# Patient Record
Sex: MALE | Race: WHITE | Employment: OTHER | URBAN - METROPOLITAN AREA
[De-identification: names, ages, dates, MRNs, and addresses within clinical notes are randomized per-mention and may not be internally consistent; named-entity substitution may affect disease eponyms.]

---

## 2016-12-01 LAB
ALBUMIN SERPL-MCNC: 4.2 G/DL
ALP SERPL-CCNC: 73 U/L
ALT SERPL-CCNC: 12 U/L
ANION GAP SERPL CALCULATED.3IONS-SCNC: NORMAL MMOL/L
AST SERPL-CCNC: 16 U/L
BILIRUB SERPL-MCNC: 0.7 MG/DL
BUN SERPL-MCNC: 18 MG/DL
CALCIUM SERPL-MCNC: 9.2 MG/DL
CHLORIDE SERPLBLD-SCNC: 104 MMOL/L
CO2 SERPL-SCNC: 24 MMOL/L
CREAT SERPL-MCNC: 1.52 MG/DL
GFR SERPL CREATININE-BSD FRML MDRD: 42 ML/MIN/1.73M2
GLUCOSE SERPL-MCNC: 101 MG/DL (ref 70–99)
POTASSIUM SERPL-SCNC: 4.8 MMOL/L
PROT SERPL-MCNC: 6.4 G/DL
SODIUM SERPL-SCNC: 143 MMOL/L

## 2017-01-11 DIAGNOSIS — I48.0 PAROXYSMAL ATRIAL FIBRILLATION (H): Primary | ICD-10-CM

## 2017-01-11 RX ORDER — DABIGATRAN ETEXILATE 150 MG/1
150 CAPSULE ORAL 2 TIMES DAILY
Qty: 60 CAPSULE | Refills: 0 | Status: SHIPPED | OUTPATIENT
Start: 2017-01-11 | End: 2017-01-27

## 2017-01-11 RX ORDER — FLECAINIDE ACETATE 50 MG/1
TABLET ORAL
Qty: 270 TABLET | Refills: 0 | Status: SHIPPED | OUTPATIENT
Start: 2017-01-11 | End: 2017-01-27

## 2017-01-26 DIAGNOSIS — I10 HYPERTENSION, ESSENTIAL: ICD-10-CM

## 2017-01-26 DIAGNOSIS — E78.5 HYPERLIPIDEMIA: Primary | ICD-10-CM

## 2017-01-27 ENCOUNTER — OFFICE VISIT (OUTPATIENT)
Dept: CARDIOLOGY | Facility: CLINIC | Age: 82
End: 2017-01-27
Attending: INTERNAL MEDICINE
Payer: MEDICARE

## 2017-01-27 ENCOUNTER — TELEPHONE (OUTPATIENT)
Dept: CARDIOLOGY | Facility: CLINIC | Age: 82
End: 2017-01-27

## 2017-01-27 VITALS
SYSTOLIC BLOOD PRESSURE: 120 MMHG | HEART RATE: 60 BPM | BODY MASS INDEX: 26.51 KG/M2 | WEIGHT: 179 LBS | DIASTOLIC BLOOD PRESSURE: 54 MMHG | HEIGHT: 69 IN

## 2017-01-27 DIAGNOSIS — E78.2 MIXED HYPERLIPIDEMIA: ICD-10-CM

## 2017-01-27 DIAGNOSIS — I10 ESSENTIAL HYPERTENSION: ICD-10-CM

## 2017-01-27 DIAGNOSIS — I48.0 PAROXYSMAL ATRIAL FIBRILLATION (H): Primary | ICD-10-CM

## 2017-01-27 DIAGNOSIS — I25.810 CORONARY ARTERY DISEASE INVOLVING CORONARY BYPASS GRAFT OF NATIVE HEART WITHOUT ANGINA PECTORIS: ICD-10-CM

## 2017-01-27 DIAGNOSIS — E78.5 HYPERLIPIDEMIA: ICD-10-CM

## 2017-01-27 DIAGNOSIS — I10 HYPERTENSION, ESSENTIAL: ICD-10-CM

## 2017-01-27 LAB
ALT SERPL W P-5'-P-CCNC: <5 U/L (ref 5–30)
ANION GAP SERPL CALCULATED.3IONS-SCNC: 10.3 MMOL/L (ref 6–17)
BUN SERPL-MCNC: 22 MG/DL (ref 7–30)
CALCIUM SERPL-MCNC: 8.6 MG/DL (ref 8.5–10.5)
CHLORIDE SERPL-SCNC: 106 MMOL/L (ref 98–107)
CHOLEST SERPL-MCNC: 118 MG/DL
CO2 SERPL-SCNC: 23 MMOL/L (ref 23–29)
CREAT SERPL-MCNC: 1.77 MG/DL (ref 0.7–1.3)
ERYTHROCYTE [DISTWIDTH] IN BLOOD BY AUTOMATED COUNT: 13.4 % (ref 10–15)
GFR SERPL CREATININE-BSD FRML MDRD: 37 ML/MIN/1.7M2
GLUCOSE SERPL-MCNC: 116 MG/DL (ref 70–105)
HCT VFR BLD AUTO: 51 % (ref 40–53)
HDLC SERPL-MCNC: 43 MG/DL
HGB BLD-MCNC: 16.8 G/DL (ref 13.3–17.7)
LDLC SERPL CALC-MCNC: 53 MG/DL
MCH RBC QN AUTO: 29 PG (ref 26.5–33)
MCHC RBC AUTO-ENTMCNC: 32.9 G/DL (ref 31.5–36.5)
MCV RBC AUTO: 88 FL (ref 78–100)
NONHDLC SERPL-MCNC: 75 MG/DL
PLATELET # BLD AUTO: 171 10E9/L (ref 150–450)
POTASSIUM SERPL-SCNC: 4.3 MMOL/L (ref 3.5–5.1)
RBC # BLD AUTO: 5.8 10E12/L (ref 4.4–5.9)
SODIUM SERPL-SCNC: 135 MMOL/L (ref 136–145)
TRIGL SERPL-MCNC: 112 MG/DL
WBC # BLD AUTO: 7.8 10E9/L (ref 4–11)

## 2017-01-27 PROCEDURE — 80048 BASIC METABOLIC PNL TOTAL CA: CPT | Performed by: INTERNAL MEDICINE

## 2017-01-27 PROCEDURE — 84460 ALANINE AMINO (ALT) (SGPT): CPT | Performed by: INTERNAL MEDICINE

## 2017-01-27 PROCEDURE — 93005 ELECTROCARDIOGRAM TRACING: CPT | Performed by: INTERNAL MEDICINE

## 2017-01-27 PROCEDURE — 85027 COMPLETE CBC AUTOMATED: CPT | Performed by: INTERNAL MEDICINE

## 2017-01-27 PROCEDURE — 99214 OFFICE O/P EST MOD 30 MIN: CPT | Mod: 25 | Performed by: INTERNAL MEDICINE

## 2017-01-27 PROCEDURE — 36415 COLL VENOUS BLD VENIPUNCTURE: CPT | Performed by: INTERNAL MEDICINE

## 2017-01-27 PROCEDURE — 80061 LIPID PANEL: CPT | Performed by: INTERNAL MEDICINE

## 2017-01-27 RX ORDER — LISINOPRIL 10 MG/1
10 TABLET ORAL AT BEDTIME
COMMUNITY
End: 2017-01-27

## 2017-01-27 RX ORDER — EZETIMIBE 10 MG/1
10 TABLET ORAL DAILY
Qty: 90 TABLET | Refills: 3 | Status: SHIPPED | OUTPATIENT
Start: 2017-01-27 | End: 2017-02-20

## 2017-01-27 RX ORDER — PYRIDOSTIGMINE BROMIDE 60 MG/1
60 TABLET ORAL 2 TIMES DAILY PRN
COMMUNITY
End: 2017-11-09

## 2017-01-27 RX ORDER — ROSUVASTATIN CALCIUM 10 MG/1
10 TABLET, COATED ORAL DAILY
Qty: 90 TABLET | Refills: 4 | Status: SHIPPED | OUTPATIENT
Start: 2017-01-27 | End: 2017-11-09

## 2017-01-27 RX ORDER — LISINOPRIL 10 MG/1
10 TABLET ORAL AT BEDTIME
Qty: 90 TABLET | Refills: 3 | Status: SHIPPED | OUTPATIENT
Start: 2017-01-27 | End: 2017-02-10 | Stop reason: SINTOL

## 2017-01-27 RX ORDER — FLECAINIDE ACETATE 50 MG/1
TABLET ORAL
Qty: 270 TABLET | Refills: 3 | Status: SHIPPED | OUTPATIENT
Start: 2017-01-27 | End: 2017-03-06 | Stop reason: DRUGHIGH

## 2017-01-27 RX ORDER — DABIGATRAN ETEXILATE 150 MG/1
150 CAPSULE ORAL 2 TIMES DAILY
Qty: 90 CAPSULE | Refills: 11 | Status: SHIPPED | OUTPATIENT
Start: 2017-01-27 | End: 2017-11-09

## 2017-01-27 NOTE — Clinical Note
2017         Mike Cosby MD   [facility]    [address]    [address]   [place]  [address]      RE: Chava Valentine   MRN: [id number]   : 10/14/1933      Dear Mike:      I saw my good friend and colleague, Chava Valentine, today.  Chava is 83.  He has had chronic coronary artery disease and in  he had a four-vessel bypass.  His anatomy was he had a mammary artery to the LAD, saphenous vein grafts individually to the diagonal, to the obtuse marginal, and the distal RCA.  Since that time, he has not had angina and he has not had heart failure.  He has remained quietly active.  He has been slowed down by a vague pattern of muscular weakness.  He has had some colon issues and a tendency to loose stools, but all in all he is doing pretty well.  He has had paroxysmal atrial fibrillation and he has been under really delightful control with flecainide 100 mg in the morning and 50 mg at night.  With this, he has not had palpitations, dizziness or syncope.  He generally can appreciate when he is in atrial fibrillation and it usually is very short lived.  He says he can even convert himself if he does a vigorous Valsalva.      His current medications are:   1.  Pradaxa 150 mg b.i.d.   2.  Zetia 10 mg a day.   3.  Crestor 10 mg daily.   4.  Lisinopril 10 mg daily.   5.  Hydrochlorothiazide 25 mg p.r.n. which he only uses occasionally.   6.  Protonix 40 mg a day.      He also gets testosterone injections every 2 weeks.      This morning, I had him get a lipid profile.  His LDL was 53, triglycerides 112 and total cholesterol 118.  His creatinine was elevated at 1.77.  He was 1.66 2 years ago.  He has seen Aguilar Campos in the past and according to Chava, Aguilar was accepting of those levels.  I asked him to get another creatinine in a couple of weeks and I asked him to hydrate himself a little more consistently.      I also saqib a hemoglobin today.  He has had a tendency to  have high hemoglobins with the testosterone injections.  The last one I had in our chart was a hemoglobin of 17 grams.  We will see what the hemoglobin is today.      PHYSICAL EXAMINATION:     GENERAL:  The exam shows that his weight is down about 7 pounds from 2 years ago.     VITAL SIGNS:  Blood pressure 120/54, heart rate 60 beats a minute and regular.   HEAD:  Normal.   NECK:  Free of neck vein distention or bruits.   HEART:  Heart tones are regular without gallop or murmur.   LUNGS:  Clear.  Sternum is solid and well-healed.   ABDOMEN:  Flat without organomegaly, mass or pain.   EXTREMITIES:  Showed diminished dorsalis pedis pulses and +1 posterior tibial pulses.  He has diminished hair on both lower extremities, supporting some component of arterial disease.      His last stress nuclear study from 01/2015 showed an ejection fraction of 53%.  No obvious scars and no obvious ischemia.      This delightful man continues to be relatively asymptomatic from a cardiac standpoint.  I renewed the cardiac medicines noted above.  He was despondent a little bit that the Pradaxa is so expensive.  I really feel for him.  These drug companies are inordinately greedy and they have really priced themselves fixing the cost at around 300 dollars a month.  It is very disappointing to be a part of that.  I offered Coumadin.  He was not interested.      IMPRESSION:   1.  Asymptomatic coronary artery disease.   2.  Controlled hypertension.   3.  Moderate renal insufficiency.   4.  Controlled paroxysmal atrial fibrillation.   5.  GERD, but relatively quiet at this time.      PLAN:  Continue as we are.  He is one of my favorite people.  If he has any problems, please let me know.  Warm regards.      Sincerely,            Michael Bernard MD, [facility]

## 2017-01-27 NOTE — MR AVS SNAPSHOT
"              After Visit Summary   1/27/2017    Chava Valentine    MRN: [id number]           Patient Information     Date Of Birth          10/14/1933        Visit Information        Provider Department      1/27/2017 9:45 AM Michael Bernard MD [facility]        Today's Diagnoses     Paroxysmal atrial fibrillation (H)    -  1     Coronary artery disease involving coronary bypass graft of native heart without angina pectoris         Mixed hyperlipidemia         Essential hypertension            Follow-ups after your visit        Additional Services     Follow-Up with Cardiologist                 Future tests that were ordered for you today     Open Future Orders        Priority Expected Expires Ordered    Follow-Up with Cardiologist Routine 7/26/2017 1/27/2018 1/27/2017    Basic metabolic panel Routine 2/10/2017 1/27/2018 1/27/2017            Who to contact     If you have questions or need follow up information about today's clinic visit or your schedule please contact [facility] directly at [phone].  Normal or non-critical lab and imaging results will be communicated to you by SSEVhart, letter or phone within 4 business days after the clinic has received the results. If you do not hear from us within 7 days, please contact the clinic through SSEVhart or phone. If you have a critical or abnormal lab result, we will notify you by phone as soon as possible.  Submit refill requests through [email or web address] or call your pharmacy and they will forward the refill request to us. Please allow 3 business days for your refill to be completed.          Additional Information About Your Visit        SSEVhart Information     [email or web address] lets you send messages to your doctor, view your test results, renew your prescriptions, schedule appointments and more. To sign up, go to [email or web address] . Click on \"Log in\" on the left side of the screen, which " "will take you to the Welcome page. Then click on \"Sign up Now\" on the right side of the page.     You will be asked to enter the access code listed below, as well as some personal information. Please follow the directions to create your username and password.     Your access code is: [email or web address]  Expires: 2017 10:33 AM     Your access code will  in 90 days. If you need help or a new code, please call your [facility] clinic or [phone].        Care EveryWhere ID     This is your Care EveryWhere ID. This could be used by other organizations to access your [facility] medical records  [id number]        Your Vitals Were     Pulse Height BMI (Body Mass Index)             60 1.74 m (5' 8.5\") 26.82 kg/m2          Blood Pressure from Last 3 Encounters:   17 120/54   12/28/15 122/74   09/30/15 145/83    Weight from Last 3 Encounters:   17 81.194 kg (179 lb)   12/28/15 83.28 kg (183 lb 9.6 oz)   09/30/15 81.647 kg (180 lb)              We Performed the Following     EKG 12-lead complete w/read - Clinics (performed today)     Follow-Up with Cardiologist          Today's Medication Changes          These changes are accurate as of: 17 10:33 AM.  If you have any questions, ask your nurse or doctor.               These medicines have changed or have updated prescriptions.        Dose/Directions    hydrochlorothiazide 25 MG tablet   Commonly known as:  HYDRODIURIL   This may have changed:  additional instructions   Used for:  Essential hypertension, benign        Dose:  25 mg   Take 1 tablet (25 mg) by mouth daily   Quantity:  90 tablet   Refills:  2       lisinopril 10 MG tablet   Commonly known as:  PRINIVIL/ZESTRIL   This may have changed:  Another medication with the same name was removed. Continue taking this medication, and follow the directions you see here.   Used for:  Essential hypertension, Coronary artery disease involving coronary bypass graft of native heart without angina pectoris "   Changed by:  Michael Bernard MD        Dose:  10 mg   Take 1 tablet (10 mg) by mouth At Bedtime   Quantity:  90 tablet   Refills:  3            Where to get your medicines      These medications were sent to [id number] - [place], [place] - [address] [facility] [address]  [address] [place] [address]    Hours:  24-hours Phone:  [phone]    - dabigatran ANTICOAGULANT 150 MG Caps capsule  - ezetimibe 10 MG tablet  - flecainide 50 MG tablet  - lisinopril 10 MG tablet  - rosuvastatin 10 MG tablet             Primary Care Provider Office Phone # Fax #    Mike Cosby [phone]       [address] [facility] [address]  [place] [address]        Thank you!     Thank you for choosing [facility] [place]  for your care. Our goal is always to provide you with excellent care. Hearing back from our patients is one way we can continue to improve our services. Please take a few minutes to complete the written survey that you may receive in the mail after your visit with us. Thank you!             Your Updated Medication List - Protect others around you: Learn how to safely use, store and throw away your medicines at www.disposemymeds.org.          This list is accurate as of: 1/27/17 10:33 AM.  Always use your most recent med list.                   Brand Name Dispense Instructions for use    CLONAZEPAM PO      Take 0.5 mg by mouth 3 times daily as needed.       dabigatran ANTICOAGULANT 150 MG Caps capsule    PRADAXA ANTICOAGULANT    90 capsule    Take 1 capsule (150 mg) by mouth 2 times daily       ezetimibe 10 MG tablet    ZETIA    90 tablet    Take 1 tablet (10 mg) by mouth daily       flecainide 50 MG tablet    TAMBOCOR    270 tablet    2 tabs every am,100 mg,  1 tab every pm, 50 mg       hydrochlorothiazide 25 MG tablet    HYDRODIURIL    90 tablet    Take 1 tablet (25 mg) by mouth daily       lisinopril 10 MG tablet     PRINIVIL/ZESTRIL    90 tablet    Take 1 tablet (10 mg) by mouth At Bedtime       MESTINON 60 MG tablet   Generic drug:  pyridostigmine      Take 60 mg by mouth 2 times daily       METAMUCIL PO      Take by mouth as needed       pantoprazole 40 MG EC tablet    PROTONIX     Take by mouth 2 times daily       rosuvastatin 10 MG tablet    CRESTOR    90 tablet    Take 1 tablet (10 mg) by mouth daily       TESTOSTERONE AQUEOUS IM      Inject 1 mL into the muscle As directed  Every two weeks       ZOLPIDEM TARTRATE PO      Take 10 mg by mouth nightly as needed.

## 2017-01-27 NOTE — TELEPHONE ENCOUNTER
Patient phoned with results of CBC drawn after clinic today by Dr. Bernard because patient is receiving testosterone injections. Copy of report placed in mail. Lul

## 2017-01-27 NOTE — PROGRESS NOTES
2017         Mike Cosby MD   [facility]    [address]    [address]   [place]  [address]      RE: Chava Valentine   MRN: [id number]   : 10/14/1933      Dear Mike:      I saw my good friend and colleague, Chava Valentine, today.  Chava is 83.  He has had chronic coronary artery disease and in  he had a four-vessel bypass.  His anatomy was he had a mammary artery to the LAD, saphenous vein grafts individually to the diagonal, to the obtuse marginal, and the distal RCA.  Since that time, he has not had angina and he has not had heart failure.  He has remained quietly active.  He has been slowed down by a vague pattern of muscular weakness.  He has had some colon issues and a tendency to loose stools, but all in all he is doing pretty well.  He has had paroxysmal atrial fibrillation and he has been under really delightful control with flecainide 100 mg in the morning and 50 mg at night.  With this, he has not had palpitations, dizziness or syncope.  He generally can appreciate when he is in atrial fibrillation and it usually is very short lived.  He says he can even convert himself if he does a vigorous Valsalva.      His current medications are:   1.  Pradaxa 150 mg b.i.d.   2.  Zetia 10 mg a day.   3.  Crestor 10 mg daily.   4.  Lisinopril 10 mg daily.   5.  Hydrochlorothiazide 25 mg p.r.n. which he only uses occasionally.   6.  Protonix 40 mg a day.      He also gets testosterone injections every 2 weeks.      This morning, I had him get a lipid profile.  His LDL was 53, triglycerides 112 and total cholesterol 118.  His creatinine was elevated at 1.77.  He was 1.66 2 years ago.  He has seen Aguilar Campos in the past and according to Chava, Aguilar was accepting of those levels.  I asked him to get another creatinine in a couple of weeks and I asked him to hydrate himself a little more consistently.      I also saqib a hemoglobin today.  He has had a tendency to  have high hemoglobins with the testosterone injections.  The last one I had in our chart was a hemoglobin of 17 grams.  We will see what the hemoglobin is today.      PHYSICAL EXAMINATION:     GENERAL:  The exam shows that his weight is down about 7 pounds from 2 years ago.     VITAL SIGNS:  Blood pressure 120/54, heart rate 60 beats a minute and regular.   HEAD:  Normal.   NECK:  Free of neck vein distention or bruits.   HEART:  Heart tones are regular without gallop or murmur.   LUNGS:  Clear.  Sternum is solid and well-healed.   ABDOMEN:  Flat without organomegaly, mass or pain.   EXTREMITIES:  Showed diminished dorsalis pedis pulses and +1 posterior tibial pulses.  He has diminished hair on both lower extremities, supporting some component of arterial disease.      His last stress nuclear study from 2015 showed an ejection fraction of 53%.  No obvious scars and no obvious ischemia.      This delightful man continues to be relatively asymptomatic from a cardiac standpoint.  I renewed the cardiac medicines noted above.  He was despondent a little bit that the Pradaxa is so expensive.  I really feel for him.  These drug companies are inordinately greedy and they have really priced themselves fixing the cost at around 300 dollars a month.  It is very disappointing to be a part of that.  I offered Coumadin.  He was not interested.      IMPRESSION:   1.  Asymptomatic coronary artery disease.   2.  Controlled hypertension.   3.  Moderate renal insufficiency.   4.  Controlled paroxysmal atrial fibrillation.   5.  GERD, but relatively quiet at this time.      PLAN:  Continue as we are.  He is one of my favorite people.  If he has any problems, please let me know.  Warm regards.      Sincerely,            Talisha Bernard MD, FACC         TALISHA BERNARD MD, FACC             D: 2017 10:39   T: 2017 11:46   MT: HANG      Name:     MAGAN COWAN   MRN:      [id number]        Account:      [id number]   :       10/14/1933           Service Date: 01/27/2017      Document: [id number]

## 2017-01-27 NOTE — PROGRESS NOTES
HPI and Plan:   See dictation  I recommend continuing current treatment plans in the chart.    Orders Placed This Encounter   Procedures     CBC with platelets     Basic metabolic panel     EKG 12-lead complete w/read - Clinics (performed today)     Orders Placed This Encounter   Medications     DISCONTD: lisinopril (PRINIVIL/ZESTRIL) 10 MG tablet     Sig: Take 10 mg by mouth At Bedtime     pyridostigmine (MESTINON) 60 MG tablet     Sig: Take 60 mg by mouth 2 times daily     dabigatran ANTICOAGULANT (PRADAXA ANTICOAGULANT) 150 MG CAPS capsule     Sig: Take 1 capsule (150 mg) by mouth 2 times daily     Dispense:  90 capsule     Refill:  11     ezetimibe (ZETIA) 10 MG tablet     Sig: Take 1 tablet (10 mg) by mouth daily     Dispense:  90 tablet     Refill:  3     flecainide (TAMBOCOR) 50 MG tablet     Si tabs every am,100 mg,  1 tab every pm, 50 mg     Dispense:  270 tablet     Refill:  3     lisinopril (PRINIVIL/ZESTRIL) 10 MG tablet     Sig: Take 1 tablet (10 mg) by mouth At Bedtime     Dispense:  90 tablet     Refill:  3     rosuvastatin (CRESTOR) 10 MG tablet     Sig: Take 1 tablet (10 mg) by mouth daily     Dispense:  90 tablet     Refill:  4     Medications Discontinued During This Encounter   Medication Reason     lisinopril (PRINIVIL,ZESTRIL) 10 MG tablet Dose adjustment     dabigatran ANTICOAGULANT (PRADAXA ANTICOAGULANT) 150 MG CAPS capsule Reorder     ezetimibe (ZETIA) 10 MG tablet Reorder     flecainide (TAMBOCOR) 50 MG tablet Reorder     lisinopril (PRINIVIL/ZESTRIL) 10 MG tablet Reorder     rosuvastatin (CRESTOR) 10 MG tablet Reorder         Encounter Diagnoses   Name Primary?     Coronary artery disease involving coronary bypass graft of native heart without angina pectoris      Mixed hyperlipidemia      Essential hypertension      Paroxysmal atrial fibrillation (H) Yes       CURRENT MEDICATIONS:  Current Outpatient Prescriptions   Medication Sig Dispense Refill     pyridostigmine (MESTINON) 60 MG  tablet Take 60 mg by mouth 2 times daily       dabigatran ANTICOAGULANT (PRADAXA ANTICOAGULANT) 150 MG CAPS capsule Take 1 capsule (150 mg) by mouth 2 times daily 90 capsule 11     ezetimibe (ZETIA) 10 MG tablet Take 1 tablet (10 mg) by mouth daily 90 tablet 3     flecainide (TAMBOCOR) 50 MG tablet 2 tabs every am,100 mg,  1 tab every pm, 50 mg 270 tablet 3     lisinopril (PRINIVIL/ZESTRIL) 10 MG tablet Take 1 tablet (10 mg) by mouth At Bedtime 90 tablet 3     rosuvastatin (CRESTOR) 10 MG tablet Take 1 tablet (10 mg) by mouth daily 90 tablet 4     hydrochlorothiazide (HYDRODIURIL) 25 MG tablet Take 1 tablet (25 mg) by mouth daily (Patient taking differently: Take 25 mg by mouth daily Taking as B/P warrants) 90 tablet 2     pantoprazole (PROTONIX) 40 MG enteric coated tablet Take by mouth 2 times daily       Psyllium (METAMUCIL PO) Take by mouth as needed        ZOLPIDEM TARTRATE PO Take 10 mg by mouth nightly as needed.       CLONAZEPAM PO Take 0.5 mg by mouth 3 times daily as needed.       TESTOSTERONE AQUEOUS IM Inject 1 mL into the muscle As directed  Every two weeks       [DISCONTINUED] lisinopril (PRINIVIL/ZESTRIL) 10 MG tablet Take 10 mg by mouth At Bedtime       [DISCONTINUED] flecainide (TAMBOCOR) 50 MG tablet 2 tabs every am,100 mg,  1 tab every pm, 50 mg 270 tablet 0     [DISCONTINUED] ezetimibe (ZETIA) 10 MG tablet Take 1 tablet (10 mg) by mouth daily 90 tablet 3     [DISCONTINUED] lisinopril (PRINIVIL,ZESTRIL) 10 MG tablet Take 1 tablet (10 mg) by mouth 2 times daily Take one tab twice daily 180 tablet 4     [DISCONTINUED] rosuvastatin (CRESTOR) 10 MG tablet Take 1 tablet (10 mg) by mouth daily 90 tablet 4       ALLERGIES   No Known Allergies    PAST MEDICAL HISTORY:  Past Medical History   Diagnosis Date     Coronary artery disease      CABG with 4 grafts-LIMA to LAD, SVG to Diagonal,OM and RCA     Arrhythmia      occ a-fib, treated     Gastro-oesophageal reflux disease      Anxiety      Hypertension       Hypogonadism      with low testosterone     Hyperlipidemia      GI bleed 10/10     Erythrocytosis      Tremor        PAST SURGICAL HISTORY:  Past Surgical History   Procedure Laterality Date     Vascular surgery       cab     Orthopedic surgery       rotator cuff right     Vitrectomy parsplana, phacoemulsification clear cornea w standard intraocular lens implant, combined  2/20/2013     Procedure: COMBINED VITRECTOMY PARSPLANA, PHACOEMULSIFICATION CLEAR CORNEA WITH STANDARD INTRAOCULAR LENS IMPLANT;  LEFT PHACOEMULSIFICATION CLEAR CORNEA WITH SAURABH TORIC INTRAOCULAR LENS IMPLANT,  LEFT EYE 23 GAUGE PARSPLANA VITRECTOMY;  Surgeon: Jorge Regan MD;  Location:  [facility]     Phacoemulsification clear cornea with standard iol, vitrectomy parsplana 23 gague, combined  2/20/2013     Procedure: COMBINED PHACOEMULSIFICATION CLEAR CORNEA WITH STANDARD INTRAOCULAR LENS IMPLANT, VITRECTOMY PARSPLANA 23 GAUGE;;  Surgeon: Guero Lockett MD;  Location: [facility]     Vitrectomy parsplana, phacoemulsification clear cornea w standard intraocular lens implant, combined  7/31/2013     Procedure: COMBINED VITRECTOMY PARSPLANA, PHACOEMULSIFICATION CLEAR CORNEA WITH STANDARD INTRAOCULAR LENS IMPLANT;  RIGHT PHACOEMULSIFICATION CLEAR CORNEA WITH TORIC INTRAOCULAR LENS IMPLANT, RIGHT 23 GAUGE PARSPLANA VITRECTOMY ;  Surgeon: Jorge Regan MD;  Location: [facility]     Phacoemulsification clear cornea with standard iol, vitrectomy parsplana 23 gague, combined  7/31/2013     Procedure: COMBINED PHACOEMULSIFICATION CLEAR CORNEA WITH STANDARD INTRAOCULAR LENS IMPLANT, VITRECTOMY PARSPLANA 23 GAUGE;;  Surgeon: Guero Lockett MD;  Location: [facility]     Cardiac surgery       Bypass graft artery coronary         FAMILY HISTORY:  Family History   Problem Relation Age of Onset     Other - See Comments Mother      old age     Other - See Comments Father 97     CEREBROVASCULAR DISEASE Father        SOCIAL HISTORY:  Social History     Social  "History     Marital Status:      Spouse Name: N/A     Number of Children: N/A     Years of Education: N/A     Social History Main Topics     Smoking status: Former Smoker     Quit date: 01/01/1999     Smokeless tobacco: Never Used      Comment: recreational only     Alcohol Use: Yes      Comment: occas     Drug Use: No     Sexual Activity: Not on file     Other Topics Concern     Parent/Sibling W/ Cabg, Mi Or Angioplasty Before 65f 55m? No     Sleep Concern Yes     Special Diet Yes     less meat      Exercise Yes     3 x weekly     Seat Belt Yes     Social History Narrative         Review of Systems:  Skin:          Eyes:         ENT:         Respiratory:          Cardiovascular:         Gastroenterology:        Genitourinary:         Musculoskeletal:         Neurologic:         Psychiatric:         Heme/Lymph/Imm:         Endocrine:           Physical Exam:  Vitals: /54 mmHg  Pulse 60  Ht 1.74 m (5' 8.5\")  Wt 81.194 kg (179 lb)  BMI 26.82 kg/m2    Constitutional:  cooperative;well developed;alert and oriented        Skin:  warm and dry to the touch        Head:  normocephalic        Eyes:  pupils equal and round;sclera white        ENT:  no pallor or cyanosis        Neck:  carotid pulses are full and equal bilaterally;JVP normal        Chest:  clear to auscultation          Cardiac: regular rhythm;normal S1 and S2;no murmurs, gallops or rubs detected                  Abdomen:  abdomen soft;no masses        Vascular:                 1+             1+          Extremities and Back:  no edema;no deformities, clubbing, cyanosis, erythema observed              Neurological:  affect appropriate, oriented to time, person and place;no gross motor deficits          Recent Lab Results:  LIPID RESULTS:  Lab Results   Component Value Date    CHOL 118 01/27/2017    HDL 43 01/27/2017    LDL 53 01/27/2017    TRIG 112 01/27/2017    CHOLHDLRATIO 2.6 01/22/2015       LIVER ENZYME RESULTS:  Lab Results   Component " Value Date    AST 13 12/28/2015    ALT <5* 01/27/2017       CBC RESULTS:  Lab Results   Component Value Date    WBC 9.5 05/14/2015    RBC 5.97* 05/14/2015    HGB 17.0 05/14/2015    HCT 50.9 05/14/2015    MCV 85 05/14/2015    MCH 28.5 05/14/2015    MCHC 33.4 05/14/2015    RDW 13.7 05/14/2015     05/14/2015       BMP RESULTS:  Lab Results   Component Value Date    * 01/27/2017    POTASSIUM 4.3 01/27/2017    CHLORIDE 106 01/27/2017    CO2 23 01/27/2017    ANIONGAP 10.3 01/27/2017    * 01/27/2017    BUN 22 01/27/2017    BUN 12.3 01/13/2014    CR 1.77* 01/27/2017    GFRESTIMATED 37* 01/27/2017    GFRESTBLACK 45* 01/27/2017    FIDEL 8.6 01/27/2017        A1C RESULTS:  No results found for: A1C    INR RESULTS:  No results found for: INR        CC  Michael Bernard MD   [facility]  [address]  [place] [address]

## 2017-02-02 DIAGNOSIS — I10 ESSENTIAL HYPERTENSION: Primary | ICD-10-CM

## 2017-02-08 ENCOUNTER — TELEPHONE (OUTPATIENT)
Dept: CARDIOLOGY | Facility: CLINIC | Age: 82
End: 2017-02-08

## 2017-02-08 NOTE — TELEPHONE ENCOUNTER
Patient called in to say that shortly after seeing  on 1/27 he has been having more episodes of atrial fibrillation particularly in the evening and can usually abort the events with a Valsalva maneuver. He has a history of PAF and is on Flecainide 100 MG BID. He started taking an extra 50 MG in the evening after Dr. Bernard told him to do so when he saw him on 1/27. However, last evening he had a 'thunderous' episode ( heart was pounding hard) that took 3 Valsalva's to abort. It had him anxious and wondering what would happen if it continued.Interestingly, he had last evening a late dinner of pork. I told him that this type of meal could be a potential trigger. He asked about exercise and I told him that this would be beneficial. He asked about other anti arrhythmic drugs and I told him that there were some that are better than Flecainide but would require an office visit with EP to discuss. I did tell him that should he get an episode that he feels is fast > 100 bpm and lasts 2-3 hours he should go to ED. For now, we will keep observing and if he does experience increasing afib episodes he will call us and would like to get in to see Dr. Guzmán. Lul

## 2017-02-10 ENCOUNTER — TELEPHONE (OUTPATIENT)
Dept: CARDIOLOGY | Facility: CLINIC | Age: 82
End: 2017-02-10

## 2017-02-10 DIAGNOSIS — I25.810 CORONARY ARTERY DISEASE INVOLVING CORONARY BYPASS GRAFT OF NATIVE HEART WITHOUT ANGINA PECTORIS: ICD-10-CM

## 2017-02-10 DIAGNOSIS — I10 ESSENTIAL HYPERTENSION: ICD-10-CM

## 2017-02-10 DIAGNOSIS — E78.2 MIXED HYPERLIPIDEMIA: ICD-10-CM

## 2017-02-10 LAB
ANION GAP SERPL CALCULATED.3IONS-SCNC: 9.1 MMOL/L (ref 6–17)
BUN SERPL-MCNC: 18 MG/DL (ref 7–30)
CALCIUM SERPL-MCNC: 8.8 MG/DL (ref 8.5–10.5)
CHLORIDE SERPL-SCNC: 104 MMOL/L (ref 98–107)
CO2 SERPL-SCNC: 28 MMOL/L (ref 23–29)
CREAT SERPL-MCNC: 1.89 MG/DL (ref 0.7–1.3)
GFR SERPL CREATININE-BSD FRML MDRD: 34 ML/MIN/1.7M2
GLUCOSE SERPL-MCNC: 179 MG/DL (ref 70–105)
POTASSIUM SERPL-SCNC: 4.1 MMOL/L (ref 3.5–5.1)
SODIUM SERPL-SCNC: 137 MMOL/L (ref 136–145)

## 2017-02-10 PROCEDURE — 36415 COLL VENOUS BLD VENIPUNCTURE: CPT | Performed by: INTERNAL MEDICINE

## 2017-02-10 PROCEDURE — 80048 BASIC METABOLIC PNL TOTAL CA: CPT | Performed by: INTERNAL MEDICINE

## 2017-02-10 NOTE — TELEPHONE ENCOUNTER
"Patient called with BMP results form today showing an upward trend in creatinine. Reviewed with Dr. Bernard who recommends stopping Lisinopril, increase fluid intake and to contact Dr. Bolivar's office regarding CKD. Patient verbalized understanding and agrees with plan. I will mail copy of lab results to him today. Lul      Dx:  Mixed hyperlipidemia; Essential hyper...                       Ref Range 9:34 AM   2wk ago        Sodium 136 - 145 mmol/L 137 135 (L)      Potassium 3.5 - 5.1 mmol/L 4.1 4.3      Chloride 98 - 107 mmol/L 104 106      Carbon Dioxide 23 - 29 mmol/L 28 23      Anion Gap 6 - 17 mmol/L 9.1 10.3      Glucose 70 - 105 mg/dL 179 (H) 116 (H)CM      Urea Nitrogen 7 - 30 mg/dL 18 22      Creatinine 0.70 - 1.30 mg/dL 1.89 (H) 1.77 (H)      GFR Estimate >60 mL/min/1.7m2 34 (L) 60 mL/min/1.7m2\" class=\"[id number]\" style=\"cursor: pointer; background-color: rgb(222, 231, 239);\" onmouseover='jscript: william varStyle=\"underline\"; william bgColor=\"#[id number]\"; this.style.backgroundColor=bgColor; william children=this.getElementsByTagName(\"div\"); for(william child=0;child  37 (L)      GFR Estimate If Black >60 mL/min/1.7m2 41 (L) 60 mL/min/1.7m2\" class=\"[id number]\" style=\"cursor: pointer; background-color: rgb(222, 231, 239);\" onmouseover='jscript: william varStyle=\"underline\"; william bgColor=\"#[id number]\"; this.style.backgroundColor=bgColor; william children=this.getElementsByTagName(\"div\"); for(william child=0;child  45 (L)      Calcium 8.5 - 10.5 mg/dL 8.8 8.6              "

## 2017-02-20 DIAGNOSIS — E78.2 MIXED HYPERLIPIDEMIA: ICD-10-CM

## 2017-02-20 DIAGNOSIS — I25.810 CORONARY ARTERY DISEASE INVOLVING CORONARY BYPASS GRAFT OF NATIVE HEART WITHOUT ANGINA PECTORIS: ICD-10-CM

## 2017-02-20 RX ORDER — EZETIMIBE 10 MG/1
10 TABLET ORAL DAILY
Qty: 90 TABLET | Refills: 3 | Status: SHIPPED | OUTPATIENT
Start: 2017-02-20 | End: 2017-11-09

## 2017-02-21 ENCOUNTER — TRANSFERRED RECORDS (OUTPATIENT)
Dept: CARDIOLOGY | Facility: CLINIC | Age: 82
End: 2017-02-21

## 2017-02-24 ENCOUNTER — DOCUMENTATION ONLY (OUTPATIENT)
Dept: CARDIOLOGY | Facility: CLINIC | Age: 82
End: 2017-02-24

## 2017-03-01 ENCOUNTER — TELEPHONE (OUTPATIENT)
Dept: CARDIOLOGY | Facility: CLINIC | Age: 82
End: 2017-03-01

## 2017-03-01 NOTE — TELEPHONE ENCOUNTER
States after seeing Dr. Bernard at the end of January 2017, was somewhat concerned about creatine of 1.89 and GFR of 34. Since has seen GI, [facility] and Dr. Campos, nephrologist. GI felt he was having esophogeal spasms. Both GI and nephrologist indicated to him that he should change to a calcium Channel medication like Amlodipine, but they did not prescribe the medication. Would like cardiology input. Dr. Bernard out of the office this week and next.  Requesting to see Dr. Vegas, in the absents of Dr. Bernard. Agreed to appointment on Monday 3/6/17.  Sent for updates from [facility] and [facility].

## 2017-03-06 ENCOUNTER — OFFICE VISIT (OUTPATIENT)
Dept: CARDIOLOGY | Facility: CLINIC | Age: 82
End: 2017-03-06
Payer: MEDICARE

## 2017-03-06 VITALS
SYSTOLIC BLOOD PRESSURE: 124 MMHG | BODY MASS INDEX: 26.36 KG/M2 | HEART RATE: 68 BPM | WEIGHT: 178 LBS | HEIGHT: 69 IN | DIASTOLIC BLOOD PRESSURE: 64 MMHG

## 2017-03-06 DIAGNOSIS — I10 ESSENTIAL HYPERTENSION: ICD-10-CM

## 2017-03-06 DIAGNOSIS — I25.10 CORONARY ARTERY DISEASE INVOLVING NATIVE CORONARY ARTERY OF NATIVE HEART WITHOUT ANGINA PECTORIS: ICD-10-CM

## 2017-03-06 DIAGNOSIS — N18.30 CKD (CHRONIC KIDNEY DISEASE) STAGE 3, GFR 30-59 ML/MIN (H): ICD-10-CM

## 2017-03-06 DIAGNOSIS — K22.4 JACKHAMMER ESOPHAGUS: Primary | ICD-10-CM

## 2017-03-06 PROCEDURE — 99214 OFFICE O/P EST MOD 30 MIN: CPT | Performed by: INTERNAL MEDICINE

## 2017-03-06 RX ORDER — LISINOPRIL 10 MG/1
10 TABLET ORAL AT BEDTIME
COMMUNITY
End: 2017-03-06 | Stop reason: ALTCHOICE

## 2017-03-06 RX ORDER — NIFEDIPINE 30 MG
30 TABLET, EXTENDED RELEASE ORAL DAILY
Qty: 30 TABLET | Refills: 11 | Status: SHIPPED | OUTPATIENT
Start: 2017-03-06 | End: 2017-04-18

## 2017-03-06 RX ORDER — FLECAINIDE ACETATE 50 MG/1
50 TABLET ORAL 2 TIMES DAILY
Status: ON HOLD | COMMUNITY
End: 2017-07-24

## 2017-03-06 NOTE — PROGRESS NOTES
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  He has carefully followed by my partner Dr. Bernard with a history of coronary artery disease including CABG in 1995, diagnosis of paroxysmal atrial fibrillation for which he is on flecainide and anticoagulation, essential hypertension, and also sees Dr. Saini with stage III chronic renal insufficiency.    He's been having increasing problems with sudden severe epigastric and lower chest pain which almost doubles him over.  It goes away after a minute or 2.  There is no pattern to it.  Specifically he never gets this with exertion and it is not like his previous angina.  Little over a year ago he had a stress study with normal ejection fraction and no ischemia.  He has been through extensive GI workup and has been given the diagnosis of jackhammer esophagus.  They recommended medical therapy and to consider calcium channel blockers but did not actually initiate therapy.  He has discussed this with his nephrologist, Dr. Campos, and reports that Dr. Saini is okay with switching his lisinopril to a calcium channel blocker.  While he has a history of significant hypertension it is well controlled with relatively low dose ACE inhibitor.  If his blood pressure gets down to 110 systolic or lower he gets symptomatic hypotension symptoms.  He is not currently having orthostasis,  with his blood pressure in clinic today running 124/64.  He does note occasionally his heart rate suddenly change going from about 55-60 up to about 85-90, although he is asymptomatic.  He does a Valsalva maneuver and it resolvs.  This suggests an element of SVT , rather than PAF.  He is not having any bleeding episodes and has not fallen.  He otherwise is mainly limited by an obscure muscle weakness syndrome that is not progressive.  He is not having dyspnea on exertion, other chest  discomfort, orthopnea, edema, claudication, myalgias or muscle weakness, focal neurologic symptoms.     Full exam as noted below.  In summary blood pressures controlled, heart rate regular, and cardiopulmonary exams normal.    Impression/plan  1-jackhammer esophagus.  There is the potential that this spasm may be amenable to mitigation with calcium channel blockers.  He is otherwise an appropriate candidate for calcium channel blockers.  We do have to watch out for low blood pressure symptoms however.  Therefore I will initiate nifedipine extended release 30 mg daily, and he will stop his lisinopril.  He'll take his blood pressure daily to every other day and be in contact if there are issues with either too low or too high a blood pressure.  As needed, we may have to titrate the nifedipine to higher dose.    2-coronary artery disease.  Stable post CABG.  Normal ejection fraction, no ischemic symptoms.      3-history of paroxysmal atrial fibrillation and possibly SVT.  However no rapid ventricular response even off of rate control medication.  I will have him continue to follow with Dr. Bernard.  He is tolerating anticoagulation.   See dictation    No orders of the defined types were placed in this encounter.      Orders Placed This Encounter   Medications     flecainide (TAMBOCOR) 50 MG tablet     Sig: Take 50 mg by mouth 2 times daily Taking one tab in the am and one tab in the pm     DISCONTD: lisinopril (PRINIVIL/ZESTRIL) 10 MG tablet     Sig: Take 10 mg by mouth At Bedtime     NIFEdipine ER (ADALAT CC) 30 MG TB24     Sig: Take 1 tablet (30 mg) by mouth daily     Dispense:  30 tablet     Refill:  11       Medications Discontinued During This Encounter   Medication Reason     flecainide (TAMBOCOR) 50 MG tablet Dose adjustment     hydrochlorothiazide (HYDRODIURIL) 25 MG tablet Discontinued by another Health Care Provider     lisinopril (PRINIVIL/ZESTRIL) 10 MG tablet Alternate therapy         Encounter Diagnoses    Name Primary?     Jackhammer esophagus Yes     Coronary artery disease involving native coronary artery of native heart without angina pectoris      Essential hypertension      CKD (chronic kidney disease) stage 3, GFR 30-59 ml/min        CURRENT MEDICATIONS:  Current Outpatient Prescriptions   Medication Sig Dispense Refill     flecainide (TAMBOCOR) 50 MG tablet Take 50 mg by mouth 2 times daily Taking one tab in the am and one tab in the pm       NIFEdipine ER (ADALAT CC) 30 MG TB24 Take 1 tablet (30 mg) by mouth daily 30 tablet 11     ezetimibe (ZETIA) 10 MG tablet Take 1 tablet (10 mg) by mouth daily 90 tablet 3     pyridostigmine (MESTINON) 60 MG tablet Take 60 mg by mouth 2 times daily       dabigatran ANTICOAGULANT (PRADAXA ANTICOAGULANT) 150 MG CAPS capsule Take 1 capsule (150 mg) by mouth 2 times daily 90 capsule 11     rosuvastatin (CRESTOR) 10 MG tablet Take 1 tablet (10 mg) by mouth daily 90 tablet 4     pantoprazole (PROTONIX) 40 MG enteric coated tablet Take by mouth 2 times daily       Psyllium (METAMUCIL PO) Take by mouth as needed        ZOLPIDEM TARTRATE PO Take 10 mg by mouth nightly as needed.       CLONAZEPAM PO Take 0.5 mg by mouth 3 times daily as needed.       TESTOSTERONE AQUEOUS IM Inject 1 mL into the muscle As directed  Every two weeks         ALLERGIES   No Known Allergies    PAST MEDICAL HISTORY:  Past Medical History   Diagnosis Date     Anxiety      Arrhythmia      occ a-fib, treated     Coronary artery disease      CABG with 4 grafts-LIMA to LAD, SVG to Diagonal,OM and RCA     Erythrocytosis      Gastro-oesophageal reflux disease      GI bleed 10/10     Hyperlipidemia      Hypertension      Hypogonadism      with low testosterone     Tremor        PAST SURGICAL HISTORY:  Past Surgical History   Procedure Laterality Date     Vascular surgery       cab     Orthopedic surgery       rotator cuff right     Vitrectomy parsplana, phacoemulsification clear cornea w standard intraocular  lens implant, combined  2/20/2013     Procedure: COMBINED VITRECTOMY PARSPLANA, PHACOEMULSIFICATION CLEAR CORNEA WITH STANDARD INTRAOCULAR LENS IMPLANT;  LEFT PHACOEMULSIFICATION CLEAR CORNEA WITH SAURABH TORIC INTRAOCULAR LENS IMPLANT,  LEFT EYE 23 GAUGE PARSPLANA VITRECTOMY;  Surgeon: Jorge Regan MD;  Location: [facility]     Phacoemulsification clear cornea with standard iol, vitrectomy parsplana 23 gague, combined  2/20/2013     Procedure: COMBINED PHACOEMULSIFICATION CLEAR CORNEA WITH STANDARD INTRAOCULAR LENS IMPLANT, VITRECTOMY PARSPLANA 23 GAUGE;;  Surgeon: Guero Lockett MD;  Location: [facility]     Vitrectomy parsplana, phacoemulsification clear cornea w standard intraocular lens implant, combined  7/31/2013     Procedure: COMBINED VITRECTOMY PARSPLANA, PHACOEMULSIFICATION CLEAR CORNEA WITH STANDARD INTRAOCULAR LENS IMPLANT;  RIGHT PHACOEMULSIFICATION CLEAR CORNEA WITH TORIC INTRAOCULAR LENS IMPLANT, RIGHT 23 GAUGE PARSPLANA VITRECTOMY ;  Surgeon: Jorge Regan MD;  Location: [facility]     Phacoemulsification clear cornea with standard iol, vitrectomy parsplana 23 gague, combined  7/31/2013     Procedure: COMBINED PHACOEMULSIFICATION CLEAR CORNEA WITH STANDARD INTRAOCULAR LENS IMPLANT, VITRECTOMY PARSPLANA 23 GAUGE;;  Surgeon: Guero Lockett MD;  Location: [facility]     Cardiac surgery       Bypass graft artery coronary         FAMILY HISTORY:  Family History   Problem Relation Age of Onset     Other - See Comments Mother      old age     Other - See Comments Father 97     CEREBROVASCULAR DISEASE Father        SOCIAL HISTORY:  Social History     Social History     Marital status:      Spouse name: N/A     Number of children: N/A     Years of education: N/A     Social History Main Topics     Smoking status: Former Smoker     Quit date: 1/1/1999     Smokeless tobacco: Never Used      Comment: recreational only     Alcohol use Yes      Comment: occas     Drug use: No     Sexual activity: Not Asked  "    Other Topics Concern     Parent/Sibling W/ Cabg, Mi Or Angioplasty Before 65f 55m? No     Sleep Concern Yes     Special Diet Yes     less meat      Exercise Yes     3 x weekly     Seat Belt Yes     Social History Narrative       Review of Systems:  Skin:  Negative       Eyes:  Positive for glasses    ENT:  Positive for nasal congestion    Respiratory:  Negative       Cardiovascular:  Negative      Gastroenterology: Negative      Genitourinary:  not assessed      Musculoskeletal:  Negative      Neurologic:  Positive for numbness or tingling of hands    Psychiatric:  Positive for anxiety occ  Heme/Lymph/Imm:  Negative      Endocrine:  Negative        Physical Exam:  Vitals: /64  Pulse 68  Ht 1.74 m (5' 8.5\")  Wt 80.7 kg (178 lb)  BMI 26.67 kg/m2    Constitutional:  cooperative;well developed;alert and oriented        Skin:  warm and dry to the touch        Head:  normocephalic        Eyes:  pupils equal and round;sclera white;EOMS intact        ENT:  no pallor or cyanosis        Neck:  carotid pulses are full and equal bilaterally;JVP normal;no carotid bruit        Chest:  clear to auscultation;normal respiratory excursion;healed median sternotomy scar          Cardiac: regular rhythm;normal S1 and S2;no murmurs, gallops or rubs detected                  Abdomen:  abdomen soft        Vascular:                 1+             1+          Extremities and Back:  no edema;no deformities, clubbing, cyanosis, erythema observed              Neurological:  affect appropriate, oriented to time, person and place;no gross motor deficits              CC  No referring provider defined for this encounter.              "

## 2017-03-06 NOTE — LETTER
3/6/2017    [facility]   [address]  [place] [address]    RE: Chava Valentine       Dear Colleague,    I had the pleasure of seeing Chava Valentine in the [facility].    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  He has carefully followed by my partner Dr. Bernard with a history of coronary artery disease including CABG in 1995, diagnosis of paroxysmal atrial fibrillation for which he is on flecainide and anticoagulation, essential hypertension, and also sees Dr. Saini with stage III chronic renal insufficiency.    He's been having increasing problems with sudden severe epigastric and lower chest pain which almost doubles him over.  It goes away after a minute or 2.  There is no pattern to it.  Specifically he never gets this with exertion and it is not like his previous angina.  Little over a year ago he had a stress study with normal ejection fraction and no ischemia.  He has been through extensive GI workup and has been given the diagnosis of jackhammer esophagus.  They recommended medical therapy and to consider calcium channel blockers but did not actually initiate therapy.  He has discussed this with his nephrologist, Dr. Campos, and reports that Dr. Saini is okay with switching his lisinopril to a calcium channel blocker.  While he has a history of significant hypertension it is well controlled with relatively low dose ACE inhibitor.  If his blood pressure gets down to 110 systolic or lower he gets symptomatic hypotension symptoms.  He is not currently having orthostasis,  with his blood pressure in clinic today running 124/64.  He does note occasionally his heart rate suddenly change going from about 55-60 up to about 85-90, although he is asymptomatic.  He does a Valsalva maneuver and it resolvs.  This suggests an element of SVT  , rather than PAF.  He is not having any bleeding episodes and has not fallen.  He otherwise is mainly limited by an obscure muscle weakness syndrome that is not progressive.  He is not having dyspnea on exertion, other chest discomfort, orthopnea, edema, claudication, myalgias or muscle weakness, focal neurologic symptoms.     Full exam as noted below.  In summary blood pressures controlled, heart rate regular, and cardiopulmonary exams normal.    Impression/plan  1-jackhammer esophagus.  There is the potential that this spasm may be amenable to mitigation with calcium channel blockers.  He is otherwise an appropriate candidate for calcium channel blockers.  We do have to watch out for low blood pressure symptoms however.  Therefore I will initiate nifedipine extended release 30 mg daily, and he will stop his lisinopril.  He'll take his blood pressure daily to every other day and be in contact if there are issues with either too low or too high a blood pressure.  As needed, we may have to titrate the nifedipine to higher dose.    2-coronary artery disease.  Stable post CABG.  Normal ejection fraction, no ischemic symptoms.      3-history of paroxysmal atrial fibrillation and possibly SVT.  However no rapid ventricular response even off of rate control medication.  I will have him continue to follow with Dr. Bernard.  He is tolerating anticoagulation.   See dictation    No orders of the defined types were placed in this encounter.      Orders Placed This Encounter   Medications     flecainide (TAMBOCOR) 50 MG tablet     Sig: Take 50 mg by mouth 2 times daily Taking one tab in the am and one tab in the pm     DISCONTD: lisinopril (PRINIVIL/ZESTRIL) 10 MG tablet     Sig: Take 10 mg by mouth At Bedtime     NIFEdipine ER (ADALAT CC) 30 MG TB24     Sig: Take 1 tablet (30 mg) by mouth daily     Dispense:  30 tablet     Refill:  11       Medications Discontinued During This Encounter   Medication Reason     flecainide  (TAMBOCOR) 50 MG tablet Dose adjustment     hydrochlorothiazide (HYDRODIURIL) 25 MG tablet Discontinued by another Health Care Provider     lisinopril (PRINIVIL/ZESTRIL) 10 MG tablet Alternate therapy         Encounter Diagnoses   Name Primary?     Jackhammer esophagus Yes     Coronary artery disease involving native coronary artery of native heart without angina pectoris      Essential hypertension      CKD (chronic kidney disease) stage 3, GFR 30-59 ml/min        CURRENT MEDICATIONS:  Current Outpatient Prescriptions   Medication Sig Dispense Refill     flecainide (TAMBOCOR) 50 MG tablet Take 50 mg by mouth 2 times daily Taking one tab in the am and one tab in the pm       NIFEdipine ER (ADALAT CC) 30 MG TB24 Take 1 tablet (30 mg) by mouth daily 30 tablet 11     ezetimibe (ZETIA) 10 MG tablet Take 1 tablet (10 mg) by mouth daily 90 tablet 3     pyridostigmine (MESTINON) 60 MG tablet Take 60 mg by mouth 2 times daily       dabigatran ANTICOAGULANT (PRADAXA ANTICOAGULANT) 150 MG CAPS capsule Take 1 capsule (150 mg) by mouth 2 times daily 90 capsule 11     rosuvastatin (CRESTOR) 10 MG tablet Take 1 tablet (10 mg) by mouth daily 90 tablet 4     pantoprazole (PROTONIX) 40 MG enteric coated tablet Take by mouth 2 times daily       Psyllium (METAMUCIL PO) Take by mouth as needed        ZOLPIDEM TARTRATE PO Take 10 mg by mouth nightly as needed.       CLONAZEPAM PO Take 0.5 mg by mouth 3 times daily as needed.       TESTOSTERONE AQUEOUS IM Inject 1 mL into the muscle As directed  Every two weeks         ALLERGIES   No Known Allergies    PAST MEDICAL HISTORY:  Past Medical History   Diagnosis Date     Anxiety      Arrhythmia      occ a-fib, treated     Coronary artery disease      CABG with 4 grafts-LIMA to LAD, SVG to Diagonal,OM and RCA     Erythrocytosis      Gastro-oesophageal reflux disease      GI bleed 10/10     Hyperlipidemia      Hypertension      Hypogonadism      with low testosterone     Tremor        PAST  SURGICAL HISTORY:  Past Surgical History   Procedure Laterality Date     Vascular surgery       cab     Orthopedic surgery       rotator cuff right     Vitrectomy parsplana, phacoemulsification clear cornea w standard intraocular lens implant, combined  2/20/2013     Procedure: COMBINED VITRECTOMY PARSPLANA, PHACOEMULSIFICATION CLEAR CORNEA WITH STANDARD INTRAOCULAR LENS IMPLANT;  LEFT PHACOEMULSIFICATION CLEAR CORNEA WITH SAURABH TORIC INTRAOCULAR LENS IMPLANT,  LEFT EYE 23 GAUGE PARSPLANA VITRECTOMY;  Surgeon: Jorge Regan MD;  Location: [facility]     Phacoemulsification clear cornea with standard iol, vitrectomy parsplana 23 gague, combined  2/20/2013     Procedure: COMBINED PHACOEMULSIFICATION CLEAR CORNEA WITH STANDARD INTRAOCULAR LENS IMPLANT, VITRECTOMY PARSPLANA 23 GAUGE;;  Surgeon: Guero Lockett MD;  Location: [facility]     Vitrectomy parsplana, phacoemulsification clear cornea w standard intraocular lens implant, combined  7/31/2013     Procedure: COMBINED VITRECTOMY PARSPLANA, PHACOEMULSIFICATION CLEAR CORNEA WITH STANDARD INTRAOCULAR LENS IMPLANT;  RIGHT PHACOEMULSIFICATION CLEAR CORNEA WITH TORIC INTRAOCULAR LENS IMPLANT, RIGHT 23 GAUGE PARSPLANA VITRECTOMY ;  Surgeon: Jorge Regan MD;  Location: [facility]     Phacoemulsification clear cornea with standard iol, vitrectomy parsplana 23 gague, combined  7/31/2013     Procedure: COMBINED PHACOEMULSIFICATION CLEAR CORNEA WITH STANDARD INTRAOCULAR LENS IMPLANT, VITRECTOMY PARSPLANA 23 GAUGE;;  Surgeon: Guero Lockett MD;  Location: [facility]     Cardiac surgery       Bypass graft artery coronary         FAMILY HISTORY:  Family History   Problem Relation Age of Onset     Other - See Comments Mother      old age     Other - See Comments Father 97     CEREBROVASCULAR DISEASE Father        SOCIAL HISTORY:  Social History     Social History     Marital status:      Spouse name: N/A     Number of children: N/A     Years of education: N/A     Social History  "Main Topics     Smoking status: Former Smoker     Quit date: 1/1/1999     Smokeless tobacco: Never Used      Comment: recreational only     Alcohol use Yes      Comment: occas     Drug use: No     Sexual activity: Not Asked     Other Topics Concern     Parent/Sibling W/ Cabg, Mi Or Angioplasty Before 65f 55m? No     Sleep Concern Yes     Special Diet Yes     less meat      Exercise Yes     3 x weekly     Seat Belt Yes     Social History Narrative       Review of Systems:  Skin:  Negative       Eyes:  Positive for glasses    ENT:  Positive for nasal congestion    Respiratory:  Negative       Cardiovascular:  Negative      Gastroenterology: Negative      Genitourinary:  not assessed      Musculoskeletal:  Negative      Neurologic:  Positive for numbness or tingling of hands    Psychiatric:  Positive for anxiety occ  Heme/Lymph/Imm:  Negative      Endocrine:  Negative        Physical Exam:  Vitals: /64  Pulse 68  Ht 1.74 m (5' 8.5\")  Wt 80.7 kg (178 lb)  BMI 26.67 kg/m2    Constitutional:  cooperative;well developed;alert and oriented        Skin:  warm and dry to the touch        Head:  normocephalic        Eyes:  pupils equal and round;sclera white;EOMS intact        ENT:  no pallor or cyanosis        Neck:  carotid pulses are full and equal bilaterally;JVP normal;no carotid bruit        Chest:  clear to auscultation;normal respiratory excursion;healed median sternotomy scar          Cardiac: regular rhythm;normal S1 and S2;no murmurs, gallops or rubs detected                  Abdomen:  abdomen soft        Vascular:                 1+             1+          Extremities and Back:  no edema;no deformities, clubbing, cyanosis, erythema observed              Neurological:  affect appropriate, oriented to time, person and place;no gross motor deficits          Thank you for allowing me to participate in the care of your patient.    Sincerely,     Jacques Vegas MD     [facility]

## 2017-03-06 NOTE — MR AVS SNAPSHOT
"              After Visit Summary   3/6/2017    Chava Valentine    MRN: [id number]           Patient Information     Date Of Birth          10/14/1933        Visit Information        Provider Department      3/6/2017 8:30 AM Jacques Vegas MD [facility] HEART [facility]        Today's Diagnoses     Jackhammer esophagus    -  1    Coronary artery disease involving native coronary artery of native heart without angina pectoris        Essential hypertension        CKD (chronic kidney disease) stage 3, GFR 30-59 ml/min           Follow-ups after your visit        Who to contact     If you have questions or need follow up information about today's clinic visit or your schedule please contact [facility] directly at [phone].  Normal or non-critical lab and imaging results will be communicated to you by MyChart, letter or phone within 4 business days after the clinic has received the results. If you do not hear from us within 7 days, please contact the clinic through [employer] or phone. If you have a critical or abnormal lab result, we will notify you by phone as soon as possible.  Submit refill requests through [employer] or call your pharmacy and they will forward the refill request to us. Please allow 3 business days for your refill to be completed.          Additional Information About Your Visit        MyChart Information     [employer] lets you send messages to your doctor, view your test results, renew your prescriptions, schedule appointments and more. To sign up, go to [email or web address] . Click on \"Log in\" on the left side of the screen, which will take you to the Welcome page. Then click on \"Sign up Now\" on the right side of the page.     You will be asked to enter the access code listed below, as well as some personal information. Please follow the directions to create your username and password.     Your access code is: " "[email or web address]  Expires: 2017 10:33 AM     Your access code will  in 90 days. If you need help or a new code, please call your [place] clinic or [phone].        Care EveryWhere ID     This is your Care EveryWhere ID. This could be used by other organizations to access your [place] medical records  [id number]        Your Vitals Were     Pulse Height BMI (Body Mass Index)             68 1.74 m (5' 8.5\") 26.67 kg/m2          Blood Pressure from Last 3 Encounters:   17 124/64   17 120/54   12/28/15 122/74    Weight from Last 3 Encounters:   17 80.7 kg (178 lb)   17 81.2 kg (179 lb)   12/28/15 83.3 kg (183 lb 9.6 oz)              Today, you had the following     No orders found for display         Today's Medication Changes          These changes are accurate as of: 3/6/17  9:15 AM.  If you have any questions, ask your nurse or doctor.               Start taking these medicines.        Dose/Directions    NIFEdipine ER 30 MG Tb24   Commonly known as:  ADALAT CC   Used for:  Jackhammer esophagus, Essential hypertension   Started by:  Jacques Vegas MD        Dose:  30 mg   Take 1 tablet (30 mg) by mouth daily   Quantity:  30 tablet   Refills:  11         These medicines have changed or have updated prescriptions.        Dose/Directions    flecainide 50 MG tablet   Commonly known as:  TAMBOCOR   This may have changed:  Another medication with the same name was removed. Continue taking this medication, and follow the directions you see here.   Changed by:  Jacques Vegas MD        Dose:  50 mg   Take 50 mg by mouth 2 times daily Taking one tab in the am and one tab in the pm   Refills:  0         Stop taking these medicines if you haven't already. Please contact your care team if you have questions.     lisinopril 10 MG tablet   Commonly known as:  PRINIVIL/ZESTRIL   Stopped by:  Jacques Vegas MD                Where to get your medicines      These " medications were sent to [id number] - [place], [place] - [address] & [facility]  [address] [place] [address] [address]    Hours:  24-hours Phone:  [phone]     NIFEdipine ER 30 MG Tb24                Primary Care Provider Office Phone # Fax #    Mike Cosby [phone]       [facility] [address]  [place] [address]        Thank you!     Thank you for choosing [facility] [place]  for your care. Our goal is always to provide you with excellent care. Hearing back from our patients is one way we can continue to improve our services. Please take a few minutes to complete the written survey that you may receive in the mail after your visit with us. Thank you!             Your Updated Medication List - Protect others around you: Learn how to safely use, store and throw away your medicines at www.disposemymeds.org.          This list is accurate as of: 3/6/17  9:15 AM.  Always use your most recent med list.                   Brand Name Dispense Instructions for use    CLONAZEPAM PO      Take 0.5 mg by mouth 3 times daily as needed.       dabigatran ANTICOAGULANT 150 MG Caps capsule    PRADAXA ANTICOAGULANT    90 capsule    Take 1 capsule (150 mg) by mouth 2 times daily       ezetimibe 10 MG tablet    ZETIA    90 tablet    Take 1 tablet (10 mg) by mouth daily       flecainide 50 MG tablet    TAMBOCOR     Take 50 mg by mouth 2 times daily Taking one tab in the am and one tab in the pm       MESTINON 60 MG tablet   Generic drug:  pyridostigmine      Take 60 mg by mouth 2 times daily       METAMUCIL PO      Take by mouth as needed       NIFEdipine ER 30 MG Tb24    ADALAT CC    30 tablet    Take 1 tablet (30 mg) by mouth daily       pantoprazole 40 MG EC tablet    PROTONIX     Take by mouth 2 times daily       rosuvastatin 10 MG tablet    CRESTOR    90 tablet    Take 1 tablet (10 mg) by mouth daily        TESTOSTERONE AQUEOUS IM      Inject 1 mL into the muscle As directed  Every two weeks       ZOLPIDEM TARTRATE PO      Take 10 mg by mouth nightly as needed.

## 2017-03-17 NOTE — TELEPHONE ENCOUNTER
Called pt - lisinopril rx refill received.  Pt states he doesn't need it for two reasons:  1) Dr. Vegas discontinued it at [facility] 03/06/2017  2)  Pt has restarted lisinopril because he didn't tolerate the nifedipine with tachycardia and flushing but he has plenty at home    Pt taking lisinopril 10mg daily.  Wants to see Dr. Vegas to review going back to lisinopril  Offered appt but pt will call back to schedule.  Called [facility] denying refill at this time.

## 2017-03-22 ENCOUNTER — TELEPHONE (OUTPATIENT)
Dept: CARDIOLOGY | Facility: CLINIC | Age: 82
End: 2017-03-22

## 2017-03-22 NOTE — TELEPHONE ENCOUNTER
Next options would be to retry the nifedipine to see if it was definitely a side effect of that; or switch to nicardipine for his jackhammer esophagus, and then could even try diltiazem.  Both of these would help with his hypertension also.  At the level of blood pressures he is getting right now we can follow for a while as try these other modalities.

## 2017-03-22 NOTE — TELEPHONE ENCOUNTER
"Received VM from pt, stating he \"put myself back on lisinopril 10mg BID and it's not working sometimes BP as high as 180.\" PC to pt who states he could not tolerate nifedipine (he experienced facial rash, headache and tachycardia). States it also wasn't controlling his BP. After 7-10 days he stopped the nifedipine and then restarted the lisinopril at 10 mg. That worked for a bit but then he noticed his BP at the 150-170 systolic range. He takes his BP in the morning and the evening and notices it is in the same range both the morning and evening readings. He then increased lisinopril to 10 mg Qa.m. and 10 mg Qp.m. Pt states he decided on the BID dosing because previously when he had been on lisinopril 20 mg daily he experienced hypotension with that dose, so he thought splitting it up to BID would help prevent that. Now a week after being on the lisinopril 10 mg BID his BP has stayed the same. HR 60-70. He does not have any other symptoms like CP or SOB.    Told pt Dr Vegas is in the cath lab today and tomorrow, but will route to him to review and get back to him with a recommendation. Pt does not have any other symptoms like chest pain or SOB.       "

## 2017-03-23 NOTE — TELEPHONE ENCOUNTER
Attempt to reach pt again. Left VM on cell phone this time. When he returns the call, Dr Vegas's recommendation is this:    (Reviewed in person with Dr Vegas 3/22/17)  1) D/C lisinopril  2) May retrial nifedipine to see if that in fact reproduces his symptoms he experienced or if he tolerates it on second try OR try nicardipine ER 20 mg  3) If either of those fail, try amlodipine 5 mg  Will most likely need to titrate doses to get to ideal BP

## 2017-03-31 NOTE — TELEPHONE ENCOUNTER
Patient called in to team 3 today to say that he had not heard back from Dr. Vegas's team regarding his recommendations-Chart reviewed and team 1 did leave him a vm on 3/22. Regardless, patient wants f/u with Dr. Bernard. He put himself back on Lisinopril 10 MG BID and his BP has been in the 130/60. He is feeling well and does not experience nay esophageal spasms. He could not tolerate the Nifidipine-gave him a feeling of an 'on going sunburn' and his BP became more elevated. I will have patient come in on 4/18 at 11:15 to see Dr. Bernard. Lul

## 2017-04-08 LAB
CHOLEST SERPL-MCNC: 109 MG/DL
HDLC SERPL-MCNC: 40 MG/DL
LDLC SERPL CALC-MCNC: 54 MG/DL
NONHDLC SERPL-MCNC: NORMAL MG/DL
TRIGL SERPL-MCNC: 77 MG/DL
VLDL - CALC: 15 CALC

## 2017-04-18 ENCOUNTER — OFFICE VISIT (OUTPATIENT)
Dept: CARDIOLOGY | Facility: CLINIC | Age: 82
End: 2017-04-18
Attending: INTERNAL MEDICINE
Payer: MEDICARE

## 2017-04-18 VITALS
SYSTOLIC BLOOD PRESSURE: 142 MMHG | HEIGHT: 69 IN | HEART RATE: 64 BPM | BODY MASS INDEX: 25.77 KG/M2 | WEIGHT: 174 LBS | DIASTOLIC BLOOD PRESSURE: 70 MMHG

## 2017-04-18 DIAGNOSIS — I48.0 PAROXYSMAL ATRIAL FIBRILLATION (H): ICD-10-CM

## 2017-04-18 DIAGNOSIS — I25.810 CORONARY ARTERY DISEASE INVOLVING CORONARY BYPASS GRAFT OF NATIVE HEART WITHOUT ANGINA PECTORIS: ICD-10-CM

## 2017-04-18 DIAGNOSIS — I10 ESSENTIAL HYPERTENSION: ICD-10-CM

## 2017-04-18 DIAGNOSIS — E78.2 MIXED HYPERLIPIDEMIA: ICD-10-CM

## 2017-04-18 PROCEDURE — 99214 OFFICE O/P EST MOD 30 MIN: CPT | Performed by: INTERNAL MEDICINE

## 2017-04-18 RX ORDER — LISINOPRIL 10 MG/1
10 TABLET ORAL 2 TIMES DAILY
COMMUNITY
End: 2017-11-09

## 2017-04-18 NOTE — PROGRESS NOTES
HPI and Plan:   See dictation            No orders of the defined types were placed in this encounter.    Orders Placed This Encounter   Medications     lisinopril (PRINIVIL/ZESTRIL) 10 MG tablet     Sig: Take 10 mg by mouth 2 times daily     Medications Discontinued During This Encounter   Medication Reason     NIFEdipine ER (ADALAT CC) 30 MG TB24 Stopped by Patient         Encounter Diagnoses   Name Primary?     Coronary artery disease involving coronary bypass graft of native heart without angina pectoris      Mixed hyperlipidemia      Essential hypertension      Paroxysmal atrial fibrillation (H)        CURRENT MEDICATIONS:  Current Outpatient Prescriptions   Medication Sig Dispense Refill     lisinopril (PRINIVIL/ZESTRIL) 10 MG tablet Take 10 mg by mouth 2 times daily       flecainide (TAMBOCOR) 50 MG tablet Take 50 mg by mouth 2 times daily Taking one tab in the am and one tab in the pm       ezetimibe (ZETIA) 10 MG tablet Take 1 tablet (10 mg) by mouth daily 90 tablet 3     pyridostigmine (MESTINON) 60 MG tablet Take 60 mg by mouth 2 times daily       dabigatran ANTICOAGULANT (PRADAXA ANTICOAGULANT) 150 MG CAPS capsule Take 1 capsule (150 mg) by mouth 2 times daily 90 capsule 11     rosuvastatin (CRESTOR) 10 MG tablet Take 1 tablet (10 mg) by mouth daily 90 tablet 4     pantoprazole (PROTONIX) 40 MG enteric coated tablet Take by mouth 2 times daily       Psyllium (METAMUCIL PO) Take by mouth as needed        ZOLPIDEM TARTRATE PO Take 10 mg by mouth nightly as needed.       CLONAZEPAM PO Take 0.5 mg by mouth 3 times daily as needed.       TESTOSTERONE AQUEOUS IM Inject 1 mL into the muscle As directed  Every two weeks         ALLERGIES   No Known Allergies    PAST MEDICAL HISTORY:  Past Medical History:   Diagnosis Date     Anxiety      Arrhythmia     occ a-fib, treated     Coronary artery disease     CABG with 4 grafts-LIMA to LAD, SVG to Diagonal,OM and RCA     Erythrocytosis      Gastro-oesophageal reflux  disease      GI bleed 10/10     Hyperlipidemia      Hypertension      Hypogonadism     with low testosterone     Tremor        PAST SURGICAL HISTORY:  Past Surgical History:   Procedure Laterality Date     BYPASS GRAFT ARTERY CORONARY       CARDIAC SURGERY       ORTHOPEDIC SURGERY      rotator cuff right     PHACOEMULSIFICATION CLEAR CORNEA WITH STANDARD IOL, VITRECTOMY PARSPLANA 23 GAGUE, COMBINED  2/20/2013    Procedure: COMBINED PHACOEMULSIFICATION CLEAR CORNEA WITH STANDARD INTRAOCULAR LENS IMPLANT, VITRECTOMY PARSPLANA 23 GAUGE;;  Surgeon: Guero Lockett MD;  Location: [facility]     PHACOEMULSIFICATION CLEAR CORNEA WITH STANDARD IOL, VITRECTOMY PARSPLANA 23 GAGUE, COMBINED  7/31/2013    Procedure: COMBINED PHACOEMULSIFICATION CLEAR CORNEA WITH STANDARD INTRAOCULAR LENS IMPLANT, VITRECTOMY PARSPLANA 23 GAUGE;;  Surgeon: Guero Lockett MD;  Location: [facility]     VASCULAR SURGERY      cab     VITRECTOMY PARSPLANA, PHACOEMULSIFICATION CLEAR CORNEA W STANDARD INTRAOCULAR LENS IMPLANT, COMBINED  2/20/2013    Procedure: COMBINED VITRECTOMY PARSPLANA, PHACOEMULSIFICATION CLEAR CORNEA WITH STANDARD INTRAOCULAR LENS IMPLANT;  LEFT PHACOEMULSIFICATION CLEAR CORNEA WITH SAURABH TORIC INTRAOCULAR LENS IMPLANT,  LEFT EYE 23 GAUGE PARSPLANA VITRECTOMY;  Surgeon: Jorge Regan MD;  Location: [facility]     VITRECTOMY PARSPLANA, PHACOEMULSIFICATION CLEAR CORNEA W STANDARD INTRAOCULAR LENS IMPLANT, COMBINED  7/31/2013    Procedure: COMBINED VITRECTOMY PARSPLANA, PHACOEMULSIFICATION CLEAR CORNEA WITH STANDARD INTRAOCULAR LENS IMPLANT;  RIGHT PHACOEMULSIFICATION CLEAR CORNEA WITH TORIC INTRAOCULAR LENS IMPLANT, RIGHT 23 GAUGE PARSPLANA VITRECTOMY ;  Surgeon: Jorge Regan MD;  Location: [facility]       FAMILY HISTORY:  Family History   Problem Relation Age of Onset     Other - See Comments Mother      old age     Other - See Comments Father 97     CEREBROVASCULAR DISEASE Father        SOCIAL HISTORY:  Social History     Social  "History     Marital status:      Spouse name: N/A     Number of children: N/A     Years of education: N/A     Social History Main Topics     Smoking status: Former Smoker     Quit date: 1/1/1999     Smokeless tobacco: Never Used      Comment: recreational only     Alcohol use Yes      Comment: occas     Drug use: No     Sexual activity: Not on file     Other Topics Concern     Parent/Sibling W/ Cabg, Mi Or Angioplasty Before 65f 55m? No     Sleep Concern Yes     Special Diet Yes     less meat      Exercise Yes     3 x weekly     Seat Belt Yes     Social History Narrative         Review of Systems:  Skin:  Negative       Eyes:  Positive for glasses    ENT:  Positive for nasal congestion    Respiratory:  Negative       Cardiovascular:  Negative      Gastroenterology: Negative      Genitourinary:  Negative      Musculoskeletal:  Negative      Neurologic:  Positive for numbness or tingling of hands    Psychiatric:  Positive for anxiety occ  Heme/Lymph/Imm:  Negative      Endocrine:  Negative        Physical Exam:  Vitals: /70  Pulse 64  Ht 1.74 m (5' 8.5\")  Wt 78.9 kg (174 lb)  BMI 26.07 kg/m2    Constitutional:  cooperative;well developed;alert and oriented        Skin:  warm and dry to the touch        Head:  normocephalic        Eyes:  pupils equal and round;sclera white;EOMS intact        ENT:  no pallor or cyanosis        Neck:  carotid pulses are full and equal bilaterally;JVP normal;no carotid bruit        Chest:  clear to auscultation;normal respiratory excursion;healed median sternotomy scar          Cardiac: regular rhythm;normal S1 and S2;no murmurs, gallops or rubs detected                  Abdomen:  abdomen soft        Vascular:                 1+             1+          Extremities and Back:  no edema;no deformities, clubbing, cyanosis, erythema observed              Neurological:  affect appropriate, oriented to time, person and place;no gross motor deficits          Recent Lab " Results:  LIPID RESULTS:  Lab Results   Component Value Date    CHOL 118 01/27/2017    HDL 43 01/27/2017    LDL 53 01/27/2017    TRIG 112 01/27/2017    CHOLHDLRATIO 2.6 01/22/2015       LIVER ENZYME RESULTS:  Lab Results   Component Value Date    AST 16 12/01/2016    ALT <5 (L) 01/27/2017       CBC RESULTS:  Lab Results   Component Value Date    WBC 7.8 01/27/2017    RBC 5.80 01/27/2017    HGB 16.8 01/27/2017    HCT 51.0 01/27/2017    MCV 88 01/27/2017    MCH 29.0 01/27/2017    MCHC 32.9 01/27/2017    RDW 13.4 01/27/2017     01/27/2017       BMP RESULTS:  Lab Results   Component Value Date     02/10/2017    POTASSIUM 4.1 02/10/2017    CHLORIDE 104 02/10/2017    CO2 28 02/10/2017    ANIONGAP 9.1 02/10/2017     (H) 02/10/2017    BUN 18 02/10/2017    CR 1.89 (H) 02/10/2017    GFRESTIMATED 34 (L) 02/10/2017    GFRESTBLACK 41 (L) 02/10/2017    FIDEL 8.8 02/10/2017        A1C RESULTS:  No results found for: A1C    INR RESULTS:  No results found for: INR        CC  Michael Bernard MD   [facility]  [address]  [place] [address]

## 2017-04-18 NOTE — MR AVS SNAPSHOT
"              After Visit Summary   4/18/2017    Chava Valentine    MRN: [id number]           Patient Information     Date Of Birth          10/14/1933        Visit Information        Provider Department      4/18/2017 11:15 AM Michael Bernard MD [facility] HEART [facility]        Today's Diagnoses     Coronary artery disease involving coronary bypass graft of native heart without angina pectoris        Mixed hyperlipidemia        Essential hypertension        Paroxysmal atrial fibrillation (H)           Follow-ups after your visit        Additional Services     Follow-Up with Cardiologist                 Future tests that were ordered for you today     Open Future Orders        Priority Expected Expires Ordered    Follow-Up with Cardiologist Routine 10/15/2017 4/18/2018 4/18/2017            Who to contact     If you have questions or need follow up information about today's clinic visit or your schedule please contact [facility] directly at [phone].  Normal or non-critical lab and imaging results will be communicated to you by MyChart, letter or phone within 4 business days after the clinic has received the results. If you do not hear from us within 7 days, please contact the clinic through [employer] or phone. If you have a critical or abnormal lab result, we will notify you by phone as soon as possible.  Submit refill requests through [employer] or call your pharmacy and they will forward the refill request to us. Please allow 3 business days for your refill to be completed.          Additional Information About Your Visit        MyChart Information     [employer] lets you send messages to your doctor, view your test results, renew your prescriptions, schedule appointments and more. To sign up, go to [email or web address] . Click on \"Log in\" on the left side of the screen, which will take you to the Welcome page. Then click on \"Sign up Now\" on the " "right side of the page.     You will be asked to enter the access code listed below, as well as some personal information. Please follow the directions to create your username and password.     Your access code is: [email or web address]  Expires: 2017 11:33 AM     Your access code will  in 90 days. If you need help or a new code, please call your [place] [address] clinic or [phone].        Care EveryWhere ID     This is your Care EveryWhere ID. This could be used by other organizations to access your [address] [address] medical records  [id number]        Your Vitals Were     Pulse Height BMI (Body Mass Index)             64 1.74 m (5' 8.5\") 26.07 kg/m2          Blood Pressure from Last 3 Encounters:   17 142/70   17 124/64   17 120/54    Weight from Last 3 Encounters:   17 78.9 kg (174 lb)   17 80.7 kg (178 lb)   17 81.2 kg (179 lb)              We Performed the Following     Follow-Up with Cardiologist        Primary Care Provider Office Phone # Fax #    Mike Cosby [phone]       [address] [facility] [address] [address]        Thank you!     Thank you for choosing [facility] [place]  for your care. Our goal is always to provide you with excellent care. Hearing back from our patients is one way we can continue to improve our services. Please take a few minutes to complete the written survey that you may receive in the mail after your visit with us. Thank you!             Your Updated Medication List - Protect others around you: Learn how to safely use, store and throw away your medicines at www.disposemymeds.org.          This list is accurate as of: 17 12:04 PM.  Always use your most recent med list.                   Brand Name Dispense Instructions for use    CLONAZEPAM PO      Take 0.5 mg by mouth 3 times daily as needed.       dabigatran ANTICOAGULANT 150 MG Caps capsule    PRADAXA ANTICOAGULANT "    90 capsule    Take 1 capsule (150 mg) by mouth 2 times daily       ezetimibe 10 MG tablet    ZETIA    90 tablet    Take 1 tablet (10 mg) by mouth daily       flecainide 50 MG tablet    TAMBOCOR     Take 50 mg by mouth 2 times daily Taking one tab in the am and one tab in the pm       lisinopril 10 MG tablet    PRINIVIL/ZESTRIL     Take 10 mg by mouth 2 times daily       MESTINON 60 MG tablet   Generic drug:  pyridostigmine      Take 60 mg by mouth 2 times daily       METAMUCIL PO      Take by mouth as needed       pantoprazole 40 MG EC tablet    PROTONIX     Take by mouth 2 times daily       rosuvastatin 10 MG tablet    CRESTOR    90 tablet    Take 1 tablet (10 mg) by mouth daily       TESTOSTERONE AQUEOUS IM      Inject 1 mL into the muscle As directed  Every two weeks       ZOLPIDEM TARTRATE PO      Take 10 mg by mouth nightly as needed.

## 2017-04-18 NOTE — LETTER
4/18/2017    Mike Cosby MD  [facility]   [address]  [place] [address]    RE: Chava BRYANT Meme       Dear Colleague,    I had the pleasure of seeing Chava Valentine in the [facility].    I saw Chava today, who is 83.  He had some midsternal discomforts recently that the clinical impression of which was esophageal spasm.  It did not fit a pattern that was suggestive of angina.  He has rarely had some palpitations which he has been able to terminate using a Valsalva maneuver.  He has not had dizziness or syncope.  In fact, Chava has lost a few pounds down from 186 three years ago to 174 pounds.  He looks trim and fit and actually looks as well to me as I remember for a couple of years.  Appetite, bowel and urinary functions have been decent.  The mid sternal discomfort he has has recently been relatively quiet.      Jacques Vegas gave a trial of nifedipine in hopes of perhaps altering the pattern of esophageal spasm, but with this Chava did not feel that well.  His blood pressure got a little wobbly, and he went back and self-medicated himself up to lisinopril 10 mg b.i.d.  Previously he had been on 10 mg once a day.      He does have chronic renal insufficiency, but lisinopril has seemingly not been a problem for him.      MEDICATIONS then are:   1.  Lisinopril as above.   2.  Flecainide 50 mg b.i.d.   3.  Zetia 10 mg a day.   4.  Physostigmine 60 mg b.i.d.   5.  Pradaxa 150 mg b.i.d.   6.  Crestor 10 mg at bedtime.   7.  Protonix 40 mg a day.   8.  Testosterone injection.      PHYSICAL EXAMINATION:   VITAL SIGNS:  Showed a blood pressure 140/70.  He has been running 130-140/70 at home, heart rate 64 beats a minute and regular.   HEAD:  Normal.   NECK:  He had no neck vein distention or bruit.   HEART:  Regular without gallop or murmur.   LUNGS:  Clear.   ABDOMEN:  Soft without organomegaly.   EXTREMITIES:  Free of edema.      The issue of his esophageal  discomfort, presumably, will be left you.  His blood pressure is normal.  He looks well.  He feels well.  His weight is down.  His lipid profiles have always been excellent.      His rhythm has been relatively stable other than a rare sense of palpitation which he tolerates and terminates using a Valsalva maneuver.  He is one of my favorite people.  Hopefully he can continue on this very stable path.  If he has any problems, let me know.      IMPRESSION:   1.  Asymptomatic coronary artery disease.   2.  No signs of angina or heart failure.   3.  Atypical midsternal discomfort, likely esophageal, doubt it is cardiac in origin.   4.  Treated hypertension.   5.  Treated hyperlipidemia.   6.  Treated paroxysmal atrial fibrillation on flecainide chronically, and he has never had signs of drug side effects or toxicity with flecainide.      PLAN:  Continue as we are.     Again, thank you for allowing me to participate in the care of your patient.      Sincerely,    TALISHA RIDLEY MD     [facility]

## 2017-04-19 NOTE — PROGRESS NOTES
HISTORY OF PRESENT ILLNESS:  I saw Chava today, who is 83.  He had some midsternal discomforts recently that the clinical impression of which was esophageal spasm.  It did not fit a pattern that was suggestive of angina.  He has rarely had some palpitations which he has been able to terminate using a Valsalva maneuver.  He has not had dizziness or syncope.  In fact, Chava has lost a few pounds down from 186 three years ago to 174 pounds.  He looks trim and fit and actually looks as well to me as I remember for a couple of years.  Appetite, bowel and urinary functions have been decent.  The mid sternal discomfort he has has recently been relatively quiet.      Jacques Vegas gave a trial of nifedipine in hopes of perhaps altering the pattern of esophageal spasm, but with this Chava did not feel that well.  His blood pressure got a little wobbly, and he went back and self-medicated himself up to lisinopril 10 mg b.i.d.  Previously he had been on 10 mg once a day.      He does have chronic renal insufficiency, but lisinopril has seemingly not been a problem for him.      MEDICATIONS then are:   1.  Lisinopril as above.   2.  Flecainide 50 mg b.i.d.   3.  Zetia 10 mg a day.   4.  Physostigmine 60 mg b.i.d.   5.  Pradaxa 150 mg b.i.d.   6.  Crestor 10 mg at bedtime.   7.  Protonix 40 mg a day.   8.  Testosterone injection.      PHYSICAL EXAMINATION:   VITAL SIGNS:  Showed a blood pressure 140/70.  He has been running 130-140/70 at home, heart rate 64 beats a minute and regular.   HEAD:  Normal.   NECK:  He had no neck vein distention or bruit.   HEART:  Regular without gallop or murmur.   LUNGS:  Clear.   ABDOMEN:  Soft without organomegaly.   EXTREMITIES:  Free of edema.      The issue of his esophageal discomfort, presumably, will be left you.  His blood pressure is normal.  He looks well.  He feels well.  His weight is down.  His lipid profiles have always been excellent.      His rhythm has been relatively stable other  than a rare sense of palpitation which he tolerates and terminates using a Valsalva maneuver.  He is one of my favorite people.  Hopefully he can continue on this very stable path.  If he has any problems, let me know.      IMPRESSION:   1.  Asymptomatic coronary artery disease.   2.  No signs of angina or heart failure.   3.  Atypical midsternal discomfort, likely esophageal, doubt it is cardiac in origin.   4.  Treated hypertension.   5.  Treated hyperlipidemia.   6.  Treated paroxysmal atrial fibrillation on flecainide chronically, and he has never had signs of drug side effects or toxicity with flecainide.      PLAN:  Continue as we are.      cc:   Mike Cosby MD    [facility]    [address]    [place]  [address]         TALISHA RIDLEY MD, [facility]             D: 2017 12:04   T: 2017 23:26   MT: DAVID      Name:     MAGAN COWAN   MRN:      [id number]        Account:      [id number]   :      10/14/1933           Service Date: 2017      Document: [id number]

## 2017-06-08 LAB
ALBUMIN SERPL-MCNC: 4.1 G/DL
ALP SERPL-CCNC: 68 U/L
ALT SERPL-CCNC: 9 U/L
ANION GAP SERPL CALCULATED.3IONS-SCNC: NORMAL MMOL/L
AST SERPL-CCNC: 10 U/L
BILIRUB SERPL-MCNC: 0.6 MG/DL
BUN SERPL-MCNC: 20 MG/DL
CALCIUM SERPL-MCNC: 8.9 MG/DL
CHLORIDE SERPLBLD-SCNC: 103 MMOL/L
CO2 SERPL-SCNC: 23 MMOL/L
CREAT SERPL-MCNC: 1.59 MG/DL
GFR SERPL CREATININE-BSD FRML MDRD: 40 ML/MIN/1.73M2
GLUCOSE SERPL-MCNC: 93 MG/DL (ref 70–99)
POTASSIUM SERPL-SCNC: 4.5 MMOL/L
PROT SERPL-MCNC: 6.3 G/DL
SODIUM SERPL-SCNC: 141 MMOL/L

## 2017-06-12 LAB
T4 FREE SERPL-MCNC: 1.01 NG/DL
TSH SERPL-ACNC: 5.94 MCU/ML

## 2017-06-26 DIAGNOSIS — N40.0 BPH (BENIGN PROSTATIC HYPERPLASIA): Primary | ICD-10-CM

## 2017-06-27 ENCOUNTER — OFFICE VISIT (OUTPATIENT)
Dept: UROLOGY | Facility: CLINIC | Age: 82
End: 2017-06-27
Payer: MEDICARE

## 2017-06-27 VITALS
WEIGHT: 174 LBS | BODY MASS INDEX: 25.77 KG/M2 | SYSTOLIC BLOOD PRESSURE: 128 MMHG | DIASTOLIC BLOOD PRESSURE: 70 MMHG | HEIGHT: 69 IN | HEART RATE: 60 BPM

## 2017-06-27 DIAGNOSIS — N40.0 ENLARGED PROSTATE: Primary | ICD-10-CM

## 2017-06-27 LAB
ALBUMIN UR-MCNC: NEGATIVE MG/DL
APPEARANCE UR: CLEAR
BILIRUB UR QL STRIP: NEGATIVE
COLOR UR AUTO: YELLOW
GLUCOSE UR STRIP-MCNC: NEGATIVE MG/DL
HGB UR QL STRIP: NEGATIVE
KETONES UR STRIP-MCNC: NEGATIVE MG/DL
LEUKOCYTE ESTERASE UR QL STRIP: NEGATIVE
NITRATE UR QL: NEGATIVE
PH UR STRIP: 5.5 PH (ref 5–7)
PR INTERVAL - MUSE: 21
PSA SERPL-MCNC: 3.2 NG/ML (ref 0–4)
SP GR UR STRIP: 1.01 (ref 1–1.03)
URN SPEC COLLECT METH UR: NORMAL
UROBILINOGEN UR STRIP-ACNC: 0.2 EU/DL (ref 0.2–1)

## 2017-06-27 PROCEDURE — 81003 URINALYSIS AUTO W/O SCOPE: CPT | Performed by: UROLOGY

## 2017-06-27 PROCEDURE — 84153 ASSAY OF PSA TOTAL: CPT | Performed by: UROLOGY

## 2017-06-27 PROCEDURE — 36415 COLL VENOUS BLD VENIPUNCTURE: CPT | Performed by: UROLOGY

## 2017-06-27 PROCEDURE — 99213 OFFICE O/P EST LOW 20 MIN: CPT | Mod: 25 | Performed by: UROLOGY

## 2017-06-27 PROCEDURE — 51798 US URINE CAPACITY MEASURE: CPT | Performed by: UROLOGY

## 2017-06-27 ASSESSMENT — PAIN SCALES - GENERAL: PAINLEVEL: NO PAIN (0)

## 2017-06-27 NOTE — LETTER
6/27/2017       RE: Chava Valentine  [address]   [facility] [address]     Dear Colleague,    Thank you for referring your patient, Chava Valentine, to the [facility] [place] at [facility]. Please see a copy of my visit note below.    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 He also uses intramuscular testosterone injections managed by his primary care physician. He uses trimix injections with good success.  I saw him for the first time a year and a half ago and at that time he was found have a somewhat asymmetric prostate, slightly larger on the right side. His PSA was low. He is back today for repeat PSA and digital rectal examination. His PSA is 3.2. He has no urinary complaints currently.      PAST MEDICAL HISTORY:   Past Medical History:   Diagnosis Date     Anxiety      Arrhythmia     occ a-fib, treated     Coronary artery disease     CABG with 4 grafts-LIMA to LAD, SVG to Diagonal,OM and RCA     Erythrocytosis      Gastro-oesophageal reflux disease      GI bleed 10/10     Hyperlipidemia      Hypertension      Hypogonadism     with low testosterone     Tremor        PAST SURGICAL HISTORY:   Past Surgical History:   Procedure Laterality Date     BYPASS GRAFT ARTERY CORONARY       CARDIAC SURGERY       ORTHOPEDIC SURGERY      rotator cuff right     PHACOEMULSIFICATION CLEAR CORNEA WITH STANDARD IOL, VITRECTOMY PARSPLANA 23 GAGUE, COMBINED  2/20/2013    Procedure: COMBINED PHACOEMULSIFICATION CLEAR CORNEA WITH STANDARD INTRAOCULAR LENS IMPLANT, VITRECTOMY PARSPLANA 23 GAUGE;;  Surgeon: Guero Lockett MD;  Location: [facility]     PHACOEMULSIFICATION CLEAR CORNEA WITH STANDARD IOL, VITRECTOMY  PARSPLANA 23 GAGUE, COMBINED  7/31/2013    Procedure: COMBINED PHACOEMULSIFICATION CLEAR CORNEA WITH STANDARD INTRAOCULAR LENS IMPLANT, VITRECTOMY PARSPLANA 23 GAUGE;;  Surgeon: Guero Lockett MD;  Location: [facility]     VASCULAR SURGERY      cab     VITRECTOMY PARSPLANA, PHACOEMULSIFICATION CLEAR CORNEA W STANDARD INTRAOCULAR LENS IMPLANT, COMBINED  2/20/2013    Procedure: COMBINED VITRECTOMY PARSPLANA, PHACOEMULSIFICATION CLEAR CORNEA WITH STANDARD INTRAOCULAR LENS IMPLANT;  LEFT PHACOEMULSIFICATION CLEAR CORNEA WITH SAURABH TORIC INTRAOCULAR LENS IMPLANT,  LEFT EYE 23 GAUGE PARSPLANA VITRECTOMY;  Surgeon: Jorge Regan MD;  Location: [facility]     VITRECTOMY PARSPLANA, PHACOEMULSIFICATION CLEAR CORNEA W STANDARD INTRAOCULAR LENS IMPLANT, COMBINED  7/31/2013    Procedure: COMBINED VITRECTOMY PARSPLANA, PHACOEMULSIFICATION CLEAR CORNEA WITH STANDARD INTRAOCULAR LENS IMPLANT;  RIGHT PHACOEMULSIFICATION CLEAR CORNEA WITH TORIC INTRAOCULAR LENS IMPLANT, RIGHT 23 GAUGE PARSPLANA VITRECTOMY ;  Surgeon: Jorge Regan MD;  Location: [facility]       FAMILY HISTORY:   Family History   Problem Relation Age of Onset     Other - See Comments Mother      old age     Other - See Comments Father 97     CEREBROVASCULAR DISEASE Father        SOCIAL HISTORY:   Social History   Substance Use Topics     Smoking status: Former Smoker     Quit date: 1/1/1999     Smokeless tobacco: Never Used      Comment: recreational only     Alcohol use Yes      Comment: occas       Current Outpatient Prescriptions   Medication     lisinopril (PRINIVIL/ZESTRIL) 10 MG tablet     flecainide (TAMBOCOR) 50 MG tablet     ezetimibe (ZETIA) 10 MG tablet     pyridostigmine (MESTINON) 60 MG tablet     dabigatran ANTICOAGULANT (PRADAXA ANTICOAGULANT) 150 MG CAPS capsule     rosuvastatin (CRESTOR) 10 MG tablet     pantoprazole (PROTONIX) 40 MG enteric coated tablet     Psyllium (METAMUCIL PO)     ZOLPIDEM TARTRATE PO     CLONAZEPAM PO     TESTOSTERONE AQUEOUS IM      No current facility-administered medications for this visit.      PHYSICAL EXAM:  There were no vitals taken for this visit.    HEENT: Normocephalic and atraumatic   Cardiac: Not done  Back/Flank: Not done  CNS/PNS: Not done  Respiratory: Normal non-labored breathing  Abdomen: Soft nontender and nondistended  Peripheral Vascular: Not done  Mental Status: Not done    Penis: Not done  Scrotal Skin: Not done  Testicles: Not done  Epididymis: Not done  Digital Rectal Exam: His prostate is slightly asymmetric, slightly larger on the right. Unchanged from previous exam.    Cystoscopy: Not done    Imaging: None    Urinalysis: UA RESULTS:  Recent Labs   Lab Test  05/14/15   1920   COLOR  Yellow   APPEARANCE  Slightly Cloudy   URINEGLC  Negative   URINEBILI  Negative   URINEKETONE  5*   SG  1.014   UBLD  Negative   URINEPH  8.0*   PROTEIN  30*   NITRITE  Negative   LEUKEST  Negative   RBCU  0   WBCU  0     PSA: 3.2    Post Void Residual: 21mL    Other labs: None today    IMPRESSION:  Doing well    PLAN:  He is doing well with a normal PSA and no urinary complaints. The Trimix injections are working well for him. We discussed general screening recommendations for PSA screening. He is well beyond the normally recommended age and he was advised of this. Nonetheless, he would like to continue PSA screening annually. We will see him back in 1 year for this.    Total Time: 15 minutes                                      Total in Consultation: 15 minutes  Ricardo Randall M.D.

## 2017-06-27 NOTE — NURSING NOTE
Chief Complaint   Patient presents with     Clinic Care Coordination - Follow-up     BPH      Echo Pacheco LPN

## 2017-06-27 NOTE — MR AVS SNAPSHOT
"              After Visit Summary   6/27/2017    Chava Valentine    MRN: [id number]           Patient Information     Date Of Birth          10/14/1933        Visit Information        Provider Department      6/27/2017 10:00 AM Ricardo Randall MD [facility]        Today's Diagnoses     Enlarged prostate    -  1       Follow-ups after your visit        Follow-up notes from your care team     Return in about 1 year (around 6/27/2018) for PSA same day, 10 min appt OK.      Your next 10 appointments already scheduled     Jul 24, 2017   Procedure with Rodolfo Calhoun MD   [facility] (--)    [address]  [facility] [address] [phone]              Who to contact     If you have questions or need follow up information about today's clinic visit or your schedule please contact [facility] directly at [phone].  Normal or non-critical lab and imaging results will be communicated to you by [employer], letter or phone within 4 business days after the clinic has received the results. If you do not hear from us within 7 days, please contact the clinic through [employer] or phone. If you have a critical or abnormal lab result, we will notify you by phone as soon as possible.  Submit refill requests through [employer] or call your pharmacy and they will forward the refill request to us. Please allow 3 business days for your refill to be completed.          Additional Information About Your Visit        MyChart Information     [employer] lets you send messages to your doctor, view your test results, renew your prescriptions, schedule appointments and more. To sign up, go to [email or web address] . Click on \"Log in\" on the left side of the screen, which will take you to the Welcome page. Then click on \"Sign up Now\" on the right side of the page.     You will be asked to enter the access code listed " "below, as well as some personal information. Please follow the directions to create your username and password.     Your access code is: [email or web address]  Expires: 2017 10:22 AM     Your access code will  in 90 days. If you need help or a new code, please call your [facility] or [phone].        Care EveryWhere ID     This is your Care EveryWhere ID. This could be used by other organizations to access your [facility] medical records  [id number]        Your Vitals Were     Pulse Height BMI (Body Mass Index)             60 1.74 m (5' 8.5\") 26.07 kg/m2          Blood Pressure from Last 3 Encounters:   17 128/70   17 142/70   17 124/64    Weight from Last 3 Encounters:   17 78.9 kg (174 lb)   17 78.9 kg (174 lb)   17 80.7 kg (178 lb)              We Performed the Following     MEASURE POST-VOID RESIDUAL URINE/BLADDER CAPACITY, US NON-IMAGING (28864)     PSA Diag Urologic Phys     UA without Microscopic        Primary Care Provider Office Phone # Fax #    Mike Cosby [phone]       [facility] [address]  [facility] [address]        Equal Access to Services     PAOLA ROJO : Hadii aad ku hadasho Soomaali, waaxda luqadaha, qaybta kaalmada adeegyachrissy, tim rosado . So [facility] [phone].    ATENCIÓN: Si habla español, tiene a diaz disposición servicios gratuitos de asistencia lingüística. Llame al [phone].    We comply with applicable federal civil rights laws and [place] laws. We do not discriminate on the basis of race, color, national origin, age, disability sex, sexual orientation or gender identity.            Thank you!     Thank you for choosing [facility]  for your care. Our goal is always to provide you with excellent care. Hearing back from our patients is one way we can continue to improve our services. Please take a few minutes to complete " the written survey that you may receive in the mail after your visit with us. Thank you!             Your Updated Medication List - Protect others around you: Learn how to safely use, store and throw away your medicines at www.disposemymeds.org.          This list is accurate as of: 6/27/17 10:22 AM.  Always use your most recent med list.                   Brand Name Dispense Instructions for use Diagnosis    CLONAZEPAM PO      Take 0.5 mg by mouth 3 times daily as needed.        dabigatran ANTICOAGULANT 150 MG capsule    PRADAXA ANTICOAGULANT    90 capsule    Take 1 capsule (150 mg) by mouth 2 times daily    Paroxysmal atrial fibrillation (H)       ezetimibe 10 MG tablet    ZETIA    90 tablet    Take 1 tablet (10 mg) by mouth daily    Coronary artery disease involving coronary bypass graft of native heart without angina pectoris, Mixed hyperlipidemia       flecainide 50 MG tablet    TAMBOCOR     Take 50 mg by mouth 2 times daily Taking one tab in the am and one tab in the pm        lisinopril 10 MG tablet    PRINIVIL/ZESTRIL     Take 10 mg by mouth 2 times daily        MESTINON 60 MG tablet   Generic drug:  pyridostigmine      Take 60 mg by mouth 2 times daily        METAMUCIL PO      Take by mouth as needed        pantoprazole 40 MG EC tablet    PROTONIX     Take by mouth 2 times daily        rosuvastatin 10 MG tablet    CRESTOR    90 tablet    Take 1 tablet (10 mg) by mouth daily    Coronary artery disease involving coronary bypass graft of native heart without angina pectoris, Mixed hyperlipidemia       TESTOSTERONE AQUEOUS IM      Inject 1 mL into the muscle As directed  Every two weeks        ZOLPIDEM TARTRATE PO      Take 10 mg by mouth nightly as needed.

## 2017-06-27 NOTE — PROGRESS NOTES
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 He also uses intramuscular testosterone injections managed by his primary care physician. He uses trimix injections with good success.  I saw him for the first time a year and a half ago and at that time he was found have a somewhat asymmetric prostate, slightly larger on the right side. His PSA was low. He is back today for repeat PSA and digital rectal examination. His PSA is 3.2. He has no urinary complaints currently.      PAST MEDICAL HISTORY:   Past Medical History:   Diagnosis Date     Anxiety      Arrhythmia     occ a-fib, treated     Coronary artery disease     CABG with 4 grafts-LIMA to LAD, SVG to Diagonal,OM and RCA     Erythrocytosis      Gastro-oesophageal reflux disease      GI bleed 10/10     Hyperlipidemia      Hypertension      Hypogonadism     with low testosterone     Tremor        PAST SURGICAL HISTORY:   Past Surgical History:   Procedure Laterality Date     BYPASS GRAFT ARTERY CORONARY       CARDIAC SURGERY       ORTHOPEDIC SURGERY      rotator cuff right     PHACOEMULSIFICATION CLEAR CORNEA WITH STANDARD IOL, VITRECTOMY PARSPLANA 23 GAGUE, COMBINED  2/20/2013    Procedure: COMBINED PHACOEMULSIFICATION CLEAR CORNEA WITH STANDARD INTRAOCULAR LENS IMPLANT, VITRECTOMY PARSPLANA 23 GAUGE;;  Surgeon: Guero Lockett MD;  Location: [facility]     PHACOEMULSIFICATION CLEAR CORNEA WITH STANDARD IOL, VITRECTOMY PARSPLANA 23 GAGUE, COMBINED  7/31/2013    Procedure: COMBINED PHACOEMULSIFICATION CLEAR CORNEA WITH STANDARD INTRAOCULAR LENS IMPLANT, VITRECTOMY PARSPLANA 23 GAUGE;;  Surgeon: Guero Lockett MD;  Location: [facility]     VASCULAR SURGERY      cab     VITRECTOMY PARSPLANA, PHACOEMULSIFICATION CLEAR CORNEA W  STANDARD INTRAOCULAR LENS IMPLANT, COMBINED  2/20/2013    Procedure: COMBINED VITRECTOMY PARSPLANA, PHACOEMULSIFICATION CLEAR CORNEA WITH STANDARD INTRAOCULAR LENS IMPLANT;  LEFT PHACOEMULSIFICATION CLEAR CORNEA WITH SAURABH TORIC INTRAOCULAR LENS IMPLANT,  LEFT EYE 23 GAUGE PARSPLANA VITRECTOMY;  Surgeon: Jorge Regan MD;  Location: [facility]     VITRECTOMY PARSPLANA, PHACOEMULSIFICATION CLEAR CORNEA W STANDARD INTRAOCULAR LENS IMPLANT, COMBINED  7/31/2013    Procedure: COMBINED VITRECTOMY PARSPLANA, PHACOEMULSIFICATION CLEAR CORNEA WITH STANDARD INTRAOCULAR LENS IMPLANT;  RIGHT PHACOEMULSIFICATION CLEAR CORNEA WITH TORIC INTRAOCULAR LENS IMPLANT, RIGHT 23 GAUGE PARSPLANA VITRECTOMY ;  Surgeon: Jorge Regan MD;  Location: [facility]       FAMILY HISTORY:   Family History   Problem Relation Age of Onset     Other - See Comments Mother      old age     Other - See Comments Father 97     CEREBROVASCULAR DISEASE Father        SOCIAL HISTORY:   Social History   Substance Use Topics     Smoking status: Former Smoker     Quit date: 1/1/1999     Smokeless tobacco: Never Used      Comment: recreational only     Alcohol use Yes      Comment: occas       Current Outpatient Prescriptions   Medication     lisinopril (PRINIVIL/ZESTRIL) 10 MG tablet     flecainide (TAMBOCOR) 50 MG tablet     ezetimibe (ZETIA) 10 MG tablet     pyridostigmine (MESTINON) 60 MG tablet     dabigatran ANTICOAGULANT (PRADAXA ANTICOAGULANT) 150 MG CAPS capsule     rosuvastatin (CRESTOR) 10 MG tablet     pantoprazole (PROTONIX) 40 MG enteric coated tablet     Psyllium (METAMUCIL PO)     ZOLPIDEM TARTRATE PO     CLONAZEPAM PO     TESTOSTERONE AQUEOUS IM     No current facility-administered medications for this visit.          PHYSICAL EXAM:    There were no vitals taken for this visit.    HEENT: Normocephalic and atraumatic   Cardiac: Not done  Back/Flank: Not done  CNS/PNS: Not done  Respiratory: Normal non-labored breathing  Abdomen: Soft nontender and  nondistended  Peripheral Vascular: Not done  Mental Status: Not done    Penis: Not done  Scrotal Skin: Not done  Testicles: Not done  Epididymis: Not done  Digital Rectal Exam: His prostate is slightly asymmetric, slightly larger on the right. Unchanged from previous exam.    Cystoscopy: Not done    Imaging: None    Urinalysis: UA RESULTS:  Recent Labs   Lab Test  05/14/15   1920   COLOR  Yellow   APPEARANCE  Slightly Cloudy   URINEGLC  Negative   URINEBILI  Negative   URINEKETONE  5*   SG  1.014   UBLD  Negative   URINEPH  8.0*   PROTEIN  30*   NITRITE  Negative   LEUKEST  Negative   RBCU  0   WBCU  0       PSA: 3.2    Post Void Residual: 21mL    Other labs: None today      IMPRESSION:  Doing well    PLAN:  He is doing well with a normal PSA and no urinary complaints. The Trimix injections are working well for him. We discussed general screening recommendations for PSA screening. He is well beyond the normally recommended age and he was advised of this. Nonetheless, he would like to continue PSA screening annually. We will see him back in 1 year for this.    Total Time: 15 minutes                                      Total in Consultation: 15 minutes      Ricardo Randall M.D.

## 2017-07-18 LAB — POTASSIUM SERPL-SCNC: 5.3 MMOL/L

## 2017-07-21 RX ORDER — NIFEDIPINE 30 MG
30 TABLET, EXTENDED RELEASE ORAL DAILY
Status: ON HOLD | COMMUNITY
End: 2017-07-24

## 2017-07-24 ENCOUNTER — ANESTHESIA EVENT (OUTPATIENT)
Dept: SURGERY | Facility: CLINIC | Age: 82
DRG: 483 | End: 2017-07-24
Payer: MEDICARE

## 2017-07-24 ENCOUNTER — HOSPITAL ENCOUNTER (INPATIENT)
Facility: CLINIC | Age: 82
LOS: 2 days | Discharge: HOME | DRG: 483 | End: 2017-07-26
Attending: ORTHOPAEDIC SURGERY | Admitting: ORTHOPAEDIC SURGERY
Payer: MEDICARE

## 2017-07-24 ENCOUNTER — ANESTHESIA (OUTPATIENT)
Dept: SURGERY | Facility: CLINIC | Age: 82
DRG: 483 | End: 2017-07-24
Payer: MEDICARE

## 2017-07-24 ENCOUNTER — APPOINTMENT (OUTPATIENT)
Dept: GENERAL RADIOLOGY | Facility: CLINIC | Age: 82
DRG: 483 | End: 2017-07-24
Attending: ORTHOPAEDIC SURGERY
Payer: MEDICARE

## 2017-07-24 DIAGNOSIS — Z96.611 S/P REVERSE TOTAL SHOULDER ARTHROPLASTY, RIGHT: Primary | ICD-10-CM

## 2017-07-24 PROBLEM — Z96.619 S/P REVERSE TOTAL SHOULDER ARTHROPLASTY: Status: ACTIVE | Noted: 2017-07-24

## 2017-07-24 LAB
ANION GAP SERPL CALCULATED.3IONS-SCNC: 8 MMOL/L (ref 3–14)
BUN SERPL-MCNC: 16 MG/DL (ref 7–30)
CALCIUM SERPL-MCNC: 8.5 MG/DL (ref 8.5–10.1)
CHLORIDE SERPL-SCNC: 106 MMOL/L (ref 94–109)
CO2 SERPL-SCNC: 24 MMOL/L (ref 20–32)
CREAT SERPL-MCNC: 1.42 MG/DL (ref 0.66–1.25)
GFR SERPL CREATININE-BSD FRML MDRD: 48 ML/MIN/1.7M2
GLUCOSE SERPL-MCNC: 106 MG/DL (ref 70–99)
HGB BLD-MCNC: 16.7 G/DL (ref 13.3–17.7)
INR PPP: 0.89 (ref 0.86–1.14)
PLATELET # BLD AUTO: 145 10E9/L (ref 150–450)
POTASSIUM SERPL-SCNC: 4.7 MMOL/L (ref 3.4–5.3)
SODIUM SERPL-SCNC: 138 MMOL/L (ref 133–144)

## 2017-07-24 PROCEDURE — 25000125 ZZHC RX 250: Performed by: NURSE ANESTHETIST, CERTIFIED REGISTERED

## 2017-07-24 PROCEDURE — C1713 ANCHOR/SCREW BN/BN,TIS/BN: HCPCS | Performed by: ORTHOPAEDIC SURGERY

## 2017-07-24 PROCEDURE — 85018 HEMOGLOBIN: CPT | Performed by: ORTHOPAEDIC SURGERY

## 2017-07-24 PROCEDURE — 25000128 H RX IP 250 OP 636: Performed by: NURSE ANESTHETIST, CERTIFIED REGISTERED

## 2017-07-24 PROCEDURE — 36415 COLL VENOUS BLD VENIPUNCTURE: CPT | Performed by: ORTHOPAEDIC SURGERY

## 2017-07-24 PROCEDURE — 36000063 ZZH SURGERY LEVEL 4 EA 15 ADDTL MIN: Performed by: ORTHOPAEDIC SURGERY

## 2017-07-24 PROCEDURE — 12000007 ZZH R&B INTERMEDIATE

## 2017-07-24 PROCEDURE — C1776 JOINT DEVICE (IMPLANTABLE): HCPCS | Performed by: ORTHOPAEDIC SURGERY

## 2017-07-24 PROCEDURE — 40000986 XR SHOULDER RT PORT G/E 2 VW: Mod: RT

## 2017-07-24 PROCEDURE — 27210794 ZZH OR GENERAL SUPPLY STERILE: Performed by: ORTHOPAEDIC SURGERY

## 2017-07-24 PROCEDURE — 27810169 ZZH OR IMPLANT GENERAL: Performed by: ORTHOPAEDIC SURGERY

## 2017-07-24 PROCEDURE — 93010 ELECTROCARDIOGRAM REPORT: CPT | Performed by: INTERNAL MEDICINE

## 2017-07-24 PROCEDURE — 25000132 ZZH RX MED GY IP 250 OP 250 PS 637: Mod: GY | Performed by: ORTHOPAEDIC SURGERY

## 2017-07-24 PROCEDURE — 25000566 ZZH SEVOFLURANE, EA 15 MIN: Performed by: ORTHOPAEDIC SURGERY

## 2017-07-24 PROCEDURE — 99207 ZZC CONSULT E&M CHANGED TO INITIAL LEVEL: CPT | Performed by: PHYSICIAN ASSISTANT

## 2017-07-24 PROCEDURE — 25000128 H RX IP 250 OP 636: Performed by: ORTHOPAEDIC SURGERY

## 2017-07-24 PROCEDURE — 36000093 ZZH SURGERY LEVEL 4 1ST 30 MIN: Performed by: ORTHOPAEDIC SURGERY

## 2017-07-24 PROCEDURE — 25000128 H RX IP 250 OP 636: Performed by: ANESTHESIOLOGY

## 2017-07-24 PROCEDURE — 25000125 ZZHC RX 250: Performed by: ORTHOPAEDIC SURGERY

## 2017-07-24 PROCEDURE — 25000132 ZZH RX MED GY IP 250 OP 250 PS 637: Mod: GY

## 2017-07-24 PROCEDURE — A9270 NON-COVERED ITEM OR SERVICE: HCPCS | Mod: GY | Performed by: ORTHOPAEDIC SURGERY

## 2017-07-24 PROCEDURE — 85610 PROTHROMBIN TIME: CPT | Performed by: ORTHOPAEDIC SURGERY

## 2017-07-24 PROCEDURE — 40000171 ZZH STATISTIC PRE-PROCEDURE ASSESSMENT III: Performed by: ORTHOPAEDIC SURGERY

## 2017-07-24 PROCEDURE — 37000008 ZZH ANESTHESIA TECHNICAL FEE, 1ST 30 MIN: Performed by: ORTHOPAEDIC SURGERY

## 2017-07-24 PROCEDURE — 99223 1ST HOSP IP/OBS HIGH 75: CPT | Performed by: PHYSICIAN ASSISTANT

## 2017-07-24 PROCEDURE — 93005 ELECTROCARDIOGRAM TRACING: CPT

## 2017-07-24 PROCEDURE — 0RRJ00Z REPLACEMENT OF RIGHT SHOULDER JOINT WITH REVERSE BALL AND SOCKET SYNTHETIC SUBSTITUTE, OPEN APPROACH: ICD-10-PCS | Performed by: ORTHOPAEDIC SURGERY

## 2017-07-24 PROCEDURE — 80048 BASIC METABOLIC PNL TOTAL CA: CPT | Performed by: ORTHOPAEDIC SURGERY

## 2017-07-24 PROCEDURE — 85049 AUTOMATED PLATELET COUNT: CPT | Performed by: ORTHOPAEDIC SURGERY

## 2017-07-24 PROCEDURE — 37000009 ZZH ANESTHESIA TECHNICAL FEE, EACH ADDTL 15 MIN: Performed by: ORTHOPAEDIC SURGERY

## 2017-07-24 PROCEDURE — 25800025 ZZH RX 258: Performed by: ORTHOPAEDIC SURGERY

## 2017-07-24 PROCEDURE — 71000012 ZZH RECOVERY PHASE 1 LEVEL 1 FIRST HR: Performed by: ORTHOPAEDIC SURGERY

## 2017-07-24 DEVICE — IMP BASEPLATE SHLDR 15MM AQLS DWD003: Type: IMPLANTABLE DEVICE | Site: SHOULDER | Status: FUNCTIONAL

## 2017-07-24 DEVICE — IMP SHOULDER HUMERAL HEAD +12MM 36MM DWB995: Type: IMPLANTABLE DEVICE | Site: SHOULDER | Status: FUNCTIONAL

## 2017-07-24 DEVICE — IMP SCR FIXATION 4.5X18MM AQLS VDV218: Type: IMPLANTABLE DEVICE | Site: SHOULDER | Status: FUNCTIONAL

## 2017-07-24 DEVICE — BONE CEMENT RADIOPAQUE SIMPLEX HV FULL DOSE 6194-1-001: Type: IMPLANTABLE DEVICE | Site: SHOULDER | Status: FUNCTIONAL

## 2017-07-24 DEVICE — IMP STEM SHOULDER 12.0X100MM AQLS DWB950: Type: IMPLANTABLE DEVICE | Site: SHOULDER | Status: FUNCTIONAL

## 2017-07-24 DEVICE — IMP SHOULDER HUMERAL HEAD METAPHYSIS 36MM DWB960: Type: IMPLANTABLE DEVICE | Site: SHOULDER | Status: FUNCTIONAL

## 2017-07-24 DEVICE — IMP GLENOID SHOULDER +9MM 36MM AQLS DWB931: Type: IMPLANTABLE DEVICE | Site: SHOULDER | Status: FUNCTIONAL

## 2017-07-24 DEVICE — IMP SCR LOCKING 4.5X41MM AQLS DWD041: Type: IMPLANTABLE DEVICE | Site: SHOULDER | Status: FUNCTIONAL

## 2017-07-24 DEVICE — CEMENT RESTRICTOR 24MM EBO101: Type: IMPLANTABLE DEVICE | Site: SHOULDER | Status: FUNCTIONAL

## 2017-07-24 DEVICE — IMPLANTABLE DEVICE: Type: IMPLANTABLE DEVICE | Site: SHOULDER | Status: FUNCTIONAL

## 2017-07-24 DEVICE — IMP SCR FIXATION 4.5X29MM AQLS VDV229: Type: IMPLANTABLE DEVICE | Site: SHOULDER | Status: FUNCTIONAL

## 2017-07-24 DEVICE — IMP SCR LOCKING 4.5X26MM AQLS DWD026: Type: IMPLANTABLE DEVICE | Site: SHOULDER | Status: FUNCTIONAL

## 2017-07-24 RX ORDER — HYDROMORPHONE HYDROCHLORIDE 1 MG/ML
0.2 INJECTION, SOLUTION INTRAMUSCULAR; INTRAVENOUS; SUBCUTANEOUS
Status: DISCONTINUED | OUTPATIENT
Start: 2017-07-24 | End: 2017-07-26 | Stop reason: HOSPADM

## 2017-07-24 RX ORDER — ONDANSETRON 2 MG/ML
4 INJECTION INTRAMUSCULAR; INTRAVENOUS EVERY 30 MIN PRN
Status: DISCONTINUED | OUTPATIENT
Start: 2017-07-24 | End: 2017-07-24 | Stop reason: HOSPADM

## 2017-07-24 RX ORDER — FLUTICASONE PROPIONATE 50 MCG
1 SPRAY, SUSPENSION (ML) NASAL DAILY PRN
COMMUNITY
End: 2022-06-29

## 2017-07-24 RX ORDER — FENTANYL CITRATE 50 UG/ML
INJECTION, SOLUTION INTRAMUSCULAR; INTRAVENOUS PRN
Status: DISCONTINUED | OUTPATIENT
Start: 2017-07-24 | End: 2017-07-24

## 2017-07-24 RX ORDER — CYCLOBENZAPRINE HCL 5 MG
5 TABLET ORAL 3 TIMES DAILY PRN
Status: DISCONTINUED | OUTPATIENT
Start: 2017-07-24 | End: 2017-07-26 | Stop reason: HOSPADM

## 2017-07-24 RX ORDER — CEFAZOLIN SODIUM 1 G/3ML
1 INJECTION, POWDER, FOR SOLUTION INTRAMUSCULAR; INTRAVENOUS EVERY 8 HOURS
Status: COMPLETED | OUTPATIENT
Start: 2017-07-24 | End: 2017-07-25

## 2017-07-24 RX ORDER — ONDANSETRON 4 MG/1
4 TABLET, ORALLY DISINTEGRATING ORAL EVERY 30 MIN PRN
Status: DISCONTINUED | OUTPATIENT
Start: 2017-07-24 | End: 2017-07-24 | Stop reason: HOSPADM

## 2017-07-24 RX ORDER — ONDANSETRON 4 MG/1
4 TABLET, ORALLY DISINTEGRATING ORAL EVERY 6 HOURS PRN
Status: DISCONTINUED | OUTPATIENT
Start: 2017-07-24 | End: 2017-07-26 | Stop reason: HOSPADM

## 2017-07-24 RX ORDER — ACETAMINOPHEN 325 MG/1
650 TABLET ORAL EVERY 4 HOURS PRN
Status: DISCONTINUED | OUTPATIENT
Start: 2017-07-27 | End: 2017-07-26 | Stop reason: HOSPADM

## 2017-07-24 RX ORDER — FENTANYL CITRATE 50 UG/ML
25-50 INJECTION, SOLUTION INTRAMUSCULAR; INTRAVENOUS
Status: DISCONTINUED | OUTPATIENT
Start: 2017-07-24 | End: 2017-07-24

## 2017-07-24 RX ORDER — OXYCODONE HYDROCHLORIDE 5 MG/1
5 TABLET ORAL
Status: DISCONTINUED | OUTPATIENT
Start: 2017-07-24 | End: 2017-07-25

## 2017-07-24 RX ORDER — CEFAZOLIN SODIUM 1 G/3ML
1 INJECTION, POWDER, FOR SOLUTION INTRAMUSCULAR; INTRAVENOUS SEE ADMIN INSTRUCTIONS
Status: DISCONTINUED | OUTPATIENT
Start: 2017-07-24 | End: 2017-07-24

## 2017-07-24 RX ORDER — GLYCOPYRROLATE 0.2 MG/ML
INJECTION, SOLUTION INTRAMUSCULAR; INTRAVENOUS PRN
Status: DISCONTINUED | OUTPATIENT
Start: 2017-07-24 | End: 2017-07-24

## 2017-07-24 RX ORDER — ONDANSETRON 2 MG/ML
4 INJECTION INTRAMUSCULAR; INTRAVENOUS EVERY 6 HOURS PRN
Status: DISCONTINUED | OUTPATIENT
Start: 2017-07-24 | End: 2017-07-26 | Stop reason: HOSPADM

## 2017-07-24 RX ORDER — VECURONIUM BROMIDE 1 MG/ML
INJECTION, POWDER, LYOPHILIZED, FOR SOLUTION INTRAVENOUS PRN
Status: DISCONTINUED | OUTPATIENT
Start: 2017-07-24 | End: 2017-07-24

## 2017-07-24 RX ORDER — NEOSTIGMINE METHYLSULFATE 1 MG/ML
VIAL (ML) INJECTION PRN
Status: DISCONTINUED | OUTPATIENT
Start: 2017-07-24 | End: 2017-07-24

## 2017-07-24 RX ORDER — EPHEDRINE SULFATE 50 MG/ML
INJECTION, SOLUTION INTRAMUSCULAR; INTRAVENOUS; SUBCUTANEOUS PRN
Status: DISCONTINUED | OUTPATIENT
Start: 2017-07-24 | End: 2017-07-24

## 2017-07-24 RX ORDER — ROSUVASTATIN CALCIUM 10 MG/1
10 TABLET, COATED ORAL DAILY
Status: DISCONTINUED | OUTPATIENT
Start: 2017-07-24 | End: 2017-07-26 | Stop reason: HOSPADM

## 2017-07-24 RX ORDER — PROPOFOL 10 MG/ML
INJECTION, EMULSION INTRAVENOUS PRN
Status: DISCONTINUED | OUTPATIENT
Start: 2017-07-24 | End: 2017-07-24

## 2017-07-24 RX ORDER — NALOXONE HYDROCHLORIDE 0.4 MG/ML
.1-.4 INJECTION, SOLUTION INTRAMUSCULAR; INTRAVENOUS; SUBCUTANEOUS
Status: DISCONTINUED | OUTPATIENT
Start: 2017-07-24 | End: 2017-07-26 | Stop reason: HOSPADM

## 2017-07-24 RX ORDER — FLUTICASONE PROPIONATE 50 MCG
1 SPRAY, SUSPENSION (ML) NASAL DAILY PRN
Status: DISCONTINUED | OUTPATIENT
Start: 2017-07-24 | End: 2017-07-26 | Stop reason: HOSPADM

## 2017-07-24 RX ORDER — PANTOPRAZOLE SODIUM 40 MG/1
40 TABLET, DELAYED RELEASE ORAL 2 TIMES DAILY
Status: DISCONTINUED | OUTPATIENT
Start: 2017-07-24 | End: 2017-07-26 | Stop reason: HOSPADM

## 2017-07-24 RX ORDER — SODIUM CHLORIDE, SODIUM LACTATE, POTASSIUM CHLORIDE, CALCIUM CHLORIDE 600; 310; 30; 20 MG/100ML; MG/100ML; MG/100ML; MG/100ML
INJECTION, SOLUTION INTRAVENOUS CONTINUOUS
Status: DISCONTINUED | OUTPATIENT
Start: 2017-07-24 | End: 2017-07-24

## 2017-07-24 RX ORDER — DIPHENHYDRAMINE HYDROCHLORIDE 50 MG/ML
12.5 INJECTION INTRAMUSCULAR; INTRAVENOUS EVERY 6 HOURS PRN
Status: DISCONTINUED | OUTPATIENT
Start: 2017-07-24 | End: 2017-07-26 | Stop reason: HOSPADM

## 2017-07-24 RX ORDER — PROCHLORPERAZINE MALEATE 5 MG
5 TABLET ORAL EVERY 6 HOURS PRN
Status: DISCONTINUED | OUTPATIENT
Start: 2017-07-24 | End: 2017-07-26 | Stop reason: HOSPADM

## 2017-07-24 RX ORDER — AMOXICILLIN 250 MG
1-2 CAPSULE ORAL 2 TIMES DAILY
Status: DISCONTINUED | OUTPATIENT
Start: 2017-07-24 | End: 2017-07-26 | Stop reason: HOSPADM

## 2017-07-24 RX ORDER — LIDOCAINE 40 MG/G
CREAM TOPICAL
Status: DISCONTINUED | OUTPATIENT
Start: 2017-07-24 | End: 2017-07-26 | Stop reason: HOSPADM

## 2017-07-24 RX ORDER — LIDOCAINE HYDROCHLORIDE 20 MG/ML
INJECTION, SOLUTION INFILTRATION; PERINEURAL PRN
Status: DISCONTINUED | OUTPATIENT
Start: 2017-07-24 | End: 2017-07-24

## 2017-07-24 RX ORDER — ONDANSETRON 2 MG/ML
INJECTION INTRAMUSCULAR; INTRAVENOUS PRN
Status: DISCONTINUED | OUTPATIENT
Start: 2017-07-24 | End: 2017-07-24

## 2017-07-24 RX ORDER — FENTANYL CITRATE 50 UG/ML
25-50 INJECTION, SOLUTION INTRAMUSCULAR; INTRAVENOUS
Status: DISCONTINUED | OUTPATIENT
Start: 2017-07-24 | End: 2017-07-24 | Stop reason: HOSPADM

## 2017-07-24 RX ORDER — SODIUM CHLORIDE, SODIUM LACTATE, POTASSIUM CHLORIDE, CALCIUM CHLORIDE 600; 310; 30; 20 MG/100ML; MG/100ML; MG/100ML; MG/100ML
INJECTION, SOLUTION INTRAVENOUS CONTINUOUS
Status: DISCONTINUED | OUTPATIENT
Start: 2017-07-24 | End: 2017-07-24 | Stop reason: HOSPADM

## 2017-07-24 RX ORDER — DEXTROSE MONOHYDRATE, SODIUM CHLORIDE, AND POTASSIUM CHLORIDE 50; 1.49; 4.5 G/1000ML; G/1000ML; G/1000ML
INJECTION, SOLUTION INTRAVENOUS CONTINUOUS
Status: DISCONTINUED | OUTPATIENT
Start: 2017-07-24 | End: 2017-07-26 | Stop reason: HOSPADM

## 2017-07-24 RX ORDER — EZETIMIBE 10 MG/1
10 TABLET ORAL DAILY
Status: DISCONTINUED | OUTPATIENT
Start: 2017-07-25 | End: 2017-07-26 | Stop reason: HOSPADM

## 2017-07-24 RX ORDER — DIPHENHYDRAMINE HCL 12.5MG/5ML
12.5 LIQUID (ML) ORAL EVERY 6 HOURS PRN
Status: DISCONTINUED | OUTPATIENT
Start: 2017-07-24 | End: 2017-07-26 | Stop reason: HOSPADM

## 2017-07-24 RX ORDER — ACETAMINOPHEN 325 MG/1
975 TABLET ORAL EVERY 8 HOURS
Status: DISCONTINUED | OUTPATIENT
Start: 2017-07-24 | End: 2017-07-26 | Stop reason: HOSPADM

## 2017-07-24 RX ORDER — CEFAZOLIN SODIUM 2 G/100ML
2 INJECTION, SOLUTION INTRAVENOUS
Status: COMPLETED | OUTPATIENT
Start: 2017-07-24 | End: 2017-07-24

## 2017-07-24 RX ORDER — LISINOPRIL 10 MG/1
10 TABLET ORAL 2 TIMES DAILY
Status: DISCONTINUED | OUTPATIENT
Start: 2017-07-24 | End: 2017-07-24

## 2017-07-24 RX ORDER — HYDROMORPHONE HYDROCHLORIDE 1 MG/ML
.3-.5 INJECTION, SOLUTION INTRAMUSCULAR; INTRAVENOUS; SUBCUTANEOUS EVERY 5 MIN PRN
Status: DISCONTINUED | OUTPATIENT
Start: 2017-07-24 | End: 2017-07-24 | Stop reason: HOSPADM

## 2017-07-24 RX ORDER — FLECAINIDE ACETATE 100 MG/1
100 TABLET ORAL 2 TIMES DAILY
Status: DISCONTINUED | OUTPATIENT
Start: 2017-07-24 | End: 2017-07-26 | Stop reason: HOSPADM

## 2017-07-24 RX ADMIN — ROSUVASTATIN CALCIUM 10 MG: 10 TABLET ORAL at 21:30

## 2017-07-24 RX ADMIN — SODIUM CHLORIDE, POTASSIUM CHLORIDE, SODIUM LACTATE AND CALCIUM CHLORIDE: 600; 310; 30; 20 INJECTION, SOLUTION INTRAVENOUS at 15:08

## 2017-07-24 RX ADMIN — OXYCODONE HYDROCHLORIDE 5 MG: 5 TABLET ORAL at 21:30

## 2017-07-24 RX ADMIN — VECURONIUM BROMIDE 2 MG: 1 INJECTION, POWDER, LYOPHILIZED, FOR SOLUTION INTRAVENOUS at 14:27

## 2017-07-24 RX ADMIN — GLYCOPYRROLATE 0.4 MG: 0.2 INJECTION, SOLUTION INTRAMUSCULAR; INTRAVENOUS at 15:04

## 2017-07-24 RX ADMIN — POTASSIUM CHLORIDE, DEXTROSE MONOHYDRATE AND SODIUM CHLORIDE: 150; 5; 450 INJECTION, SOLUTION INTRAVENOUS at 19:10

## 2017-07-24 RX ADMIN — CEFAZOLIN SODIUM 1 G: 1 INJECTION, POWDER, FOR SOLUTION INTRAMUSCULAR; INTRAVENOUS at 21:30

## 2017-07-24 RX ADMIN — HYDROMORPHONE HYDROCHLORIDE 0.2 MG: 1 INJECTION, SOLUTION INTRAMUSCULAR; INTRAVENOUS; SUBCUTANEOUS at 17:33

## 2017-07-24 RX ADMIN — PROPOFOL 200 MG: 10 INJECTION, EMULSION INTRAVENOUS at 13:12

## 2017-07-24 RX ADMIN — ROCURONIUM BROMIDE 50 MG: 10 INJECTION INTRAVENOUS at 13:12

## 2017-07-24 RX ADMIN — SODIUM CHLORIDE, POTASSIUM CHLORIDE, SODIUM LACTATE AND CALCIUM CHLORIDE: 600; 310; 30; 20 INJECTION, SOLUTION INTRAVENOUS at 12:42

## 2017-07-24 RX ADMIN — Medication 10 MG: at 13:25

## 2017-07-24 RX ADMIN — Medication: at 17:32

## 2017-07-24 RX ADMIN — SENNOSIDES AND DOCUSATE SODIUM 1 TABLET: 8.6; 5 TABLET ORAL at 21:30

## 2017-07-24 RX ADMIN — CEFAZOLIN SODIUM 2 G: 2 INJECTION, SOLUTION INTRAVENOUS at 13:30

## 2017-07-24 RX ADMIN — ACETAMINOPHEN 975 MG: 325 TABLET, FILM COATED ORAL at 21:30

## 2017-07-24 RX ADMIN — LIDOCAINE HYDROCHLORIDE 100 MG: 20 INJECTION, SOLUTION INFILTRATION; PERINEURAL at 13:12

## 2017-07-24 RX ADMIN — ONDANSETRON 4 MG: 2 INJECTION INTRAMUSCULAR; INTRAVENOUS at 15:02

## 2017-07-24 RX ADMIN — PANTOPRAZOLE SODIUM 40 MG: 40 TABLET, DELAYED RELEASE ORAL at 21:30

## 2017-07-24 RX ADMIN — FENTANYL CITRATE 100 MCG: 50 INJECTION, SOLUTION INTRAMUSCULAR; INTRAVENOUS at 13:12

## 2017-07-24 RX ADMIN — NEOSTIGMINE METHYLSULFATE 3 MG: 1 INJECTION INTRAMUSCULAR; INTRAVENOUS; SUBCUTANEOUS at 15:04

## 2017-07-24 RX ADMIN — FLECAINIDE ACETATE 100 MG: 100 TABLET ORAL at 21:30

## 2017-07-24 RX ADMIN — PHENYLEPHRINE HYDROCHLORIDE 0.6 MCG/KG/MIN: 10 INJECTION, SOLUTION INTRAMUSCULAR; INTRAVENOUS; SUBCUTANEOUS at 13:12

## 2017-07-24 ASSESSMENT — LIFESTYLE VARIABLES: TOBACCO_USE: 1

## 2017-07-24 NOTE — IP AVS SNAPSHOT
MRN:[id number]                      After Visit Summary   7/24/2017    Chava Valentine    MRN: [id number]           Thank you!     Thank you for choosing [facility] for your care. Our goal is always to provide you with excellent care. Hearing back from our patients is one way we can continue to improve our services. Please take a few minutes to complete the written survey that you may receive in the mail after you visit with us. Thank you!        Patient Information     Date Of Birth          10/14/1933        Designated Caregiver       Most Recent Value    Caregiver    Will someone help with your care after discharge? yes    Name of designated caregiver carlyn     Phone number of caregiver     Caregiver address [address] [place] [address] [address] [facility]       About your hospital stay     You were admitted on:  July 24, 2017 You last received care in the:  [place] [address] [facility]    You were discharged on:  July 26, 2017        Reason for your hospital stay       Reverse TSA                  Who to Call     For medical emergencies, please call 911.  For non-urgent questions about your medical care, please call your primary care provider or clinic, [phone]  For questions related to your surgery, please call your surgery clinic        Attending Provider     Provider Bernadine Sutherland MD Orthopedics       Primary Care Provider Office Phone # Fax #    Mike Cosby [phone]      Follow-up Appointments     Follow-up and recommended labs and tests        Office visit prearranged                  Further instructions from your care team       DISCHARGE INSTRUCTIONS FOR YOUR TOTAL SHOULDER REPLACEMENT      BERNADINE FERRO MD    Instructions to care for your wound at home:   Change the dressing daily.  Inspect your incision at the time of dressing change for redness, swelling, and drainage.  Some discoloration and bruising is common, but please  notify my office if you have any concerns about the wound appearance.  You may shower directly over the wound beginning 4 days following your surgery.  Do not, however, submerge the wound under water until the sutures are removed and the wound completely sealed and without drainage.  You should let your arm hang at your side during the shower.  Do not actively lift or actively move the arm from your side when out of the sling.  To wash under your arm, simply lean over towards your operative shoulder to allow the arm to hang freely and gently separate from your body by gravity alone.         Assistive devices:  Sling for support.  Remove the sling at least 3 times daily to fully straighten your elbow.  Also move your wrist, hand, and fingers often to mobilize swelling and prevent stiffness.    Wearing a button-up blouse or shirt is recommended.  The shoulder sling or immobilizer may be worn over the outside of your clothes.  Always keep your surgical arm hanging at your side when donning or doffing the sling.  Slide your surgical arm into the shirt sleeve first, then the other arm.  When taking off the shirt or blouse, do the opposite - slide your unaffected arm out of the sleeve first, followed by the surgical arm.     Outpatient physical therapy and home exercises:  Your outpatient physical therapy following your surgery is prearranged by our office.  You should have already scheduled your therapy sessions in advance.  If you have not done so, contact the therapy location of your choice for your appointments for the physical therapy regimen that has been prescribed for you.  You should perform your home exercise program daily in addition to the sessions scheduled with your therapist.    Clinic follow-up appointment:  Your clinic follow-up visit has been prearranged at the time of your surgery scheduling.     Medications:  New discharge medications will include a pain medication and a muscle relaxer as needed.  "Detailed instructions will come with those medications.  You will also receive instructions on when to resume your home medications.  Make sure to use your stool softener while on narcotics.    Please use your Pradaxa as before surgery as the blood thinner.    Antibiotic coverage will be needed before any type of dental procedure.  This is a life long recommendation.  You should notify your dentist of your total shoulder surgery and call your dentist or our office one week before a dental appointment for the antibiotics.       Call [phone] with any questions.     Rodolfo Calhoun MD    Pending Results     Date and Time Order Name Status Description    7/24/2017 1039 EKG 12-lead, tracing only Preliminary             Statement of Approval     Ordered          07/25/17 0831  I have reviewed and agree with all the recommendations and orders detailed in this document.  EFFECTIVE NOW     Approved and electronically signed by:  Rodolfo Calhoun MD             Admission Information     Date & Time Department Dept. Phone    7/24/2017 [place] [address] [facility] [phone]      Your Vitals Were     Blood Pressure Pulse Temperature Respirations Height Weight    140/68 (BP Location: Left arm) 58 97.9  F (36.6  C) (Oral) 16 1.727 m (5' 8\") 79.8 kg (176 lb)    Pulse Oximetry BMI (Body Mass Index)                94% 26.76 kg/m2          [employer] Information     [employer] lets you send messages to your doctor, view your test results, renew your prescriptions, schedule appointments and more. To sign up, go to [email or web address] . Click on \"Log in\" on the left side of the screen, which will take you to the Welcome page. Then click on \"Sign up Now\" on the right side of the page.     You will be asked to enter the access code listed below, as well as some personal information. Please follow the directions to create your username and password.     Your access code is: [email or web address]  Expires: 9/25/2017 10:22 " AM     Your access code will  in 90 days. If you need help or a new code, please call your [facility] clinic or [phone].        Care EveryWhere ID     This is your Care EveryWhere ID. This could be used by other organizations to access your [facility] medical records  [id number]        Equal Access to Services     SAMLUIS MANUEL DARREL : Hadii aad ku hadasho Soomaali, waaxda luqadaha, qaybta kaalmada adegeorgeda, tim greenemorrodarin grey. So [facility] [phone].    ATENCIÓN: Si habla español, tiene a diaz disposición servicios gratuitos de asistencia lingüística. Llame al [phone].    We comply with applicable federal civil rights laws and [place] laws. We do not discriminate on the basis of race, color, national origin, age, disability sex, sexual orientation or gender identity.               Review of your medicines      START taking        Dose / Directions    cyclobenzaprine 5 MG tablet   Commonly known as:  FLEXERIL   Used for:  S/p reverse total shoulder arthroplasty, right   Notes to Patient:  Use sparingly         Dose:  5 mg   Take 1 tablet (5 mg) by mouth 3 times daily as needed for muscle spasms   Quantity:  20 tablet   Refills:  0       HYDROcodone-acetaminophen 5-325 MG per tablet   Commonly known as:  NORCO   Used for:  S/p reverse total shoulder arthroplasty, right   Notes to Patient:  Start with 1/2 tablet        Dose:  1-2 tablet   Take 1-2 tablets by mouth every 4 hours as needed for moderate to severe pain   Quantity:  50 tablet   Refills:  0         CONTINUE these medicines which have NOT CHANGED        Dose / Directions    CLONAZEPAM PO   Notes to Patient:  Don't use while taking Norco        Dose:  0.25-0.5 mg   Take 0.25-0.5 mg by mouth 3 times daily as needed   Refills:  0       dabigatran ANTICOAGULANT 150 MG capsule   Commonly known as:  PRADAXA ANTICOAGULANT   Used for:  Paroxysmal atrial fibrillation (H)        Dose:  150 mg   Take 1 capsule (150 mg) by mouth 2 times  daily   Quantity:  90 capsule   Refills:  11       ezetimibe 10 MG tablet   Commonly known as:  ZETIA   Used for:  Coronary artery disease involving coronary bypass graft of native heart without angina pectoris, Mixed hyperlipidemia        Dose:  10 mg   Take 1 tablet (10 mg) by mouth daily   Quantity:  90 tablet   Refills:  3       FLECAINIDE ACETATE PO        Dose:  100 mg   Take 100 mg by mouth 2 times daily (Takes 2 x 50mg tablet = 100mg dose)   Refills:  0       fluticasone 50 MCG/ACT spray   Commonly known as:  FLONASE        Dose:  1 spray   Spray 1 spray into both nostrils daily as needed for rhinitis or allergies   Refills:  0       lisinopril 10 MG tablet   Commonly known as:  PRINIVIL/ZESTRIL   Notes to Patient:  Resume on discharge         Dose:  10 mg   Take 10 mg by mouth 2 times daily   Refills:  0       MESTINON 60 MG tablet   Generic drug:  pyridostigmine   Notes to Patient:  Resume on discharge         Dose:  60 mg   Take 60 mg by mouth 2 times daily as needed   Refills:  0       METAMUCIL PO        Dose:  1 Dose   Take 1 Dose by mouth daily as needed   Refills:  0       OCUVITE PO   Notes to Patient:  Resume on discharge         Dose:  1 tablet   Take 1 tablet by mouth 2 times daily   Refills:  0       PAPAV-PHENTOLAMINE-ALPROSTADIL IC   Notes to Patient:  Resume on discharge         Dose:  1 Dose   Inject 1 Dose as directed daily as needed (erectile dysfunction) Trimix Injection   Refills:  0       PROTONIX PO        Dose:  40 mg   Take 40 mg by mouth 2 times daily   Refills:  0       rosuvastatin 10 MG tablet   Commonly known as:  CRESTOR   Used for:  Coronary artery disease involving coronary bypass graft of native heart without angina pectoris, Mixed hyperlipidemia        Dose:  10 mg   Take 1 tablet (10 mg) by mouth daily   Quantity:  90 tablet   Refills:  4       TESTOSTERONE AQUEOUS IM   Notes to Patient:  Resume at discharge        Dose:  1 mL   Inject 1 mL into the muscle every 14 days    Refills:  0       ZANTAC PO   Notes to Patient:  Resume at discharge        Dose:  150 mg   Take 150 mg by mouth daily as needed for heartburn   Refills:  0       ZOLPIDEM TARTRATE PO   Notes to Patient:  Do not take while taking Norco        Dose:  5-10 mg   Take 5-10 mg by mouth nightly as needed   Refills:  0            Where to get your medicines      These medications were sent to [facility] [place], [place] - [address]  [address], [place] [address]     Phone:  [phone]     cyclobenzaprine 5 MG tablet         Some of these will need a paper prescription and others can be bought over the counter. Ask your nurse if you have questions.     Bring a paper prescription for each of these medications     HYDROcodone-acetaminophen 5-325 MG per tablet                Protect others around you: Learn how to safely use, store and throw away your medicines at www.disposemymeds.org.             Medication List: This is a list of all your medications and when to take them. Check marks below indicate your daily home schedule. Keep this list as a reference.      Medications           Morning Afternoon Evening Bedtime As Needed    CLONAZEPAM PO   Take 0.25-0.5 mg by mouth 3 times daily as needed   Notes to Patient:  Don't use while taking Norco                                cyclobenzaprine 5 MG tablet   Commonly known as:  FLEXERIL   Take 1 tablet (5 mg) by mouth 3 times daily as needed for muscle spasms   Last time this was given:  5 mg on 7/25/2017  7:04 PM   Notes to Patient:  Use sparingly                                 dabigatran ANTICOAGULANT 150 MG capsule   Commonly known as:  PRADAXA ANTICOAGULANT   Take 1 capsule (150 mg) by mouth 2 times daily   Next Dose Due:  Start on 7/27                                      ezetimibe 10 MG tablet   Commonly known as:  ZETIA   Take 1 tablet (10 mg) by mouth daily   Last time this was given:  10 mg on 7/26/2017  8:13 AM   Next Dose Due:  7/27                                    FLECAINIDE ACETATE PO   Take 100 mg by mouth 2 times daily (Takes 2 x 50mg tablet = 100mg dose)   Last time this was given:  100 mg on 7/26/2017  8:13 AM   Next Dose Due:  7/27                                fluticasone 50 MCG/ACT spray   Commonly known as:  FLONASE   Spray 1 spray into both nostrils daily as needed for rhinitis or allergies                                   HYDROcodone-acetaminophen 5-325 MG per tablet   Commonly known as:  NORCO   Take 1-2 tablets by mouth every 4 hours as needed for moderate to severe pain   Last time this was given:  2 tablets on 7/26/2017  8:12 AM   Notes to Patient:  Start with 1/2 tablet                                lisinopril 10 MG tablet   Commonly known as:  PRINIVIL/ZESTRIL   Take 10 mg by mouth 2 times daily   Notes to Patient:  Resume on discharge                                       MESTINON 60 MG tablet   Take 60 mg by mouth 2 times daily as needed   Generic drug:  pyridostigmine   Notes to Patient:  Resume on discharge                                    METAMUCIL PO   Take 1 Dose by mouth daily as needed                                   OCUVITE PO   Take 1 tablet by mouth 2 times daily   Notes to Patient:  Resume on discharge                                 PAPAV-PHENTOLAMINE-ALPROSTADIL IC   Inject 1 Dose as directed daily as needed (erectile dysfunction) Trimix Injection   Notes to Patient:  Resume on discharge                                 PROTONIX PO   Take 40 mg by mouth 2 times daily   Last time this was given:  40 mg on 7/26/2017  8:13 AM   Next Dose Due:  7/26                                   rosuvastatin 10 MG tablet   Commonly known as:  CRESTOR   Take 1 tablet (10 mg) by mouth daily   Last time this was given:  10 mg on 7/25/2017  8:28 PM   Next Dose Due:  7/27                                   TESTOSTERONE AQUEOUS IM   Inject 1 mL into the muscle every 14 days   Notes to Patient:  Resume at discharge                                    ZANTAC PO   Take 150 mg by mouth daily as needed for heartburn   Notes to Patient:  Resume at discharge                                   ZOLPIDEM TARTRATE PO   Take 5-10 mg by mouth nightly as needed   Notes to Patient:  Do not take while taking Norco

## 2017-07-24 NOTE — ANESTHESIA PREPROCEDURE EVALUATION
Procedure: Procedure(s):  REVERSE ARTHROPLASTY SHOULDER  Preop diagnosis: RIGHT SHOULDER ROTATOR CUFF ARTHROPATHY     No Known Allergies  Past Medical History:   Diagnosis Date     Allergic rhinitis      Anxiety      Arrhythmia     occ a-fib, treated     ASCVD (arteriosclerotic cardiovascular disease)      Contact dermatitis      Coronary artery disease     CABG with 4 grafts-LIMA to LAD, SVG to Diagonal,OM and RCA     Cough      Elevated prolactin level (H)      Erythrocytosis      Gastro-oesophageal reflux disease      GI bleed 10/10     Hyperlipidemia      Hypertension      Hypogonadism     with low testosterone     Insomnia      Post-nasal drip      Prediabetes      Renal disease     CKD     Shoulder arthritis      SVT (supraventricular tachycardia) (H)      Tremor      Past Surgical History:   Procedure Laterality Date     BYPASS GRAFT ARTERY CORONARY       CARDIAC SURGERY       COLONOSCOPY  12/2010    tubular adenoma     ORTHOPEDIC SURGERY      rotator cuff right     PHACOEMULSIFICATION CLEAR CORNEA WITH STANDARD IOL, VITRECTOMY PARSPLANA 23 GAGUE, COMBINED  2/20/2013    Procedure: COMBINED PHACOEMULSIFICATION CLEAR CORNEA WITH STANDARD INTRAOCULAR LENS IMPLANT, VITRECTOMY PARSPLANA 23 GAUGE;;  Surgeon: Guero Lockett MD;  Location: [facility]     PHACOEMULSIFICATION CLEAR CORNEA WITH STANDARD IOL, VITRECTOMY PARSPLANA 23 GAGUE, COMBINED  7/31/2013    Procedure: COMBINED PHACOEMULSIFICATION CLEAR CORNEA WITH STANDARD INTRAOCULAR LENS IMPLANT, VITRECTOMY PARSPLANA 23 GAUGE;;  Surgeon: Guero Lockett MD;  Location: [facility]     VASCULAR SURGERY      cab     VITRECTOMY PARSPLANA, PHACOEMULSIFICATION CLEAR CORNEA W STANDARD INTRAOCULAR LENS IMPLANT, COMBINED  2/20/2013    Procedure: COMBINED VITRECTOMY PARSPLANA, PHACOEMULSIFICATION CLEAR CORNEA WITH STANDARD INTRAOCULAR LENS IMPLANT;  LEFT PHACOEMULSIFICATION CLEAR CORNEA WITH SAURABH TORIC INTRAOCULAR LENS IMPLANT,  LEFT EYE 23 GAUGE PARSPLANA VITRECTOMY;   Surgeon: Jorge Regan MD;  Location: [facility]     VITRECTOMY PARSPLANA, PHACOEMULSIFICATION CLEAR CORNEA W STANDARD INTRAOCULAR LENS IMPLANT, COMBINED  7/31/2013    Procedure: COMBINED VITRECTOMY PARSPLANA, PHACOEMULSIFICATION CLEAR CORNEA WITH STANDARD INTRAOCULAR LENS IMPLANT;  RIGHT PHACOEMULSIFICATION CLEAR CORNEA WITH TORIC INTRAOCULAR LENS IMPLANT, RIGHT 23 GAUGE PARSPLANA VITRECTOMY ;  Surgeon: Jorge Regan MD;  Location: [facility]     Prior to Admission medications    Medication Sig Start Date End Date Taking? Authorizing Provider   FLECAINIDE ACETATE PO Take 100 mg by mouth 2 times daily (Takes 2 x 50mg tablet = 100mg dose)   Yes Reported, Patient   Pantoprazole Sodium (PROTONIX PO) Take 40 mg by mouth 2 times daily   Yes Reported, Patient   Multiple Vitamins-Minerals (OCUVITE PO) Take 1 tablet by mouth 2 times daily   Yes Reported, Patient   RaNITidine HCl (ZANTAC PO) Take 150 mg by mouth daily as needed for heartburn   Yes Reported, Patient   fluticasone (FLONASE) 50 MCG/ACT spray Spray 1 spray into both nostrils daily as needed for rhinitis or allergies   Yes Reported, Patient   PAPAV-PHENTOLAMINE-ALPROSTADIL IC Inject 1 Dose as directed daily as needed (erectile dysfunction) Trimix Injection   Yes Reported, Patient   lisinopril (PRINIVIL/ZESTRIL) 10 MG tablet Take 10 mg by mouth 2 times daily   Yes Reported, Patient   ezetimibe (ZETIA) 10 MG tablet Take 1 tablet (10 mg) by mouth daily 2/20/17  Yes Michael Bernard MD   pyridostigmine (MESTINON) 60 MG tablet Take 60 mg by mouth 2 times daily as needed    Yes Reported, Patient   dabigatran ANTICOAGULANT (PRADAXA ANTICOAGULANT) 150 MG CAPS capsule Take 1 capsule (150 mg) by mouth 2 times daily 1/27/17  Yes Michael Bernard MD   rosuvastatin (CRESTOR) 10 MG tablet Take 1 tablet (10 mg) by mouth daily 1/27/17  Yes Michael Bernard MD   Psyllium (METAMUCIL PO) Take 1 Dose by mouth daily as needed    Yes Reported, Patient   ZOLPIDEM TARTRATE PO Take 5-10 mg by  mouth nightly as needed    Yes Reported, Patient   CLONAZEPAM PO Take 0.25-0.5 mg by mouth 3 times daily as needed    Yes Reported, Patient   TESTOSTERONE AQUEOUS IM Inject 1 mL into the muscle every 14 days    Yes Reported, Patient     Current Facility-Administered Medications Ordered in Epic   Medication Dose Route Frequency Last Rate Last Dose     ceFAZolin sodium-dextrose (ANCEF) infusion 2 g  2 g Intravenous Pre-Op/Pre-procedure x 1 dose         ceFAZolin (ANCEF) 1 g vial to attach to  ml bag for ADULT or 50 ml bag for PEDS  1 g Intravenous See Admin Instructions         lidocaine 1 % 1 mL  1 mL Other Q1H PRN         lactated ringers infusion   Intravenous Continuous         fentaNYL (PF) (SUBLIMAZE) injection 25-50 mcg  25-50 mcg Intravenous Pre-Op/Pre-procedure x 1 dose         midazolam (VERSED) injection 1.5 mg  1.5 mg Intravenous Pre-Op/Pre-procedure x 1 dose         No current [facility]-ordered outpatient prescriptions on file.     Wt Readings from Last 1 Encounters:   06/27/17 78.9 kg (174 lb)     Temp Readings from Last 1 Encounters:   05/14/15 36.9  C (98.5  F) (Oral)     BP Readings from Last 6 Encounters:   06/27/17 128/70   04/18/17 142/70   03/06/17 124/64   01/27/17 120/54   12/28/15 122/74   09/30/15 145/83     Pulse Readings from Last 4 Encounters:   06/27/17 60   04/18/17 64   03/06/17 68   01/27/17 60     Resp Readings from Last 1 Encounters:   09/30/15 10     SpO2 Readings from Last 1 Encounters:   09/30/15 96%     Recent Labs   Lab Test  02/10/17   0934  01/27/17   0846   NA  137  135*   POTASSIUM  4.1  4.3   CHLORIDE  104  106   CO2  28  23   ANIONGAP  9.1  10.3   GLC  179*  116*   BUN  18  22   CR  1.89*  1.77*   FIDEL  8.8  8.6     Recent Labs   Lab Test  01/27/17   0846  05/14/15   1839   WBC  7.8  9.5   HGB  16.8  17.0   PLT  171  154     No results for input(s): INR in the last 60278 hours.    Invalid input(s): APTT   RECENT LABS:   ECG:   ECHO:   CXR:    Anesthesia Evaluation     . Pt  has had prior anesthetic.     No history of anesthetic complications          ROS/MED HX    ENT/Pulmonary:     (+)tobacco use, Past use , . .    Neurologic:       Cardiovascular: Comment: Ef 55 %    (+) hypertension--CAD, -CABG-. : . . . :. .       METS/Exercise Tolerance:     Hematologic:         Musculoskeletal:         GI/Hepatic:     (+) GERD Asymptomatic on medication,       Renal/Genitourinary:     (+) chronic renal disease, type: CRI, Pt does not require dialysis, Pt has no history of transplant,       Endo:         Psychiatric:         Infectious Disease:         Malignancy:         Other:                     Physical Exam  Normal systems: cardiovascular and pulmonary    Airway   Mallampati: I  Neck ROM: full    Dental   (+) caps    Cardiovascular       Pulmonary                     Anesthesia Plan      History & Physical Review  History and physical reviewed and following examination; no interval change.    ASA Status:  3 .    NPO Status:  > 8 hours    Plan for General, ETT and Periph. Nerve Block for postop pain with Intravenous induction. Maintenance will be Balanced.      Additional equipment: Videolaryngoscope      Postoperative Care  Postoperative pain management:  IV analgesics.      Consents  Anesthetic plan, risks, benefits and alternatives discussed with:  Patient..                          .

## 2017-07-24 NOTE — ANESTHESIA CARE TRANSFER NOTE
Patient: Chava Valentine    Procedure(s):  RIGHT REVERSE TOTAL SHOULDER ARTHROPLASTY  - Wound Class: I-Clean    Diagnosis: RIGHT SHOULDER ROTATOR CUFF ARTHROPATHY   Diagnosis Additional Information: No value filed.    Anesthesia Type:   General, ETT, Periph. Nerve Block for postop pain     Note:  Airway :Face Mask  Patient transferred to:PACU  Comments: Spontaneous respirations, tidal volume > 300 ml, oxygen saturation > 97%, TOF 4/4 with > 5 seconds sustained tetany, follows commands.  Suctioned and extubated with cuff down to facemask.  Exchanging well.Transferred to PACU, spontaneous respirations, 6 L oxygen via facemask.  All monitors and alarms on and functioning, VSS.  Patient comfortable.  Report to PACU RN.      Vitals: (Last set prior to Anesthesia Care Transfer)    CRNA VITALS  7/24/2017 1503 - 7/24/2017 1538      7/24/2017             Pulse: 62    SpO2: 98 %    Resp Rate (set): 10                Electronically Signed By: GAMAL Sherman CRNA  July 24, 2017  3:38 PM

## 2017-07-24 NOTE — OP NOTE
PATIENT NAME:  Chava Valentine  MEDICAL RECORD #: [id number]  PATIENT BIRTHDAY:           10/14/1933  DATE OF SURGERY: 7/24/2017    SURGEON:     Rodolfo Cahloun MD     1st ASSISTANTt:  WAN Burr OPA-C      PREOPERATIVE DIAGNOSIS:  Rotator cuff arthropathy right shoulder.      POSTOPERATIVE DIAGNOSIS:  Rotator cuff arthropathy right shoulder.      PROCEDURE: right reverse total shoulder arthroplasty.    COMPONENTS USED:  Tornier 12 mm humeral stem, 9 mm metaphyseal extension insert, 12 mm humeral poly insert, 29 mm glenoid baseplate, 36 mm glenosphere, plus appropriate length compression and locking screws.     INDICATIONS FOR PROCEDURE:  The patient is brought to the operating room for elective right Reverse TSA for rotator cuff arthropathy.  Preoperative IV antibiotics were given and will be continued for 24 hours post-op.  The patient understands the indications, alternatives, risks, benefits, and time involved for recovery and is consented for the procedure.        DESCRIPTION OF PROCEDURE:  The patient was brought to the operating room on 7/24/2017 and following suitable general anesthesia, the patient was placed in the semi-sitting position and the right shoulder prepped and draped in the usual manner.      A full time out was carried out and the patient, proper extremity, operative site and procedure identified and confirmed by all members of the operative team.      We used the superolateral approach with an incision made over the distal clavicle, the anterior edge of the acromion, out to the anterolateral edge of the acromion and down in line with the deltoid fibers for 5 cm.  The deltoid was split in line with its fibers for 4 cm and released from the anterior edge of the acromion and clavicle.  The underlying rotator cuff was completely torn and retracted, especially involving the supraspinatus and infraspinatus.  The subscapularis was partially released along the superior attachment.  The long  head biceps tendon was torn and retracted distally.      The rotator cuff was debrided superiorly to allow for superior and anterior dislocation of the humerus.  We then resected the proximal end of the humerus in the usual fashion in 15 degrees of retroversion.  We prepared the canal with the diaphyseal reamer first to the 12 mm size followed by the metaphyseal reamer.  We then trialed the 12 mm humeral component and noted it was well seated.      We next retracted the humerus inferiorly.  I did partially release the subscap from the lesser tuberosity and released the glenoid labrum and capsular structures to allow for inferior displacement of the proximally displaced humeral head.  Once we had good exposure of the glenoid, we then prepared the glenoid face by placing a centering hole followed by planing off the face of the glenoid with the circular reamer back to subchondral bone.  The 29 mm base plate was secured to the glenoid with two locking screws and 2 compression screws.  Secure fixation was obtained.  The 36 mm 10 degree tilted glenosphere was next secured to baseplate.      Attention was directed back to the humerus.  We trialed and determined that the 12 mm polyethylene articular insert would be used.   A 9 mm metaphyseal ring extension was used.  The trial components were removed.  We prepared the bone surfaces with jet lavage and careful drying technique.  We then cemented the 12 mm stem into position using Simplex cement.  Once the cement was set, we trialed one more time and again selected the 12 mm articular insert which was secured and the shoulder reduced one final time.  Motion, stability and alignment was good.  Tensioning of the deltoid was good.      The wound was irrigated throughout with antibiotic solution using jet lavage.  It was closed over a medium Hemovac drain by initially placing 0 Vicryl in a running fashion to reinforce the edge of the anterior deltoid.  The running suture was  placed 1 cm from the edge of the deltoid.  I then used #2 FiberWire looping around the 0 Vicryl reinforcing suture in the deltoid and then up through drill holes in the distal clavicle and acromion.  After these 4 sutures were placed through the bone and through the anterior deltoid, we then used #1 Ethibond in interrupted and running fashion over the top of the superior portion of the deltoid repair and then also running in the distal portion.  Subcutaneous tissue was closed with 2-0 undyed Vicryl and skin with skin staples.  Sterile soft dressing was applied.  The patient was placed in an UltraSling, awakened and taken to PAR in satisfactory condition.     A surgical assistant was medically necessary and required during this procedure for positioning and retraction.  He was present for the entire procedure.          BERNADINE FERRO MD      CC: Bernadine Ferro MD          Fax: [phone]

## 2017-07-24 NOTE — ANESTHESIA PROCEDURE NOTES
Peripheral nerve/Neuraxial procedure note : interscalene  Pre-Procedure  Performed by GARY CALLAHAN  Location: pre-op      Pre-Anesthestic Checklist: patient identified, IV checked, site marked, risks and benefits discussed, informed consent, monitors and equipment checked, at physician/surgeon's request and post-op pain management    Timeout  Correct Patient: Yes   Correct Procedure: Yes   Correct Site: Yes   Correct Laterality: Yes   Correct Position: Yes   Site Marked: Yes   .   Procedure Documentation    .    Procedure:    Interscalene.  Local skin infiltrated with 3 mL of 1% lidocaine.     Ultrasound used to identify targeted nerve, plexus, or vascular marker and placed a needle adjacent to it., Ultrasound was used to visualize the spread of the anesthetic in close proximity to the above stated nerve. A permanent image is entered into the patient's record.  Patient Prep;mask, sterile gloves, chlorhexidine gluconate and isopropyl alcohol, patient draped.  Nerve Stim: Initial Level 1 mA. Lowest motor response mA..  Needle: insulated, short bevel Needle Gauge: 20.    Needle Length (Inches) 2  Insertion Method: Single Shot.       Assessment/Narrative  Paresthesias: No.  .  The placement was negative for: blood aspirated, painful injection and site bleeding.  Bolus given via needle..   Secured via.   Complications: none. Comments:  Interscalene brachial plexus nerve block  30 ml 0.5% Ropivacaine with 1:400,000 Epinephrine

## 2017-07-24 NOTE — IP AVS SNAPSHOT
` ` Patient Information     Patient Name Sex     Chava Valentine ([id number]) Male 10/14/1933       Room Bed    [facility] 5511-02      Patient Demographics     Address Phone E-mail Address    [address]   [place] [address] [phone] (Home) *Preferred*  [phone] (Mobile) [email or web address]      Patient Ethnicity & Race     Ethnic Group Patient Race    American White      Emergency Contact(s)     Name Relation Home Work Mobile    Ricardo Thibodeaux Spouse [phone]    Jamarcus Valentine Brother [phone]        Documents on File        Status Date Received Description       Documents for the Patient    Privacy Notice - [place] Received 09     Insurance Card Received 11/19/10     External Medication Information Consent Accepted 12/10/14     Patient ID Received 13     Consent for Services - Hospital/Clinic Received () 11     Insurance Card Received 11     Insurance Card  12 INSURANCE CARD    Business/Insurance/Care Coordination/Health Form - Patient.1       Consent for EHR Access  13 Copied from existing Consent for services - C/HOD collected on 2011    [facility] Specified Other       Insurance Card Received 13     Consent for Services - Hospital/Clinic Received () 13     Insurance Card Received 12/10/14     Consent for Services - Hospital/Clinic Received () 12/10/14     Patient ID Received 05/14/15     Consent for Services - Hospital/Clinic Received () 12/28/15     Insurance Card Received 12/28/15     Consent for Services/Privacy Notice - Hospital/Clinic Received () 16     Insurance Card Received 17 BCBS 2017    Privacy Notice - [place] Received 17     Consent for Services/Privacy Notice - Hospital/Clinic Received 17        Documents for the Encounter    H/P - History and Physical  17 [facility], HISTORY AND PHY 2017    CMS IM for Patient Signature Received  07/26/17 1MM    Lab Result - HIM Scan  07/20/17 LAB[facility], [facility]    Lab Result - HIM Scan  07/21/17 LAB- [facility]      Admission Information     Attending Provider Admitting Provider Admission Type Admission Date/Time     Rodolfo Calhoun MD Elective 07/24/17  1011    Discharge Date Hospital Service Auth/Cert Status Service Area    07/26/17 Surgery Incomplete [facility]    Unit Room/Bed Admission Status        55 ORTHO SPEC UNIT 5511/5511-02 Discharged (Confirmed)       Admission     Complaint    RIGHT SHOULDER ROTATOR CUFF ARTHROPATHY , S/p reverse total shoulder arthroplasty      Hospital Account     Name Acct ID Class Status Primary Coverage    Chava Valentine [id number] Inpatient Open MEDICARE - MEDICARE            Guarantor Account (for Hospital Account #[id number])     Name Relation to Pt Service Area Active? Acct Type    Chava Valentine Self FCS Yes Personal/Family    Address Phone          [address] [facility] [place] [address] [phone](H)              Coverage Information (for Hospital Account #[id number])     1. MEDICARE/MEDICARE     F/O Payor/Plan Precert #    MEDICARE/MEDICARE     Subscriber Subscriber #    Chava Valentine [id number]    Address Phone    [employer] CLAIMS  [address]  [place] [address] [phone]          2. BC[place]     F/O Payor/Plan Precert #    [place]     Subscriber Subscriber #    Chava Valentine [id number]    Address Phone    [address] [address]  [place], [place] [id number] [phone]

## 2017-07-24 NOTE — PROGRESS NOTES
Admission medication history interview status for the 7/24/2017  admission is complete. See EPIC admission navigator for prior to admission medications     Medication history source reliability:Good    Medication history interview source(s):Patient    Medication history resources (including written lists, pill bottles, clinic record):None    Primary pharmacy.[facility]    Additional medication history information not noted on PTA med list :      Time spent in this activity: 45 minutes    Prior to Admission medications    Medication Sig Last Dose Taking? Auth Provider   FLECAINIDE ACETATE PO Take 100 mg by mouth 2 times daily (Takes 2 x 50mg tablet = 100mg dose) 7/24/2017 at 0700 Yes Reported, Patient   Pantoprazole Sodium (PROTONIX PO) Take 40 mg by mouth 2 times daily 7/23/2017 at pm Yes Reported, Patient   Multiple Vitamins-Minerals (OCUVITE PO) Take 1 tablet by mouth 2 times daily 7/23/2017 Yes Reported, Patient   RaNITidine HCl (ZANTAC PO) Take 150 mg by mouth daily as needed for heartburn 7/23/2017 at prn Yes Reported, Patient   fluticasone (FLONASE) 50 MCG/ACT spray Spray 1 spray into both nostrils daily as needed for rhinitis or allergies more than a week at prn Yes Reported, Patient   PAPAV-PHENTOLAMINE-ALPROSTADIL IC Inject 1 Dose as directed daily as needed (erectile dysfunction) Trimix Injection 7/23/2017 at pm Yes Reported, Patient   lisinopril (PRINIVIL/ZESTRIL) 10 MG tablet Take 10 mg by mouth 2 times daily 7/24/2017 at 0700 Yes Reported, Patient   ezetimibe (ZETIA) 10 MG tablet Take 1 tablet (10 mg) by mouth daily 7/24/2017 at 0700 Yes Michael Bernard MD   pyridostigmine (MESTINON) 60 MG tablet Take 60 mg by mouth 2 times daily as needed  7/21/2017 Yes Reported, Patient   dabigatran ANTICOAGULANT (PRADAXA ANTICOAGULANT) 150 MG CAPS capsule Take 1 capsule (150 mg) by mouth 2 times daily 7/17/2017 Yes Michael Bernard MD   rosuvastatin (CRESTOR) 10 MG tablet Take 1 tablet (10 mg) by mouth daily  7/23/2017 at pm Yes Michael Bernard MD   Psyllium (METAMUCIL PO) Take 1 Dose by mouth daily as needed  7/23/2017 at prn Yes Reported, Patient   ZOLPIDEM TARTRATE PO Take 5-10 mg by mouth nightly as needed  7/23/2017 at pm Yes Reported, Patient   CLONAZEPAM PO Take 0.25-0.5 mg by mouth 3 times daily as needed  Past Week at prn Yes Reported, Patient   TESTOSTERONE AQUEOUS IM Inject 1 mL into the muscle every 14 days  more than 2 weeks Yes Reported, Patient

## 2017-07-24 NOTE — ANESTHESIA POSTPROCEDURE EVALUATION
Patient: Chava Valentine    Procedure(s):  RIGHT REVERSE TOTAL SHOULDER ARTHROPLASTY  - Wound Class: I-Clean    Diagnosis:RIGHT SHOULDER ROTATOR CUFF ARTHROPATHY   Diagnosis Additional Information: No value filed.    Anesthesia Type:  General, ETT, Periph. Nerve Block for postop pain    Note:  Anesthesia Post Evaluation    Patient location during evaluation: PACU  Patient participation: Able to fully participate in evaluation  Level of consciousness: awake  Pain management: adequate  Airway patency: patent  Cardiovascular status: acceptable  Respiratory status: acceptable  Hydration status: acceptable  PONV: none     Anesthetic complications: None          Last vitals:  Vitals:    07/24/17 1610 07/24/17 1620 07/24/17 1630   BP: 116/67 112/66 116/66   Resp: 18 19 17   Temp:      SpO2: 99% 99% 99%         Electronically Signed By: Kanadce Mcgill MD  July 24, 2017  4:52 PM

## 2017-07-25 ENCOUNTER — APPOINTMENT (OUTPATIENT)
Dept: OCCUPATIONAL THERAPY | Facility: CLINIC | Age: 82
DRG: 483 | End: 2017-07-25
Attending: ORTHOPAEDIC SURGERY
Payer: MEDICARE

## 2017-07-25 LAB
ANION GAP SERPL CALCULATED.3IONS-SCNC: 8 MMOL/L (ref 3–14)
BUN SERPL-MCNC: 16 MG/DL (ref 7–30)
CALCIUM SERPL-MCNC: 8 MG/DL (ref 8.5–10.1)
CHLORIDE SERPL-SCNC: 101 MMOL/L (ref 94–109)
CO2 SERPL-SCNC: 24 MMOL/L (ref 20–32)
CREAT SERPL-MCNC: 1.29 MG/DL (ref 0.66–1.25)
GFR SERPL CREATININE-BSD FRML MDRD: 53 ML/MIN/1.7M2
GLUCOSE SERPL-MCNC: 166 MG/DL (ref 70–99)
HGB BLD-MCNC: 13.8 G/DL (ref 13.3–17.7)
POTASSIUM SERPL-SCNC: 4.6 MMOL/L (ref 3.4–5.3)
SODIUM SERPL-SCNC: 133 MMOL/L (ref 133–144)

## 2017-07-25 PROCEDURE — 97535 SELF CARE MNGMENT TRAINING: CPT | Mod: GO

## 2017-07-25 PROCEDURE — 80048 BASIC METABOLIC PNL TOTAL CA: CPT | Performed by: ORTHOPAEDIC SURGERY

## 2017-07-25 PROCEDURE — 97166 OT EVAL MOD COMPLEX 45 MIN: CPT | Mod: GO

## 2017-07-25 PROCEDURE — 99207 ZZC MOONLIGHTING INDICATOR: CPT | Performed by: INTERNAL MEDICINE

## 2017-07-25 PROCEDURE — 40000133 ZZH STATISTIC OT WARD VISIT: Performed by: OCCUPATIONAL THERAPY ASSISTANT

## 2017-07-25 PROCEDURE — A9270 NON-COVERED ITEM OR SERVICE: HCPCS | Mod: GY | Performed by: ORTHOPAEDIC SURGERY

## 2017-07-25 PROCEDURE — 40000133 ZZH STATISTIC OT WARD VISIT

## 2017-07-25 PROCEDURE — A9270 NON-COVERED ITEM OR SERVICE: HCPCS | Mod: GY | Performed by: INTERNAL MEDICINE

## 2017-07-25 PROCEDURE — 97110 THERAPEUTIC EXERCISES: CPT | Mod: GO

## 2017-07-25 PROCEDURE — 12000007 ZZH R&B INTERMEDIATE

## 2017-07-25 PROCEDURE — 36415 COLL VENOUS BLD VENIPUNCTURE: CPT | Performed by: ORTHOPAEDIC SURGERY

## 2017-07-25 PROCEDURE — 97530 THERAPEUTIC ACTIVITIES: CPT | Mod: GO | Performed by: OCCUPATIONAL THERAPY ASSISTANT

## 2017-07-25 PROCEDURE — 99232 SBSQ HOSP IP/OBS MODERATE 35: CPT | Performed by: INTERNAL MEDICINE

## 2017-07-25 PROCEDURE — 25000132 ZZH RX MED GY IP 250 OP 250 PS 637: Mod: GY | Performed by: ORTHOPAEDIC SURGERY

## 2017-07-25 PROCEDURE — 97110 THERAPEUTIC EXERCISES: CPT | Mod: GO | Performed by: OCCUPATIONAL THERAPY ASSISTANT

## 2017-07-25 PROCEDURE — 97530 THERAPEUTIC ACTIVITIES: CPT | Mod: GO

## 2017-07-25 PROCEDURE — 25000132 ZZH RX MED GY IP 250 OP 250 PS 637: Mod: GY | Performed by: INTERNAL MEDICINE

## 2017-07-25 PROCEDURE — 85018 HEMOGLOBIN: CPT | Performed by: ORTHOPAEDIC SURGERY

## 2017-07-25 PROCEDURE — 25000128 H RX IP 250 OP 636: Performed by: ORTHOPAEDIC SURGERY

## 2017-07-25 RX ORDER — CYCLOBENZAPRINE HCL 5 MG
5 TABLET ORAL 3 TIMES DAILY PRN
Qty: 20 TABLET | Refills: 0 | Status: SHIPPED | OUTPATIENT
Start: 2017-07-25 | End: 2017-11-09

## 2017-07-25 RX ORDER — CALCIUM CARBONATE 500 MG/1
500-1000 TABLET, CHEWABLE ORAL
Status: DISCONTINUED | OUTPATIENT
Start: 2017-07-25 | End: 2017-07-26 | Stop reason: HOSPADM

## 2017-07-25 RX ORDER — OXYCODONE HYDROCHLORIDE 5 MG/1
5-10 TABLET ORAL
Status: DISCONTINUED | OUTPATIENT
Start: 2017-07-25 | End: 2017-07-25 | Stop reason: ALTCHOICE

## 2017-07-25 RX ORDER — HYDROCODONE BITARTRATE AND ACETAMINOPHEN 5; 325 MG/1; MG/1
1-2 TABLET ORAL EVERY 4 HOURS PRN
Status: DISCONTINUED | OUTPATIENT
Start: 2017-07-25 | End: 2017-07-26 | Stop reason: HOSPADM

## 2017-07-25 RX ORDER — OXYCODONE HYDROCHLORIDE 5 MG/1
5-10 TABLET ORAL
Qty: 50 TABLET | Refills: 0 | Status: SHIPPED | OUTPATIENT
Start: 2017-07-25 | End: 2017-07-26

## 2017-07-25 RX ADMIN — PANTOPRAZOLE SODIUM 40 MG: 40 TABLET, DELAYED RELEASE ORAL at 20:28

## 2017-07-25 RX ADMIN — FLECAINIDE ACETATE 100 MG: 100 TABLET ORAL at 08:40

## 2017-07-25 RX ADMIN — HYDROMORPHONE HYDROCHLORIDE 0.2 MG: 1 INJECTION, SOLUTION INTRAMUSCULAR; INTRAVENOUS; SUBCUTANEOUS at 05:10

## 2017-07-25 RX ADMIN — HYDROCODONE BITARTRATE AND ACETAMINOPHEN 2 TABLET: 5; 325 TABLET ORAL at 18:16

## 2017-07-25 RX ADMIN — FLECAINIDE ACETATE 100 MG: 100 TABLET ORAL at 20:28

## 2017-07-25 RX ADMIN — HYDROCODONE BITARTRATE AND ACETAMINOPHEN 2 TABLET: 5; 325 TABLET ORAL at 22:24

## 2017-07-25 RX ADMIN — HYDROCODONE BITARTRATE AND ACETAMINOPHEN 1 TABLET: 5; 325 TABLET ORAL at 14:48

## 2017-07-25 RX ADMIN — CALCIUM CARBONATE (ANTACID) CHEW TAB 500 MG 1000 MG: 500 CHEW TAB at 11:09

## 2017-07-25 RX ADMIN — CALCIUM CARBONATE (ANTACID) CHEW TAB 500 MG 1000 MG: 500 CHEW TAB at 13:23

## 2017-07-25 RX ADMIN — ENOXAPARIN SODIUM 40 MG: 40 INJECTION SUBCUTANEOUS at 12:03

## 2017-07-25 RX ADMIN — OXYCODONE HYDROCHLORIDE 5 MG: 5 TABLET ORAL at 06:27

## 2017-07-25 RX ADMIN — HYDROCODONE BITARTRATE AND ACETAMINOPHEN 1 TABLET: 5; 325 TABLET ORAL at 14:10

## 2017-07-25 RX ADMIN — EZETIMIBE 10 MG: 10 TABLET ORAL at 08:40

## 2017-07-25 RX ADMIN — SENNOSIDES AND DOCUSATE SODIUM 2 TABLET: 8.6; 5 TABLET ORAL at 20:27

## 2017-07-25 RX ADMIN — ACETAMINOPHEN 975 MG: 325 TABLET, FILM COATED ORAL at 05:30

## 2017-07-25 RX ADMIN — OXYCODONE HYDROCHLORIDE 5 MG: 5 TABLET ORAL at 00:49

## 2017-07-25 RX ADMIN — CYCLOBENZAPRINE HYDROCHLORIDE 5 MG: 5 TABLET, FILM COATED ORAL at 19:04

## 2017-07-25 RX ADMIN — HYDROMORPHONE HYDROCHLORIDE 0.2 MG: 1 INJECTION, SOLUTION INTRAMUSCULAR; INTRAVENOUS; SUBCUTANEOUS at 20:27

## 2017-07-25 RX ADMIN — PANTOPRAZOLE SODIUM 40 MG: 40 TABLET, DELAYED RELEASE ORAL at 08:41

## 2017-07-25 RX ADMIN — HYDROMORPHONE HYDROCHLORIDE 0.2 MG: 1 INJECTION, SOLUTION INTRAMUSCULAR; INTRAVENOUS; SUBCUTANEOUS at 07:25

## 2017-07-25 RX ADMIN — SENNOSIDES AND DOCUSATE SODIUM 2 TABLET: 8.6; 5 TABLET ORAL at 08:40

## 2017-07-25 RX ADMIN — CEFAZOLIN SODIUM 1 G: 1 INJECTION, POWDER, FOR SOLUTION INTRAMUSCULAR; INTRAVENOUS at 05:30

## 2017-07-25 RX ADMIN — CYCLOBENZAPRINE HYDROCHLORIDE 5 MG: 5 TABLET, FILM COATED ORAL at 06:22

## 2017-07-25 RX ADMIN — ROSUVASTATIN CALCIUM 10 MG: 10 TABLET ORAL at 20:28

## 2017-07-25 RX ADMIN — OXYCODONE HYDROCHLORIDE 5 MG: 5 TABLET ORAL at 10:43

## 2017-07-25 ASSESSMENT — ACTIVITIES OF DAILY LIVING (ADL): PREVIOUS_RESPONSIBILITIES: MEDICATION MANAGEMENT;FINANCES;DRIVING

## 2017-07-25 NOTE — PLAN OF CARE
Problem: Goal Outcome Summary  Goal: Goal Outcome Summary  OT: Eval complete and Tx initiated. Seen POD #1 of R reverse TSA. Prior to surgery, pt lives with wife in condo and reports I with ADL/IADLs. O2 sats dropped to 90% on room air during activity; thus, reapplied 1L with RN notified.     Discharge Planner OT   Patient plan for discharge: Home tomorrow with OP OT/PT  Current status: Pt required mod A for R sling mgmt and min A for functional transfers. Difficulty tolerating R UE HEP during session.   Barriers to return to prior living situation: Current level of A for ADLs; Fall risk; Safety concern  Recommendations for discharge: Home with A from wife for ADLs and OP OT/PT per MD's protocol.   Rationale for recommendations: Pt would benefit from twice daily OT intervention during hospitalization to ensure safety with ADLs as well as caregiver education for pt's wife. Pt will need A for ADLs upon return home due to impaired safety, decreased balance, pain, and R UE post-surgical precautions.        Entered by: Jc Pacheco 07/25/2017 12:53 PM

## 2017-07-25 NOTE — CONSULTS
"[facility]  Hospitalist Service Consultation  JoAnna K. Barthell, PA-C pager [phone]    Chava Valentine MRN# [id number]   YOB: 1933 Age: 83 year old      Date of Admission:  7/24/2017  Date of Consult: 7/24/2017    Primary Care Physician: Mike Cosby [phone]    Requesting Physician: Rodolfo Calhoun  Reason for Consult: Medical comngt    History provided by patient.    HPI  Chava Valentine is an 82-year-old male with a myasthenia gravis-like disorder, CKD III, HLD, HTN, ASCVD, and paroxysmal atrial fibrillation who is admitted under the care of the orthopedic service for routine postoperative management following an elective right reverse total shoulder arthroplasty.  The procedure was performed under general anesthesia and a peripheral nerve block.  Procedure was performed by Dr. Rodolfo Calhoun.  He was treated perioperatively with Ancef antibiotics.  EBL is not yet documented.    Presently the patient is evaluated in his hospital room with his wife at the bedside.  He looks drowsy and complains of increasing pain and nausea.  His heart rate sounds regular and is in the 60s to 70s range.  He is on 4 L supplemental oxygen.    He follows with Dr. Bernard for his paroxysmal atrial fibrillation and monitoring of ASCVD.  He helped Pradaxa for the week prior to today's surgery.  He takes Mestinon for an unspecified neurologic disorder.  He explains he follows with Dr. Loo at the [facility] and was informed that he does not have myasthenia gravis but continues to take the medication after having some improvement of muscle weakness after starting approximately six years ago in 2011.  He is a borderline diabetic and does not take any medications.  He reports he can tell when he has low blood sugar because he gets \"shaky\" and believes he might have had one episode within the last hour or two, but has since eaten crackers and drank juice and is feeling improved.    PAST MEDICAL " HISTORY  Paroxysmal atrial fibrillation.  ASCVD s/p 4-vessel CABG (1995)  Hypertension.  Hyperlipidemia.  CKD stage III.  Myasthenia gravis variant (2011).  Follows with Dr. Loo through the [facility].  This is not myasthenia gravis but he takes Mestinon.  Erectile dysfunction.    PAST SURGICAL HISTORY  Past Surgical History:   Procedure Laterality Date     BYPASS GRAFT ARTERY CORONARY       CARDIAC SURGERY       COLONOSCOPY  12/2010    tubular adenoma     ORTHOPEDIC SURGERY      rotator cuff right     PHACOEMULSIFICATION CLEAR CORNEA WITH STANDARD IOL, VITRECTOMY PARSPLANA 23 GAGUE, COMBINED  2/20/2013    Procedure: COMBINED PHACOEMULSIFICATION CLEAR CORNEA WITH STANDARD INTRAOCULAR LENS IMPLANT, VITRECTOMY PARSPLANA 23 GAUGE;;  Surgeon: Guero Lockett MD;  Location: [facility]     PHACOEMULSIFICATION CLEAR CORNEA WITH STANDARD IOL, VITRECTOMY PARSPLANA 23 GAGUE, COMBINED  7/31/2013    Procedure: COMBINED PHACOEMULSIFICATION CLEAR CORNEA WITH STANDARD INTRAOCULAR LENS IMPLANT, VITRECTOMY PARSPLANA 23 GAUGE;;  Surgeon: Guero Lockett MD;  Location: [facility]     VASCULAR SURGERY      cab     VITRECTOMY PARSPLANA, PHACOEMULSIFICATION CLEAR CORNEA W STANDARD INTRAOCULAR LENS IMPLANT, COMBINED  2/20/2013    Procedure: COMBINED VITRECTOMY PARSPLANA, PHACOEMULSIFICATION CLEAR CORNEA WITH STANDARD INTRAOCULAR LENS IMPLANT;  LEFT PHACOEMULSIFICATION CLEAR CORNEA WITH SAURABH TORIC INTRAOCULAR LENS IMPLANT,  LEFT EYE 23 GAUGE PARSPLANA VITRECTOMY;  Surgeon: Jorge Regan MD;  Location: [facility]     VITRECTOMY PARSPLANA, PHACOEMULSIFICATION CLEAR CORNEA W STANDARD INTRAOCULAR LENS IMPLANT, COMBINED  7/31/2013    Procedure: COMBINED VITRECTOMY PARSPLANA, PHACOEMULSIFICATION CLEAR CORNEA WITH STANDARD INTRAOCULAR LENS IMPLANT;  RIGHT PHACOEMULSIFICATION CLEAR CORNEA WITH TORIC INTRAOCULAR LENS IMPLANT, RIGHT 23 GAUGE PARSPLANA VITRECTOMY ;  Surgeon: Jorge Regan MD;  Location: [facility]       HOME MEDICATIONS  Prior to  Admission medications    Medication Sig Last Dose Taking? Auth Provider   FLECAINIDE ACETATE PO Take 100 mg by mouth 2 times daily (Takes 2 x 50mg tablet = 100mg dose) 7/24/2017 at 0700 Yes Reported, Patient   Pantoprazole Sodium (PROTONIX PO) Take 40 mg by mouth 2 times daily 7/23/2017 at pm Yes Reported, Patient   Multiple Vitamins-Minerals (OCUVITE PO) Take 1 tablet by mouth 2 times daily 7/23/2017 Yes Reported, Patient   RaNITidine HCl (ZANTAC PO) Take 150 mg by mouth daily as needed for heartburn 7/23/2017 at prn Yes Reported, Patient   fluticasone (FLONASE) 50 MCG/ACT spray Spray 1 spray into both nostrils daily as needed for rhinitis or allergies more than a week at prn Yes Reported, Patient   PAPAV-PHENTOLAMINE-ALPROSTADIL IC Inject 1 Dose as directed daily as needed (erectile dysfunction) Trimix Injection 7/23/2017 at pm Yes Reported, Patient   lisinopril (PRINIVIL/ZESTRIL) 10 MG tablet Take 10 mg by mouth 2 times daily 7/24/2017 at 0700 Yes Reported, Patient   ezetimibe (ZETIA) 10 MG tablet Take 1 tablet (10 mg) by mouth daily 7/24/2017 at 0700 Yes Michael Bernard MD   pyridostigmine (MESTINON) 60 MG tablet Take 60 mg by mouth 2 times daily as needed  7/21/2017 Yes Reported, Patient   dabigatran ANTICOAGULANT (PRADAXA ANTICOAGULANT) 150 MG CAPS capsule Take 1 capsule (150 mg) by mouth 2 times daily 7/17/2017 Yes Michael Bernard MD   rosuvastatin (CRESTOR) 10 MG tablet Take 1 tablet (10 mg) by mouth daily 7/23/2017 at pm Yes Michael Bernard MD   Psyllium (METAMUCIL PO) Take 1 Dose by mouth daily as needed  7/23/2017 at prn Yes Reported, Patient   ZOLPIDEM TARTRATE PO Take 5-10 mg by mouth nightly as needed  7/23/2017 at pm Yes Reported, Patient   CLONAZEPAM PO Take 0.25-0.5 mg by mouth 3 times daily as needed  Past Week at prn Yes Reported, Patient   TESTOSTERONE AQUEOUS IM Inject 1 mL into the muscle every 14 days  more than 2 weeks Yes Reported, Patient       ALLERGIES  Allergies   Allergen Reactions  "    No Clinical Screening - See Comments Rash     Dove soap       SOCIAL HISTORY  Former social smoker quitting 10-15 years ago.  He drinks approximately one sixpack of beer per month.  He lives in a condominium with his wife.  Is generally quite active at baseline.      FAMILY HISTORY  Mother:  at age 97.  She had bipolar disorder.  Father:  at age 97.  History of cerebrovascular disease.      REVIEW OF SYSTEMS  A 10 point ROS was negative other than the symptoms noted above in the HPI.    PHYSICAL EXAM  Nursing Notes Reviewed.  /68  Temp 97.7  F (36.5  C) (Oral)  Resp 16  Ht 1.727 m (5' 8\")  Wt 79.8 kg (176 lb)  SpO2 98%  BMI 26.76 kg/m2   General:  Awake but drowsy.  Elderly male who appears stated age and looks comfortable lying flat in bed.    Skin:  Warm, dry. No rashes or lesions on exposed skin.  HEENT:  Normocephalic, atraumatic. EOMs grossly intact and PERRL. MMM.  Neck:  Supple, no cervical lymphadenopathy.  Chest:  Breath sounds CTA. No increased work of breathing on 4 L supplemental O2 by nasal cannula..  Cardiovascular:  RRR, no rub or murmur. No peripheral edema.  Abdomen:  Soft, non-tender, non-distended. Bowel sounds present.  Musculoskeletal:  Right upper extremity is immobilized in a sling.  Hemovac is present.  Distal CMS intact.  Neurological:  CN 2-12 grossly intact    Laboratory Results:  Personally reviewed today as per Epic.   Creatinine 1.42, GFR 48.  Hemoglobin 16.7, platelets 145.    Imaging Results:   Personally reviewed today as per Epic.   EKG shows sinus rhythm with left axis deviation and left bundle branch block.    ASSESSMENT AND PLAN  Chava Valentine is an 82-year-old male with a myasthenia gravis-like disorder, CKD III, HLD, HTN, ASCVD, and paroxysmal atrial fibrillation who is admitted under the care of the orthopedic service for routine postoperative management following an elective right reverse total shoulder arthroplasty.  The hospital service is consulted " "for medical comanagement.    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  -Routine postoperative management including DVT prophylaxis IV fluids, and pain control deferred to orthopedic service.  -Hemoglobin 16.7 preoperatively.  Check hemoglobin in the a.m.  -Resume Pradaxa when okay with orthopedic service.    Paroxysmal atrial fibrillation.  Currently sounds to be in regular rhythm with normal rate.  Follows with Dr. Bernard.  -Continue prior to admission flecainide 100 mg b.i.d.  -As above resume Pradaxa for stroke prophylaxis when okay with orthopedic service.    ASCVD.  HTN.  HLD.  Status post four vessel CABG in 1995.  Blood pressure is adequately controlled postoperatively.  -Hold prior to admission lisinopril given known CKD stage III and today's surgery.  Can likely resume if kidney functioning stable tomorrow.  -Continue prior to admission Zetia and Crestor.    CKD, stage III.  Baseline creatinine appears in the 1.4-1.5 range.  -Hold prior to admission ACE inhibitor as above.  -Avoid nephrotoxic agent.  -Check creatinine in a.m.    Myasthenia gravis-like disorder.  Diagnosis in 2011 through the [facility] after presenting with sudden muscle weakness.  He does not have myasthenia gravis, but continues on Mestinon as needed.  -Continue prior to admission Mestinon b.i.d. prn.    GERD.  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  -Continue prior to admission Protonix b.i.d and Zantac daily.    Prophylaxis:  DVT: Enoxaparin per primary service.    Code Status: Full code, but clarifies that he would only want heroic measures like intubation for up to three days unless he was noted to be improving.    Dispo: as per ortho service.    This patient was discussed with Dr. Ariane Puga of the Hospitalist Service who agrees with current plans as outlined above.    JoAnna K. Barthell  "

## 2017-07-25 NOTE — PLAN OF CARE
Problem: Perioperative Period (Adult)  Goal: Signs and Symptoms of Listed Potential Problems Will be Absent or Manageable (Perioperative Period)  Signs and symptoms of listed potential problems will be absent or manageable by discharge/transition of care (reference Perioperative Period (Adult) CPG).  Outcome: No Change  A&O x 4, VSS on RA, pain controlled with IV and oral analgesic, drsg CDI, CMS intact, up with assist of 1 SB, unable to void strait cathed x 1, IV infusing, continue to monitor.

## 2017-07-25 NOTE — PLAN OF CARE
Problem: Goal Outcome Summary  Goal: Goal Outcome Summary  Outcome: Improving  Patient A&Ox4, VSS On 6LPM O2. Iv infusing. Dressing c/d/i. Hvc patent. Up with SBA. CMS intact. Sling on Rt arm. Unable to void. BladderScan >520 tried to void several times no succes . Straight cath x1.Now DTV. On Regular diet. Wife by bedside. Will continue monitoring.

## 2017-07-25 NOTE — DISCHARGE INSTRUCTIONS
DISCHARGE INSTRUCTIONS FOR YOUR TOTAL SHOULDER REPLACEMENT      BERNADINE FERRO MD    Instructions to care for your wound at home:   Change the dressing daily.  Inspect your incision at the time of dressing change for redness, swelling, and drainage.  Some discoloration and bruising is common, but please notify my office if you have any concerns about the wound appearance.  You may shower directly over the wound beginning 4 days following your surgery.  Do not, however, submerge the wound under water until the sutures are removed and the wound completely sealed and without drainage.  You should let your arm hang at your side during the shower.  Do not actively lift or actively move the arm from your side when out of the sling.  To wash under your arm, simply lean over towards your operative shoulder to allow the arm to hang freely and gently separate from your body by gravity alone.         Assistive devices:  Sling for support.  Remove the sling at least 3 times daily to fully straighten your elbow.  Also move your wrist, hand, and fingers often to mobilize swelling and prevent stiffness.    Wearing a button-up blouse or shirt is recommended.  The shoulder sling or immobilizer may be worn over the outside of your clothes.  Always keep your surgical arm hanging at your side when donning or doffing the sling.  Slide your surgical arm into the shirt sleeve first, then the other arm.  When taking off the shirt or blouse, do the opposite - slide your unaffected arm out of the sleeve first, followed by the surgical arm.     Outpatient physical therapy and home exercises:  Your outpatient physical therapy following your surgery is prearranged by our office.  You should have already scheduled your therapy sessions in advance.  If you have not done so, contact the therapy location of your choice for your appointments for the physical therapy regimen that has been prescribed for you.  You should perform your home exercise  program daily in addition to the sessions scheduled with your therapist.    Clinic follow-up appointment:  Your clinic follow-up visit has been prearranged at the time of your surgery scheduling.     Medications:  New discharge medications will include a pain medication and a muscle relaxer as needed. Detailed instructions will come with those medications.  You will also receive instructions on when to resume your home medications.  Make sure to use your stool softener while on narcotics.    Please use your Pradaxa as before surgery as the blood thinner.    Antibiotic coverage will be needed before any type of dental procedure.  This is a life long recommendation.  You should notify your dentist of your total shoulder surgery and call your dentist or our office one week before a dental appointment for the antibiotics.       Call [phone] with any questions.     Rodolfo Calhoun MD

## 2017-07-25 NOTE — PLAN OF CARE
Problem: Goal Outcome Summary  Goal: Goal Outcome Summary  PT: PT orders received, chart reviewed, discussed status with OT. Per chart review and OT discussion, no skilled acute PT needs. Plan is to discharge home with OP therapy. Will complete PT orders.

## 2017-07-25 NOTE — PROVIDER NOTIFICATION
Spoke with Dr. Calhoun regarding pt's unrelieved pain this morning after all available pain meds were given, increased oxy to 5-10 q3 prn

## 2017-07-25 NOTE — PLAN OF CARE
Problem: Goal Outcome Summary  Goal: Goal Outcome Summary  Outcome: No Change  VSS, A&Ox4, complaints of increasing pain. Pt states does not tolerate Oxycodone well. Feeling sedated with no relief of pain. Per MD medication change to Norco, started with 1 tab norco. Pt still unable to void, BS for 224. Will continue to monitor

## 2017-07-25 NOTE — PROGRESS NOTES
[facility]  Hospitalist Progress Note  Date of service (when I saw the patient) 07/25/2017:          Assessment and Plan:    Mr Chava Valentine is an 82-year-old male with a myasthenia gravis-like disorder, CKD III, HLD, HTN, ASCVD, and paroxysmal atrial fibrillation who is admitted under the care of the orthopedic service for routine postoperative management following an elective right reverse total shoulder arthroplasty.  The hospital service is consulted for medical comanagement.      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  -Routine postoperative management including DVT prophylaxis IV fluids, and pain control deferred to orthopedic service.  -Hemoglobin drop 16.7 preoperatively->13.8 this a.m due to surgery  -Resume Pradaxa when okay with orthopedic service.     Urinary retention:  - likely due to pain medication  - has required intermittent catherization  - encourage po fluids, ambulate continue intermittent catherization  - add Flomax if unable to void & leave indwelling harris in       Paroxysmal atrial fibrillation.  - Currently sounds to be in regular rhythm with normal rate. Follows with Dr. Bernard.  - Continue prior to admission flecainide 100 mg b.i.d.  - resume Pradaxa for stroke prophylaxis when okay with orthopedic service.      ASCVD:  - stable. s/p Status post four vessel CABG in 1995.     Hx HTN:  - BP soft postop,continue to hold PTA lisinopril 10 mg     HLD.  Status post four vessel CABG in 1995.  Blood pressure is adequately controlled postoperatively.  -Hold prior to admission lisinopril given known CKD stage III and today's surgery.  Can likely resume if kidney functioning stable tomorrow.  -Continue prior to admission Zetia and Crestor.      CKD, stage III.  Baseline creatinine appears in the 1.4-1.5 range.  - Creatinine better today 1.29  - Hold prior to admission ACE inhibitor as above.  - Avoid nephrotoxic agent.      Myasthenia  "gravis-like disorder.  Diagnosis in  through the [facility] after presenting with sudden muscle weakness.  He does not have myasthenia gravis, but continues on Mestinon as needed.  - Continue prior to admission Mestinon b.i.d. prn.      GERD.  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  -Continue prior to admission Protonix b.i.d and Zantac daily.     Prophylaxis:  DVT: Enoxaparin per primary service.     Code Status: Full code,     Dispo: as per ortho service.               Interval History:   Unable to void- urinary retention required straight catherizing x 2 last last evening. This am has not voided as yet.  - pain issues , adverse affects to Oxy changed per ortho to norco              Medications:       ezetimibe  10 mg Oral Daily     flecainide (TAMBOCOR) tablet 100 mg  100 mg Oral BID     pantoprazole (PROTONIX) EC tablet 40 mg  40 mg Oral BID     rosuvastatin  10 mg Oral Daily     sodium chloride (PF)  3 mL Intracatheter Q8H     enoxaparin  40 mg Subcutaneous Q24H     acetaminophen  975 mg Oral Q8H     senna-docusate  1-2 tablet Oral BID     calcium carbonate, HYDROcodone-acetaminophen, fluticasone, ranitidine, lidocaine (buffered or not buffered), lidocaine 4%, sodium chloride (PF), naloxone, HYDROmorphone, [START ON 2017] acetaminophen, cyclobenzaprine, diphenhydrAMINE **OR** diphenhydrAMINE, ondansetron **OR** ondansetron, prochlorperazine **OR** prochlorperazine, sore throat lozenge               Physical Exam:   Blood pressure 102/54, pulse 61, temperature 98.8  F (37.1  C), temperature source Oral, resp. rate 16, height 1.727 m (5' 8\"), weight 79.8 kg (176 lb), SpO2 95 %.  Wt Readings from Last 4 Encounters:   17 79.8 kg (176 lb)   17 78.9 kg (174 lb)   17 78.9 kg (174 lb)   17 80.7 kg (178 lb)         Vital Sign Ranges  Temperature Temp  Av.7  F (36.5  C)  Min: 96.4  F (35.8  C)  Max: 98.8  F (37.1  C)   Blood " pressure Systolic (24hrs), Av , Min:97 , Max:122        Diastolic (24hrs), Av, Min:53, Max:72      Pulse Pulse  Av  Min: 61  Max: 61   Respirations Resp  Av.1  Min: 12  Max: 19   Pulse oximetry SpO2  Av.4 %  Min: 94 %  Max: 99 %         Intake/Output Summary (Last 24 hours) at 17 1414  Last data filed at 17 1300   Gross per 24 hour   Intake           2952.5 ml   Output             1645 ml   Net           1307.5 ml       Constitutional: Awake, alert, cooperative, no apparent distress   Lungs: Clear to auscultation bilaterally, no crackles or wheezing   Cardiovascular: Regular rate and rhythm, normal S1 and S2, and no murmur noted   Abdomen: Normal bowel sounds, soft, non-distended, non-tender   Skin: No rashes, no cyanosis, no edema   Neuro:                Data:          Lab Results   Component Value Date     2017     2017     02/10/2017    Lab Results   Component Value Date    CHLORIDE 101 2017    CHLORIDE 106 2017    CHLORIDE 104 02/10/2017    Lab Results   Component Value Date    BUN 16 2017    BUN 16 2017    BUN 18 02/10/2017      Lab Results   Component Value Date    POTASSIUM 4.6 2017    POTASSIUM 4.7 2017    POTASSIUM 4.1 02/10/2017    Lab Results   Component Value Date    CO2 24 2017    CO2 24 2017    CO2 28 02/10/2017    Lab Results   Component Value Date    CR 1.29 2017    CR 1.42 2017    CR 1.89 02/10/2017        Lab Results   Component Value Date    WBC 7.8 2017    WBC 9.5 2015    WBC 8.6 2008    HGB 13.8 2017    HGB 16.7 2017    HGB 16.8 2017    HCT 51.0 2017    HCT 50.9 2015    HCT 46.6 2008    MCV 88 2017    MCV 85 2015    MCV 96 2008     (L) 2017     2017     2015

## 2017-07-25 NOTE — DISCHARGE SUMMARY
DISCHARGE SUMMARY    NAME:  Chava Valentine  AGE:  83 year old  YOB: 1933  MRN#:  [id number]    Chava Valentine was admitted for elective reverse right total shoulder arthroplasty.  The surgery was performed on 7/24/2017.  The postoperative course is documented in the medical record.  There were no complications. The patient was felt ready for discharge home with post-discharge physical therapy and outpatient visit prearranged.     Lab Results   Component Value Date    HGB 13.8 07/25/2017    HGB 16.7 07/24/2017    HGB 16.8 01/27/2017       The patient received 0 units transfusion.      FINAL DISCHARGE DIAGNOSIS:  Rotator cuff arthropathy.               Acute blood loss anemia     SURGICAL PROCEDURE THIS ADMISSION:  Right reverse total shoulder arthroplasty.         BERNADINE FERRO MD      CC: Fax [phone]         Bernadine Ferro MD

## 2017-07-25 NOTE — PROGRESS NOTES
[facility] Orthopedic Post-Op / Progress Note  Chava Valentine is a 83 year old male    Today's Date:2017  Admission Date: 2017  POD # 1 Reverse TSA         Interval History:   Struggling with pain management as block wore off a few hours ago.    CMS good in hand  Some problems with urination - should improve with mobilization            Physical Exam:   All vitals have been reviewed  Temperatures:  Current - Temp: 98  F (36.7  C); Max - Temp  Av.6  F (36.4  C)  Min: 96.4  F (35.8  C)  Max: 98.2  F (36.8  C)  Pulse range: No Data Recorded  Blood pressure range: Systolic (24hrs), Av , Min:97 , Max:137   ; Diastolic (24hrs), Av, Min:53, Max:76      Intake/Output Summary (Last 24 hours) at 17 0823  Last data filed at 17 0632   Gross per 24 hour   Intake           2652.5 ml   Output             1645 ml   Net           1007.5 ml       Dressing dry and intact.          Data:   All laboratory data related to this surgery reviewed      Lab Results   Component Value Date     2017    POTASSIUM 4.6 2017    CHLORIDE 101 2017    CO2 24 2017     (H) 2017     Lab Results   Component Value Date    HGB 13.8 2017    HGB 16.7 2017    HGB 16.8 2017     Platelet Count (10e9/L)   Date Value   2017 145 (L)   2017 171   2015 154       All imaging studies related to this surgery reviewed         Assessment and Plan:    Assessment:  Doing well.  No immediate surgical complications identified.  No excessive bleeding    Plan:  Pain control measures  Discharge plan: Home when mobilized and pain controlled.  Maybe this afternoon or tomorrow AM    Rodolfo Calhoun MD

## 2017-07-25 NOTE — PROGRESS NOTES
07/25/17 0846   Quick Adds   Type of Visit Initial Occupational Therapy Evaluation   Living Environment   Lives With spouse   Living Arrangements condominium   Home Accessibility no concerns   Transportation Available car;family or friend will provide  (Pt still drives; however, wife can provide for now.)   Self-Care   Dominant Hand right   Usual Activity Tolerance good   Current Activity Tolerance moderate   Equipment Currently Used at Home none   Functional Level Prior   Ambulation 0-->independent   Transferring 0-->independent   Toileting 0-->independent   Bathing 0-->independent   Dressing 0-->independent   Eating 0-->independent   Communication 0-->understands/communicates without difficulty   Swallowing 0-->swallows foods/liquids without difficulty   Cognition 0 - no cognition issues reported   Fall history within last six months no   General Information   Onset of Illness/Injury or Date of Surgery - Date 07/24/17   Referring Physician Rodolfo Calhoun MD   Patient/Family Goals Statement Pt plans to discharge home tomorrow with A from wife   Additional Occupational Profile Info/Pertinent History of Current Problem Seen POD #1 of R reverse TSA.    Precautions/Limitations fall precautions;oxygen therapy device and L/min  (No supplemental O2 at baseline. On 4L at eval.)   Weight-Bearing Status - RUE nonweight-bearing   General Observations Wife present at eval.    Cognitive Status Examination   Orientation orientation to person, place and time   Level of Consciousness alert   Able to Follow Commands WNL/WFL   Personal Safety (Cognitive) at risk behaviors demonstrated   Memory intact   Pain Assessment   Patient Currently in Pain Yes, see Vital Sign flowsheet   Range of Motion (ROM)   ROM Quick Adds Other (describe)   ROM Comment L UE WNL. No R shoulder ROM per MD. Only R elbow, wrist, and hand ROM.    Strength   Manual Muscle Testing Quick Adds Other   Strength Comments L UE WFL. R UE not tested due to shoulder  precautions.    Mobility   Bed Mobility Comments SBA for supine<>sit.    Transfer Skills   Transfer Transfer Safety Analysis Bed/Chair;Transfer Skill: Stand to Sit;Transfer Safety Analysis Sit/Stand   Transfer Skill: Bed to Chair/Chair to Bed   Level of [place]: Bed to Chair minimum assist (75% patients effort)   Weight-Bearing Restrictions nonweight-bearing   Transfer Safety Analysis Bed/Chair   Impairments Contributing to Impaired Transfers pain;impaired balance   Transfer Skill: Sit to Stand   Level of [place]: Sit/Stand minimum assist (75% patients effort)   Transfer Skill: Sit to Stand nonweight-bearing   Transfer Safety Analysis Sit/Stand   Impaired Transfers: Sit/Stand pain;impaired balance   Transfer Skill: Toilet Transfer   Level of [place]: Toilet minimum assist (75% patients effort)   Weight-Bearing Restrictions: Toilet nonweight-bearing   Upper Body Dressing   Level of [place]: Dress Upper Body moderate assist (50% patients effort)   Lower Body Dressing   Level of [place]: Dress Lower Body moderate assist (50% patients effort)   Eating/Self Feeding   Level of [place]: Eating independent   Instrumental Activities of Daily Living (IADL)   Previous Responsibilities medication management;finances;driving   Activities of Daily Living Analysis   Impairments Contributing to Impaired Activities of Daily Living pain;post surgical precautions;balance impaired;fear and anxiety   General Therapy Interventions   Planned Therapy Interventions ADL retraining;cognition;transfer training;home program guidelines;progressive activity/exercise   Clinical Impression   Criteria for Skilled Therapeutic Interventions Met yes, treatment indicated   OT Diagnosis Decreased I and safety with ADLs   Influenced by the following impairments Pain; R UE post-surgical precautions; Impaired safety and activity tolerance; Decreased balance   Assessment of Occupational Performance 3-5 Performance Deficits  "  Identified Performance Deficits Dressing; Toileting; Showering   Clinical Decision Making (Complexity) Moderate complexity   Therapy Frequency 2 times/day   Predicted Duration of Therapy Intervention (days/wks) 2 days   Anticipated Discharge Disposition (Per surgeon)   Risks and Benefits of Treatment have been explained. Yes   Patient, Family & other staff in agreement with plan of care Yes   [facility] TM \"6 Clicks\"   2016, Trustees of [facility], under license to [employer].  All rights reserved.   6 Clicks Short Forms Daily Activity Inpatient Short Form   [facility]  \"6 Clicks\" Daily Activity Inpatient Short Form   1. Putting on and taking off regular lower body clothing? 2 - A Lot   2. Bathing (including washing, rinsing, drying)? 2 - A Lot   3. Toileting, which includes using toilet, bedpan or urinal? 3 - A Little   4. Putting on and taking off regular upper body clothing? 2 - A Lot   5. Taking care of personal grooming such as brushing teeth? 4 - None   6. Eating meals? 4 - None   Daily Activity Raw Score (Score out of 24.Lower scores equate to lower levels of function) 17   Total Evaluation Time   Total Evaluation Time (Minutes) 12     "

## 2017-07-26 ENCOUNTER — APPOINTMENT (OUTPATIENT)
Dept: OCCUPATIONAL THERAPY | Facility: CLINIC | Age: 82
DRG: 483 | End: 2017-07-26
Attending: ORTHOPAEDIC SURGERY
Payer: MEDICARE

## 2017-07-26 VITALS
TEMPERATURE: 97.9 F | RESPIRATION RATE: 16 BRPM | SYSTOLIC BLOOD PRESSURE: 140 MMHG | DIASTOLIC BLOOD PRESSURE: 68 MMHG | BODY MASS INDEX: 26.67 KG/M2 | HEART RATE: 58 BPM | WEIGHT: 176 LBS | HEIGHT: 68 IN | OXYGEN SATURATION: 94 %

## 2017-07-26 LAB — HGB BLD-MCNC: 13.3 G/DL (ref 13.3–17.7)

## 2017-07-26 PROCEDURE — 25000132 ZZH RX MED GY IP 250 OP 250 PS 637: Mod: GY | Performed by: INTERNAL MEDICINE

## 2017-07-26 PROCEDURE — A9270 NON-COVERED ITEM OR SERVICE: HCPCS | Mod: GY | Performed by: ORTHOPAEDIC SURGERY

## 2017-07-26 PROCEDURE — 40000133 ZZH STATISTIC OT WARD VISIT: Performed by: OCCUPATIONAL THERAPY ASSISTANT

## 2017-07-26 PROCEDURE — 25000128 H RX IP 250 OP 636: Performed by: ORTHOPAEDIC SURGERY

## 2017-07-26 PROCEDURE — 97110 THERAPEUTIC EXERCISES: CPT | Mod: GO | Performed by: OCCUPATIONAL THERAPY ASSISTANT

## 2017-07-26 PROCEDURE — 97535 SELF CARE MNGMENT TRAINING: CPT | Mod: GO | Performed by: OCCUPATIONAL THERAPY ASSISTANT

## 2017-07-26 PROCEDURE — 36415 COLL VENOUS BLD VENIPUNCTURE: CPT | Performed by: ORTHOPAEDIC SURGERY

## 2017-07-26 PROCEDURE — 97530 THERAPEUTIC ACTIVITIES: CPT | Mod: GO | Performed by: OCCUPATIONAL THERAPY ASSISTANT

## 2017-07-26 PROCEDURE — A9270 NON-COVERED ITEM OR SERVICE: HCPCS | Mod: GY | Performed by: INTERNAL MEDICINE

## 2017-07-26 PROCEDURE — 85018 HEMOGLOBIN: CPT | Performed by: ORTHOPAEDIC SURGERY

## 2017-07-26 PROCEDURE — 25000132 ZZH RX MED GY IP 250 OP 250 PS 637: Mod: GY | Performed by: ORTHOPAEDIC SURGERY

## 2017-07-26 PROCEDURE — 99232 SBSQ HOSP IP/OBS MODERATE 35: CPT | Performed by: INTERNAL MEDICINE

## 2017-07-26 RX ORDER — HYDROCODONE BITARTRATE AND ACETAMINOPHEN 5; 325 MG/1; MG/1
1-2 TABLET ORAL EVERY 4 HOURS PRN
Qty: 50 TABLET | Refills: 0 | Status: SHIPPED | OUTPATIENT
Start: 2017-07-26 | End: 2017-11-09

## 2017-07-26 RX ADMIN — PANTOPRAZOLE SODIUM 40 MG: 40 TABLET, DELAYED RELEASE ORAL at 08:13

## 2017-07-26 RX ADMIN — SENNOSIDES AND DOCUSATE SODIUM 2 TABLET: 8.6; 5 TABLET ORAL at 08:13

## 2017-07-26 RX ADMIN — CALCIUM CARBONATE (ANTACID) CHEW TAB 500 MG 1000 MG: 500 CHEW TAB at 08:28

## 2017-07-26 RX ADMIN — HYDROCODONE BITARTRATE AND ACETAMINOPHEN 2 TABLET: 5; 325 TABLET ORAL at 02:35

## 2017-07-26 RX ADMIN — HYDROCODONE BITARTRATE AND ACETAMINOPHEN 2 TABLET: 5; 325 TABLET ORAL at 08:12

## 2017-07-26 RX ADMIN — CALCIUM CARBONATE (ANTACID) CHEW TAB 500 MG 1000 MG: 500 CHEW TAB at 15:15

## 2017-07-26 RX ADMIN — FLECAINIDE ACETATE 100 MG: 100 TABLET ORAL at 08:13

## 2017-07-26 RX ADMIN — ENOXAPARIN SODIUM 40 MG: 40 INJECTION SUBCUTANEOUS at 12:08

## 2017-07-26 RX ADMIN — EZETIMIBE 10 MG: 10 TABLET ORAL at 08:13

## 2017-07-26 NOTE — PROGRESS NOTES
[facility]  Hospitalist Progress Note  Date of service (when I saw the patient) 07/25/2017:          Assessment and Plan:    Mr Chava Valentine is an 82-year-old male with a myasthenia gravis-like disorder, CKD III, HLD, HTN, ASCVD, and paroxysmal atrial fibrillation who is admitted under the care of the orthopedic service for routine postoperative management following an elective right reverse total shoulder arthroplasty.  The hospital service is consulted for medical comanagement.      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  -Routine postoperative management including DVT prophylaxis IV fluids, and pain control deferred to orthopedic service.  - minimize norco  To 1/2 - 1 pill instead of 2 pills   -Hemoglobin drop 16.7 preoperatively->13.8 -> 3.3 this a.m due to surgery  -Resume Pradaxa in am per ortho.     Urinary retention:  - likely due to pain medication  - has required intermittent catherization  - resolved      Paroxysmal atrial fibrillation.  - Currently sounds to be in regular rhythm with normal rate. Follows with Dr. Bernard.  - Continue prior to admission flecainide 100 mg b.i.d.  - resume Pradaxa in am for stroke prophylaxis, per ortho.      ASCVD:  - stable. s/p Status post four vessel CABG in 1995.     Hx HTN:  - BP improved , ok to resume  PTA lisinopril 10 mg in am     HLD.  Status post four vessel CABG in 1995.  Blood pressure is adequately controlled postoperatively.  -Hold prior to admission lisinopril given known CKD stage III and today's surgery.  Can likely resume if kidney functioning stable tomorrow.  -Continue prior to admission Zetia and Crestor.      CKD, stage III.  Baseline creatinine appears in the 1.4-1.5 range.  - Creatinine better today 1.29  - Hold prior to admission ACE inhibitor in hospital   - ok to resume  PTA lisinopril 10 mg in am      Myasthenia gravis-like disorder.  Diagnosis in 2011 through the [facility] after  "presenting with sudden muscle weakness.  He does not have myasthenia gravis, but continues on Mestinon as needed.  - Continue prior to admission Mestinon b.i.d. prn.      GERD.  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  -Continue prior to admission Protonix b.i.d and Zantac daily.     Prophylaxis:  DVT: Enoxaparin per primary service.     Code Status: Full code,     Dispo: as per ortho service.               Interval History:   Was felt to be slow / sluggish this am when ortho was him, Rn report pt had received 2 norcos this am. Now pt sitting up in bed feeding self. Medications for d/c discussed with pt/significant othet              Medications:       ezetimibe  10 mg Oral Daily     flecainide (TAMBOCOR) tablet 100 mg  100 mg Oral BID     pantoprazole (PROTONIX) EC tablet 40 mg  40 mg Oral BID     rosuvastatin  10 mg Oral Daily     sodium chloride (PF)  3 mL Intracatheter Q8H     enoxaparin  40 mg Subcutaneous Q24H     acetaminophen  975 mg Oral Q8H     senna-docusate  1-2 tablet Oral BID     calcium carbonate, HYDROcodone-acetaminophen, fluticasone, ranitidine, lidocaine (buffered or not buffered), lidocaine 4%, sodium chloride (PF), naloxone, HYDROmorphone, [START ON 2017] acetaminophen, cyclobenzaprine, diphenhydrAMINE **OR** diphenhydrAMINE, ondansetron **OR** ondansetron, prochlorperazine **OR** prochlorperazine, sore throat lozenge               Physical Exam:   Blood pressure 136/77, pulse 67, temperature 98.4  F (36.9  C), temperature source Oral, resp. rate 16, height 1.727 m (5' 8\"), weight 79.8 kg (176 lb), SpO2 92 %.  Wt Readings from Last 4 Encounters:   17 79.8 kg (176 lb)   17 78.9 kg (174 lb)   17 78.9 kg (174 lb)   17 80.7 kg (178 lb)         Vital Sign Ranges  Temperature Temp  Av.7  F (36.5  C)  Min: 96.4  F (35.8  C)  Max: 98.8  F (37.1  C)   Blood pressure Systolic (24hrs), Av , Min:97 , Max:122        " Diastolic (24hrs), Av, Min:53, Max:72      Pulse Pulse  Av  Min: 61  Max: 61   Respirations Resp  Av.1  Min: 12  Max: 19   Pulse oximetry SpO2  Av.4 %  Min: 94 %  Max: 99 %         Intake/Output Summary (Last 24 hours) at 17 1414  Last data filed at 17 1300   Gross per 24 hour   Intake           2952.5 ml   Output             1645 ml   Net           1307.5 ml       Constitutional: Awake, alert, cooperative, no apparent distress   Lungs: Clear to auscultation bilaterally, no crackles or wheezing   Cardiovascular: Regular rate and rhythm, normal S1 and S2, and no murmur noted   Abdomen: Normal bowel sounds, soft, non-distended, non-tender   Skin: No rashes, no cyanosis, no edema   Neuro:                Data:        Hgb 16.7 preop-> 13.8-> 13.3 today

## 2017-07-26 NOTE — PLAN OF CARE
Problem: Goal Outcome Summary  Goal: Goal Outcome Summary  Outcome: Improving  Discharge instructions reviewed with patient and spouse, all questions answered.

## 2017-07-26 NOTE — PLAN OF CARE
Problem: Goal Outcome Summary  Goal: Goal Outcome Summary  Outcome: No Change  A&O. CMS intact. Bowel sounds active and present. Regular diet; appetite good.  VSS. O2 stable on RA. Dressing CDI.  Up with SBA. Voided spontaneously on shift: 775 mL.  C/o R shoulder  Pain, minimally addressed with PRN Norco, Dilaudid, Flexeril. Discharge plan pending.

## 2017-07-26 NOTE — PLAN OF CARE
Problem: Goal Outcome Summary  Goal: Goal Outcome Summary  Outcome: Improving  VSS, AxOx4, pain well controlled this shift, voiding in urinal, tolerating regular diet. Will d/c to home with spouse this shift. Medications to be sent with patient.

## 2017-07-26 NOTE — PLAN OF CARE
Problem: Goal Outcome Summary  Goal: Goal Outcome Summary  Outcome: Improving  A/o. VSS on 2L. CMS intact. Ambulating to bathroom, voiding in urinal. Frequent voids  each.  Dressing has dried drainage, no change noticed.  Pain mgt w/IV Dilaudid, PO Norco and Flexeril. Wife at bedside. Continue to follow care plan.

## 2017-07-26 NOTE — PLAN OF CARE
Problem: Goal Outcome Summary  Goal: Goal Outcome Summary  Discharge Planner OT   Patient plan for discharge: Home with OP OT/PT  Current status: Pt feeling groggy from the pain meds, extra time to process information when completing HEP and dressing.  Pt required assist from spouse to doff/don sling, don LE/UE dressing.  Assist with cues needed for safety with sit to stands and transfer bed<>chair.    Barriers to return to prior living situation:  Current level of A for ADLs; Fall risk; Safety concern  Recommendations for discharge: Home with spouse assist and OP OT/PT per plan established by the Occupational Therapist  Rationale for recommendations:  Pt will need A for ADLs upon return home due to impaired safety, decreased balance, pain, and R UE post-surgical precautions       Entered by: Adele Clark 07/26/2017 9:31 AM      Pt to d/c home today with spouse and family/friends assist for mobility, ADLS/IADLS.  GOALS NOT MET see discharge summary

## 2017-07-27 ENCOUNTER — APPOINTMENT (OUTPATIENT)
Dept: GENERAL RADIOLOGY | Facility: CLINIC | Age: 82
End: 2017-07-27
Attending: EMERGENCY MEDICINE
Payer: MEDICARE

## 2017-07-27 ENCOUNTER — HOSPITAL ENCOUNTER (EMERGENCY)
Facility: CLINIC | Age: 82
Discharge: HOME | End: 2017-07-27
Attending: EMERGENCY MEDICINE | Admitting: EMERGENCY MEDICINE
Payer: MEDICARE

## 2017-07-27 VITALS
OXYGEN SATURATION: 95 % | TEMPERATURE: 99.7 F | SYSTOLIC BLOOD PRESSURE: 133 MMHG | HEART RATE: 91 BPM | BODY MASS INDEX: 26.67 KG/M2 | WEIGHT: 176 LBS | HEIGHT: 68 IN | RESPIRATION RATE: 18 BRPM | DIASTOLIC BLOOD PRESSURE: 75 MMHG

## 2017-07-27 DIAGNOSIS — K59.00 CONSTIPATION, UNSPECIFIED CONSTIPATION TYPE: ICD-10-CM

## 2017-07-27 PROCEDURE — 71020 XR CHEST 2 VW: CPT

## 2017-07-27 PROCEDURE — 25000128 H RX IP 250 OP 636: Performed by: EMERGENCY MEDICINE

## 2017-07-27 PROCEDURE — 99284 EMERGENCY DEPT VISIT MOD MDM: CPT | Mod: 25

## 2017-07-27 PROCEDURE — 74020 XR ABDOMEN 2 VW: CPT

## 2017-07-27 PROCEDURE — 96372 THER/PROPH/DIAG INJ SC/IM: CPT

## 2017-07-27 RX ADMIN — METHYLNALTREXONE BROMIDE 6 MG: 12 INJECTION, SOLUTION SUBCUTANEOUS at 13:37

## 2017-07-27 ASSESSMENT — ENCOUNTER SYMPTOMS
COUGH: 1
CONSTIPATION: 1
FEVER: 1

## 2017-07-27 NOTE — DISCHARGE INSTRUCTIONS

## 2017-07-27 NOTE — ED PROVIDER NOTES
History     Chief Complaint:  Constipation     HPI   Chava Valentine is a 83 year old male s/p total right shoulder reconstruction 4 days ago who presents for multiple complaints. The patient is currently taking Norco and has had no bowel movements since 1 day prior to surgery despite taking stool softeners. The patient has since developed rectal pain which has been progressively worsening. Wife notes the patient has also had a productive cough since his surgery and measured a fever of 101 last night. Given the patient's worsening pain and other symptoms 911 services were called which prompts their visit. Patient also reports he fell yesterday after losing balance and lightly bumped his right shoulder but denies any worsening pain in his extremities.     Allergies:  The patient has no known allergies to medications.       Medications:    Hydrocodone-acetaminophen (norco) 5-325 mg per tablet  Cyclobenzaprine (flexeril) 5 mg tablet  Flecainide acetate po  Pantoprazole sodium (protonix po)  Multiple vitamins-minerals (ocuvite po)  Ranitidine hcl (zantac po)  Fluticasone (flonase) 50 mcg/act spray  Papav-phentolamine-alprostadil ic  Lisinopril (prinivil/zestril) 10 mg tablet  Ezetimibe (zetia) 10 mg tablet  Pyridostigmine (mestinon) 60 mg tablet  Dabigatran anticoagulant (pradaxa anticoagulant) 150 mg caps capsule  Rosuvastatin (crestor) 10 mg tablet  Psyllium (metamucil po)  Zolpidem tartrate po  Clonazepam po  Testosterone aqueous im    Past Medical History:    Allergic rhinitis   Anxiety   Arrhythmia   ASCVD (arteriosclerotic cardiovascular disease)   Contact dermatitis   Coronary artery disease   Cough   Elevated prolactin level (H)   Erythrocytosis   Gastro-oesophageal reflux disease   GI bleed   Hyperlipidemia   Hypertension   Hypogonadism   Insomnia   Post-nasal drip   Prediabetes   Renal disease   Shoulder arthritis   SVT (supraventricular tachycardia) (H)   Tremor     Past Surgical History:    Bypass graft  "artery coronary    Cardiac surgery    Colonoscopy  12/2010  Tubular adenoma  Orthopedic surgery    Rotator cuff right  Reverse arthroplasty shoulder right 7/24/2017  Procedure: reverse arthroplasty shoulder;  right reverse total shoulder arthroplasty ;  surgeon: flora plunkett md;  location:  or  Vascular surgery    Cab  Vitrectomy parsplana, phacoemulsification clear cornea w standard intraocular lens implant, combined    Family History:    CV disease    Social History:  .  The patient is a former smoker, quit 1999.  The patient consumes alcohol occasionally.     Review of Systems   Constitutional: Positive for fever.   Respiratory: Positive for cough.    Gastrointestinal: Positive for constipation.   All other systems reviewed and are negative.    Physical Exam   First Vitals:  BP: 147/73  Pulse: 83  Temp: 99.5  F (37.5  C)  Resp: 18  Height: 172.7 cm (5' 8\")  Weight: 79.8 kg (176 lb)  SpO2: 94 %    Physical Exam  Physical Exam   General:  Sitting on bed with wife, son and daughter in law at bedside.   HENT:  No obvious trauma to head  Right Ear:  External ear normal.   Left Ear:  External ear normal.   Nose:  Nose normal.   Eyes:  Conjunctivae and EOM are normal. Pupils are equal, round, and reactive.   Neck: Normal range of motion. Neck supple. No tracheal deviation present.   CV:  Normal heart sounds. No murmur heard.  Pulm/Chest: Effort normal and breath sounds normal.   Abd: Soft. No distension. There is no tenderness. There is no rigidity, no rebound and no guarding.   M/S: Normal range of motion. The right arm distribution of the AIN, PIN, radial, ulnar, and musculocutaneous nerve are intact to muscle and sensation bilaterally. Right shoulder in postoperative shoulder immobilizer. Radial pulses +2.   Neuro: Alert. GCS 15.  Skin: Skin is warm and dry. No rash noted. Not diaphoretic.   Psych: Normal mood and affect. Behavior is normal.     Emergency Department Course "   Imaging:  Radiographic findings were communicated with the patient who voiced understanding of the findings.    XR Abdomen:   Moderate amount of stool throughout the colon. Nonobstructive bowel gas pattern without evidence of free air. Lung bases are clear.  Results per radiology.     Chest X-ray (PA & LAT):   Cardiac silhouette within normal limits. Prominence of the pulmonary arteries, suggests pulmonary arterial hypertension. Lungs are clear. No pleural effusion or pneumothorax. Median sternotomy  wires appear intact.  Results per radiology.      Interventions:  Relistor 6 mg IV subcut    Emergency Department Course:  Nursing notes and vitals reviewed.  I performed an exam of the patient as documented above.     The work up above was undertaken.     The patient received the intervention(s) above.     1555: Patient had very large ball movement and felt 100% better. He dressed himself and desires to go home. Son and daughter-in-law are at bedside along with wife and updated them. He feels comfortable going home.    Findings and plan explained to the Patient. Patient discharged home with instructions regarding supportive care, medications, and reasons to return. The importance of close follow-up was reviewed.      Impression & Plan    Medical Decision Making:  Chava Valentine is a very pleasant 83 year old year old patient who presents to the emergency department with concern of constipation. The patient declined a rectal exam. Signs and symptoms are consistent with constipation. I reviewed options for fecal disimpaction versus enema versus Relistor. The patient consented for the latter. There are no signs at this point for a need for rectal disimpaction.  There are no signs of obstruction nor other intraabdominal catastrophe.  Abdominal x-ray showed no evidence of small bowel obstruction. The patient has had a dry cough since surgery and has a temperature of 99 so chest x-rays obtained at the same time that shows  no infiltrate to suggest pneumonia. The patient had no abdominal pain to suggest diverticulitis appendicitis or cholecystitis. After moving his bowels his symptoms of constipation feeling resolved. He did have a fall earlier but there is no injury to his head or shoulder. There are no indications for advanced imaging or admission.  I am going to send them home with instructions on medication management of this. They will then start daily stool softener in addition to MiraLAX as well once they are more completely cleaned out. Patient is agreeable with this and questions are answered.     The treatment plan was discussed with the patient and they expressed understanding of this plan and consented to the plan.  In addition, the patient will return to the emergency department if their symptoms persist, worsen, if new symptoms arise or if there is any concern as other pathology may be present that is not evident at this time. They also understand the importance of close follow up in the clinic and if unable to do so will return to the emergency department for a reevaluation. All questions were answered.    Diagnosis:    ICD-10-CM    1. Constipation, unspecified constipation type K59.00        Disposition:  Discharged.    Grayson KAY, am serving as a scribe at 2:29 PM on 7/27/2017 to document services personally performed by Dr. Calhoun, based on my observations and the provider's statements to me.     EMERGENCY DEPARTMENT       Cody Calhoun,   07/27/17 143

## 2017-07-27 NOTE — ED AVS SNAPSHOT
Emergency Department    [address] [facility] [address]    Phone:  [phone]    Fax:  [phone]                                       Chava Valentine   MRN: [id number]    Department:   Emergency Department   Date of Visit:  7/27/2017           Patient Information     Date Of Birth          10/14/1933        Your diagnoses for this visit were:     Constipation, unspecified constipation type        You were seen by Cody Calhoun DO.      Follow-up Information     Follow up with Mike Cosby MD In 5 days.    Specialty:  Family Practice    Contact information:    [facility]  [address] [facility] [address]  [facility] [address] [phone]          Follow up with  Emergency Department.    Specialty:  EMERGENCY MEDICINE    Why:  If symptoms worsen    Contact information:    [address] [facility] [address] [phone]        Discharge Instructions         Constipation (Adult)  Constipation means that you have bowel movements that are less frequent than usual. Stools often become very hard and difficult to pass.  Constipation is very common. At some point in life it affects almost everyone. Since everyone's bowel habits are different, what is constipation to one person may not be to another. Your healthcare provider may do tests to diagnose constipation. It depends on what he or she finds when evaluating you.    Symptoms of constipation include:    Abdominal pain    Bloating    Vomiting    Painful bowel movements    Itching, swelling, bleeding, or pain around the anus  Causes  Constipation can have many causes. These include:    Diet low in fiber    Too much dairy    Not drinking enough liquids    Lack of exercise or physical activity. This is especially true for older adults.    Changes in lifestyle or daily routine, including pregnancy, aging, work, and travel    Frequent use or misuse of laxatives    Ignoring the urge to have a bowel movement or  delaying it until later    Medicines, such as certain prescription pain medicines, iron supplements, antacids, certain antidepressants, and calcium supplements    Diseases like irritable bowel syndrome, bowel obstructions, stroke, diabetes, thyroid disease, Parkinson disease, hemorrhoids, and colon cancer  Complications  Potential complications of constipation can include:    Hemorrhoids    Rectal bleeding from hemorrhoids or anal fissures (skin tears)    Hernias    Dependency on laxatives    Chronic constipation    Fecal impaction    Bowel obstruction or perforation  Home care  All treatment should be done after talking with your healthcare provider. This is especially true if you have another medical problems, are taking prescription medicines, or are an older adult. Treatment most often involves lifestyle changes. You may also need medicines. Your healthcare provider will tell you which will work best for you. Follow the advice below to help avoid this problem in the future.  Lifestyle changes  These lifestyle changes can help prevent constipation:    Diet. Eat a high-fiber diet, with fresh fruit and vegetables, and reduce dairy intake, meats, and processed foods    Fluids. It's important to get enough fluids each day. Drink plenty of water when you eat more fiber. If you are on diet that limits the amount of fluid you can have, talk about this with your healthcare provider.    Regular exercise. Check with your healthcare provider first.  Medications  Take any medicines as directed. Some laxatives are safe to use only every now and then. Others can be taken on a regular basis. Talk with your doctor or pharmacist if you have questions.  Prescription pain medicines can cause constipation. If you are taking this kind of medicine, ask your healthcare provider if you should also take a stool softener.  Medicines you may take to treat constipation include:    Fiber supplements    Stool  softeners    Laxatives    Enemas    Rectal suppositories  Follow-up care  Follow up with your healthcare provider if symptoms don't get better in the next few days. You may need to have more tests or see a specialist.  Call 911  Call 911 if any of these occur:    Trouble breathing    Stiff, rigid abdomen that is severely painful to touch    Confusion    Fainting or loss of consciousness    Rapid heart rate    Chest pain  When to seek medical advice  Call your healthcare provider right away if any of these occur:    Fever over 100.4 F (38 C)    Failure to resume normal bowel movements    Pain in your abdomen or back gets worse    Nausea or vomiting    Swelling in your abdomen    Blood in the stool    Black, tarry stool    Involuntary weight loss    Weakness  Date Last Reviewed: 12/30/2015 2000-2017 [email or web address]. [address] [place] [address], [place] [address]. All rights reserved. This information is not intended as a substitute for professional medical care. Always follow your healthcare professional's instructions.          24 Hour Appointment Hotline       To make an appointment at any [facility], call [phone] ([phone]). If you don't have a family doctor or clinic, we will help you find one. [facility] clinics are conveniently located to serve the needs of you and your family.             Review of your medicines      Our records show that you are taking the medicines listed below. If these are incorrect, please call your family doctor or clinic.        Dose / Directions Last dose taken    CLONAZEPAM PO   Dose:  0.25-0.5 mg        Take 0.25-0.5 mg by mouth 3 times daily as needed   Refills:  0        cyclobenzaprine 5 MG tablet   Commonly known as:  FLEXERIL   Dose:  5 mg   Quantity:  20 tablet        Take 1 tablet (5 mg) by mouth 3 times daily as needed for muscle spasms   Refills:  0        dabigatran ANTICOAGULANT 150 MG capsule   Commonly known as:  PRADAXA ANTICOAGULANT   Dose:  150  mg   Quantity:  90 capsule        Take 1 capsule (150 mg) by mouth 2 times daily   Refills:  11        ezetimibe 10 MG tablet   Commonly known as:  ZETIA   Dose:  10 mg   Quantity:  90 tablet        Take 1 tablet (10 mg) by mouth daily   Refills:  3        FLECAINIDE ACETATE PO   Dose:  100 mg        Take 100 mg by mouth 2 times daily (Takes 2 x 50mg tablet = 100mg dose)   Refills:  0        fluticasone 50 MCG/ACT spray   Commonly known as:  FLONASE   Dose:  1 spray        Spray 1 spray into both nostrils daily as needed for rhinitis or allergies   Refills:  0        HYDROcodone-acetaminophen 5-325 MG per tablet   Commonly known as:  NORCO   Dose:  1-2 tablet   Quantity:  50 tablet        Take 1-2 tablets by mouth every 4 hours as needed for moderate to severe pain   Refills:  0        lisinopril 10 MG tablet   Commonly known as:  PRINIVIL/ZESTRIL   Dose:  10 mg        Take 10 mg by mouth 2 times daily   Refills:  0        MESTINON 60 MG tablet   Dose:  60 mg   Generic drug:  pyridostigmine        Take 60 mg by mouth 2 times daily as needed   Refills:  0        METAMUCIL PO   Dose:  1 Dose        Take 1 Dose by mouth daily as needed   Refills:  0        OCUVITE PO   Dose:  1 tablet        Take 1 tablet by mouth 2 times daily   Refills:  0        PAPAV-PHENTOLAMINE-ALPROSTADIL IC   Dose:  1 Dose        Inject 1 Dose as directed daily as needed (erectile dysfunction) Trimix Injection   Refills:  0        PROTONIX PO   Dose:  40 mg        Take 40 mg by mouth 2 times daily   Refills:  0        rosuvastatin 10 MG tablet   Commonly known as:  CRESTOR   Dose:  10 mg   Quantity:  90 tablet        Take 1 tablet (10 mg) by mouth daily   Refills:  4        TESTOSTERONE AQUEOUS IM   Dose:  1 mL        Inject 1 mL into the muscle every 14 days   Refills:  0        ZANTAC PO   Dose:  150 mg        Take 150 mg by mouth daily as needed for heartburn   Refills:  0        ZOLPIDEM TARTRATE PO   Dose:  5-10 mg        Take 5-10 mg by  mouth nightly as needed   Refills:  0                Procedures and tests performed during your visit     Abdomen XR, 2 vw, flat and upright    XR Chest 2 Views      Orders Needing Specimen Collection     None      Pending Results     No orders found from 7/25/2017 to 7/28/2017.            Pending Culture Results     No orders found from 7/25/2017 to 7/28/2017.            Pending Results Instructions     If you had any lab results that were not finalized at the time of your Discharge, you can call the ED Lab Result RN at [phone]. You will be contacted by this team for any positive Lab results or changes in treatment. The nurses are available 7 days a week from 10A to 6:30P.  You can leave a message 24 hours per day and they will return your call.        Test Results From Your Hospital Stay        7/27/2017 12:54 PM      Narrative     XR CHEST 2 VW 7/27/2017 12:50 PM    COMPARISON: 5/14/2015    HISTORY: Cough        Impression     IMPRESSION: Cardiac silhouette within normal limits. Prominence of the  pulmonary arteries, suggests pulmonary arterial hypertension. Lungs  are clear. No pleural effusion or pneumothorax. Median sternotomy  wires appear intact.    LONDON HENRY         7/27/2017 12:55 PM      Narrative     XR ABDOMEN 2 VW 7/27/2017 12:50 PM    COMPARISON: CT dated 5/14/2015    HISTORY: Pain, constipation.        Impression     IMPRESSION: Moderate amount of stool throughout the colon.  Nonobstructive bowel gas pattern without evidence of free air. Lung  bases are clear.    LONDON HENRY                Clinical Quality Measure: Blood Pressure Screening     Your blood pressure was checked while you were in the emergency department today. The last reading we obtained was  BP: 147/73 . Please read the guidelines below about what these numbers mean and what you should do about them.  If your systolic blood pressure (the top number) is less than 120 and your diastolic blood pressure (the bottom number) is  "less than 80, then your blood pressure is normal. There is nothing more that you need to do about it.  If your systolic blood pressure (the top number) is 120-139 or your diastolic blood pressure (the bottom number) is 80-89, your blood pressure may be higher than it should be. You should have your blood pressure rechecked within a year by a primary care provider.  If your systolic blood pressure (the top number) is 140 or greater or your diastolic blood pressure (the bottom number) is 90 or greater, you may have high blood pressure. High blood pressure is treatable, but if left untreated over time it can put you at risk for heart attack, stroke, or kidney failure. You should have your blood pressure rechecked by a primary care provider within the next 4 weeks.  If your provider in the emergency department today gave you specific instructions to follow-up with your doctor or provider even sooner than that, you should follow that instruction and not wait for up to 4 weeks for your follow-up visit.        Thank you for choosing [facility]       Thank you for choosing [facility] for your care. Our goal is always to provide you with excellent care. Hearing back from our patients is one way we can continue to improve our services. Please take a few minutes to complete the written survey that you may receive in the mail after you visit with us. Thank you!        [employer] Information     [employer] lets you send messages to your doctor, view your test results, renew your prescriptions, schedule appointments and more. To sign up, go to [email or web address] . Click on \"Log in\" on the left side of the screen, which will take you to the Welcome page. Then click on \"Sign up Now\" on the right side of the page.     You will be asked to enter the access code listed below, as well as some personal information. Please follow the directions to create your username and password.     Your access code is: [email or web address]  Expires: 9/25/2017 " 10:22 AM     Your access code will  in 90 days. If you need help or a new code, please call your [facility] clinic or [phone].        Care EveryWhere ID     This is your Care EveryWhere ID. This could be used by other organizations to access your [facility] medical records  [id number]        Equal Access to Services     PAOLA ROJO : Benja Ho, joyce hughes, bobby vazquezaldakota sihdu, tim rosado . So [facility] [phone].    ATENCIÓN: Si habla español, tiene a diaz disposición servicios gratuitos de asistencia lingüística. Llame al [phone].    We comply with applicable federal civil rights laws and [place] laws. We do not discriminate on the basis of race, color, national origin, age, disability sex, sexual orientation or gender identity.            After Visit Summary       This is your record. Keep this with you and show to your community pharmacist(s) and doctor(s) at your next visit.

## 2017-07-27 NOTE — ED NOTES
Bed: ED13  Expected date: 7/27/17  Expected time: 12:03 PM  Means of arrival: Ambulance  Comments:  ED1 83M possible bowel obstruction

## 2017-07-27 NOTE — ED AVS SNAPSHOT
Emergency Department    [address] [place] [address]    Phone:  [phone]    Fax:  [phone]                                       Chava Valentine   MRN: [id number]    Department:   Emergency Department   Date of Visit:  7/27/2017           After Visit Summary Signature Page     I have received my discharge instructions, and my questions have been answered. I have discussed any challenges I see with this plan with the nurse or doctor.    ..........................................................................................................................................  Patient/Patient Representative Signature      ..........................................................................................................................................  Patient Representative Print Name and Relationship to Patient    ..................................................               ................................................  Date                                            Time    ..........................................................................................................................................  Reviewed by Signature/Title    ...................................................              ..............................................  Date                                                            Time

## 2017-07-28 LAB — INTERPRETATION ECG - MUSE: NORMAL

## 2017-11-02 ENCOUNTER — PRE VISIT (OUTPATIENT)
Dept: CARDIOLOGY | Facility: CLINIC | Age: 82
End: 2017-11-02

## 2017-11-09 ENCOUNTER — OFFICE VISIT (OUTPATIENT)
Dept: CARDIOLOGY | Facility: CLINIC | Age: 82
End: 2017-11-09
Attending: INTERNAL MEDICINE
Payer: MEDICARE

## 2017-11-09 VITALS
WEIGHT: 177.1 LBS | BODY MASS INDEX: 26.23 KG/M2 | HEIGHT: 69 IN | HEART RATE: 77 BPM | SYSTOLIC BLOOD PRESSURE: 126 MMHG | DIASTOLIC BLOOD PRESSURE: 64 MMHG

## 2017-11-09 DIAGNOSIS — I10 ESSENTIAL HYPERTENSION: ICD-10-CM

## 2017-11-09 DIAGNOSIS — I25.810 CORONARY ARTERY DISEASE INVOLVING CORONARY BYPASS GRAFT OF NATIVE HEART WITHOUT ANGINA PECTORIS: ICD-10-CM

## 2017-11-09 DIAGNOSIS — E78.2 MIXED HYPERLIPIDEMIA: ICD-10-CM

## 2017-11-09 DIAGNOSIS — I48.0 PAROXYSMAL ATRIAL FIBRILLATION (H): ICD-10-CM

## 2017-11-09 PROCEDURE — 99214 OFFICE O/P EST MOD 30 MIN: CPT | Performed by: INTERNAL MEDICINE

## 2017-11-09 RX ORDER — LISINOPRIL 10 MG/1
10 TABLET ORAL 2 TIMES DAILY
Qty: 90 TABLET | Refills: 3 | Status: SHIPPED | OUTPATIENT
Start: 2017-11-09 | End: 2018-05-09

## 2017-11-09 RX ORDER — EZETIMIBE 10 MG/1
10 TABLET ORAL DAILY
Qty: 90 TABLET | Refills: 3 | Status: SHIPPED | OUTPATIENT
Start: 2017-11-09 | End: 2018-10-26

## 2017-11-09 RX ORDER — DABIGATRAN ETEXILATE 150 MG/1
150 CAPSULE ORAL 2 TIMES DAILY
Qty: 90 CAPSULE | Refills: 11 | Status: SHIPPED | OUTPATIENT
Start: 2017-11-09 | End: 2018-01-30

## 2017-11-09 RX ORDER — FLECAINIDE ACETATE 100 MG/1
100 TABLET ORAL 2 TIMES DAILY
Qty: 180 TABLET | Refills: 3 | Status: SHIPPED | OUTPATIENT
Start: 2017-11-09 | End: 2018-10-26

## 2017-11-09 RX ORDER — ROSUVASTATIN CALCIUM 10 MG/1
10 TABLET, COATED ORAL DAILY
Qty: 90 TABLET | Refills: 4 | Status: SHIPPED | OUTPATIENT
Start: 2017-11-09 | End: 2018-10-26

## 2017-11-09 RX ORDER — FLECAINIDE ACETATE 100 MG/1
100 TABLET ORAL 2 TIMES DAILY
COMMUNITY
End: 2017-11-09

## 2017-11-09 NOTE — MR AVS SNAPSHOT
"              After Visit Summary   11/9/2017    Chava Valentine    MRN: [id number]           Patient Information     Date Of Birth          10/14/1933        Visit Information        Provider Department      11/9/2017 2:45 PM Michael Bernard MD [facility]        Today's Diagnoses     Coronary artery disease involving coronary bypass graft of native heart without angina pectoris        Mixed hyperlipidemia        Essential hypertension        Paroxysmal atrial fibrillation (H)           Follow-ups after your visit        Additional Services     Follow-Up with Cardiologist                 Future tests that were ordered for you today     Open Future Orders        Priority Expected Expires Ordered    Follow-Up with Cardiologist Routine 5/8/2018 11/9/2018 11/9/2017            Who to contact     If you have questions or need follow up information about today's clinic visit or your schedule please contact [facility] directly at [phone].  Normal or non-critical lab and imaging results will be communicated to you by MyChart, letter or phone within 4 business days after the clinic has received the results. If you do not hear from us within 7 days, please contact the clinic through [employer] or phone. If you have a critical or abnormal lab result, we will notify you by phone as soon as possible.  Submit refill requests through [employer] or call your pharmacy and they will forward the refill request to us. Please allow 3 business days for your refill to be completed.          Additional Information About Your Visit        MyChart Information     [employer] lets you send messages to your doctor, view your test results, renew your prescriptions, schedule appointments and more. To sign up, go to [email or web address] . Click on \"Log in\" on the left side of the screen, which will take you to the Welcome page. Then click on \"Sign up Now\" on the right " "side of the page.     You will be asked to enter the access code listed below, as well as some personal information. Please follow the directions to create your username and password.     Your access code is: [email or web address]  Expires: 2018  3:28 PM     Your access code will  in 90 days. If you need help or a new code, please call your [facility] clinic or [phone].        Care EveryWhere ID     This is your Care EveryWhere ID. This could be used by other organizations to access your [facility] medical records  [id number]        Your Vitals Were     Pulse Height BMI (Body Mass Index)             77 1.74 m (5' 8.5\") 26.54 kg/m2          Blood Pressure from Last 3 Encounters:   17 126/64   17 133/75   17 140/68    Weight from Last 3 Encounters:   17 80.3 kg (177 lb 1.6 oz)   17 79.8 kg (176 lb)   17 79.8 kg (176 lb)              We Performed the Following     Follow-Up with Cardiologist          Today's Medication Changes          These changes are accurate as of: 17  3:28 PM.  If you have any questions, ask your nurse or doctor.               These medicines have changed or have updated prescriptions.        Dose/Directions    flecainide 100 MG tablet   Commonly known as:  TAMBOCOR   This may have changed:  Another medication with the same name was removed. Continue taking this medication, and follow the directions you see here.   Used for:  Paroxysmal atrial fibrillation (H)   Changed by:  Michael Bernard MD        Dose:  100 mg   Take 1 tablet (100 mg) by mouth 2 times daily   Quantity:  180 tablet   Refills:  3            Where to get your medicines      These medications were sent to [facility]  [place] - [address] & [facility]  [address] [address]    Hours:  24-hours Phone:  [phone]     dabigatran ANTICOAGULANT 150 MG capsule    ezetimibe 10 MG tablet    flecainide 100 MG tablet    lisinopril 10 MG " tablet    rosuvastatin 10 MG tablet                Primary Care Provider Office Phone # Fax #    Mike Cosby [phone] [address] [address]        Equal Access to Services     PAOLA ROJO : Hadii jessenia ku hadsjo Soomaali, waaxda luqadaha, qaybta kaalmada adeegyada, tim edwigein hayaan christi josr ashlee grey. So [facility] [phone].    ATENCIÓN: Si habla español, tiene a diaz disposición servicios gratuitos de asistencia lingüística. Llame al [phone].    We comply with applicable federal civil rights laws and [place] laws. We do not discriminate on the basis of race, color, national origin, age, disability, sex, sexual orientation, or gender identity.            Thank you!     Thank you for choosing [facility]  for your care. Our goal is always to provide you with excellent care. Hearing back from our patients is one way we can continue to improve our services. Please take a few minutes to complete the written survey that you may receive in the mail after your visit with us. Thank you!             Your Updated Medication List - Protect others around you: Learn how to safely use, store and throw away your medicines at www.disposemymeds.org.          This list is accurate as of: 11/9/17  3:28 PM.  Always use your most recent med list.                   Brand Name Dispense Instructions for use Diagnosis    CLONAZEPAM PO      Take 0.25-0.5 mg by mouth 3 times daily as needed        dabigatran ANTICOAGULANT 150 MG capsule    PRADAXA ANTICOAGULANT    90 capsule    Take 1 capsule (150 mg) by mouth 2 times daily    Paroxysmal atrial fibrillation (H)       ezetimibe 10 MG tablet    ZETIA    90 tablet    Take 1 tablet (10 mg) by mouth daily    Coronary artery disease involving coronary bypass graft of native heart without angina pectoris, Mixed hyperlipidemia       flecainide 100 MG tablet    TAMBOCOR    180 tablet    Take 1 tablet  (100 mg) by mouth 2 times daily    Paroxysmal atrial fibrillation (H)       fluticasone 50 MCG/ACT spray    FLONASE     Spray 1 spray into both nostrils daily as needed for rhinitis or allergies        lisinopril 10 MG tablet    PRINIVIL/ZESTRIL    90 tablet    Take 1 tablet (10 mg) by mouth 2 times daily    Essential hypertension       METAMUCIL PO      Take 1 Dose by mouth daily as needed        OCUVITE PO      Take 1 tablet by mouth 2 times daily        PAPAV-PHENTOLAMINE-ALPROSTADIL IC      Inject 1 Dose as directed daily as needed (erectile dysfunction) Trimix Injection        PROTONIX PO      Take 40 mg by mouth 2 times daily        rosuvastatin 10 MG tablet    CRESTOR    90 tablet    Take 1 tablet (10 mg) by mouth daily    Coronary artery disease involving coronary bypass graft of native heart without angina pectoris, Mixed hyperlipidemia       TESTOSTERONE AQUEOUS IM      Inject 1 mL into the muscle every 14 days        ZANTAC PO      Take 150 mg by mouth daily as needed for heartburn        ZOLPIDEM TARTRATE PO      Take 5-10 mg by mouth nightly as needed

## 2017-11-09 NOTE — PROGRESS NOTES
I saw Chava today, who is 83.  He had some midsternal discomforts recently that the clinical impression of which was esophageal spasm.  It did not fit a pattern that was suggestive of angina.  He has rarely had some palpitations which he has been able to terminate using a Valsalva maneuver.  He has not had dizziness or syncope.  In fact, Chava has lost a few pounds down from 186 three years ago to 174 pounds.  He looks trim and fit and actually looks as well to me as I remember for a couple of years.  Appetite, bowel and urinary functions have been decent.  The mid sternal discomfort he has has recently been relatively quiet.     MEDICATIONS then are:   1.  Lisinopril as above.   2.  Flecainide 50 mg b.i.d.   3.  Zetia 10 mg a day.   4.  Physostigmine 60 mg b.i.d.   5.  Pradaxa 150 mg b.i.d.   6.  Crestor 10 mg at bedtime.   7.  Protonix 40 mg a day.   8.  Testosterone injection.       IMPRESSION:   1.  Asymptomatic coronary artery disease.   2.  No signs of angina or heart failure.   3.  Atypical midsternal discomfort  4.  Treated hypertension.   5.  Treated hyperlipidemia.   6.  Treated paroxysmal atrial fibrillation on flecainide chronically, and he has never had signs of drug side effects or toxicity with flecainide.   HPI and Plan:     See dictation  I recommend continuing current treatment plans in the chart.        No orders of the defined types were placed in this encounter.    Orders Placed This Encounter   Medications     flecainide (TAMBOCOR) 100 MG tablet     Sig: Take 100 mg by mouth 2 times daily     Medications Discontinued During This Encounter   Medication Reason     cyclobenzaprine (FLEXERIL) 5 MG tablet Therapy completed     FLECAINIDE ACETATE PO Dose adjustment     HYDROcodone-acetaminophen (NORCO) 5-325 MG per tablet Therapy completed     pyridostigmine (MESTINON) 60 MG tablet Therapy completed         Encounter Diagnoses   Name Primary?     Coronary artery disease involving coronary bypass graft  of native heart without angina pectoris      Mixed hyperlipidemia      Essential hypertension      Paroxysmal atrial fibrillation (H)        CURRENT MEDICATIONS:  Current Outpatient Prescriptions   Medication Sig Dispense Refill     flecainide (TAMBOCOR) 100 MG tablet Take 100 mg by mouth 2 times daily       Pantoprazole Sodium (PROTONIX PO) Take 40 mg by mouth 2 times daily       Multiple Vitamins-Minerals (OCUVITE PO) Take 1 tablet by mouth 2 times daily       RaNITidine HCl (ZANTAC PO) Take 150 mg by mouth daily as needed for heartburn       fluticasone (FLONASE) 50 MCG/ACT spray Spray 1 spray into both nostrils daily as needed for rhinitis or allergies       PAPAV-PHENTOLAMINE-ALPROSTADIL IC Inject 1 Dose as directed daily as needed (erectile dysfunction) Trimix Injection       lisinopril (PRINIVIL/ZESTRIL) 10 MG tablet Take 10 mg by mouth 2 times daily       ezetimibe (ZETIA) 10 MG tablet Take 1 tablet (10 mg) by mouth daily 90 tablet 3     dabigatran ANTICOAGULANT (PRADAXA ANTICOAGULANT) 150 MG CAPS capsule Take 1 capsule (150 mg) by mouth 2 times daily 90 capsule 11     rosuvastatin (CRESTOR) 10 MG tablet Take 1 tablet (10 mg) by mouth daily 90 tablet 4     Psyllium (METAMUCIL PO) Take 1 Dose by mouth daily as needed        ZOLPIDEM TARTRATE PO Take 5-10 mg by mouth nightly as needed        CLONAZEPAM PO Take 0.25-0.5 mg by mouth 3 times daily as needed        TESTOSTERONE AQUEOUS IM Inject 1 mL into the muscle every 14 days          ALLERGIES     Allergies   Allergen Reactions     No Clinical Screening - See Comments Rash     Dove soap       PAST MEDICAL HISTORY:  Past Medical History:   Diagnosis Date     Allergic rhinitis      Anxiety      Contact dermatitis      Coronary artery disease     CABG with 4 grafts-LIMA to LAD, SVG to Diagonal,OM and RCA     Cough      Elevated prolactin level (H)      Erythrocytosis      Gastro-oesophageal reflux disease      GI bleed 10/10     Hyperlipidemia      Hypertension       Hypogonadism     with low testosterone     Insomnia      Paroxysmal atrial fibrillation (H)     flecainide therapy     Post-nasal drip      Prediabetes      Renal disease     CKD     Shoulder arthritis      SVT (supraventricular tachycardia) (H)      Tremor        PAST SURGICAL HISTORY:  Past Surgical History:   Procedure Laterality Date     BYPASS GRAFT ARTERY CORONARY       CARDIAC SURGERY       COLONOSCOPY  12/2010    tubular adenoma     ORTHOPEDIC SURGERY      rotator cuff right     PHACOEMULSIFICATION CLEAR CORNEA WITH STANDARD IOL, VITRECTOMY PARSPLANA 23 GAGUE, COMBINED  2/20/2013    Procedure: COMBINED PHACOEMULSIFICATION CLEAR CORNEA WITH STANDARD INTRAOCULAR LENS IMPLANT, VITRECTOMY PARSPLANA 23 GAUGE;;  Surgeon: Guero Lockett MD;  [facility]:  [facility]     PHACOEMULSIFICATION CLEAR CORNEA WITH STANDARD IOL, VITRECTOMY PARSPLANA 23 GAGUE, COMBINED  7/31/2013    Procedure: COMBINED PHACOEMULSIFICATION CLEAR CORNEA WITH STANDARD INTRAOCULAR LENS IMPLANT, VITRECTOMY PARSPLANA 23 GAUGE;;  Surgeon: Guero Lockett MD;  Location:  [facility]     REVERSE ARTHROPLASTY SHOULDER Right 7/24/2017    Procedure: REVERSE ARTHROPLASTY SHOULDER;  RIGHT REVERSE TOTAL SHOULDER ARTHROPLASTY ;  Surgeon: Rodolfo Calhoun MD;  Location:  [facility]     VASCULAR SURGERY      cab     VITRECTOMY PARSPLANA, PHACOEMULSIFICATION CLEAR CORNEA W STANDARD INTRAOCULAR LENS IMPLANT, COMBINED  2/20/2013    Procedure: COMBINED VITRECTOMY PARSPLANA, PHACOEMULSIFICATION CLEAR CORNEA WITH STANDARD INTRAOCULAR LENS IMPLANT;  LEFT PHACOEMULSIFICATION CLEAR CORNEA WITH SAURABH TORIC INTRAOCULAR LENS IMPLANT,  LEFT EYE 23 GAUGE PARSPLANA VITRECTOMY;  Surgeon: Jorge Regan MD;  Location:  [facility]     VITRECTOMY PARSPLANA, PHACOEMULSIFICATION CLEAR CORNEA W STANDARD INTRAOCULAR LENS IMPLANT, COMBINED  7/31/2013    Procedure: COMBINED VITRECTOMY PARSPLANA, PHACOEMULSIFICATION CLEAR CORNEA WITH STANDARD INTRAOCULAR LENS IMPLANT;  RIGHT  "PHACOEMULSIFICATION CLEAR CORNEA WITH TORIC INTRAOCULAR LENS IMPLANT, RIGHT 23 GAUGE PARSPLANA VITRECTOMY ;  Surgeon: Jorge Regan MD;  Location: [facility]       FAMILY HISTORY:  Family History   Problem Relation Age of Onset     Other - See Comments Mother      old age     Other - See Comments Father 97     CEREBROVASCULAR DISEASE Father        SOCIAL HISTORY:  Social History     Social History     Marital status:      Spouse name: N/A     Number of children: N/A     Years of education: N/A     Social History Main Topics     Smoking status: Former Smoker     Packs/day: 0.00     Years: 20.00     Types: Cigarettes     Quit date: 1/1/1999     Smokeless tobacco: Never Used      Comment: recreational only     Alcohol use Yes      Comment: occas     Drug use: No     Sexual activity: Not Asked     Other Topics Concern     Parent/Sibling W/ Cabg, Mi Or Angioplasty Before 65f 55m? No     Sleep Concern Yes     Special Diet Yes     less meat      Exercise Yes     3 x weekly     Seat Belt Yes     Social History Narrative         Review of Systems:  Skin:  Negative       Eyes:  Positive for glasses    ENT:  Negative      Respiratory:  Negative       Cardiovascular:  Negative      Gastroenterology: Positive for heartburn    Genitourinary:  Negative      Musculoskeletal:  Negative      Neurologic:  Negative      Psychiatric:  Positive for anxiety occ  Heme/Lymph/Imm:  Negative      Endocrine:  Negative        Physical Exam:  Vitals: /64  Pulse 77  Ht 1.74 m (5' 8.5\")  Wt 80.3 kg (177 lb 1.6 oz)  BMI 26.54 kg/m2    Constitutional:           Skin:             Head:           Eyes:           Lymph:      ENT:           Neck:           Respiratory:            Cardiac:                                                           GI:           Extremities and Muscular Skeletal:                 Neurological:           Psych:           Recent Lab Results:  LIPID RESULTS:  Lab Results   Component Value Date    CHOL 118 " 01/27/2017    HDL 43 01/27/2017    LDL 53 01/27/2017    TRIG 112 01/27/2017    CHOLHDLRATIO 2.6 01/22/2015       LIVER ENZYME RESULTS:  Lab Results   Component Value Date    AST 16 12/01/2016    ALT <5 (L) 01/27/2017       CBC RESULTS:  Lab Results   Component Value Date    WBC 7.8 01/27/2017    RBC 5.80 01/27/2017    HGB 13.3 07/26/2017    HCT 51.0 01/27/2017    MCV 88 01/27/2017    MCH 29.0 01/27/2017    MCHC 32.9 01/27/2017    RDW 13.4 01/27/2017     (L) 07/24/2017       BMP RESULTS:  Lab Results   Component Value Date     07/25/2017    POTASSIUM 4.6 07/25/2017    CHLORIDE 101 07/25/2017    CO2 24 07/25/2017    ANIONGAP 8 07/25/2017     (H) 07/25/2017    BUN 16 07/25/2017    CR 1.29 (H) 07/25/2017    GFRESTIMATED 53 (L) 07/25/2017    GFRESTBLACK 64 07/25/2017    FIDEL 8.0 (L) 07/25/2017        A1C RESULTS:  No results found for: A1C    INR RESULTS:  Lab Results   Component Value Date    INR 0.89 07/24/2017           CC  Michael Bernard MD  [address]  [place] [id number]

## 2017-11-09 NOTE — LETTER
11/9/2017    Mike Cosby MD  [address]  [place] [address]      RE: Chava Valentine       Dear Colleague,    I had the pleasure of seeing Chava Valentine in the [facility].    HISTORY OF PRESENT ILLNESS:  I saw my good friend, Chava Valentine, today, who is 84.  He has a history of coronary artery disease and previous coronary artery bypass surgery but has essentially been angina-free since that time.  He has had paroxysmal atrial fibrillation that has been held in check with flecainide 100 mg twice a day.  He takes Pradaxa for stroke prophylaxis because of his history of PAFib.      Chava continues to be quietly active.  He denies chest pain, palpitations, dizziness, syncope, shortness of breath, etc.        He has been slowed down more by surgical right shoulder which has been stubborn and has rehabbed with some residual diminished range of motion and stiffness.  He is disappointed by this but continues to work on it.      Appetite, bowel and urinary functions have been stable.  He had some constipation post shoulder surgery that was dealt with acutely, but subsequently his bowel functions have returned to normal.      REVIEW OF SYSTEMS:  Review of systems is negative from a cardiopulmonary standpoint.  He has occasional heartburn, occasional mild anxiety, otherwise he is relatively negative at this time.  I endorsed the findings in Epic.      PHYSICAL EXAMINATION:   GENERAL:  Well-developed, well-nourished male.  He looks younger than his years, bright, alert, oriented and cooperative.  He finds his memory is not as precise as it used to be, but not particularly bad.   VITAL SIGNS:  Blood pressure at rest was 120/60, heart rate 76 beats per minute.  He weighs 177 pounds, which is stable.   HEAD:  Normal.   NECK:  Free of neck vein distention or bruits.   HEART:  Regular without gallop or murmur.  Sternum was solid and well-healed.   LUNGS:  Clear to auscultation.    ABDOMEN:  Soft without organomegaly.   EXTREMITIES:  Free of edema.      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  He seems to be delightfully stable.  He has been on flecainide with good control for years.  Despite his history of coronary artery disease, he has never had inducible ischemia or angina.  His QRS did widen a little bit with his last EKG.  I will review this with EP.  His QRS went from 132 milliseconds with a left bundle branch block pattern to a computer approximated QRS of 146 milliseconds.  No dizziness or syncope, no palpitations or signs of arrhythmia otherwise.      IMPRESSION:   1.  Asymptomatic coronary artery disease.   2.  Post multiple-vessel bypass.   3.  LV function is normal.   4.  No current signs of angina or heart failure.   5.  Paroxysmal atrial fibrillation successfully treated with flecainide and no signs of arrhythmias, a slight increase in QRS duration noted.   6.  Chronic anticoagulation for stroke prophylaxis.      PLAN:  Should he wish to go skiing again this year as he has done regularly, we will discuss doing a repeat stress nuclear study and proceed as needed.  I would do a Lexiscan thallium.  Otherwise, I am delighted with his stability.  He is one of my favorite people.  I hope he continues to do well.  If he has problems, let me know.  I will see him in 6-9 months.        Outpatient Encounter Prescriptions as of 11/9/2017   Medication Sig Dispense Refill     dabigatran ANTICOAGULANT (PRADAXA ANTICOAGULANT) 150 MG capsule Take 1 capsule (150 mg) by mouth 2 times daily 90 capsule 11     ezetimibe (ZETIA) 10 MG tablet Take 1 tablet (10 mg) by mouth daily 90 tablet 3     flecainide (TAMBOCOR) 100 MG tablet Take 1 tablet (100 mg) by mouth 2 times daily 180 tablet 3     lisinopril (PRINIVIL/ZESTRIL) 10 MG tablet Take 1 tablet (10 mg) by mouth 2 times daily 90 tablet 3     rosuvastatin (CRESTOR) 10 MG tablet Take 1 tablet (10 mg) by mouth daily 90 tablet 4     Pantoprazole  Sodium (PROTONIX PO) Take 40 mg by mouth 2 times daily       Multiple Vitamins-Minerals (OCUVITE PO) Take 1 tablet by mouth 2 times daily       RaNITidine HCl (ZANTAC PO) Take 150 mg by mouth daily as needed for heartburn       fluticasone (FLONASE) 50 MCG/ACT spray Spray 1 spray into both nostrils daily as needed for rhinitis or allergies       PAPAV-PHENTOLAMINE-ALPROSTADIL IC Inject 1 Dose as directed daily as needed (erectile dysfunction) Trimix Injection       Psyllium (METAMUCIL PO) Take 1 Dose by mouth daily as needed        ZOLPIDEM TARTRATE PO Take 5-10 mg by mouth nightly as needed        CLONAZEPAM PO Take 0.25-0.5 mg by mouth 3 times daily as needed        TESTOSTERONE AQUEOUS IM Inject 1 mL into the muscle every 14 days        [DISCONTINUED] flecainide (TAMBOCOR) 100 MG tablet Take 100 mg by mouth 2 times daily       [DISCONTINUED] HYDROcodone-acetaminophen (NORCO) 5-325 MG per tablet Take 1-2 tablets by mouth every 4 hours as needed for moderate to severe pain 50 tablet 0     [DISCONTINUED] cyclobenzaprine (FLEXERIL) 5 MG tablet Take 1 tablet (5 mg) by mouth 3 times daily as needed for muscle spasms 20 tablet 0     [DISCONTINUED] FLECAINIDE ACETATE PO Take 100 mg by mouth 2 times daily (Takes 2 x 50mg tablet = 100mg dose)       [DISCONTINUED] lisinopril (PRINIVIL/ZESTRIL) 10 MG tablet Take 10 mg by mouth 2 times daily       [DISCONTINUED] ezetimibe (ZETIA) 10 MG tablet Take 1 tablet (10 mg) by mouth daily 90 tablet 3     [DISCONTINUED] pyridostigmine (MESTINON) 60 MG tablet Take 60 mg by mouth 2 times daily as needed        [DISCONTINUED] dabigatran ANTICOAGULANT (PRADAXA ANTICOAGULANT) 150 MG CAPS capsule Take 1 capsule (150 mg) by mouth 2 times daily 90 capsule 11     [DISCONTINUED] rosuvastatin (CRESTOR) 10 MG tablet Take 1 tablet (10 mg) by mouth daily 90 tablet 4     No facility-administered encounter medications on file as of 11/9/2017.      Again, thank you for allowing me to participate in the  care of your patient.      Sincerely,    TALISHA RIDLEY MD     [facility]

## 2017-11-10 NOTE — PROGRESS NOTES
HISTORY OF PRESENT ILLNESS:  I saw my good friend, Chava Morrellshilohar, today, who is 84.  He has a history of coronary artery disease and previous coronary artery bypass surgery but has essentially been angina-free since that time.  He has had paroxysmal atrial fibrillation that has been held in check with flecainide 100 mg twice a day.  He takes Pradaxa for stroke prophylaxis because of his history of PAFib.      Chava continues to be quietly active.  He denies chest pain, palpitations, dizziness, syncope, shortness of breath, etc.        He has been slowed down more by surgical right shoulder which has been stubborn and has rehabbed with some residual diminished range of motion and stiffness.  He is disappointed by this but continues to work on it.      Appetite, bowel and urinary functions have been stable.  He had some constipation post shoulder surgery that was dealt with acutely, but subsequently his bowel functions have returned to normal.      REVIEW OF SYSTEMS:  Review of systems is negative from a cardiopulmonary standpoint.  He has occasional heartburn, occasional mild anxiety, otherwise he is relatively negative at this time.  I endorsed the findings in Epic.      PHYSICAL EXAMINATION:   GENERAL:  Well-developed, well-nourished male.  He looks younger than his years, bright, alert, oriented and cooperative.  He finds his memory is not as precise as it used to be, but not particularly bad.   VITAL SIGNS:  Blood pressure at rest was 120/60, heart rate 76 beats per minute.  He weighs 177 pounds, which is stable.   HEAD:  Normal.   NECK:  Free of neck vein distention or bruits.   HEART:  Regular without gallop or murmur.  Sternum was solid and well-healed.   LUNGS:  Clear to auscultation.   ABDOMEN:  Soft without organomegaly.   EXTREMITIES:  Free of edema.      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  He seems to be delightfully stable.  He has been on flecainide with good control for years.  Despite his  history of coronary artery disease, he has never had inducible ischemia or angina.  His QRS did widen a little bit with his last EKG.  I will review this with EP.  His QRS went from 132 milliseconds with a left bundle branch block pattern to a computer approximated QRS of 146 milliseconds.  No dizziness or syncope, no palpitations or signs of arrhythmia otherwise.      IMPRESSION:   1.  Asymptomatic coronary artery disease.   2.  Post multiple-vessel bypass.   3.  LV function is normal.   4.  No current signs of angina or heart failure.   5.  Paroxysmal atrial fibrillation successfully treated with flecainide and no signs of arrhythmias, a slight increase in QRS duration noted.   6.  Chronic anticoagulation for stroke prophylaxis.      PLAN:  Should he wish to go skiing again this year as he has done regularly, we will discuss doing a repeat stress nuclear study and proceed as needed.  I would do a Lexiscan thallium.  Otherwise, I am delighted with his stability.  He is one of my favorite people.  I hope he continues to do well.  If he has problems, let me know.  I will see him in 6-9 months.      cc:   Mike Cosby MD    [facility]    [address], [address] [place] [address]  [id number]         TALISHA RIDLEY MD, [facility]             D: 2017 16:06   T: 11/10/2017 01:08   MT: DAVID      Name:     MAGAN COWAN   MRN:      [id number]        Account:      [id number]   :      10/14/1933           Service Date: 2017      Document: [id number]

## 2017-11-28 ENCOUNTER — TELEPHONE (OUTPATIENT)
Dept: CARDIOLOGY | Facility: CLINIC | Age: 82
End: 2017-11-28

## 2017-11-28 NOTE — TELEPHONE ENCOUNTER
Call from patient stating that his script for Ambien was recently cut from 30 tablets per month to 15 tablets per month. He stated that he has tried to cut down to a half a tablet some nights due to this, but since he has tried the smaller dose he has been having occasional episodes of tachycardia some nights. He denied symptoms with these episodes or issues with episodes during the day. He has a hx of AFib and is on Flecainide and Pradaxa for this. He requested to see if Dr. Bernard would refill the ambien to help with the tachycardia episodes. Advised that he review this with the physician who has been prescribing this medication for him. He stated understanding, but requested to send a message to Dr. Bernard regarding this issue. Will route to Dr. Bernard for review. LEEROY Uribe RN

## 2018-01-11 ENCOUNTER — TELEPHONE (OUTPATIENT)
Dept: CARDIOLOGY | Facility: CLINIC | Age: 83
End: 2018-01-11

## 2018-01-11 DIAGNOSIS — I25.10 ATHEROSCLEROSIS OF NATIVE CORONARY ARTERY OF NATIVE HEART WITHOUT ANGINA PECTORIS: Primary | ICD-10-CM

## 2018-01-11 DIAGNOSIS — I10 BENIGN ESSENTIAL HYPERTENSION: ICD-10-CM

## 2018-01-11 RX ORDER — NITROGLYCERIN 0.4 MG/1
TABLET SUBLINGUAL
Qty: 25 TABLET | Refills: 3 | Status: SHIPPED | OUTPATIENT
Start: 2018-01-11 | End: 2021-08-31

## 2018-01-11 RX ORDER — ISOSORBIDE MONONITRATE 30 MG/1
15 TABLET, EXTENDED RELEASE ORAL DAILY
Qty: 30 TABLET | Refills: 3 | Status: SHIPPED | OUTPATIENT
Start: 2018-01-11 | End: 2018-10-26

## 2018-01-11 NOTE — PROGRESS NOTES
Pt called about CP with intercourse  No NTG to use .  Sounds a little like angina but not clearly so.    Ordered NTG 4 days mg SL and Imdur 15 mg qd    JE

## 2018-01-11 NOTE — TELEPHONE ENCOUNTER
Patient states when he last saw Dr. Bernard on 11-9-2017 he has notice some anginal symptoms with intercourse.  He denies any chest pain with walking up stairs or walking around. Or any other strenuous activity.  Is recovering from shoulder surgery so is not as active as he usually is.      He would prefer to talk to Dr. Mariscal directly if possible due to the sensitivity of the issue.  He is wondering if he needs to have any cardiac testing? Will forward msg to Dr. Bernard

## 2018-01-12 NOTE — TELEPHONE ENCOUNTER
Michael Bernard MD Tinglov, Karin, RN                   I did call him => ordered NTG and imdur 15 mg qg => we'll see how he does => likely get lexiscan thallium    je

## 2018-01-30 DIAGNOSIS — I48.0 PAROXYSMAL ATRIAL FIBRILLATION (H): ICD-10-CM

## 2018-01-30 RX ORDER — DABIGATRAN ETEXILATE 150 MG/1
150 CAPSULE ORAL 2 TIMES DAILY
Qty: 180 CAPSULE | Refills: 2 | Status: SHIPPED | OUTPATIENT
Start: 2018-01-30 | End: 2018-10-26

## 2018-03-07 ENCOUNTER — TELEPHONE (OUTPATIENT)
Dept: CARDIOLOGY | Facility: CLINIC | Age: 83
End: 2018-03-07

## 2018-03-07 NOTE — TELEPHONE ENCOUNTER
Call from patient to report hypotension in the morning recently. He stated that yesterday morning he felt a general sense of malaise/fatigue and some lightheadedness. He checked his blood pressure and it was 102 systolic. He typically runs right in the 120s and prefers to be there. He takes lisinopril 10 mg BID. He wanted to see if he could possibly decrease or eliminate his evening dose of lisinopril to avoid lower blood pressures in the morning. He didn't think he'd be able to accurately cut the 10 mg pill in half because it's too small. Advised that he could try holding the evening dose of the lisinopril for the next few days, continue to monitor his blood pressure, and see how he feels. Informed Dr. Valentine that I'd send a message to Dr. Bernard for his review upon his return to the clinic on Monday, and we can give him a call with any further recommendations for the lisinopril. He felt comfortable with this plan and appreciated the call. LEEROY Uribe RN - 03/07/18, 1:39 PM

## 2018-03-15 ENCOUNTER — TRANSFERRED RECORDS (OUTPATIENT)
Dept: HEALTH INFORMATION MANAGEMENT | Facility: CLINIC | Age: 83
End: 2018-03-15

## 2018-04-10 ENCOUNTER — TRANSFERRED RECORDS (OUTPATIENT)
Dept: HEALTH INFORMATION MANAGEMENT | Facility: CLINIC | Age: 83
End: 2018-04-10

## 2018-04-13 ENCOUNTER — APPOINTMENT (OUTPATIENT)
Dept: GENERAL RADIOLOGY | Facility: CLINIC | Age: 83
End: 2018-04-13
Attending: PHYSICIAN ASSISTANT
Payer: MEDICARE

## 2018-04-13 ENCOUNTER — HOSPITAL ENCOUNTER (EMERGENCY)
Facility: CLINIC | Age: 83
Discharge: HOME | End: 2018-04-13
Attending: EMERGENCY MEDICINE | Admitting: EMERGENCY MEDICINE
Payer: MEDICARE

## 2018-04-13 VITALS
WEIGHT: 174 LBS | TEMPERATURE: 98.8 F | DIASTOLIC BLOOD PRESSURE: 73 MMHG | BODY MASS INDEX: 26.37 KG/M2 | OXYGEN SATURATION: 96 % | HEIGHT: 68 IN | SYSTOLIC BLOOD PRESSURE: 129 MMHG | RESPIRATION RATE: 18 BRPM

## 2018-04-13 DIAGNOSIS — J18.9 PNEUMONIA OF LEFT LOWER LOBE DUE TO INFECTIOUS ORGANISM: ICD-10-CM

## 2018-04-13 PROCEDURE — 25000125 ZZHC RX 250: Performed by: PHYSICIAN ASSISTANT

## 2018-04-13 PROCEDURE — 94640 AIRWAY INHALATION TREATMENT: CPT

## 2018-04-13 PROCEDURE — 99283 EMERGENCY DEPT VISIT LOW MDM: CPT | Mod: 25

## 2018-04-13 PROCEDURE — 71046 X-RAY EXAM CHEST 2 VIEWS: CPT

## 2018-04-13 RX ORDER — ALBUTEROL SULFATE 90 UG/1
2 AEROSOL, METERED RESPIRATORY (INHALATION) EVERY 6 HOURS PRN
Qty: 1 INHALER | Refills: 0 | Status: SHIPPED | OUTPATIENT
Start: 2018-04-13 | End: 2022-06-29

## 2018-04-13 RX ORDER — LEVOFLOXACIN 500 MG/1
500 TABLET, FILM COATED ORAL DAILY
Qty: 5 TABLET | Refills: 0 | Status: SHIPPED | OUTPATIENT
Start: 2018-04-13 | End: 2018-04-20

## 2018-04-13 RX ORDER — IPRATROPIUM BROMIDE AND ALBUTEROL SULFATE 2.5; .5 MG/3ML; MG/3ML
3 SOLUTION RESPIRATORY (INHALATION)
Status: DISCONTINUED | OUTPATIENT
Start: 2018-04-13 | End: 2018-04-13 | Stop reason: HOSPADM

## 2018-04-13 RX ADMIN — IPRATROPIUM BROMIDE AND ALBUTEROL SULFATE 3 ML: .5; 3 SOLUTION RESPIRATORY (INHALATION) at 12:44

## 2018-04-13 ASSESSMENT — ENCOUNTER SYMPTOMS
ABDOMINAL PAIN: 0
COUGH: 1
FEVER: 1
SHORTNESS OF BREATH: 0
CONFUSION: 1

## 2018-04-13 NOTE — ED AVS SNAPSHOT
Emergency Department    [address] [facility] [address]    Phone:  [phone]    Fax:  [phone]                                       Chava Valentine   MRN: [id number]    Department:   Emergency Department   Date of Visit:  4/13/2018           Patient Information     Date Of Birth          10/14/1933        Your diagnoses for this visit were:     Pneumonia of left lower lobe due to infectious organism (H)        You were seen by Trierweiler, Chad A, MD.      Follow-up Information     Follow up with Mike Cosby MD In 2 days.    Specialty:  Family Practice    Why:  As needed    Contact information:    [facility]  [address] [address] [phone]          Follow up with  Emergency Department.    Specialty:  EMERGENCY MEDICINE    Why:  If symptoms worsen    Contact information:    [address] [facility] [address] [phone]        Discharge Instructions       Discharge Instructions  Bronchitis, Pneumonia, Bronchospasm    You were seen today for a chest infection or inflammation. If your provider decided this was due to a bacterial infection, you may need an antibiotic. Sometimes these are caused by a virus, and then an antibiotic will not help.     Generally, every Emergency Department visit should have a follow-up clinic visit with either a primary or a specialty clinic/provider. Please follow-up as instructed by your emergency provider today.    Return to the Emergency Department if:    Your breathing gets much worse.    You are very weak, or feel much more ill.    You develop new symptoms, such as chest pain.    You cough up blood.    You are vomiting (throwing up) enough that you cannot keep fluids or your medicine down.    What can I do to help myself?    Fill any prescriptions the provider gave you and take them right away--especially antibiotics. Be sure to finish the whole antibiotic prescription.    You may be  given a prescription for an inhaler, which can help loosen tight air passages.  Use this as needed, but not more often than directed. Inhalers work much better when used with a spacer.     You may be given a prescription for a steroid to reduce inflammation. Used long-term, these can have side effects, but for short-term use they are safe. You may notice restlessness or increased appetite.        You may use non-prescription cough or cold medicines. Cough medicines may help, but don t make the cough go away completely.     Avoid smoke, because this can make your symptoms worse. If you smoke, this may be a good time to quit! Consider using nicotine lozenges, gum, or patches to reduce cravings.     If you have a fever, Tylenol  (acetaminophen), Motrin  (ibuprofen), or Advil  (ibuprofen) may help bring fever down and may help you feel more comfortable. Be sure to read and follow the package directions, and ask your provider if you have questions.    Be sure to get your flu shot each year.  For certain ages, the pneumonia shot can help prevent pneumonia.  If you were given a prescription for medicine here today, be sure to read all of the information (including the package insert) that comes with your prescription.  This will include important information about the medicine, its side effects, and any warnings that you need to know about.  The pharmacist who fills the prescription can provide more information and answer questions you may have about the medicine.  If you have questions or concerns that the pharmacist cannot address, please call or return to the Emergency Department.     Remember that you can always come back to the Emergency Department if you are not able to see your regular provider in the amount of time listed above, if you get any new symptoms, or if there is anything that worries you.      Your next 10 appointments already scheduled     May 09, 2018  3:15 PM CDT   Return Visit with Michael Bernard MD    [facility] ([facility])    [address] [facility] [address]  [facility] [address] [phone] OPT 2              24 Hour Appointment Hotline       To make an appointment at any [facility], call [phone] ([phone]). If you don't have a family doctor or clinic, we will help you find one. [facility] are conveniently located to serve the needs of you and your family.             Review of your medicines      START taking        Dose / Directions Last dose taken    albuterol 108 (90 Base) MCG/ACT Inhaler   Commonly known as:  PROAIR HFA/PROVENTIL HFA/VENTOLIN HFA   Dose:  2 puff   Quantity:  1 Inhaler        Inhale 2 puffs into the lungs every 6 hours as needed for shortness of breath / dyspnea or wheezing   Refills:  0        levofloxacin 500 MG tablet   Commonly known as:  LEVAQUIN   Dose:  500 mg   Quantity:  5 tablet        Take 1 tablet (500 mg) by mouth daily for 7 days   Refills:  0          Our records show that you are taking the medicines listed below. If these are incorrect, please call your family doctor or clinic.        Dose / Directions Last dose taken    CLONAZEPAM PO   Dose:  0.25-0.5 mg        Take 0.25-0.5 mg by mouth 3 times daily as needed   Refills:  0        dabigatran ANTICOAGULANT 150 MG capsule   Commonly known as:  PRADAXA ANTICOAGULANT   Dose:  150 mg   Quantity:  180 capsule        Take 1 capsule (150 mg) by mouth 2 times daily   Refills:  2        ezetimibe 10 MG tablet   Commonly known as:  ZETIA   Dose:  10 mg   Quantity:  90 tablet        Take 1 tablet (10 mg) by mouth daily   Refills:  3        flecainide 100 MG tablet   Commonly known as:  TAMBOCOR   Dose:  100 mg   Quantity:  180 tablet        Take 1 tablet (100 mg) by mouth 2 times daily   Refills:  3        fluticasone 50 MCG/ACT spray   Commonly known as:  FLONASE   Dose:  1 spray        Spray 1 spray into both nostrils daily as needed for rhinitis or  allergies   Refills:  0        isosorbide mononitrate 30 MG 24 hr tablet   Commonly known as:  IMDUR   Dose:  15 mg   Quantity:  30 tablet        Take 0.5 tablets (15 mg) by mouth daily   Refills:  3        lisinopril 10 MG tablet   Commonly known as:  PRINIVIL/ZESTRIL   Dose:  10 mg   Quantity:  90 tablet        Take 1 tablet (10 mg) by mouth 2 times daily   Refills:  3        METAMUCIL PO   Dose:  1 Dose        Take 1 Dose by mouth daily as needed   Refills:  0        nitroGLYcerin 0.4 MG sublingual tablet   Commonly known as:  NITROSTAT   Quantity:  25 tablet        For chest pain place 1 tablet under the tongue every 5 minutes for 3 doses. If symptoms persist 5 minutes after 1st dose call 911.   Refills:  3        OCUVITE PO   Dose:  1 tablet        Take 1 tablet by mouth 2 times daily   Refills:  0        PAPAV-PHENTOLAMINE-ALPROSTADIL IC   Dose:  1 Dose        Inject 1 Dose as directed daily as needed (erectile dysfunction) Trimix Injection   Refills:  0        PROTONIX PO   Dose:  40 mg        Take 40 mg by mouth 2 times daily   Refills:  0        rosuvastatin 10 MG tablet   Commonly known as:  CRESTOR   Dose:  10 mg   Quantity:  90 tablet        Take 1 tablet (10 mg) by mouth daily   Refills:  4        TESTOSTERONE AQUEOUS IM   Dose:  1 mL        Inject 1 mL into the muscle every 14 days   Refills:  0        ZANTAC PO   Dose:  150 mg        Take 150 mg by mouth daily as needed for heartburn   Refills:  0        ZOLPIDEM TARTRATE PO   Dose:  5-10 mg        Take 5-10 mg by mouth nightly as needed   Refills:  0                Prescriptions were sent or printed at these locations (2 Prescriptions)                   Other Prescriptions                Printed at Department/Unit printer (2 of 2)         levofloxacin (LEVAQUIN) 500 MG tablet               albuterol (PROAIR HFA/PROVENTIL HFA/VENTOLIN HFA) 108 (90 Base) MCG/ACT Inhaler                Procedures and tests performed during your visit     Chest XR,  PA &  LAT      Orders Needing Specimen Collection     None      Pending Results     No orders found from 4/11/2018 to 4/14/2018.            Pending Culture Results     No orders found from 4/11/2018 to 4/14/2018.            Pending Results Instructions     If you had any lab results that were not finalized at the time of your Discharge, you can call the ED Lab Result RN at [phone]. You will be contacted by this team for any positive Lab results or changes in treatment. The nurses are available 7 days a week from 10A to 6:30P.  You can leave a message 24 hours per day and they will return your call.        Test Results From Your Hospital Stay        4/13/2018  1:37 PM      Narrative     XR CHEST 2 VW 4/13/2018 1:09 PM    COMPARISON: 4/27/2017    HISTORY: Cough for one week.        Impression     IMPRESSION: Left basilar atelectasis/consolidation, concerning for  aspiration or infection. Right lung is clear. No pleural effusion or  pneumothorax seen on either side.    LONDON HENRY MD                Clinical Quality Measure: Blood Pressure Screening     Your blood pressure was checked while you were in the emergency department today. The last reading we obtained was  BP: 144/72 . Please read the guidelines below about what these numbers mean and what you should do about them.  If your systolic blood pressure (the top number) is less than 120 and your diastolic blood pressure (the bottom number) is less than 80, then your blood pressure is normal. There is nothing more that you need to do about it.  If your systolic blood pressure (the top number) is 120-139 or your diastolic blood pressure (the bottom number) is 80-89, your blood pressure may be higher than it should be. You should have your blood pressure rechecked within a year by a primary care provider.  If your systolic blood pressure (the top number) is 140 or greater or your diastolic blood pressure (the bottom number) is 90 or greater, you may have high blood  "pressure. High blood pressure is treatable, but if left untreated over time it can put you at risk for heart attack, stroke, or kidney failure. You should have your blood pressure rechecked by a primary care provider within the next 4 weeks.  If your provider in the emergency department today gave you specific instructions to follow-up with your doctor or provider even sooner than that, you should follow that instruction and not wait for up to 4 weeks for your follow-up visit.        Thank you for choosing [facility]       Thank you for choosing [facility] for your care. Our goal is always to provide you with excellent care. Hearing back from our patients is one way we can continue to improve our services. Please take a few minutes to complete the written survey that you may receive in the mail after you visit with us. Thank you!        [facility] Information     [employer] lets you send messages to your doctor, view your test results, renew your prescriptions, schedule appointments and more. To sign up, go to [email or web address] . Click on \"Log in\" on the left side of the screen, which will take you to the Welcome page. Then click on \"Sign up Now\" on the right side of the page.     You will be asked to enter the access code listed below, as well as some personal information. Please follow the directions to create your username and password.     Your access code is: [email or web address]  Expires: 2018  2:06 PM     Your access code will  in 90 days. If you need help or a new code, please call your [facility] clinic or [phone].        Care EveryWhere ID     This is your Care EveryWhere ID. This could be used by other organizations to access your [facility] medical records  [id number]        Equal Access to Services     PAOLA ROJO : joyce Kamara, tim meza. So [facility] [phone].    ATENCIÓN: Si habla español, tiene a diaz " disposición servicios gratuitos de asistencia lingüística. Anders al [phone].    We comply with applicable federal civil rights laws and [place] laws. We do not discriminate on the basis of race, color, national origin, age, disability, sex, sexual orientation, or gender identity.            After Visit Summary       This is your record. Keep this with you and show to your community pharmacist(s) and doctor(s) at your next visit.

## 2018-04-13 NOTE — ED PROVIDER NOTES
History     Chief Complaint:  Cough      HPI   Chava Valentine is a 84 year old male on pradaxa with a history of CAD, hyperlipidemia, hypertension, paroxysmal atrial fibrillation, and SVT among others who presents to the emergency department today accompanied by his wife for evaluation of cough. The patient reports a productive cough with onset one week ago. He reports intermittent fevers, with his highest fever of 102.5 degrees occurring two to three days ago. He also describes some confusion. He states that he was evaluated by his primary care provider, Dr. Cosby of [facility], five days ago. He states that he had a chest x-ray which was negative for pneumonia as well as laboratory work up and negative strep and influenza swabs. He notes that he was prescribed cefuroxime, which he has taken for the last five days. He denies abdominal pain, shortness of breath, chest pain, leg swelling, or a history of asthma or COPD. He notes that he was a former smoker, although rarely. He notes that he took tylenol this morning around 0700. The patient's wife states that the patient's cough seems to have decreased in frequency. The patient reports that he had similar symptoms previously and was treated with a nebulizer, but was not diagnosed with pneumonia. 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    Allergies:  No Known Drug Allergies     Medications:    Pradaxa  Nitroglycerin  Imdur  Zetia  Tambocor  Lisinopril  Crestor  Protonix  Zantac  Flonase  Papav-phentolamine-alprostadil  Psyllium  Zolpidem tartrate  Clonazepam  Testosterone aqueous    Past Medical History:    Allergic rhinitis  Anxiety  Contact dermatitis  CAD  Cough  Elevated prolactin level  Erythrocytosis  GERD  GI bleed  Hyperlipidemia  Hypertension  Hypogonadism  Insomnia  Paroxysmal atrial fibrillation  Post-nasal drip  Prediabetes  Renal disease  Shoulder arthritis  SVT  Tremor    Past Surgical History:    Bypass graft artery  "coronary  Cardiac surgery  Right rotator cuff surgery  Phacoemulsification clear cornea  Right reverse shoulder arthroplasty  Vascular surgery  Vitrectomy parsplana, phacoemulsification clear cornea with standard intraocular lens implant x2    Family History:    Cerebrovascular disease: father    Social History:  The patient was accompanied to the ED by his wife.  Smoking Status: Former Smoker - 20 years, quit 1999  Smokeless Tobacco: Recreationally  Alcohol Use: Positive - occasional  Marital Status:       Review of Systems   Constitutional: Positive for fever.   Respiratory: Positive for cough (productive). Negative for shortness of breath.    Cardiovascular: Negative for chest pain and leg swelling.   Gastrointestinal: Negative for abdominal pain.   Psychiatric/Behavioral: Positive for confusion.   All other systems reviewed and are negative.    Physical Exam     Patient Vitals for the past 24 hrs:   BP Temp Temp src Heart Rate Resp SpO2 Height Weight   04/13/18 1054 144/72 98.8  F (37.1  C) Oral 76 20 95 % 1.727 m (5' 8\") 78.9 kg (174 lb)     Physical Exam  General: Alert and interactive. Appears well. Dry cough throughout visit.   Eyes: The pupils are equal and round. EOMs intact. No scleral icterus. Posterior oropharynx is non-erythematous. Mucous membranes moist.     CV: Regular rate and rhythm. S1 and S2 normal without murmur, click, gallop or rub.   Resp: Breath sounds are clear bilaterally, without rhonchi, wheezes, rales. Non-labored, no retractions or accessory muscle use.     GI: Abdomen is soft without distension. No tenderness to palpation. No peritoneal signs.    MS: Moving all extremities well.   Skin:  Warm and dry. No rash or lesions noted.  Neuro: Alert and oriented x 3. GCS 15.    Psych: Awake. Alert.  Normal affect. Appropriate interactions.  Lymph: No anterior or posterior cervical lymphadenopathy noted.    Emergency Department Course   Imaging:  Radiology findings were communicated " with the patient who voiced understanding of the findings.    Chest XR,  PA & LAT  Left basilar atelectasis/consolidation, concerning for  aspiration or infection. Right lung is clear. No pleural effusion or  pneumothorax seen on either side.  LONDON HENRY MD  Reading per radiology    Interventions:  1244 Duoneb 3 mL [facility]    Emergency Department Course:  Nursing notes and vitals reviewed.  I performed an exam of the patient as documented above.   The patient was sent for a Chest XR,  PA & LAT while in the emergency department, results above.   I discussed the treatment plan with the patient and his wife, who expressed understanding of this plan and consented to discharge. Patient will be discharged home with instructions for care and follow up. In addition, the patient will return to the emergency department if symptoms persist, worsen, if new symptoms arise or if there is any concern.  All questions were answered.    Impression & Plan    Medical Decision Making: Chava Valentine is a 84 year old male who presents for evaluation of a cough. History, physical exam and imaging studies are consistent with pneumonia. Patient was seen by his primary five days ago with similar symptoms. Chest x-ray at that time showed minimal left lung base scarring and CBC was WNL. He was started on Cefuroxime. Chest x-ray obtained here showed signs of scarring or a infiltrate in the left lower base. There are no signs of complications of pneumonia at this point such as septic shock, bacteremia, empyema, hypoxia, respiratory failure or compromise. Given that this could be considered failure of outpatient management, Dr. Trierweiler discussed inpatient admission for IV antibiotics. Patient would like to try a stronger and more broad spectrum antibiotic at home. I will start the patient on Levaquin and will give a ventolin inhaler for management of the cough. He should use Tylenol/Ibuprofen for management of He should follow closely with  his PCP. I doubt PE, dissection, acute coronary syndrome, or other worrisome etiology for patients symptoms. Signs for return visit to ED were discussed with patient and pneumonia precautions given for home. The patient and his wife have no further questions nor concerns.       Diagnosis:    ICD-10-CM    1. Pneumonia of left lower lobe due to infectious organism (H) J18.1        Disposition: Discharged to home    Discharge Medications:  New Prescriptions    ALBUTEROL (PROAIR HFA/PROVENTIL HFA/VENTOLIN HFA) 108 (90 BASE) MCG/ACT INHALER    Inhale 2 puffs into the lungs every 6 hours as needed for shortness of breath / dyspnea or wheezing    LEVOFLOXACIN (LEVAQUIN) 500 MG TABLET    Take 1 tablet (500 mg) by mouth daily for 7 days     I, Evon Holguin PA-C, interviewed the patient, explained the course of action and discussed the patient with Dr. Trierweiler, who then evaluated the patient.    Scribe Disclosure:  I, John Epstein, am serving as a scribe at 12:00 PM on 4/13/2018 to document services personally performed by Evon Holguin PA-C, based on my observations and the provider's statements to me.   EMERGENCY DEPARTMENT       Evon Holguin PA-C  04/13/18 8091

## 2018-04-13 NOTE — ED AVS SNAPSHOT
Emergency Department    [address] [place] [address]    Phone:  [phone]    Fax:  [phone]                                       Chava Valentine   MRN: [id number]    Department:   Emergency Department   Date of Visit:  4/13/2018           After Visit Summary Signature Page     I have received my discharge instructions, and my questions have been answered. I have discussed any challenges I see with this plan with the nurse or doctor.    ..........................................................................................................................................  Patient/Patient Representative Signature      ..........................................................................................................................................  Patient Representative Print Name and Relationship to Patient    ..................................................               ................................................  Date                                            Time    ..........................................................................................................................................  Reviewed by Signature/Title    ...................................................              ..............................................  Date                                                            Time

## 2018-04-26 ENCOUNTER — DOCUMENTATION ONLY (OUTPATIENT)
Dept: CARDIOLOGY | Facility: CLINIC | Age: 83
End: 2018-04-26

## 2018-04-26 NOTE — PROGRESS NOTES
Received incoming last OV from 2/21/17- [facility]. Not seen since 2017 and no further appointments needed at this time.

## 2018-04-30 ENCOUNTER — TRANSFERRED RECORDS (OUTPATIENT)
Dept: HEALTH INFORMATION MANAGEMENT | Facility: CLINIC | Age: 83
End: 2018-04-30

## 2018-04-30 DIAGNOSIS — I48.0 PAROXYSMAL ATRIAL FIBRILLATION (H): Primary | ICD-10-CM

## 2018-05-09 ENCOUNTER — OFFICE VISIT (OUTPATIENT)
Dept: CARDIOLOGY | Facility: CLINIC | Age: 83
End: 2018-05-09
Attending: INTERNAL MEDICINE
Payer: MEDICARE

## 2018-05-09 VITALS
SYSTOLIC BLOOD PRESSURE: 114 MMHG | DIASTOLIC BLOOD PRESSURE: 60 MMHG | BODY MASS INDEX: 25.22 KG/M2 | WEIGHT: 170.3 LBS | HEIGHT: 69 IN | HEART RATE: 68 BPM

## 2018-05-09 DIAGNOSIS — I10 ESSENTIAL HYPERTENSION: ICD-10-CM

## 2018-05-09 DIAGNOSIS — E78.2 MIXED HYPERLIPIDEMIA: ICD-10-CM

## 2018-05-09 DIAGNOSIS — I48.0 PAROXYSMAL ATRIAL FIBRILLATION (H): ICD-10-CM

## 2018-05-09 DIAGNOSIS — I25.810 CORONARY ARTERY DISEASE INVOLVING CORONARY BYPASS GRAFT OF NATIVE HEART WITHOUT ANGINA PECTORIS: ICD-10-CM

## 2018-05-09 PROCEDURE — 99214 OFFICE O/P EST MOD 30 MIN: CPT | Performed by: INTERNAL MEDICINE

## 2018-05-09 RX ORDER — LISINOPRIL 10 MG/1
10 TABLET ORAL 2 TIMES DAILY
Qty: 180 TABLET | Refills: 3 | Status: SHIPPED | OUTPATIENT
Start: 2018-05-09 | End: 2018-10-26

## 2018-05-09 NOTE — LETTER
5/9/2018    [facility] [address]  [place] [address]    RE: Chava Medranoar       Dear Colleague,    I had the pleasure of seeing Chava Valentine in the [facility].     I saw my good friend, Chava Valentine, today, who is 84.  He has a history of coronary artery disease and previous coronary artery bypass surgery but has essentially been angina-free since that time.  He has had paroxysmal atrial fibrillation that has been held in check with flecainide 100 mg twice a day.  He takes Pradaxa for stroke prophylaxis because of his history of PAFib.       Chava continues to be quietly active.  He denies chest pain, palpitations, dizziness, syncope, shortness of breath, etc.         He has been slowed down more by surgical right shoulder which has been stubborn and has rehabbed with some residual diminished range of motion and stiffness.  He is disappointed by this but continues to work on it.     Recent viral syndrome => fatigue and decreased energy.    HPI and Plan:   See dictation  I recommend continuing current treatment plans in the chart. and Lexiscan Stress Thallium test    Orders Placed This Encounter   Procedures     NM Lexiscan stress test (nuc card)     No orders of the defined types were placed in this encounter.    There are no discontinued medications.      Encounter Diagnoses   Name Primary?     Coronary artery disease involving coronary bypass graft of native heart without angina pectoris      Mixed hyperlipidemia      Essential hypertension      Paroxysmal atrial fibrillation (H)        CURRENT MEDICATIONS:  Current Outpatient Prescriptions   Medication Sig Dispense Refill     albuterol (PROAIR HFA/PROVENTIL HFA/VENTOLIN HFA) 108 (90 Base) MCG/ACT Inhaler Inhale 2 puffs into the lungs every 6 hours as needed for shortness of breath / dyspnea or wheezing 1 Inhaler 0     CLONAZEPAM PO Take 0.25-0.5 mg by mouth 3 times daily as  needed        flecainide (TAMBOCOR) 100 MG tablet Take 1 tablet (100 mg) by mouth 2 times daily 180 tablet 3     fluticasone (FLONASE) 50 MCG/ACT spray Spray 1 spray into both nostrils daily as needed for rhinitis or allergies       lisinopril (PRINIVIL/ZESTRIL) 10 MG tablet Take 1 tablet (10 mg) by mouth 2 times daily 90 tablet 3     Multiple Vitamins-Minerals (OCUVITE PO) Take 1 tablet by mouth 2 times daily       nitroGLYcerin (NITROSTAT) 0.4 MG sublingual tablet For chest pain place 1 tablet under the tongue every 5 minutes for 3 doses. If symptoms persist 5 minutes after 1st dose call 911. 25 tablet 3     Pantoprazole Sodium (PROTONIX PO) Take 40 mg by mouth 2 times daily       PAPAV-PHENTOLAMINE-ALPROSTADIL IC Inject 1 Dose as directed daily as needed (erectile dysfunction) Trimix Injection       Psyllium (METAMUCIL PO) Take 1 Dose by mouth daily as needed        RaNITidine HCl (ZANTAC PO) Take 150 mg by mouth daily as needed for heartburn       rosuvastatin (CRESTOR) 10 MG tablet Take 1 tablet (10 mg) by mouth daily 90 tablet 4     TESTOSTERONE AQUEOUS IM Inject 1 mL into the muscle every 14 days        ZOLPIDEM TARTRATE PO Take 5-10 mg by mouth nightly as needed        dabigatran ANTICOAGULANT (PRADAXA ANTICOAGULANT) 150 MG capsule Take 1 capsule (150 mg) by mouth 2 times daily 180 capsule 2     ezetimibe (ZETIA) 10 MG tablet Take 1 tablet (10 mg) by mouth daily 90 tablet 3     isosorbide mononitrate (IMDUR) 30 MG 24 hr tablet Take 0.5 tablets (15 mg) by mouth daily (Patient not taking: Reported on 5/9/2018) 30 tablet 3       ALLERGIES     Allergies   Allergen Reactions     No Clinical Screening - See Comments Rash     Dove soap       PAST MEDICAL HISTORY:  Past Medical History:   Diagnosis Date     Allergic rhinitis      Anxiety      Contact dermatitis      Coronary artery disease     CABG with 4 grafts-LIMA to LAD, SVG to Diagonal,OM and RCA     Cough      Elevated prolactin level (H)      Erythrocytosis       Gastro-oesophageal reflux disease      GI bleed 10/10     Hyperlipidemia      Hypertension      Hypogonadism     with low testosterone     Insomnia      Paroxysmal atrial fibrillation (H)     flecainide therapy     Post-nasal drip      Prediabetes      Renal disease     CKD     Shoulder arthritis      SVT (supraventricular tachycardia) (H)      Tremor        PAST SURGICAL HISTORY:  Past Surgical History:   Procedure Laterality Date     BYPASS GRAFT ARTERY CORONARY       CARDIAC SURGERY       COLONOSCOPY  12/2010    tubular adenoma     ORTHOPEDIC SURGERY      rotator cuff right     PHACOEMULSIFICATION CLEAR CORNEA WITH STANDARD IOL, VITRECTOMY PARSPLANA 23 GAGUE, COMBINED  2/20/2013    Procedure: COMBINED PHACOEMULSIFICATION CLEAR CORNEA WITH STANDARD INTRAOCULAR LENS IMPLANT, VITRECTOMY PARSPLANA 23 GAUGE;;  Surgeon: Guero Lockett MD;  [facility]:  [facility]     PHACOEMULSIFICATION CLEAR CORNEA WITH STANDARD IOL, VITRECTOMY PARSPLANA 23 GAGUE, COMBINED  7/31/2013    Procedure: COMBINED PHACOEMULSIFICATION CLEAR CORNEA WITH STANDARD INTRAOCULAR LENS IMPLANT, VITRECTOMY PARSPLANA 23 GAUGE;;  Surgeon: Guero Lockett MD;  Location:  [facility]     REVERSE ARTHROPLASTY SHOULDER Right 7/24/2017    Procedure: REVERSE ARTHROPLASTY SHOULDER;  RIGHT REVERSE TOTAL SHOULDER ARTHROPLASTY ;  Surgeon: Rodolfo Calhoun MD;  Location:  OR     VASCULAR SURGERY      cab     VITRECTOMY PARSPLANA, PHACOEMULSIFICATION CLEAR CORNEA W STANDARD INTRAOCULAR LENS IMPLANT, COMBINED  2/20/2013    Procedure: COMBINED VITRECTOMY PARSPLANA, PHACOEMULSIFICATION CLEAR CORNEA WITH STANDARD INTRAOCULAR LENS IMPLANT;  LEFT PHACOEMULSIFICATION CLEAR CORNEA WITH SAURABH TORIC INTRAOCULAR LENS IMPLANT,  LEFT EYE 23 GAUGE PARSPLANA VITRECTOMY;  Surgeon: Jorge Regan MD;  Location:  [facility]     VITRECTOMY PARSPLANA, PHACOEMULSIFICATION CLEAR CORNEA W STANDARD INTRAOCULAR LENS IMPLANT, COMBINED  7/31/2013    Procedure: COMBINED VITRECTOMY  "PARSPLANA, PHACOEMULSIFICATION CLEAR CORNEA WITH STANDARD INTRAOCULAR LENS IMPLANT;  RIGHT PHACOEMULSIFICATION CLEAR CORNEA WITH TORIC INTRAOCULAR LENS IMPLANT, RIGHT 23 GAUGE PARSPLANA VITRECTOMY ;  Surgeon: Jorge Regan MD;  Location: [facility]       FAMILY HISTORY:  Family History   Problem Relation Age of Onset     Other - See Comments Mother      old age     Other - See Comments Father 97     CEREBROVASCULAR DISEASE Father        SOCIAL HISTORY:  Social History     Social History     Marital status:      Spouse name: N/A     Number of children: N/A     Years of education: N/A     Social History Main Topics     Smoking status: Former Smoker     Packs/day: 0.00     Years: 20.00     Types: Cigarettes     Quit date: 1/1/1999     Smokeless tobacco: Never Used      Comment: recreational only     Alcohol use Yes      Comment: occas     Drug use: No     Sexual activity: Not Asked     Other Topics Concern     Parent/Sibling W/ Cabg, Mi Or Angioplasty Before 65f 55m? No     Sleep Concern Yes     Special Diet Yes     less meat      Exercise Yes     3 x weekly     Seat Belt Yes     Social History Narrative         Review of Systems:  Skin:  Negative       Eyes:  Positive for glasses    ENT:  Positive for nasal congestion    Respiratory:  Negative       Cardiovascular:    fatigue;Positive for    Gastroenterology: Negative      Genitourinary:  Negative      Musculoskeletal:  Negative      Neurologic:  Negative      Psychiatric:  Positive for anxiety    Heme/Lymph/Imm:  Negative      Endocrine:  Negative        Physical Exam:  Vitals: /60  Pulse 68  Ht 1.74 m (5' 8.5\")  Wt 77.2 kg (170 lb 4.8 oz)  BMI 25.52 kg/m2    Constitutional:           Skin:  warm and dry to the touch          Head:  normocephalic        Eyes:  pupils equal and round;sclera white;EOMS intact        Lymph:      ENT:  no pallor or cyanosis        Neck:  carotid pulses are full and equal bilaterally;JVP normal;no carotid bruit    "     Respiratory:  clear to auscultation;normal respiratory excursion;healed median sternotomy scar         Cardiac: regular rhythm;normal S1 and S2;no murmurs, gallops or rubs detected                                1+             1+          GI:  abdomen soft        Extremities and Muscular Skeletal:  no edema;no deformities, clubbing, cyanosis, erythema observed              Neurological:  affect appropriate        Psych:  oriented to time, person and place        Recent Lab Results:  LIPID RESULTS:  Lab Results   Component Value Date    CHOL 109 04/08/2017    HDL 40 04/08/2017    LDL 54 04/08/2017    TRIG 77 04/08/2017    CHOLHDLRATIO 2.6 01/22/2015       LIVER ENZYME RESULTS:  Lab Results   Component Value Date    AST 10 06/08/2017    ALT 9 06/08/2017       CBC RESULTS:  Lab Results   Component Value Date    WBC 7.8 01/27/2017    RBC 5.80 01/27/2017    HGB 13.3 07/26/2017    HCT 51.0 01/27/2017    MCV 88 01/27/2017    MCH 29.0 01/27/2017    MCHC 32.9 01/27/2017    RDW 13.4 01/27/2017     (L) 07/24/2017       BMP RESULTS:  Lab Results   Component Value Date     07/25/2017    POTASSIUM 4.6 07/25/2017    CHLORIDE 101 07/25/2017    CO2 24 07/25/2017    ANIONGAP 8 07/25/2017     (H) 07/25/2017    BUN 16 07/25/2017    CR 1.29 (H) 07/25/2017    GFRESTIMATED 53 (L) 07/25/2017    GFRESTBLACK 64 07/25/2017    FIDEL 8.0 (L) 07/25/2017        A1C RESULTS:  No results found for: A1C    INR RESULTS:  Lab Results   Component Value Date    INR 0.89 07/24/2017           CC  Talisha Bernard MD  [address]  [place] [address]                                  Thank you for allowing me to participate in the care of your patient.      Sincerely,     TALISHA BERNARD MD     [facility]    cc:   Talisha Bernard MD  [address]  [place] [address]

## 2018-05-09 NOTE — MR AVS SNAPSHOT
After Visit Summary   5/9/2018    Chava Valentine    MRN: [id number]           Patient Information     Date Of Birth          10/14/1933        Visit Information        Provider Department      5/9/2018 3:15 PM Michael Bernard MD [facility]        Today's Diagnoses     Coronary artery disease involving coronary bypass graft of native heart without angina pectoris        Mixed hyperlipidemia        Essential hypertension        Paroxysmal atrial fibrillation (H)           Follow-ups after your visit        Your next 10 appointments already scheduled     Jun 21, 2018 10:00 AM CDT   Return Visit with Ricardo Randall MD   [facility] [place] ([facility])    [address]  [address]  [facility] [address]   [phone]              Future tests that were ordered for you today     Open Future Orders        Priority Expected Expires Ordered    NM Lexiscan stress test (nuc card) Routine 5/16/2018 5/9/2019 5/9/2018            Who to contact     If you have questions or need follow up information about today's clinic visit or your schedule please contact [facility] directly at [phone].  Normal or non-critical lab and imaging results will be communicated to you by MyChart, letter or phone within 4 business days after the clinic has received the results. If you do not hear from us within 7 days, please contact the clinic through TapTaphart or phone. If you have a critical or abnormal lab result, we will notify you by phone as soon as possible.  Submit refill requests through Metooo or call your pharmacy and they will forward the refill request to us. Please allow 3 business days for your refill to be completed.          Additional Information About Your Visit        TapTaphart Information     Metooo lets you send messages to your doctor, view your test results, renew your  "prescriptions, schedule appointments and more. To sign up, go to [email or web address] . Click on \"Log in\" on the left side of the screen, which will take you to the Welcome page. Then click on \"Sign up Now\" on the right side of the page.     You will be asked to enter the access code listed below, as well as some personal information. Please follow the directions to create your username and password.     Your access code is: [email or web address]  Expires: 2018  2:06 PM     Your access code will  in 90 days. If you need help or a new code, please call your [facility] clinic or [phone].        Care EveryWhere ID     This is your Care EveryWhere ID. This could be used by other organizations to access your [facility] medical records  [id number]        Your Vitals Were     Pulse Height BMI (Body Mass Index)             68 1.74 m (5' 8.5\") 25.52 kg/m2          Blood Pressure from Last 3 Encounters:   18 114/60   18 129/73   17 126/64    Weight from Last 3 Encounters:   18 77.2 kg (170 lb 4.8 oz)   18 78.9 kg (174 lb)   17 80.3 kg (177 lb 1.6 oz)              We Performed the Following     Follow-Up with Cardiologist          Where to get your medicines      These medications were sent to [facility] [address] [place] [address] [address]  [address] [place] [address]    Hours:  24-hours Phone:  [phone]     lisinopril 10 MG tablet          Primary Care Provider Office Phone # Fax #    Mike Cosby [phone]       [facility] [address] [place] [address]  [place] [address]        Equal Access to Services     PAOLA ROJO : Benja Ho, wadeya hughes, qaybta georgealdakota sidhu, tim grey. So [facility] [phone].    ATENCIÓN: Si habla español, tiene a diaz disposición servicios gratuitos de asistencia lingüística. Llame al [phone].    We comply with " applicable federal civil rights laws and [place] laws. We do not discriminate on the basis of race, color, national origin, age, disability, sex, sexual orientation, or gender identity.            Thank you!     Thank you for choosing [facility]  for your care. Our goal is always to provide you with excellent care. Hearing back from our patients is one way we can continue to improve our services. Please take a few minutes to complete the written survey that you may receive in the mail after your visit with us. Thank you!             Your Updated Medication List - Protect others around you: Learn how to safely use, store and throw away your medicines at www.disposemymeds.org.          This list is accurate as of 5/9/18  4:02 PM.  Always use your most recent med list.                   Brand Name Dispense Instructions for use Diagnosis    albuterol 108 (90 Base) MCG/ACT Inhaler    PROAIR HFA/PROVENTIL HFA/VENTOLIN HFA    1 Inhaler    Inhale 2 puffs into the lungs every 6 hours as needed for shortness of breath / dyspnea or wheezing        CLONAZEPAM PO      Take 0.25-0.5 mg by mouth 3 times daily as needed        dabigatran ANTICOAGULANT 150 MG capsule    PRADAXA ANTICOAGULANT    180 capsule    Take 1 capsule (150 mg) by mouth 2 times daily    Paroxysmal atrial fibrillation (H)       ezetimibe 10 MG tablet    ZETIA    90 tablet    Take 1 tablet (10 mg) by mouth daily    Coronary artery disease involving coronary bypass graft of native heart without angina pectoris, Mixed hyperlipidemia       flecainide 100 MG tablet    TAMBOCOR    180 tablet    Take 1 tablet (100 mg) by mouth 2 times daily    Paroxysmal atrial fibrillation (H)       fluticasone 50 MCG/ACT spray    FLONASE     Spray 1 spray into both nostrils daily as needed for rhinitis or allergies        isosorbide mononitrate 30 MG 24 hr tablet    IMDUR    30 tablet    Take 0.5 tablets (15 mg) by mouth daily     Atherosclerosis of native coronary artery of native heart without angina pectoris       lisinopril 10 MG tablet    PRINIVIL/ZESTRIL    180 tablet    Take 1 tablet (10 mg) by mouth 2 times daily    Essential hypertension       METAMUCIL PO      Take 1 Dose by mouth daily as needed        nitroGLYcerin 0.4 MG sublingual tablet    NITROSTAT    25 tablet    For chest pain place 1 tablet under the tongue every 5 minutes for 3 doses. If symptoms persist 5 minutes after 1st dose call 911.    Atherosclerosis of native coronary artery of native heart without angina pectoris       OCUVITE PO      Take 1 tablet by mouth 2 times daily        PAPAV-PHENTOLAMINE-ALPROSTADIL IC      Inject 1 Dose as directed daily as needed (erectile dysfunction) Trimix Injection        PROTONIX PO      Take 40 mg by mouth 2 times daily        rosuvastatin 10 MG tablet    CRESTOR    90 tablet    Take 1 tablet (10 mg) by mouth daily    Coronary artery disease involving coronary bypass graft of native heart without angina pectoris, Mixed hyperlipidemia       TESTOSTERONE AQUEOUS IM      Inject 1 mL into the muscle every 14 days        ZANTAC PO      Take 150 mg by mouth daily as needed for heartburn        ZOLPIDEM TARTRATE PO      Take 5-10 mg by mouth nightly as needed

## 2018-05-09 NOTE — PROGRESS NOTES
I saw my good friend, Chava Valentine, today, who is 84.  He has a history of coronary artery disease and previous coronary artery bypass surgery but has essentially been angina-free since that time.  He has had paroxysmal atrial fibrillation that has been held in check with flecainide 100 mg twice a day.  He takes Pradaxa for stroke prophylaxis because of his history of PAFib.       Chava continues to be quietly active.  He denies chest pain, palpitations, dizziness, syncope, shortness of breath, etc.         He has been slowed down more by surgical right shoulder which has been stubborn and has rehabbed with some residual diminished range of motion and stiffness.  He is disappointed by this but continues to work on it.     Recent viral syndrome => fatigue and decreased energy.    HPI and Plan:   See dictation  I recommend continuing current treatment plans in the chart. and Lexiscan Stress Thallium test    Orders Placed This Encounter   Procedures     NM Lexiscan stress test (nuc card)     No orders of the defined types were placed in this encounter.    There are no discontinued medications.      Encounter Diagnoses   Name Primary?     Coronary artery disease involving coronary bypass graft of native heart without angina pectoris      Mixed hyperlipidemia      Essential hypertension      Paroxysmal atrial fibrillation (H)        CURRENT MEDICATIONS:  Current Outpatient Prescriptions   Medication Sig Dispense Refill     albuterol (PROAIR HFA/PROVENTIL HFA/VENTOLIN HFA) 108 (90 Base) MCG/ACT Inhaler Inhale 2 puffs into the lungs every 6 hours as needed for shortness of breath / dyspnea or wheezing 1 Inhaler 0     CLONAZEPAM PO Take 0.25-0.5 mg by mouth 3 times daily as needed        flecainide (TAMBOCOR) 100 MG tablet Take 1 tablet (100 mg) by mouth 2 times daily 180 tablet 3     fluticasone (FLONASE) 50 MCG/ACT spray Spray 1 spray into both nostrils daily as needed for rhinitis or allergies       lisinopril  (PRINIVIL/ZESTRIL) 10 MG tablet Take 1 tablet (10 mg) by mouth 2 times daily 90 tablet 3     Multiple Vitamins-Minerals (OCUVITE PO) Take 1 tablet by mouth 2 times daily       nitroGLYcerin (NITROSTAT) 0.4 MG sublingual tablet For chest pain place 1 tablet under the tongue every 5 minutes for 3 doses. If symptoms persist 5 minutes after 1st dose call 911. 25 tablet 3     Pantoprazole Sodium (PROTONIX PO) Take 40 mg by mouth 2 times daily       PAPAV-PHENTOLAMINE-ALPROSTADIL IC Inject 1 Dose as directed daily as needed (erectile dysfunction) Trimix Injection       Psyllium (METAMUCIL PO) Take 1 Dose by mouth daily as needed        RaNITidine HCl (ZANTAC PO) Take 150 mg by mouth daily as needed for heartburn       rosuvastatin (CRESTOR) 10 MG tablet Take 1 tablet (10 mg) by mouth daily 90 tablet 4     TESTOSTERONE AQUEOUS IM Inject 1 mL into the muscle every 14 days        ZOLPIDEM TARTRATE PO Take 5-10 mg by mouth nightly as needed        dabigatran ANTICOAGULANT (PRADAXA ANTICOAGULANT) 150 MG capsule Take 1 capsule (150 mg) by mouth 2 times daily 180 capsule 2     ezetimibe (ZETIA) 10 MG tablet Take 1 tablet (10 mg) by mouth daily 90 tablet 3     isosorbide mononitrate (IMDUR) 30 MG 24 hr tablet Take 0.5 tablets (15 mg) by mouth daily (Patient not taking: Reported on 5/9/2018) 30 tablet 3       ALLERGIES     Allergies   Allergen Reactions     No Clinical Screening - See Comments Rash     Dove soap       PAST MEDICAL HISTORY:  Past Medical History:   Diagnosis Date     Allergic rhinitis      Anxiety      Contact dermatitis      Coronary artery disease     CABG with 4 grafts-LIMA to LAD, SVG to Diagonal,OM and RCA     Cough      Elevated prolactin level (H)      Erythrocytosis      Gastro-oesophageal reflux disease      GI bleed 10/10     Hyperlipidemia      Hypertension      Hypogonadism     with low testosterone     Insomnia      Paroxysmal atrial fibrillation (H)     flecainide therapy     Post-nasal drip       Prediabetes      Renal disease     CKD     Shoulder arthritis      SVT (supraventricular tachycardia) (H)      Tremor        PAST SURGICAL HISTORY:  Past Surgical History:   Procedure Laterality Date     BYPASS GRAFT ARTERY CORONARY       CARDIAC SURGERY       COLONOSCOPY  12/2010    tubular adenoma     ORTHOPEDIC SURGERY      rotator cuff right     PHACOEMULSIFICATION CLEAR CORNEA WITH STANDARD IOL, VITRECTOMY PARSPLANA 23 GAGUE, COMBINED  2/20/2013    Procedure: COMBINED PHACOEMULSIFICATION CLEAR CORNEA WITH STANDARD INTRAOCULAR LENS IMPLANT, VITRECTOMY PARSPLANA 23 GAUGE;;  Surgeon: Guero Lockett MD;  Location: [facility]     PHACOEMULSIFICATION CLEAR CORNEA WITH STANDARD IOL, VITRECTOMY PARSPLANA 23 GAGUE, COMBINED  7/31/2013    Procedure: COMBINED PHACOEMULSIFICATION CLEAR CORNEA WITH STANDARD INTRAOCULAR LENS IMPLANT, VITRECTOMY PARSPLANA 23 GAUGE;;  Surgeon: Guero Lockett MD;  Location: [facility]     REVERSE ARTHROPLASTY SHOULDER Right 7/24/2017    Procedure: REVERSE ARTHROPLASTY SHOULDER;  RIGHT REVERSE TOTAL SHOULDER ARTHROPLASTY ;  Surgeon: Rodolfo Calhoun MD;  Location:  OR     VASCULAR SURGERY      cab     VITRECTOMY PARSPLANA, PHACOEMULSIFICATION CLEAR CORNEA W STANDARD INTRAOCULAR LENS IMPLANT, COMBINED  2/20/2013    Procedure: COMBINED VITRECTOMY PARSPLANA, PHACOEMULSIFICATION CLEAR CORNEA WITH STANDARD INTRAOCULAR LENS IMPLANT;  LEFT PHACOEMULSIFICATION CLEAR CORNEA WITH SAURABH TORIC INTRAOCULAR LENS IMPLANT,  LEFT EYE 23 GAUGE PARSPLANA VITRECTOMY;  Surgeon: Jorge Regan MD;  Location: [facility]     VITRECTOMY PARSPLANA, PHACOEMULSIFICATION CLEAR CORNEA W STANDARD INTRAOCULAR LENS IMPLANT, COMBINED  7/31/2013    Procedure: COMBINED VITRECTOMY PARSPLANA, PHACOEMULSIFICATION CLEAR CORNEA WITH STANDARD INTRAOCULAR LENS IMPLANT;  RIGHT PHACOEMULSIFICATION CLEAR CORNEA WITH TORIC INTRAOCULAR LENS IMPLANT, RIGHT 23 GAUGE PARSPLANA VITRECTOMY ;  Surgeon: Jorge Regan MD;  Location: [facility]  "      FAMILY HISTORY:  Family History   Problem Relation Age of Onset     Other - See Comments Mother      old age     Other - See Comments Father 97     CEREBROVASCULAR DISEASE Father        SOCIAL HISTORY:  Social History     Social History     Marital status:      Spouse name: N/A     Number of children: N/A     Years of education: N/A     Social History Main Topics     Smoking status: Former Smoker     Packs/day: 0.00     Years: 20.00     Types: Cigarettes     Quit date: 1/1/1999     Smokeless tobacco: Never Used      Comment: recreational only     Alcohol use Yes      Comment: occas     Drug use: No     Sexual activity: Not Asked     Other Topics Concern     Parent/Sibling W/ Cabg, Mi Or Angioplasty Before 65f 55m? No     Sleep Concern Yes     Special Diet Yes     less meat      Exercise Yes     3 x weekly     Seat Belt Yes     Social History Narrative         Review of Systems:  Skin:  Negative       Eyes:  Positive for glasses    ENT:  Positive for nasal congestion    Respiratory:  Negative       Cardiovascular:    fatigue;Positive for    Gastroenterology: Negative      Genitourinary:  Negative      Musculoskeletal:  Negative      Neurologic:  Negative      Psychiatric:  Positive for anxiety    Heme/Lymph/Imm:  Negative      Endocrine:  Negative        Physical Exam:  Vitals: /60  Pulse 68  Ht 1.74 m (5' 8.5\")  Wt 77.2 kg (170 lb 4.8 oz)  BMI 25.52 kg/m2    Constitutional:           Skin:  warm and dry to the touch          Head:  normocephalic        Eyes:  pupils equal and round;sclera white;EOMS intact        Lymph:      ENT:  no pallor or cyanosis        Neck:  carotid pulses are full and equal bilaterally;JVP normal;no carotid bruit        Respiratory:  clear to auscultation;normal respiratory excursion;healed median sternotomy scar         Cardiac: regular rhythm;normal S1 and S2;no murmurs, gallops or rubs detected                                1+             1+          GI:  abdomen " soft        Extremities and Muscular Skeletal:  no edema;no deformities, clubbing, cyanosis, erythema observed              Neurological:  affect appropriate        Psych:  oriented to time, person and place        Recent Lab Results:  LIPID RESULTS:  Lab Results   Component Value Date    CHOL 109 04/08/2017    HDL 40 04/08/2017    LDL 54 04/08/2017    TRIG 77 04/08/2017    CHOLHDLRATIO 2.6 01/22/2015       LIVER ENZYME RESULTS:  Lab Results   Component Value Date    AST 10 06/08/2017    ALT 9 06/08/2017       CBC RESULTS:  Lab Results   Component Value Date    WBC 7.8 01/27/2017    RBC 5.80 01/27/2017    HGB 13.3 07/26/2017    HCT 51.0 01/27/2017    MCV 88 01/27/2017    MCH 29.0 01/27/2017    MCHC 32.9 01/27/2017    RDW 13.4 01/27/2017     (L) 07/24/2017       BMP RESULTS:  Lab Results   Component Value Date     07/25/2017    POTASSIUM 4.6 07/25/2017    CHLORIDE 101 07/25/2017    CO2 24 07/25/2017    ANIONGAP 8 07/25/2017     (H) 07/25/2017    BUN 16 07/25/2017    CR 1.29 (H) 07/25/2017    GFRESTIMATED 53 (L) 07/25/2017    GFRESTBLACK 64 07/25/2017    FIDEL 8.0 (L) 07/25/2017        A1C RESULTS:  No results found for: A1C    INR RESULTS:  Lab Results   Component Value Date    INR 0.89 07/24/2017           CC  Michael Bernard MD  [address]  [place] [id number]

## 2018-05-09 NOTE — LETTER
5/9/2018      Mike Cosby MD  [facility] [address]  [place] [address]      RE: Chava Valentine       Dear Colleague,    I had the pleasure of seeing Chava Valentine in the [facility].    Service Date: 05/09/2018      HISTORY OF PRESENT ILLNESS:  I saw Chava, age 84, today.  I have known Chava now for almost 25 years.  He has always been a delightful man and I really enjoy his company.      He says in the past couple months he has had a tendency to have a little more shortness of breath and a little more easy fatigue.  This was highlighted he thinks over the past month of having what he feels is a viral syndrome.  He had some low-grade fever, feeling blah, diminished energy, diminished appetite and gradually he has come out of that, but he still does not have much in the way of energy reserve.        He has not had much in the way of classic angina.  He has had of course chronic coronary artery disease.  Remotely in 1995, he had a 4-vessel bypass with a mammary artery to the LAD and individual vein grafts to the diagonal branch and obtuse marginal, the distal RCA.  He has had a chronically occluded distal LAD and stress nuclear studies have shown ejection fractions of 50%-55% with occasional small area of apical ischemia.  His last study done in 2015 - 3 years ago, showed no apical ischemia and an EF of 53%.  I have not checked him subsequently partially because of his minimal symptoms and partially because he has not been on an agenda to go skiing every winter, which was his habit for 20 years.      REVIEW OF SYSTEMS:     CONSTITUTIONAL:   His appetite is good.  Bowels have been a little skitterish, but relatively stable.   MUSCULOSKELETAL:  Negative.   ENDOCRINE:  Negative.      PAST MEDICAL HISTORY:   1.  Hyperlipidemia.   2.  Hypertension.   3.  Paroxysmal atrial fibrillation.   4.  Coronary artery disease post bypass with virtually no angina.   5.   Treated hyperlipidemia.   6.  GERD.      CURRENT MEDICATIONS:   1.  Flecainide 100 mg b.i.d.   2.  Zetia 10 mg a day.   3.  Pradaxa 150 mg b.i.d. for anticoagulation with paroxysmal atrial fibrillation.   4.  Albuterol inhaler p.r.n.   5.  Lisinopril 10 mg b.i.d.   6.  Nitro 0.4 mg SL p.r.n.   7.  Crestor 10 mg at bedtime.   He also takes:   8.  Testosterone injections every 2 weeks.   9.  Zantac.   10.  Metamucil.   11.  Protonix daily.   12.  Multivitamins daily.   13.  Flonase p.r.n.      PHYSICAL EXAMINATION:   GENERAL:  Shows a well-developed, well-nourished male.  He tends to be remarkably stable at 170 pounds; actually is down 6 pounds from 4 months ago.   HEENT:  Normal.   NECK:  Free of neck vein distention or bruit.   HEART:  Regular without gallop or murmur.   LUNGS:  Clear.   ABDOMEN:  Soft without organomegaly.   EXTREMITIES:  Free of edema.      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  He thinks he is a little more short of breath.  He thinks he has occasional chest pains that are troublesome to him.  I ordered a Lexiscan thallium to be compared to the studies done in the past.      He is now doing relatively okay otherwise.  He does seem to be somewhat limited by this perception of a viral syndrome.  I did not check any other lab work; all I checked was the stress nuclear study.      He is normotensive.  We will assess his coronary artery disease and possible ischemia with the Lexiscan thallium.  His lipid profiles have been always excellent and all in all he is one of my favorite people.  I will do anything to keep him in good shape.  I asked to see him in 6 months.  We will get the results of the stress nuclear study and proceed as indicated.        IMPRESSION:   1.  Chronically relatively well tolerated, coronary artery disease, currently a little more shortness of breath and a little bit of atypical chest pain.   2.  Post 4-vessel bypass.   3.  Treated hyperlipidemia.   4.  Paroxysmal  atrial fibrillation on Tambocor and Pradaxa.      PLAN:  As above.      Warm regards.  Any questions, give me a call.      cc:      Mike Cosby MD    [facility]   [address]   [place] [address]         TALISHA RIDLEY MD, [facility]             D: 2018   T: 05/10/2018   MT:       Name:     MAGAN COWAN   MRN:      [id number]        Account:      [id number]   :      10/14/1933           Service Date: 2018      Document: [id number]         Outpatient Encounter Prescriptions as of 2018   Medication Sig Dispense Refill     albuterol (PROAIR HFA/PROVENTIL HFA/VENTOLIN HFA) 108 (90 Base) MCG/ACT Inhaler Inhale 2 puffs into the lungs every 6 hours as needed for shortness of breath / dyspnea or wheezing 1 Inhaler 0     CLONAZEPAM PO Take 0.25-0.5 mg by mouth 3 times daily as needed        flecainide (TAMBOCOR) 100 MG tablet Take 1 tablet (100 mg) by mouth 2 times daily 180 tablet 3     fluticasone (FLONASE) 50 MCG/ACT spray Spray 1 spray into both nostrils daily as needed for rhinitis or allergies       lisinopril (PRINIVIL/ZESTRIL) 10 MG tablet Take 1 tablet (10 mg) by mouth 2 times daily 180 tablet 3     Multiple Vitamins-Minerals (OCUVITE PO) Take 1 tablet by mouth 2 times daily       nitroGLYcerin (NITROSTAT) 0.4 MG sublingual tablet For chest pain place 1 tablet under the tongue every 5 minutes for 3 doses. If symptoms persist 5 minutes after 1st dose call 911. 25 tablet 3     Pantoprazole Sodium (PROTONIX PO) Take 40 mg by mouth 2 times daily       PAPAV-PHENTOLAMINE-ALPROSTADIL IC Inject 1 Dose as directed daily as needed (erectile dysfunction) Trimix Injection       Psyllium (METAMUCIL PO) Take 1 Dose by mouth daily as needed        RaNITidine HCl (ZANTAC PO) Take 150 mg by mouth daily as needed for heartburn       rosuvastatin (CRESTOR) 10 MG tablet Take 1 tablet (10 mg) by mouth daily 90 tablet 4     TESTOSTERONE AQUEOUS IM Inject 1 mL into the muscle every  14 days        ZOLPIDEM TARTRATE PO Take 5-10 mg by mouth nightly as needed        dabigatran ANTICOAGULANT (PRADAXA ANTICOAGULANT) 150 MG capsule Take 1 capsule (150 mg) by mouth 2 times daily 180 capsule 2     ezetimibe (ZETIA) 10 MG tablet Take 1 tablet (10 mg) by mouth daily 90 tablet 3     isosorbide mononitrate (IMDUR) 30 MG 24 hr tablet Take 0.5 tablets (15 mg) by mouth daily (Patient not taking: Reported on 5/9/2018) 30 tablet 3     [DISCONTINUED] lisinopril (PRINIVIL/ZESTRIL) 10 MG tablet Take 1 tablet (10 mg) by mouth 2 times daily 90 tablet 3     No facility-administered encounter medications on file as of 5/9/2018.        Again, thank you for allowing me to participate in the care of your patient.      Sincerely,    TALISHA RIDLEY MD     [facility]

## 2018-05-10 NOTE — PROGRESS NOTES
Service Date: 05/09/2018      HISTORY OF PRESENT ILLNESS:  I saw Chava, age 84, today.  I have known Chava now for almost 25 years.  He has always been a delightful man and I really enjoy his company.      He says in the past couple months he has had a tendency to have a little more shortness of breath and a little more easy fatigue.  This was highlighted he thinks over the past month of having what he feels is a viral syndrome.  He had some low-grade fever, feeling blah, diminished energy, diminished appetite and gradually he has come out of that, but he still does not have much in the way of energy reserve.        He has not had much in the way of classic angina.  He has had of course chronic coronary artery disease.  Remotely in 1995, he had a 4-vessel bypass with a mammary artery to the LAD and individual vein grafts to the diagonal branch and obtuse marginal, the distal RCA.  He has had a chronically occluded distal LAD and stress nuclear studies have shown ejection fractions of 50%-55% with occasional small area of apical ischemia.  His last study done in 2015 - 3 years ago, showed no apical ischemia and an EF of 53%.  I have not checked him subsequently partially because of his minimal symptoms and partially because he has not been on an agenda to go skiing every winter, which was his habit for 20 years.      REVIEW OF SYSTEMS:     CONSTITUTIONAL:   His appetite is good.  Bowels have been a little skitterish, but relatively stable.   MUSCULOSKELETAL:  Negative.   ENDOCRINE:  Negative.      PAST MEDICAL HISTORY:   1.  Hyperlipidemia.   2.  Hypertension.   3.  Paroxysmal atrial fibrillation.   4.  Coronary artery disease post bypass with virtually no angina.   5.  Treated hyperlipidemia.   6.  GERD.      CURRENT MEDICATIONS:   1.  Flecainide 100 mg b.i.d.   2.  Zetia 10 mg a day.   3.  Pradaxa 150 mg b.i.d. for anticoagulation with paroxysmal atrial fibrillation.   4.  Albuterol inhaler p.r.n.   5.  Lisinopril 10  mg b.i.d.   6.  Nitro 0.4 mg SL p.r.n.   7.  Crestor 10 mg at bedtime.   He also takes:   8.  Testosterone injections every 2 weeks.   9.  Zantac.   10.  Metamucil.   11.  Protonix daily.   12.  Multivitamins daily.   13.  Flonase p.r.n.      PHYSICAL EXAMINATION:   GENERAL:  Shows a well-developed, well-nourished male.  He tends to be remarkably stable at 170 pounds; actually is down 6 pounds from 4 months ago.   HEENT:  Normal.   NECK:  Free of neck vein distention or bruit.   HEART:  Regular without gallop or murmur.   LUNGS:  Clear.   ABDOMEN:  Soft without organomegaly.   EXTREMITIES:  Free of edema.      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  He thinks he is a little more short of breath.  He thinks he has occasional chest pains that are troublesome to him.  I ordered a Lexiscan thallium to be compared to the studies done in the past.      He is now doing relatively okay otherwise.  He does seem to be somewhat limited by this perception of a viral syndrome.  I did not check any other lab work; all I checked was the stress nuclear study.      He is normotensive.  We will assess his coronary artery disease and possible ischemia with the Lexiscan thallium.  His lipid profiles have been always excellent and all in all he is one of my favorite people.  I will do anything to keep him in good shape.  I asked to see him in 6 months.  We will get the results of the stress nuclear study and proceed as indicated.        IMPRESSION:   1.  Chronically relatively well tolerated, coronary artery disease, currently a little more shortness of breath and a little bit of atypical chest pain.   2.  Post 4-vessel bypass.   3.  Treated hyperlipidemia.   4.  Paroxysmal atrial fibrillation on Tambocor and Pradaxa.      PLAN:  As above.      Warm regards.  Any questions, give me a call.      cc:      Mike Cosby MD    [facility]   [address]   [place] [address]         TALISHA RIDLEY,  MD, [facility]             D: 2018   T: 05/10/2018   MT: PAMELA      Name:     MAGAN COWAN   MRN:      [id number]        Account:      [id number]   :      10/14/1933           Service Date: 2018      Document: [id number]

## 2018-05-16 ENCOUNTER — HOSPITAL ENCOUNTER (OUTPATIENT)
Dept: CARDIOLOGY | Facility: CLINIC | Age: 83
Discharge: HOME | End: 2018-05-16
Attending: INTERNAL MEDICINE | Admitting: INTERNAL MEDICINE
Payer: MEDICARE

## 2018-05-16 VITALS — HEART RATE: 53 BPM | SYSTOLIC BLOOD PRESSURE: 138 MMHG | DIASTOLIC BLOOD PRESSURE: 64 MMHG

## 2018-05-16 DIAGNOSIS — I10 ESSENTIAL HYPERTENSION: ICD-10-CM

## 2018-05-16 DIAGNOSIS — I25.810 CORONARY ARTERY DISEASE INVOLVING CORONARY BYPASS GRAFT OF NATIVE HEART WITHOUT ANGINA PECTORIS: ICD-10-CM

## 2018-05-16 DIAGNOSIS — I48.0 PAROXYSMAL ATRIAL FIBRILLATION (H): ICD-10-CM

## 2018-05-16 DIAGNOSIS — E78.2 MIXED HYPERLIPIDEMIA: ICD-10-CM

## 2018-05-16 PROCEDURE — 78452 HT MUSCLE IMAGE SPECT MULT: CPT | Mod: 26 | Performed by: INTERNAL MEDICINE

## 2018-05-16 PROCEDURE — 25000128 H RX IP 250 OP 636: Performed by: INTERNAL MEDICINE

## 2018-05-16 PROCEDURE — A9502 TC99M TETROFOSMIN: HCPCS | Performed by: INTERNAL MEDICINE

## 2018-05-16 PROCEDURE — 93018 CV STRESS TEST I&R ONLY: CPT | Performed by: INTERNAL MEDICINE

## 2018-05-16 PROCEDURE — 78452 HT MUSCLE IMAGE SPECT MULT: CPT

## 2018-05-16 PROCEDURE — 34300033 ZZH RX 343: Performed by: INTERNAL MEDICINE

## 2018-05-16 PROCEDURE — 93016 CV STRESS TEST SUPVJ ONLY: CPT | Performed by: INTERNAL MEDICINE

## 2018-05-16 RX ORDER — ACYCLOVIR 200 MG/1
0-1 CAPSULE ORAL
Status: DISCONTINUED | OUTPATIENT
Start: 2018-05-16 | End: 2018-05-17 | Stop reason: HOSPADM

## 2018-05-16 RX ORDER — AMINOPHYLLINE 25 MG/ML
50-100 INJECTION, SOLUTION INTRAVENOUS
Status: DISCONTINUED | OUTPATIENT
Start: 2018-05-16 | End: 2018-05-17 | Stop reason: HOSPADM

## 2018-05-16 RX ORDER — ALBUTEROL SULFATE 90 UG/1
2 AEROSOL, METERED RESPIRATORY (INHALATION) EVERY 5 MIN PRN
Status: DISCONTINUED | OUTPATIENT
Start: 2018-05-16 | End: 2018-05-17 | Stop reason: HOSPADM

## 2018-05-16 RX ORDER — CAFFEINE 200 MG
200 TABLET ORAL
Status: DISCONTINUED | OUTPATIENT
Start: 2018-05-16 | End: 2018-05-17 | Stop reason: HOSPADM

## 2018-05-16 RX ORDER — REGADENOSON 0.08 MG/ML
0.4 INJECTION, SOLUTION INTRAVENOUS ONCE
Status: COMPLETED | OUTPATIENT
Start: 2018-05-16 | End: 2018-05-16

## 2018-05-16 RX ORDER — CAFFEINE CITRATE 20 MG/ML
60 SOLUTION INTRAVENOUS
Status: DISCONTINUED | OUTPATIENT
Start: 2018-05-16 | End: 2018-05-17 | Stop reason: HOSPADM

## 2018-05-16 RX ADMIN — REGADENOSON 0.4 MG: 0.08 INJECTION, SOLUTION INTRAVENOUS at 10:49

## 2018-05-16 RX ADMIN — TETROFOSMIN 3.4 MCI.: 1.38 INJECTION, POWDER, LYOPHILIZED, FOR SOLUTION INTRAVENOUS at 09:25

## 2018-05-16 RX ADMIN — TETROFOSMIN 10.5 MCI.: 1.38 INJECTION, POWDER, LYOPHILIZED, FOR SOLUTION INTRAVENOUS at 10:52

## 2018-05-23 ENCOUNTER — TELEPHONE (OUTPATIENT)
Dept: CARDIOLOGY | Facility: CLINIC | Age: 83
End: 2018-05-23

## 2018-05-23 NOTE — TELEPHONE ENCOUNTER
Nuclear stress test results  Impression  1.  Myocardial perfusion imaging using single isotope technique  demonstrated a trivial area of apical inferior ischemia.   2. Gated images demonstrated normal wall motion and thickening.  The  left ventricular systolic function is 77% at rest and 52% post stress.  3. Compared to the prior study from 1/22/2015, prior study  demonstrated probably normal perfusion .       5-9-2018 Dr. 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  He thinks he is a little more short of breath.  He thinks he has occasional chest pains that are troublesome to him.  I ordered a Lexiscan thallium to be compared to the studies done in the past.   He is now doing relatively okay otherwise.  He does seem to be somewhat limited by this perception of a viral syndrome.  I did not check any other lab work; all I checked was the stress nuclear study.   He is normotensive.  We will assess his coronary artery disease and possible ischemia with the Lexiscan thallium.  His lipid profiles have been always excellent and all in all he is one of my favorite people.  I will do anything to keep him in good shape.  I asked to see him in 6 months.  We will get the results of the stress nuclear study and proceed as indicated.

## 2018-05-29 NOTE — TELEPHONE ENCOUNTER
Left message to return call   Michael Bernard MD Tinglov, Karin, RN                   Great result,  He should be reassured and pleased   JE                  Patient notified of Nuclear stress test results compared to 1- probably normal perfusion.  Patient verbalizes understanding.

## 2018-06-11 ENCOUNTER — HOSPITAL ENCOUNTER (OUTPATIENT)
Facility: CLINIC | Age: 83
Discharge: HOME | End: 2018-06-11
Attending: OPHTHALMOLOGY | Admitting: OPHTHALMOLOGY
Payer: MEDICARE

## 2018-06-11 VITALS
SYSTOLIC BLOOD PRESSURE: 129 MMHG | TEMPERATURE: 97.6 F | RESPIRATION RATE: 16 BRPM | WEIGHT: 165 LBS | BODY MASS INDEX: 25.01 KG/M2 | DIASTOLIC BLOOD PRESSURE: 54 MMHG | HEIGHT: 68 IN

## 2018-06-11 DIAGNOSIS — H40.10X0 OPEN-ANGLE GLAUCOMA OF RIGHT EYE, UNSPECIFIED GLAUCOMA STAGE, UNSPECIFIED OPEN-ANGLE GLAUCOMA TYPE: Primary | ICD-10-CM

## 2018-06-11 PROCEDURE — 36000101 ZZH EYE SURGERY LEVEL 3 1ST 30 MIN: Performed by: OPHTHALMOLOGY

## 2018-06-11 PROCEDURE — 40000170 ZZH STATISTIC PRE-PROCEDURE ASSESSMENT II: Performed by: OPHTHALMOLOGY

## 2018-06-11 PROCEDURE — 25000125 ZZHC RX 250: Performed by: OPHTHALMOLOGY

## 2018-06-11 RX ORDER — PILOCARPINE HYDROCHLORIDE 10 MG/ML
1 SOLUTION/ DROPS OPHTHALMIC
Status: COMPLETED | OUTPATIENT
Start: 2018-06-11 | End: 2018-06-11

## 2018-06-11 RX ORDER — PROPARACAINE HYDROCHLORIDE 5 MG/ML
1 SOLUTION/ DROPS OPHTHALMIC ONCE
Status: COMPLETED | OUTPATIENT
Start: 2018-06-11 | End: 2018-06-11

## 2018-06-11 RX ADMIN — PILOCARPINE HYDROCHLORIDE 1 DROP: 10 SOLUTION/ DROPS OPHTHALMIC at 10:58

## 2018-06-11 RX ADMIN — APRACLONIDINE HYDROCHLORIDE 1 DROP: 10 SOLUTION/ DROPS OPHTHALMIC at 10:58

## 2018-06-11 RX ADMIN — PROPARACAINE HYDROCHLORIDE 1 DROP: 5 SOLUTION/ DROPS OPHTHALMIC at 10:58

## 2018-06-18 NOTE — OP NOTE
Pre-Operative Diagnosis:  Elevated intraocular pressure or inadequate pressure control Bilateral  Post-Operatvie Diagnosis:  Same  Procedure Performed:  SLT Bilateral  Surgeon:  Janice  Anesthesia:  None  EBL:  None  Complications:  None  Power:  1.0  Shots:  100  Findings:  Open-angle  Specimens:  None  LASER spots applied to the trabecular meshwork for 360 degrees  Patient discharged to home in satisfactory condition.

## 2018-06-20 DIAGNOSIS — N40.0 ENLARGED PROSTATE: Primary | ICD-10-CM

## 2018-06-21 ENCOUNTER — OFFICE VISIT (OUTPATIENT)
Dept: UROLOGY | Facility: CLINIC | Age: 83
End: 2018-06-21
Payer: MEDICARE

## 2018-06-21 VITALS
WEIGHT: 168 LBS | BODY MASS INDEX: 25.46 KG/M2 | OXYGEN SATURATION: 96 % | HEART RATE: 65 BPM | HEIGHT: 68 IN | DIASTOLIC BLOOD PRESSURE: 68 MMHG | SYSTOLIC BLOOD PRESSURE: 134 MMHG

## 2018-06-21 DIAGNOSIS — N52.9 ERECTILE DYSFUNCTION, UNSPECIFIED ERECTILE DYSFUNCTION TYPE: Primary | ICD-10-CM

## 2018-06-21 DIAGNOSIS — N40.0 ENLARGED PROSTATE: ICD-10-CM

## 2018-06-21 LAB
ALBUMIN UR-MCNC: ABNORMAL MG/DL
APPEARANCE UR: CLEAR
BILIRUB UR QL STRIP: NEGATIVE
COLOR UR AUTO: YELLOW
GLUCOSE UR STRIP-MCNC: NEGATIVE MG/DL
HGB UR QL STRIP: NEGATIVE
KETONES UR STRIP-MCNC: ABNORMAL MG/DL
LEUKOCYTE ESTERASE UR QL STRIP: NEGATIVE
NITRATE UR QL: NEGATIVE
PH UR STRIP: 6 PH (ref 5–7)
PSA SERPL-MCNC: 3.3 NG/ML (ref 0–4)
RESIDUAL VOLUME (RV) (EXTERNAL): 11
SOURCE: ABNORMAL
SP GR UR STRIP: 1.02 (ref 1–1.03)
UROBILINOGEN UR STRIP-ACNC: 0.2 EU/DL (ref 0.2–1)

## 2018-06-21 PROCEDURE — 84153 ASSAY OF PSA TOTAL: CPT | Performed by: UROLOGY

## 2018-06-21 PROCEDURE — 99213 OFFICE O/P EST LOW 20 MIN: CPT | Performed by: UROLOGY

## 2018-06-21 PROCEDURE — 51798 US URINE CAPACITY MEASURE: CPT

## 2018-06-21 PROCEDURE — 81003 URINALYSIS AUTO W/O SCOPE: CPT | Performed by: UROLOGY

## 2018-06-21 PROCEDURE — 36415 COLL VENOUS BLD VENIPUNCTURE: CPT | Performed by: UROLOGY

## 2018-06-21 RX ORDER — LATANOPROST 50 UG/ML
SOLUTION/ DROPS OPHTHALMIC
Refills: 6 | COMMUNITY
Start: 2018-06-15 | End: 2022-06-29

## 2018-06-21 RX ORDER — PYRIDOSTIGMINE BROMIDE 60 MG/1
TABLET ORAL
Refills: 3 | COMMUNITY
Start: 2018-02-13 | End: 2022-06-29

## 2018-06-21 ASSESSMENT — PAIN SCALES - GENERAL: PAINLEVEL: NO PAIN (0)

## 2018-06-21 NOTE — NURSING NOTE
Chief Complaint   Patient presents with     Clinic Care Coordination - Follow-up     Pt here for same day PSA     Salma Cardoso, JESSICA

## 2018-06-21 NOTE — PROGRESS NOTES
Office Visit Note  [facility]  [phone]    UROLOGIC DIAGNOSES:   Enlarged prostate and erectile dysfunction    CURRENT INTERVENTIONS:   Tri-mix injections    HISTORY:   Chava returns to clinic today for follow-up. He reports continued success with the tri-mix injections and wants a refill today. He has for his PSA to be checked today and it is 3.3. He has no urinary complaint at this time.      PAST MEDICAL HISTORY:   Past Medical History:   Diagnosis Date     Allergic rhinitis      Anxiety      Contact dermatitis      Coronary artery disease     CABG with 4 grafts-LIMA to LAD, SVG to Diagonal,OM and RCA     Cough      Elevated prolactin level (H)      Erythrocytosis      Gastro-oesophageal reflux disease      GI bleed 10/10     Hyperlipidemia      Hypertension      Hypogonadism     with low testosterone     Insomnia      Paroxysmal atrial fibrillation (H)     flecainide therapy     Post-nasal drip      Prediabetes      Renal disease     CKD     Shoulder arthritis      SVT (supraventricular tachycardia) (H)      Tremor        PAST SURGICAL HISTORY:   Past Surgical History:   Procedure Laterality Date     BYPASS GRAFT ARTERY CORONARY       CARDIAC SURGERY       COLONOSCOPY  12/2010    tubular adenoma     LASER SELECTIVE TRABECULOPLASTY BILATERAL Bilateral 6/11/2018    Procedure: LASER SELECTIVE TRABECULOPLASTY BILATERAL;  LASER SELECTIVE TRABECULOPLASTY BILATERAL ;  Surgeon: Parmjit Camacho MD;  Location: [facility]     ORTHOPEDIC SURGERY      rotator cuff right     PHACOEMULSIFICATION CLEAR CORNEA WITH STANDARD IOL, VITRECTOMY PARSPLANA 23 GAGUE, COMBINED  2/20/2013    Procedure: COMBINED PHACOEMULSIFICATION CLEAR CORNEA WITH STANDARD INTRAOCULAR LENS IMPLANT, VITRECTOMY PARSPLANA 23 GAUGE;;  Surgeon: Guero Lockett MD;  Location: [facility]     PHACOEMULSIFICATION CLEAR CORNEA WITH STANDARD IOL, VITRECTOMY PARSPLANA 23 GAGUE, COMBINED  7/31/2013    Procedure: COMBINED PHACOEMULSIFICATION CLEAR  CORNEA WITH STANDARD INTRAOCULAR LENS IMPLANT, VITRECTOMY PARSPLANA 23 GAUGE;;  Surgeon: Guero Lockett MD;  Location: [facility]     REVERSE ARTHROPLASTY SHOULDER Right 7/24/2017    Procedure: REVERSE ARTHROPLASTY SHOULDER;  RIGHT REVERSE TOTAL SHOULDER ARTHROPLASTY ;  Surgeon: Rodolfo Calhoun MD;  Location:  OR     VASCULAR SURGERY      cab     VITRECTOMY PARSPLANA, PHACOEMULSIFICATION CLEAR CORNEA W STANDARD INTRAOCULAR LENS IMPLANT, COMBINED  2/20/2013    Procedure: COMBINED VITRECTOMY PARSPLANA, PHACOEMULSIFICATION CLEAR CORNEA WITH STANDARD INTRAOCULAR LENS IMPLANT;  LEFT PHACOEMULSIFICATION CLEAR CORNEA WITH SAURABH TORIC INTRAOCULAR LENS IMPLANT,  LEFT EYE 23 GAUGE PARSPLANA VITRECTOMY;  Surgeon: Jorge Regan MD;  Location: [facility]     VITRECTOMY PARSPLANA, PHACOEMULSIFICATION CLEAR CORNEA W STANDARD INTRAOCULAR LENS IMPLANT, COMBINED  7/31/2013    Procedure: COMBINED VITRECTOMY PARSPLANA, PHACOEMULSIFICATION CLEAR CORNEA WITH STANDARD INTRAOCULAR LENS IMPLANT;  RIGHT PHACOEMULSIFICATION CLEAR CORNEA WITH TORIC INTRAOCULAR LENS IMPLANT, RIGHT 23 GAUGE PARSPLANA VITRECTOMY ;  Surgeon: Jorge Regan MD;  Location: [facility]       FAMILY HISTORY:   Family History   Problem Relation Age of Onset     Other - See Comments Mother      old age     Other - See Comments Father 97     Cerebrovascular Disease Father        SOCIAL HISTORY:   Social History   Substance Use Topics     Smoking status: Former Smoker     Packs/day: 0.00     Years: 20.00     Types: Cigarettes     Quit date: 1/1/1999     Smokeless tobacco: Never Used      Comment: recreational only     Alcohol use Yes      Comment: occas       Current Outpatient Prescriptions   Medication     albuterol (PROAIR HFA/PROVENTIL HFA/VENTOLIN HFA) 108 (90 Base) MCG/ACT Inhaler     CLONAZEPAM PO     dabigatran ANTICOAGULANT (PRADAXA ANTICOAGULANT) 150 MG capsule     ezetimibe (ZETIA) 10 MG tablet     flecainide (TAMBOCOR) 100 MG tablet     fluticasone  (FLONASE) 50 MCG/ACT spray     isosorbide mononitrate (IMDUR) 30 MG 24 hr tablet     lisinopril (PRINIVIL/ZESTRIL) 10 MG tablet     Multiple Vitamins-Minerals (OCUVITE PO)     nitroGLYcerin (NITROSTAT) 0.4 MG sublingual tablet     Pantoprazole Sodium (PROTONIX PO)     PAPAV-PHENTOLAMINE-ALPROSTADIL IC     Psyllium (METAMUCIL PO)     RaNITidine HCl (ZANTAC PO)     rosuvastatin (CRESTOR) 10 MG tablet     TESTOSTERONE AQUEOUS IM     ZOLPIDEM TARTRATE PO     No current facility-administered medications for this visit.          PHYSICAL EXAM:    There were no vitals taken for this visit.    HEENT: Normocephalic and atraumatic   Cardiac: Not done  Back/Flank: Not done  CNS/PNS: Not done  Respiratory: Normal non-labored breathing  Abdomen: Soft nontender and nondistended  Peripheral Vascular: Not done  Mental Status: Not done    Penis: Not done  Scrotal Skin: Not done  Testicles: Not done  Epididymis: Not done  Digital Rectal Exam:     Cystoscopy: Not done    Imaging: None    Urinalysis: UA RESULTS:  Recent Labs   Lab Test  06/27/17   1005  05/14/15   1920   COLOR  Yellow  Yellow   APPEARANCE  Clear  Slightly Cloudy   URINEGLC  Negative  Negative   URINEBILI  Negative  Negative   URINEKETONE  Negative  5*   SG  1.015  1.014   UBLD  Negative  Negative   URINEPH  5.5  8.0*   PROTEIN  Negative  30*   UROBILINOGEN  0.2   --    NITRITE  Negative  Negative   LEUKEST  Negative  Negative   RBCU   --   0   WBCU   --   0       PSA: 3.3    Post Void Residual: 11mL    Other labs: None today      IMPRESSION:  Doing well    PLAN:  I refilled his prescription. We discussed PSA screening. We discussed normal recommendations for PSA screening. He would like to stop PSA screening at this point. He will follow-up with me as needed and the future if he needs refills of his injections.    Total Time: 15 minutes                                      Total in Consultation: 15 minutes      Ricardo Randall M.D.

## 2018-06-21 NOTE — MR AVS SNAPSHOT
"              After Visit Summary   6/21/2018    Chava Valentine    MRN: [id number]           Patient Information     Date Of Birth          10/14/1933        Visit Information        Provider Department      6/21/2018 9:45 AM Ricardo Randall MD [facility]        Today's Diagnoses     Erectile dysfunction, unspecified erectile dysfunction type    -  1    Enlarged prostate           Follow-ups after your visit        Follow-up notes from your care team     Return if symptoms worsen or fail to improve.      Who to contact     If you have questions or need follow up information about today's clinic visit or your schedule please contact [facility] directly at [phone].  Normal or non-critical lab and imaging results will be communicated to you by MyChart, letter or phone within 4 business days after the clinic has received the results. If you do not hear from us within 7 days, please contact the clinic through MyChart or phone. If you have a critical or abnormal lab result, we will notify you by phone as soon as possible.  Submit refill requests through [employer] or call your pharmacy and they will forward the refill request to us. Please allow 3 business days for your refill to be completed.          Additional Information About Your Visit        MyChart Information     [employer] lets you send messages to your doctor, view your test results, renew your prescriptions, schedule appointments and more. To sign up, go to [email or web address] . Click on \"Log in\" on the left side of the screen, which will take you to the Welcome page. Then click on \"Sign up Now\" on the right side of the page.     You will be asked to enter the access code listed below, as well as some personal information. Please follow the directions to create your username and password.     Your access code is: [email or web address]  Expires: 7/12/2018  2:06 PM     Your " "access code will  in 90 days. If you need help or a new code, please call your [place] clinic or [phone].        Care EveryWhere ID     This is your Care EveryWhere ID. This could be used by other organizations to access your [place] medical records  [id number]        Your Vitals Were     Pulse Height Pulse Oximetry BMI (Body Mass Index)          65 1.734 m (5' 8.25\") 96% 25.36 kg/m2         Blood Pressure from Last 3 Encounters:   18 134/68   18 129/54   18 138/64    Weight from Last 3 Encounters:   18 76.2 kg (168 lb)   18 74.8 kg (165 lb)   18 77.2 kg (170 lb 4.8 oz)              We Performed the Following     PSA Diag Urologic Phys        Primary Care Provider Office Phone # Fax #    Mike Cosby [phone]       [facility] [address] [address]        Equal Access to Services     [facility]: Hadii aad ku hadasho Soomaali, waaxda luqadaha, qaybta kaalmada adeerin, tim rosado . So [facility] [phone].    ATENCIÓN: Si habla español, tiene a diaz disposición servicios gratuitos de asistencia lingüística. Anders al [phone].    We comply with applicable federal civil rights laws and [place] laws. We do not discriminate on the basis of race, color, national origin, age, disability, sex, sexual orientation, or gender identity.            Thank you!     Thank you for choosing [facility] [place]  for your care. Our goal is always to provide you with excellent care. Hearing back from our patients is one way we can continue to improve our services. Please take a few minutes to complete the written survey that you may receive in the mail after your visit with us. Thank you!             Your Updated Medication List - Protect others around you: Learn how to safely use, store and throw away your medicines at www.disposemymeds.org.          This list " is accurate as of 6/21/18 10:20 AM.  Always use your most recent med list.                   Brand Name Dispense Instructions for use Diagnosis    albuterol 108 (90 Base) MCG/ACT Inhaler    PROAIR HFA/PROVENTIL HFA/VENTOLIN HFA    1 Inhaler    Inhale 2 puffs into the lungs every 6 hours as needed for shortness of breath / dyspnea or wheezing        CLONAZEPAM PO      Take 0.25-0.5 mg by mouth 3 times daily as needed        dabigatran ANTICOAGULANT 150 MG capsule    PRADAXA ANTICOAGULANT    180 capsule    Take 1 capsule (150 mg) by mouth 2 times daily    Paroxysmal atrial fibrillation (H)       ezetimibe 10 MG tablet    ZETIA    90 tablet    Take 1 tablet (10 mg) by mouth daily    Coronary artery disease involving coronary bypass graft of native heart without angina pectoris, Mixed hyperlipidemia       flecainide 100 MG tablet    TAMBOCOR    180 tablet    Take 1 tablet (100 mg) by mouth 2 times daily    Paroxysmal atrial fibrillation (H)       fluticasone 50 MCG/ACT spray    FLONASE     Spray 1 spray into both nostrils daily as needed for rhinitis or allergies        isosorbide mononitrate 30 MG 24 hr tablet    IMDUR    30 tablet    Take 0.5 tablets (15 mg) by mouth daily    Atherosclerosis of native coronary artery of native heart without angina pectoris       latanoprost 0.005 % ophthalmic solution    XALATAN     INSTILL 1 DROP IN BOTH EYES QHS.        lisinopril 10 MG tablet    PRINIVIL/ZESTRIL    180 tablet    Take 1 tablet (10 mg) by mouth 2 times daily    Essential hypertension       METAMUCIL PO      Take 1 Dose by mouth daily as needed        nitroGLYcerin 0.4 MG sublingual tablet    NITROSTAT    25 tablet    For chest pain place 1 tablet under the tongue every 5 minutes for 3 doses. If symptoms persist 5 minutes after 1st dose call 911.    Atherosclerosis of native coronary artery of native heart without angina pectoris       OCUVITE PO      Take 1 tablet by mouth 2 times daily         PAPAV-PHENTOLAMINE-ALPROSTADIL IC      Inject 1 Dose as directed daily as needed (erectile dysfunction) Trimix Injection        PROTONIX PO      Take 40 mg by mouth 2 times daily        pyridostigmine 60 MG tablet    MESTINON     USE UP TO 3 TS DAILY AS NEEDED. DISPENSE IN ORIGINAL  CONTAINER        rosuvastatin 10 MG tablet    CRESTOR    90 tablet    Take 1 tablet (10 mg) by mouth daily    Coronary artery disease involving coronary bypass graft of native heart without angina pectoris, Mixed hyperlipidemia       TESTOSTERONE AQUEOUS IM      Inject 1 mL into the muscle every 14 days        ZANTAC PO      Take 150 mg by mouth daily as needed for heartburn        ZOLPIDEM TARTRATE PO      Take 5-10 mg by mouth nightly as needed

## 2018-06-21 NOTE — LETTER
6/21/2018       RE: Chava Valentine  [address]  [place] [place] [facility] [address]     Dear Colleague,    Thank you for referring your patient, Chava Valentine, to the [facility] [place] [facility]. Please see a copy of my visit note below.    Office Visit Note  [facility]  [phone]    UROLOGIC DIAGNOSES:   Enlarged prostate and erectile dysfunction    CURRENT INTERVENTIONS:   Tri-mix injections    HISTORY:   Chava returns to clinic today for follow-up. He reports continued success with the tri-mix injections and wants a refill today. He has for his PSA to be checked today and it is 3.3. He has no urinary complaint at this time.      PAST MEDICAL HISTORY:   Past Medical History:   Diagnosis Date     Allergic rhinitis      Anxiety      Contact dermatitis      Coronary artery disease     CABG with 4 grafts-LIMA to LAD, SVG to Diagonal,OM and RCA     Cough      Elevated prolactin level (H)      Erythrocytosis      Gastro-oesophageal reflux disease      GI bleed 10/10     Hyperlipidemia      Hypertension      Hypogonadism     with low testosterone     Insomnia      Paroxysmal atrial fibrillation (H)     flecainide therapy     Post-nasal drip      Prediabetes      Renal disease     CKD     Shoulder arthritis      SVT (supraventricular tachycardia) (H)      Tremor        PAST SURGICAL HISTORY:   Past Surgical History:   Procedure Laterality Date     BYPASS GRAFT ARTERY CORONARY       CARDIAC SURGERY       COLONOSCOPY  12/2010    tubular adenoma     LASER SELECTIVE TRABECULOPLASTY BILATERAL Bilateral 6/11/2018    Procedure: LASER SELECTIVE TRABECULOPLASTY BILATERAL;  LASER SELECTIVE TRABECULOPLASTY BILATERAL ;  Surgeon: Parmjit Camacho MD;  Location: [facility]     ORTHOPEDIC SURGERY      rotator cuff right     PHACOEMULSIFICATION CLEAR CORNEA WITH STANDARD IOL, VITRECTOMY PARSPLANA 23 GAGUE, COMBINED  2/20/2013    Procedure: COMBINED  PHACOEMULSIFICATION CLEAR CORNEA WITH STANDARD INTRAOCULAR LENS IMPLANT, VITRECTOMY PARSPLANA 23 GAUGE;;  Surgeon: Guero Lockett MD;  Location: [facility]     PHACOEMULSIFICATION CLEAR CORNEA WITH STANDARD IOL, VITRECTOMY PARSPLANA 23 GAGUE, COMBINED  7/31/2013    Procedure: COMBINED PHACOEMULSIFICATION CLEAR CORNEA WITH STANDARD INTRAOCULAR LENS IMPLANT, VITRECTOMY PARSPLANA 23 GAUGE;;  Surgeon: Guero Lockett MD;  Location: [facility]     REVERSE ARTHROPLASTY SHOULDER Right 7/24/2017    Procedure: REVERSE ARTHROPLASTY SHOULDER;  RIGHT REVERSE TOTAL SHOULDER ARTHROPLASTY ;  Surgeon: Rodolfo Calhoun MD;  Location:  OR     VASCULAR SURGERY      cab     VITRECTOMY PARSPLANA, PHACOEMULSIFICATION CLEAR CORNEA W STANDARD INTRAOCULAR LENS IMPLANT, COMBINED  2/20/2013    Procedure: COMBINED VITRECTOMY PARSPLANA, PHACOEMULSIFICATION CLEAR CORNEA WITH STANDARD INTRAOCULAR LENS IMPLANT;  LEFT PHACOEMULSIFICATION CLEAR CORNEA WITH SAURABH TORIC INTRAOCULAR LENS IMPLANT,  LEFT EYE 23 GAUGE PARSPLANA VITRECTOMY;  Surgeon: Jorge Regan MD;  Location: [facility]     VITRECTOMY PARSPLANA, PHACOEMULSIFICATION CLEAR CORNEA W STANDARD INTRAOCULAR LENS IMPLANT, COMBINED  7/31/2013    Procedure: COMBINED VITRECTOMY PARSPLANA, PHACOEMULSIFICATION CLEAR CORNEA WITH STANDARD INTRAOCULAR LENS IMPLANT;  RIGHT PHACOEMULSIFICATION CLEAR CORNEA WITH TORIC INTRAOCULAR LENS IMPLANT, RIGHT 23 GAUGE PARSPLANA VITRECTOMY ;  Surgeon: Jorge Regan MD;  Location: [facility]       FAMILY HISTORY:   Family History   Problem Relation Age of Onset     Other - See Comments Mother      old age     Other - See Comments Father 97     Cerebrovascular Disease Father        SOCIAL HISTORY:   Social History   Substance Use Topics     Smoking status: Former Smoker     Packs/day: 0.00     Years: 20.00     Types: Cigarettes     Quit date: 1/1/1999     Smokeless tobacco: Never Used      Comment: recreational only     Alcohol use Yes      Comment: occas        Current Outpatient Prescriptions   Medication     albuterol (PROAIR HFA/PROVENTIL HFA/VENTOLIN HFA) 108 (90 Base) MCG/ACT Inhaler     CLONAZEPAM PO     dabigatran ANTICOAGULANT (PRADAXA ANTICOAGULANT) 150 MG capsule     ezetimibe (ZETIA) 10 MG tablet     flecainide (TAMBOCOR) 100 MG tablet     fluticasone (FLONASE) 50 MCG/ACT spray     isosorbide mononitrate (IMDUR) 30 MG 24 hr tablet     lisinopril (PRINIVIL/ZESTRIL) 10 MG tablet     Multiple Vitamins-Minerals (OCUVITE PO)     nitroGLYcerin (NITROSTAT) 0.4 MG sublingual tablet     Pantoprazole Sodium (PROTONIX PO)     PAPAV-PHENTOLAMINE-ALPROSTADIL IC     Psyllium (METAMUCIL PO)     RaNITidine HCl (ZANTAC PO)     rosuvastatin (CRESTOR) 10 MG tablet     TESTOSTERONE AQUEOUS IM     ZOLPIDEM TARTRATE PO     No current facility-administered medications for this visit.      PHYSICAL EXAM:  There were no vitals taken for this visit.    HEENT: Normocephalic and atraumatic   Cardiac: Not done  Back/Flank: Not done  CNS/PNS: Not done  Respiratory: Normal non-labored breathing  Abdomen: Soft nontender and nondistended  Peripheral Vascular: Not done  Mental Status: Not done    Penis: Not done  Scrotal Skin: Not done  Testicles: Not done  Epididymis: Not done  Digital Rectal Exam:     Cystoscopy: Not done    Imaging: None    Urinalysis: UA RESULTS:  Recent Labs   Lab Test  06/27/17   1005  05/14/15   1920   COLOR  Yellow  Yellow   APPEARANCE  Clear  Slightly Cloudy   URINEGLC  Negative  Negative   URINEBILI  Negative  Negative   URINEKETONE  Negative  5*   SG  1.015  1.014   UBLD  Negative  Negative   URINEPH  5.5  8.0*   PROTEIN  Negative  30*   UROBILINOGEN  0.2   --    NITRITE  Negative  Negative   LEUKEST  Negative  Negative   RBCU   --   0   WBCU   --   0     PSA: 3.3    Post Void Residual: 11mL    Other labs: None today    IMPRESSION:  Doing well    PLAN:  I refilled his prescription. We discussed PSA screening. We discussed normal recommendations for PSA  screening. He would like to stop PSA screening at this point. He will follow-up with me as needed and the future if he needs refills of his injections.    Total Time: 15 minutes                                      Total in Consultation: 15 minutes  Ricardo Randall M.D.

## 2018-09-01 ENCOUNTER — APPOINTMENT (OUTPATIENT)
Dept: CT IMAGING | Facility: CLINIC | Age: 83
End: 2018-09-01
Attending: EMERGENCY MEDICINE
Payer: MEDICARE

## 2018-09-01 ENCOUNTER — HOSPITAL ENCOUNTER (EMERGENCY)
Facility: CLINIC | Age: 83
Discharge: HOME | End: 2018-09-01
Attending: EMERGENCY MEDICINE | Admitting: EMERGENCY MEDICINE
Payer: MEDICARE

## 2018-09-01 VITALS
OXYGEN SATURATION: 97 % | WEIGHT: 175 LBS | HEIGHT: 68 IN | BODY MASS INDEX: 26.52 KG/M2 | SYSTOLIC BLOOD PRESSURE: 151 MMHG | RESPIRATION RATE: 18 BRPM | DIASTOLIC BLOOD PRESSURE: 82 MMHG | TEMPERATURE: 97.7 F

## 2018-09-01 DIAGNOSIS — I10 ESSENTIAL HYPERTENSION: ICD-10-CM

## 2018-09-01 LAB
ALBUMIN UR-MCNC: NEGATIVE MG/DL
ANION GAP SERPL CALCULATED.3IONS-SCNC: 5 MMOL/L (ref 3–14)
APPEARANCE UR: CLEAR
BASOPHILS # BLD AUTO: 0 10E9/L (ref 0–0.2)
BASOPHILS NFR BLD AUTO: 0.6 %
BILIRUB UR QL STRIP: NEGATIVE
BUN SERPL-MCNC: 23 MG/DL (ref 7–30)
CALCIUM SERPL-MCNC: 8.2 MG/DL (ref 8.5–10.1)
CHLORIDE SERPL-SCNC: 110 MMOL/L (ref 94–109)
CO2 SERPL-SCNC: 26 MMOL/L (ref 20–32)
COLOR UR AUTO: YELLOW
CREAT SERPL-MCNC: 1.78 MG/DL (ref 0.66–1.25)
DIFFERENTIAL METHOD BLD: ABNORMAL
EOSINOPHIL # BLD AUTO: 0.2 10E9/L (ref 0–0.7)
EOSINOPHIL NFR BLD AUTO: 2.3 %
ERYTHROCYTE [DISTWIDTH] IN BLOOD BY AUTOMATED COUNT: 14 % (ref 10–15)
GFR SERPL CREATININE-BSD FRML MDRD: 37 ML/MIN/1.7M2
GLUCOSE SERPL-MCNC: 94 MG/DL (ref 70–99)
GLUCOSE UR STRIP-MCNC: NEGATIVE MG/DL
HCT VFR BLD AUTO: 46.6 % (ref 40–53)
HGB BLD-MCNC: 15.4 G/DL (ref 13.3–17.7)
HGB UR QL STRIP: NEGATIVE
IMM GRANULOCYTES # BLD: 0 10E9/L (ref 0–0.4)
IMM GRANULOCYTES NFR BLD: 0.2 %
INTERPRETATION ECG - MUSE: NORMAL
KETONES UR STRIP-MCNC: NEGATIVE MG/DL
LEUKOCYTE ESTERASE UR QL STRIP: NEGATIVE
LYMPHOCYTES # BLD AUTO: 2.1 10E9/L (ref 0.8–5.3)
LYMPHOCYTES NFR BLD AUTO: 32.8 %
MCH RBC QN AUTO: 28.1 PG (ref 26.5–33)
MCHC RBC AUTO-ENTMCNC: 33 G/DL (ref 31.5–36.5)
MCV RBC AUTO: 85 FL (ref 78–100)
MONOCYTES # BLD AUTO: 0.6 10E9/L (ref 0–1.3)
MONOCYTES NFR BLD AUTO: 8.7 %
NEUTROPHILS # BLD AUTO: 3.6 10E9/L (ref 1.6–8.3)
NEUTROPHILS NFR BLD AUTO: 55.4 %
NITRATE UR QL: NEGATIVE
NRBC # BLD AUTO: 0 10*3/UL
NRBC BLD AUTO-RTO: 0 /100
PH UR STRIP: 6.5 PH (ref 5–7)
PLATELET # BLD AUTO: 122 10E9/L (ref 150–450)
POTASSIUM SERPL-SCNC: 4.2 MMOL/L (ref 3.4–5.3)
RBC # BLD AUTO: 5.49 10E12/L (ref 4.4–5.9)
SODIUM SERPL-SCNC: 141 MMOL/L (ref 133–144)
SOURCE: NORMAL
SP GR UR STRIP: 1.01 (ref 1–1.03)
UROBILINOGEN UR STRIP-MCNC: NORMAL MG/DL (ref 0–2)
WBC # BLD AUTO: 6.4 10E9/L (ref 4–11)

## 2018-09-01 PROCEDURE — 80048 BASIC METABOLIC PNL TOTAL CA: CPT | Performed by: EMERGENCY MEDICINE

## 2018-09-01 PROCEDURE — 70450 CT HEAD/BRAIN W/O DYE: CPT

## 2018-09-01 PROCEDURE — 81003 URINALYSIS AUTO W/O SCOPE: CPT | Performed by: EMERGENCY MEDICINE

## 2018-09-01 PROCEDURE — 99285 EMERGENCY DEPT VISIT HI MDM: CPT | Mod: 25

## 2018-09-01 PROCEDURE — 85025 COMPLETE CBC W/AUTO DIFF WBC: CPT | Performed by: EMERGENCY MEDICINE

## 2018-09-01 PROCEDURE — 93005 ELECTROCARDIOGRAM TRACING: CPT

## 2018-09-01 RX ORDER — AMLODIPINE BESYLATE 2.5 MG/1
2.5 TABLET ORAL DAILY
Qty: 30 TABLET | Refills: 0 | Status: SHIPPED | OUTPATIENT
Start: 2018-09-01 | End: 2018-10-26

## 2018-09-01 ASSESSMENT — ENCOUNTER SYMPTOMS
WEAKNESS: 0
NUMBNESS: 0
SHORTNESS OF BREATH: 0
NERVOUS/ANXIOUS: 1
CONFUSION: 1
DIZZINESS: 1

## 2018-09-01 NOTE — ED AVS SNAPSHOT
Emergency Department    [address] [facility] [address]    Phone:  [phone]    Fax:  [phone]                                       Chava Valentine   MRN: [id number]    Department:   Emergency Department   Date of Visit:  9/1/2018           Patient Information     Date Of Birth          10/14/1933        Your diagnoses for this visit were:     Essential hypertension        You were seen by Zbigniew Weathers MD.      Follow-up Information     Schedule an appointment as soon as possible for a visit with Mike Cosby MD.    Specialty:  Family Practice    Contact information:    [facility]  [address] [id number] [phone]          Follow up with  Emergency Department.    Specialty:  EMERGENCY MEDICINE    Why:  As needed, If symptoms worsen    Contact information:    [address] [facility] [id number] [phone]        Discharge Instructions       Discharge Instructions  Hypertension - High Blood Pressure    During you visit to the Emergency Department, your blood pressure was higher than the recommended blood pressure.  This may be related to stress, pain, medication or other temporary conditions. In these cases, your blood pressure may return to normal on its own. If you have a history of high blood pressure, you may need to have your provider adjust your medications. Sometimes, your high measurement here may indicate that you have developed high blood pressure that will stay high unless it is treated. As a general rule, high blood pressure causes problems over years rather than days, weeks, or months. So, while it is important to treat blood pressure, it is rarely important to treat blood pressure immediately. Occasionally we will begin a medication in the Emergency Department; more often we will recommend close follow-up for medications with a primary doctor/clinic.    Generally, every Emergency Department visit  should have a follow-up clinic visit with either a primary or a specialty clinic/provider. Please follow-up as instructed by your emergency provider today.    Return to the Emergency Department if you start to have:    A severe headache.    Chest pain.    Shortness of breath.    Weakness or numbness that affects one part of the body.    Confusion.    Vision changes.    Significant swelling of legs and/or eyes.    A reaction to any medication started in the Emergency Department.    What can I do to help myself?    Avoid alcohol.    Take any blood pressure medicine that you are prescribed.    Get a good night s sleep.    Lower your salt intake.    Exercise.    Lose weight.    Manage stress.    See your doctor regularly    If blood pressure medication was started in the Emergency Department:    The medicine may not have an immediate effect. The body and brain determine what blood pressure you have. The medicine s job is to retrain the body s  thermostat  to a lower blood pressure.    You will need to follow up with your provider to see how this medicine is working for you.  If you were given a prescription for medicine here today, be sure to read all of the information (including the package insert) that comes with your prescription.  This will include important information about the medicine, its side effects, and any warnings that you need to know about.  The pharmacist who fills the prescription can provide more information and answer questions you may have about the medicine.  If you have questions or concerns that the pharmacist cannot address, please call or return to the Emergency Department.   Remember that you can always come back to the Emergency Department if you are not able to see your regular provider in the amount of time listed above, if you get any new symptoms, or if there is anything that worries you.    Your next 10 appointments already scheduled     Oct 26, 2018 10:15 AM CDT   Return Visit with  Michael Bernard MD   [facility] ([facility])    [address]  [facility] [address] [phone] OPT 2              24 Hour Appointment Hotline       To make an appointment at any [facility], call [phone] ([phone]). If you don't have a family doctor or clinic, we will help you find one. [facility] are conveniently located to serve the needs of you and your family.             Review of your medicines      START taking        Dose / Directions Last dose taken    amLODIPine 2.5 MG tablet   Commonly known as:  NORVASC   Dose:  2.5 mg   Quantity:  30 tablet        Take 1 tablet (2.5 mg) by mouth daily   Refills:  0          Our records show that you are taking the medicines listed below. If these are incorrect, please call your family doctor or clinic.        Dose / Directions Last dose taken    albuterol 108 (90 Base) MCG/ACT inhaler   Commonly known as:  PROAIR HFA/PROVENTIL HFA/VENTOLIN HFA   Dose:  2 puff   Quantity:  1 Inhaler        Inhale 2 puffs into the lungs every 6 hours as needed for shortness of breath / dyspnea or wheezing   Refills:  0        CLONAZEPAM PO   Dose:  0.25-0.5 mg        Take 0.25-0.5 mg by mouth 3 times daily as needed   Refills:  0        dabigatran ANTICOAGULANT 150 MG capsule   Commonly known as:  PRADAXA ANTICOAGULANT   Dose:  150 mg   Quantity:  180 capsule        Take 1 capsule (150 mg) by mouth 2 times daily   Refills:  2        ezetimibe 10 MG tablet   Commonly known as:  ZETIA   Dose:  10 mg   Quantity:  90 tablet        Take 1 tablet (10 mg) by mouth daily   Refills:  3        flecainide 100 MG tablet   Commonly known as:  TAMBOCOR   Dose:  100 mg   Quantity:  180 tablet        Take 1 tablet (100 mg) by mouth 2 times daily   Refills:  3        fluticasone 50 MCG/ACT spray   Commonly known as:  FLONASE   Dose:  1 spray        Spray 1 spray into both nostrils daily as needed for rhinitis or  allergies   Refills:  0        isosorbide mononitrate 30 MG 24 hr tablet   Commonly known as:  IMDUR   Dose:  15 mg   Quantity:  30 tablet        Take 0.5 tablets (15 mg) by mouth daily   Refills:  3        latanoprost 0.005 % ophthalmic solution   Commonly known as:  XALATAN        INSTILL 1 DROP IN BOTH EYES QHS.   Refills:  6        lisinopril 10 MG tablet   Commonly known as:  PRINIVIL/ZESTRIL   Dose:  10 mg   Quantity:  180 tablet        Take 1 tablet (10 mg) by mouth 2 times daily   Refills:  3        METAMUCIL PO   Dose:  1 Dose        Take 1 Dose by mouth daily as needed   Refills:  0        nitroGLYcerin 0.4 MG sublingual tablet   Commonly known as:  NITROSTAT   Quantity:  25 tablet        For chest pain place 1 tablet under the tongue every 5 minutes for 3 doses. If symptoms persist 5 minutes after 1st dose call 911.   Refills:  3        OCUVITE PO   Dose:  1 tablet        Take 1 tablet by mouth 2 times daily   Refills:  0        PAPAV-PHENTOLAMINE-ALPROSTADIL IC   Dose:  1 Dose        Inject 1 Dose as directed daily as needed (erectile dysfunction) Trimix Injection   Refills:  0        PROTONIX PO   Dose:  40 mg        Take 40 mg by mouth 2 times daily   Refills:  0        pyridostigmine 60 MG tablet   Commonly known as:  MESTINON        USE UP TO 3 TS DAILY AS NEEDED. DISPENSE IN ORIGINAL  CONTAINER   Refills:  3        rosuvastatin 10 MG tablet   Commonly known as:  CRESTOR   Dose:  10 mg   Quantity:  90 tablet        Take 1 tablet (10 mg) by mouth daily   Refills:  4        TESTOSTERONE AQUEOUS IM   Dose:  1 mL        Inject 1 mL into the muscle every 14 days   Refills:  0        ZANTAC PO   Dose:  150 mg        Take 150 mg by mouth daily as needed for heartburn   Refills:  0        ZOLPIDEM TARTRATE PO   Dose:  5-10 mg        Take 5-10 mg by mouth nightly as needed   Refills:  0                Prescriptions were sent or printed at these locations (1 Prescription)                   Other  Prescriptions                Printed at Department/Unit printer (1 of 1)         amLODIPine (NORVASC) 2.5 MG tablet                Procedures and tests performed during your visit     Basic metabolic panel    CBC with platelets differential    CT Head w/o Contrast    Cardiac Continuous Monitoring    EKG 12-lead, tracing only    Peripheral IV catheter    Pulse oximetry nursing    UA reflex to Microscopic      Orders Needing Specimen Collection     None      Pending Results     No orders found from 8/30/2018 to 9/2/2018.            Pending Culture Results     No orders found from 8/30/2018 to 9/2/2018.            Pending Results Instructions     If you had any lab results that were not finalized at the time of your Discharge, you can call the ED Lab Result RN at [phone]. You will be contacted by this team for any positive Lab results or changes in treatment. The nurses are available 7 days a week from 10A to 6:30P.  You can leave a message 24 hours per day and they will return your call.        Test Results From Your Hospital Stay        9/1/2018  3:01 AM      Component Results     Component Value Ref Range & Units Status    WBC 6.4 4.0 - 11.0 10e9/L Final    RBC Count 5.49 4.4 - 5.9 10e12/L Final    Hemoglobin 15.4 13.3 - 17.7 g/dL Final    Hematocrit 46.6 40.0 - 53.0 % Final    MCV 85 78 - 100 fl Final    MCH 28.1 26.5 - 33.0 pg Final    MCHC 33.0 31.5 - 36.5 g/dL Final    RDW 14.0 10.0 - 15.0 % Final    Platelet Count 122 (L) 150 - 450 10e9/L Final    Diff Method Automated Method  Final    % Neutrophils 55.4 % Final    % Lymphocytes 32.8 % Final    % Monocytes 8.7 % Final    % Eosinophils 2.3 % Final    % Basophils 0.6 % Final    % Immature Granulocytes 0.2 % Final    Nucleated RBCs 0 0 /100 Final    Absolute Neutrophil 3.6 1.6 - 8.3 10e9/L Final    Absolute Lymphocytes 2.1 0.8 - 5.3 10e9/L Final    Absolute Monocytes 0.6 0.0 - 1.3 10e9/L Final    Absolute Eosinophils 0.2 0.0 - 0.7 10e9/L Final    Absolute  Basophils 0.0 0.0 - 0.2 10e9/L Final    Abs Immature Granulocytes 0.0 0 - 0.4 10e9/L Final    Absolute Nucleated RBC 0.0  Final         9/1/2018  3:21 AM      Component Results     Component Value Ref Range & Units Status    Sodium 141 133 - 144 mmol/L Final    Potassium 4.2 3.4 - 5.3 mmol/L Final    Chloride 110 (H) 94 - 109 mmol/L Final    Carbon Dioxide 26 20 - 32 mmol/L Final    Anion Gap 5 3 - 14 mmol/L Final    Glucose 94 70 - 99 mg/dL Final    Urea Nitrogen 23 7 - 30 mg/dL Final    Creatinine 1.78 (H) 0.66 - 1.25 mg/dL Final    GFR Estimate 37 (L) >60 mL/min/1.7m2 Final    Non  GFR Calc    GFR Estimate If Black 44 (L) >60 mL/min/1.7m2 Final    African American GFR Calc    Calcium 8.2 (L) 8.5 - 10.1 mg/dL Final         9/1/2018  4:37 AM      Component Results     Component Value Ref Range & Units Status    Color Urine Yellow  Final    Appearance Urine Clear  Final    Glucose Urine Negative NEG^Negative mg/dL Final    Bilirubin Urine Negative NEG^Negative Final    Ketones Urine Negative NEG^Negative mg/dL Final    Specific Gravity Urine 1.015 1.003 - 1.035 Final    Blood Urine Negative NEG^Negative Final    pH Urine 6.5 5.0 - 7.0 pH Final    Protein Albumin Urine Negative NEG^Negative mg/dL Final    Urobilinogen mg/dL Normal 0.0 - 2.0 mg/dL Final    Nitrite Urine Negative NEG^Negative Final    Leukocyte Esterase Urine Negative NEG^Negative Final    Source Midstream Urine  Final         9/1/2018  3:39 AM      Narrative     CT HEAD W/O CONTRAST  9/1/2018 3:16 AM     HISTORY: Hypertension. Confusion.    TECHNIQUE: Volumetric helical acquisition through the head without IV  contrast, displayed as 0.5 cm axial reconstructions. Radiation dose  for this scan was reduced using automated exposure control, adjustment  of the mA and/or kV according to patient size, or iterative  reconstruction technique.    COMPARISON: None.     FINDINGS: Mild generalized cerebral and cerebellar atrophy. Mild  low  attenuation in the periventricular white matter consistent with small  vessel ischemic changes of aging. Probable polyp or mucous retention  cyst in the right maxillary sinus is partially visualized on this  exam. The visualized sinuses and mastoid air cells are otherwise  clear. Ventricular size and configuration are within normal limits,  given the degree of generalized atrophy. No evidence for intracranial  hemorrhage, mass effect, or acute infarction.        Impression     IMPRESSION: Mild diffuse chronic changes of the brain. No acute  intracranial abnormality.    TALITA CHRISTINE MD                Clinical Quality Measure: Blood Pressure Screening     Your blood pressure was checked while you were in the emergency department today. The last reading we obtained was  BP: 151/82 . Please read the guidelines below about what these numbers mean and what you should do about them.  If your systolic blood pressure (the top number) is less than 120 and your diastolic blood pressure (the bottom number) is less than 80, then your blood pressure is normal. There is nothing more that you need to do about it.  If your systolic blood pressure (the top number) is 120-139 or your diastolic blood pressure (the bottom number) is 80-89, your blood pressure may be higher than it should be. You should have your blood pressure rechecked within a year by a primary care provider.  If your systolic blood pressure (the top number) is 140 or greater or your diastolic blood pressure (the bottom number) is 90 or greater, you may have high blood pressure. High blood pressure is treatable, but if left untreated over time it can put you at risk for heart attack, stroke, or kidney failure. You should have your blood pressure rechecked by a primary care provider within the next 4 weeks.  If your provider in the emergency department today gave you specific instructions to follow-up with your doctor or provider even sooner than that, you  "should follow that instruction and not wait for up to 4 weeks for your follow-up visit.        Thank you for choosing [facility]       Thank you for choosing [facility] for your care. Our goal is always to provide you with excellent care. Hearing back from our patients is one way we can continue to improve our services. Please take a few minutes to complete the written survey that you may receive in the mail after you visit with us. Thank you!        [employer] Information     [employer] lets you send messages to your doctor, view your test results, renew your prescriptions, schedule appointments and more. To sign up, go to [email or web address] . Click on \"Log in\" on the left side of the screen, which will take you to the Welcome page. Then click on \"Sign up Now\" on the right side of the page.     You will be asked to enter the access code listed below, as well as some personal information. Please follow the directions to create your username and password.     Your access code is: [email or web address]  Expires: 2018  5:16 AM     Your access code will  in 90 days. If you need help or a new code, please call your [facility] clinic or [phone].        Care EveryWhere ID     This is your Care EveryWhere ID. This could be used by other organizations to access your [facility] medical records  [id number]        Equal Access to Services     PAOLA ROJO AH: Benja Ho, waaxda luqadaha, qaybta kaalmachrissy sidhu, tim grey. So [facility] [phone].    ATENCIÓN: Si habla español, tiene a diaz disposición servicios gratuitos de asistencia lingüística. Llame al [phone].    We comply with applicable federal civil rights laws and [place] laws. We do not discriminate on the basis of race, color, national origin, age, disability, sex, sexual orientation, or gender identity.            After Visit Summary       This is your record. Keep this with you and show to your community " pharmacist(s) and doctor(s) at your next visit.

## 2018-09-01 NOTE — ED AVS SNAPSHOT
Emergency Department    [address] [place] [address]    Phone:  [phone]    Fax:  [phone]                                       Chava Valentine   MRN: [id number]    Department:   Emergency Department   Date of Visit:  9/1/2018           After Visit Summary Signature Page     I have received my discharge instructions, and my questions have been answered. I have discussed any challenges I see with this plan with the nurse or doctor.    ..........................................................................................................................................  Patient/Patient Representative Signature      ..........................................................................................................................................  Patient Representative Print Name and Relationship to Patient    ..................................................               ................................................  Date                                            Time    ..........................................................................................................................................  Reviewed by Signature/Title    ...................................................              ..............................................  Date                                                            Time          22EPIC Rev 08/18

## 2018-09-01 NOTE — ED PROVIDER NOTES
"  History     Chief Complaint:  Elevated Blood Pressure     HPI   Chava Valentine is a 84 year old male with a history of hypertension, hyperlipidemia, CAD, and paroxysmal atrial fibrillation on Pradaxa who presents accompanied by his wife for evaluation of an elevated blood pressure. Tonight while he was getting ready to go to bed, the patient checked his blood pressure and noticed that it was significantly elevated at 205/85 mmHg. He reports that since then he has \"not been thinking clearly\" and has been feeling somewhat dizzy and anxious. Following this, the patient took 20 mg of his blood pressure medication. Initially his blood pressure fell into the 160s, but around midnight it miri into the 190s, prompting him to seek evaluation in the ED. Otherwise, he has not had any chest pain, shortness of breath, headache, numbness, or weakness.     Allergies:  NKDA      Medications:    albuterol (PROAIR HFA/PROVENTIL HFA/VENTOLIN HFA) 108 (90 Base) MCG/ACT Inhaler  CLONAZEPAM PO  dabigatran ANTICOAGULANT (PRADAXA ANTICOAGULANT) 150 MG capsule  ezetimibe (ZETIA) 10 MG tablet  flecainide (TAMBOCOR) 100 MG tablet  fluticasone (FLONASE) 50 MCG/ACT spray  isosorbide mononitrate (IMDUR) 30 MG 24 hr tablet  latanoprost (XALATAN) 0.005 % ophthalmic solution  lisinopril (PRINIVIL/ZESTRIL) 10 MG tablet  Multiple Vitamins-Minerals (OCUVITE PO)  nitroGLYcerin (NITROSTAT) 0.4 MG sublingual tablet  Pantoprazole Sodium (PROTONIX PO)  PAPAV-PHENTOLAMINE-ALPROSTADIL IC  Psyllium (METAMUCIL PO)  pyridostigmine (MESTINON) 60 MG tablet  RaNITidine HCl (ZANTAC PO)  rosuvastatin (CRESTOR) 10 MG tablet  TESTOSTERONE AQUEOUS IM  ZOLPIDEM TARTRATE PO    Past Medical History:    Allergic rhinitis  Anxiety  Contact dermatitis  Coronary artery disease  Elevated prolactin level  Erythrocytosis  GERD   GI bleed  Hypertension   Hyperlipidemia  Hypogonadism   Insomnia  Paroxysmal atrial fibrillation  Prediabetes   Renal disease  Shoulder " "arthritis  SVT   Tremor     Past Surgical History:    Bypass graft artery coronary   Cardiac surgery  Colonoscopy   Laser selective trabeculoplasty, bilateral  Right rotator cuff surgery  Phacoemulsification clear cornea with standard iol, vitrectomy parsplana 23 gauge, combined   Reverse arthroplasty shoulder, right   Vascular surgery   Vitrectomy parsplana, phacoemulsification clear cornea w standard intraocular implant, combined     Family History:    Cerebrovascular disease - Father     Social History:  Tobacco use:    Former smoker, 20 years, quit 1999  Alcohol use:    Positive, occasionally   Marital status:       Accompanied to ED by:  Wife   Occupation:    The patient is a retired physician      Review of Systems   Respiratory: Negative for shortness of breath.    Cardiovascular: Negative for chest pain.   Neurological: Positive for dizziness. Negative for weakness and numbness.   Psychiatric/Behavioral: Positive for confusion. The patient is nervous/anxious.    All other systems reviewed and are negative.    Physical Exam   First Vitals:  BP: 181/76  Heart Rate: 60  Temp: 97.1  F (36.2  C)  Resp: 16  Height: 172.7 cm (5' 8\")  Weight: 79.4 kg (175 lb)  SpO2: 98 %      Physical Exam  General: Alert, interactive  Head:  Scalp is atraumatic  Eyes:  The pupils are equal, round, and reactive to light    EOM's intact    No scleral icterus  ENT:      Nose:  The external nose is normal  Ears:  External ears are normal  Mouth/Throat: The oropharynx is normal    Mucus membranes are moist       Neck:  Normal range of motion.      There is no rigidity.    Trachea is in the midline         CV:  Bradycardia    2/6 systolic murmur   Resp:  Breath sounds are clear bilaterally    Non-labored, no retractions or accessory muscle use      GI:  Abdomen is soft, no distension, no tenderness.       MS:  Normal strength in all 4 extremities  Skin:  Warm and dry, No rash or lesions noted.  Neuro: Strength 5/5 x4.  Sensation " intact  In all 4 extremities.      Cranial nerves 2-12 grossly intact.    GCS: 15  Psych:  Awake. Alert.  Normal affect.      Appropriate interactions.    Emergency Department Course   ECG (2:42:16):  Indication: Screening for cardiovascular disease.   Rate 53 bpm. MI interval 264 ms. QRS duration 148 ms. QT/QTc 476/446 ms. P-R-T axes 72 -68 16.   Interpretation: Sinus bradycardia with 1st degree AV block, Left axis deviation, Left bundle branch block, Abnormal ECG  Agree with computer interpretation. Yes   No significant change compared to EKG dated 7/24/17.   Interpreted at 0304 by Dr. Weathers.      Imaging:  Radiographic findings were communicated with the patient and family who voiced understanding of the findings.    CT Head w/o Contrast:  IMPRESSION: Mild diffuse chronic changes of the brain. No acute intracranial abnormality.  Per radiology.     Laboratory:  CBC:  low, o/w WNL (WBC 6.4, HGB 15.4)   BMP: Chloride 110 high, Creatinine 1.78 high, GFR Estimate 37 low, Calcium 8.2 low, o/w WNL   UA reflex to Microscopic: Negative     Emergency Department Course:  Nursing notes and vitals reviewed.  0225: I performed an exam of the patient as documented above.     0506: I updated and reassessed the patient.     Findings and plan explained to the Patient and spouse. Patient discharged home with instructions regarding supportive care, medications, and reasons to return. The importance of close follow-up was reviewed. The patient was prescribed Amlodipine.     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 He was concerned for his elevated blood pressure. The above workup was performed and returned as unremarkable. I do not have a clear cut etiology for his spike in hypertension. This trended down nicely without intervention in the emergency department. He has no chest pain to suggest acute coronary syndrome or pulmonary embolism and he is  on anticoagulation. There is no sign of acute intracranial hemorrhage, electrolyte abnormality, acute renal dysfunction, or more concerning illness. I have prescribed Amlodipine 2.5 mg daily. I have recommended discussing this with his primary care provider and cardiologist prior to initiating this medication. The patient stated understanding as did his wife and was subsequently discharged to home, however he will return if new symptoms develop.     Impression:    ICD-10-CM   1. Essential hypertension I10       Plan:  Discharged to home with Amlodipine.     Discharge Medications:  New Prescriptions    AMLODIPINE (NORVASC) 2.5 MG TABLET    Take 1 tablet (2.5 mg) by mouth daily       ITyree am serving as a scribe at 2:25 AM on 9/1/2018 to document services personally performed by Dr. Zbigniew Weathers, based on my observations and the provider's statements to me.     EMERGENCY DEPARTMENT       Zbigniew Weathers MD  09/01/18 0805

## 2018-09-13 ENCOUNTER — TELEPHONE (OUTPATIENT)
Dept: CARDIOLOGY | Facility: CLINIC | Age: 83
End: 2018-09-13

## 2018-09-13 NOTE — TELEPHONE ENCOUNTER
Patients son lives in [place] and had symptoms of shortness of breath premature beats and acid reflux.  Saw his PMD and an EKG showed RBBB. He is essentially asymptomatic and has a Cardiology appointment 10-3-18 but leaving for a fishing trip 10-4-18  Would like Dr. Bernard's recommendations if any concerns.  He is familiar with LBBB is more concerning however would like Dr. Bernard's expertise.

## 2018-10-24 ENCOUNTER — TELEPHONE (OUTPATIENT)
Dept: CARDIOLOGY | Facility: CLINIC | Age: 83
End: 2018-10-24

## 2018-10-24 NOTE — TELEPHONE ENCOUNTER
Complaining of episodes of heart racing at 130 bpm. On flecainide 100 mg bid.   Normal for him is a break through episode which occurs during night one time in 7-10 days.   But last night it occurred 3 times during the night along with being very diaphoretic upon awaking. Once he notes that he is in a tachycardia, he bears down and can make it quit. This is true of the episodes last night also. Denies being tested for sleep apnea.     Has a follow up with Dr. Bernard on Friday, 10/26. His concern is the frequency increase of the episodes last evening and he has an airline ticket to [place] for Saturday 10/27, for which he was going to be gone for about 10 day. Questioning if it is okay for him to fly or not.     Advised him that he will be able to discuss the episodes with Dr. Bernard and get a recommendation on the okay to fly or not from him on Friday. He should call the airlines to find out how to cancel his flight, just in case that is what Dr. Bernard is recommending. He should bring in that information with him to his appointment so we can help him get reimbursed should he need to cancel his flight.     Patient agreed with plan. Will keep tabs on frequency of the episodes tonight and tomorrow night.

## 2018-10-26 ENCOUNTER — DOCUMENTATION ONLY (OUTPATIENT)
Dept: CARDIOLOGY | Facility: CLINIC | Age: 83
End: 2018-10-26

## 2018-10-26 ENCOUNTER — OFFICE VISIT (OUTPATIENT)
Dept: CARDIOLOGY | Facility: CLINIC | Age: 83
End: 2018-10-26
Attending: INTERNAL MEDICINE
Payer: MEDICARE

## 2018-10-26 VITALS
WEIGHT: 173 LBS | BODY MASS INDEX: 26.22 KG/M2 | DIASTOLIC BLOOD PRESSURE: 60 MMHG | HEIGHT: 68 IN | SYSTOLIC BLOOD PRESSURE: 118 MMHG | HEART RATE: 68 BPM

## 2018-10-26 DIAGNOSIS — E78.2 MIXED HYPERLIPIDEMIA: ICD-10-CM

## 2018-10-26 DIAGNOSIS — I25.10 CORONARY ARTERY DISEASE INVOLVING NATIVE CORONARY ARTERY OF NATIVE HEART WITHOUT ANGINA PECTORIS: ICD-10-CM

## 2018-10-26 DIAGNOSIS — I10 ESSENTIAL HYPERTENSION: ICD-10-CM

## 2018-10-26 DIAGNOSIS — I48.0 PAROXYSMAL ATRIAL FIBRILLATION (H): ICD-10-CM

## 2018-10-26 DIAGNOSIS — I25.810 CORONARY ARTERY DISEASE INVOLVING CORONARY BYPASS GRAFT OF NATIVE HEART WITHOUT ANGINA PECTORIS: ICD-10-CM

## 2018-10-26 DIAGNOSIS — I10 BENIGN ESSENTIAL HYPERTENSION: ICD-10-CM

## 2018-10-26 DIAGNOSIS — I25.10 CORONARY ARTERY DISEASE INVOLVING NATIVE CORONARY ARTERY OF NATIVE HEART WITHOUT ANGINA PECTORIS: Primary | ICD-10-CM

## 2018-10-26 LAB
ALBUMIN SERPL-MCNC: 3.5 G/DL (ref 3.4–5)
ALP SERPL-CCNC: 78 U/L (ref 40–150)
ALT SERPL W P-5'-P-CCNC: 18 U/L (ref 0–70)
ANION GAP SERPL CALCULATED.3IONS-SCNC: 8 MMOL/L (ref 3–14)
AST SERPL W P-5'-P-CCNC: 12 U/L (ref 0–45)
BILIRUB SERPL-MCNC: 0.7 MG/DL (ref 0.2–1.3)
BUN SERPL-MCNC: 23 MG/DL (ref 7–30)
CALCIUM SERPL-MCNC: 8.9 MG/DL (ref 8.5–10.1)
CHLORIDE SERPL-SCNC: 108 MMOL/L (ref 94–109)
CO2 SERPL-SCNC: 25 MMOL/L (ref 20–32)
CREAT SERPL-MCNC: 1.87 MG/DL (ref 0.66–1.25)
GFR SERPL CREATININE-BSD FRML MDRD: 34 ML/MIN/1.7M2
GLUCOSE SERPL-MCNC: 108 MG/DL (ref 70–99)
POTASSIUM SERPL-SCNC: 4.6 MMOL/L (ref 3.4–5.3)
PROT SERPL-MCNC: 6.6 G/DL (ref 6.8–8.8)
SODIUM SERPL-SCNC: 141 MMOL/L (ref 133–144)

## 2018-10-26 PROCEDURE — 36415 COLL VENOUS BLD VENIPUNCTURE: CPT | Performed by: INTERNAL MEDICINE

## 2018-10-26 PROCEDURE — 80053 COMPREHEN METABOLIC PANEL: CPT | Performed by: INTERNAL MEDICINE

## 2018-10-26 PROCEDURE — 99214 OFFICE O/P EST MOD 30 MIN: CPT | Performed by: INTERNAL MEDICINE

## 2018-10-26 RX ORDER — EZETIMIBE 10 MG/1
10 TABLET ORAL DAILY
Qty: 90 TABLET | Refills: 3 | Status: SHIPPED | OUTPATIENT
Start: 2018-10-26 | End: 2019-12-31

## 2018-10-26 RX ORDER — ROSUVASTATIN CALCIUM 10 MG/1
10 TABLET, COATED ORAL DAILY
Qty: 90 TABLET | Refills: 4 | Status: SHIPPED | OUTPATIENT
Start: 2018-10-26 | End: 2019-12-31

## 2018-10-26 RX ORDER — DABIGATRAN ETEXILATE 150 MG/1
150 CAPSULE ORAL 2 TIMES DAILY
Qty: 180 CAPSULE | Refills: 3 | Status: SHIPPED | OUTPATIENT
Start: 2018-10-26 | End: 2019-12-31

## 2018-10-26 RX ORDER — FLECAINIDE ACETATE 100 MG/1
100 TABLET ORAL 2 TIMES DAILY
Qty: 180 TABLET | Refills: 3 | Status: SHIPPED | OUTPATIENT
Start: 2018-10-26 | End: 2018-12-11

## 2018-10-26 RX ORDER — LISINOPRIL 10 MG/1
10 TABLET ORAL 2 TIMES DAILY
Qty: 180 TABLET | Refills: 3 | Status: SHIPPED | OUTPATIENT
Start: 2018-10-26 | End: 2019-04-05 | Stop reason: SINTOL

## 2018-10-26 NOTE — LETTER
10/26/2018      [facility] [address]  [place] [address]      RE: Chava Valentine       Dear Colleague,    I had the pleasure of seeing Chava Valentine in the [facility].    Service Date: 10/26/2018      HISTORY OF PRESENT ILLNESS:  I got together with Chava today.  Chava is 85.  I always have to say he is one of my favorite people in life.  Back when he was younger, Chava and I used to go on the [employer] ski trip every year.  The last couple of years he has not been able to do so, although he dearly wishes that he could.      He has a history of known coronary disease, remotely coronary artery bypass surgery, but ever since his bypass surgery he has not had angina.        He has had paroxysmal atrial fibrillation but has been very well controlled on flecainide 100 mg twice a day.  I have reviewed his situation with my colleagues in , and although in some people it is less than ideal to have ischemic heart disease -- inducible ischemia, he has been so stable on flecainide with virtually no side effects that we have continued it.  Along those lines also, his serial stress nuclear studies, the last done earlier this year, showed an EF of 77% with a trivial area of apical ischemia that he has had for the last decade.      He does get testosterone injections every 2 weeks.  Accordingly, his hemoglobin has tended to run a little high at 16 grams, but otherwise as best I can tell it has not bothered him.      CURRENT MEDICATIONS:  His current program then is:   1.  Zetia 10 mg a day.   2.  Pradaxa 150 mg b.i.d.   3.  Flecainide 100 mg b.i.d.   4.  Lisinopril 10 mg a day.   5.  Rosuvastatin 10 mg at bedtime.        He also takes Zantac, Mestinon p.r.n., Mysoline, Metamucil, Protonix 40 mg b.i.d., Flonase p.r.n. and clonazepam p.r.n.      PHYSICAL EXAMINATION:   GENERAL:  Exam shows that Chava has not really changed for as long as I have known  him.  His weight is 173 pounds.   VITAL SIGNS:  Blood pressure 118/60, heart rate 68 beats a minute and regular.   HEAD AND NECK:  Normal.  Carotids were equal, no bruits heard.   HEART:  Regular without gallop or murmur.   LUNGS:  Clear.  Sternum is solid and well-healed.   ABDOMEN:  Flat, without organomegaly, mass or bruit.   EXTREMITIES:  Show diminished pedal pulses with no edema.      Chava Cowan is 85.  He is, of course, a retired physician.  He continues to seem to be asymptomatic.  He has had a history of paroxysmal atrial fibrillation, but flecainide has been associated with a remarkable rhythm stability.      Today, I checked a complete metabolic panel.  I asked to see him in a year.  I did not order a stress study this year, as he has been asymptomatic and he had a stress nuclear study 5 months ago.      He is a wonderful juany.  I have always enjoyed his sense of humor, and I would do anything to keep him in good shape.  If he has problems, let me know.  Warm regards.      Thanks for the privilege.      IMPRESSION:   1.  Asymptomatic coronary artery disease.   2.  Treated hypertension.   3.  Treated hyperlipidemia.   4.  Chronic renal insufficiency.  Creatinines have been in the 1.4-1.78 range.  I checked that today.  This may merit some adjustment.  If his renal function continues to deteriorate, I would favor cutting back or stopping the ACE inhibitor.      Over 35 minutes was spent doing the consult, over half the time face-to-face with education and counseling.  I reviewed old records, imaging, stress nuclear study, echo, laboratory work, laboratory data, medications, medication side effects and multiple medical problems as listed above.      cc:   Mike Cosby MD    [facility]    [address] [place] [address], [address]    [place]  [address]         TALISHA RIDLEY MD, [facility]             D: 10/26/2018   T: 10/26/2018   MT: DAVID      Name:     CHAVA COWAN   MRN:      [id number]         Account:      [id number]   :      10/14/1933           Service Date: 10/26/2018      Document: [id number]         Outpatient Encounter Prescriptions as of 10/26/2018   Medication Sig Dispense Refill     albuterol (PROAIR HFA/PROVENTIL HFA/VENTOLIN HFA) 108 (90 Base) MCG/ACT Inhaler Inhale 2 puffs into the lungs every 6 hours as needed for shortness of breath / dyspnea or wheezing 1 Inhaler 0     CLONAZEPAM PO Take 0.25-0.5 mg by mouth 3 times daily as needed        dabigatran ANTICOAGULANT (PRADAXA ANTICOAGULANT) 150 MG capsule Take 1 capsule (150 mg) by mouth 2 times daily 180 capsule 3     ezetimibe (ZETIA) 10 MG tablet Take 1 tablet (10 mg) by mouth daily 90 tablet 3     flecainide (TAMBOCOR) 100 MG tablet Take 1 tablet (100 mg) by mouth 2 times daily 180 tablet 3     lisinopril (PRINIVIL/ZESTRIL) 10 MG tablet Take 1 tablet (10 mg) by mouth 2 times daily 180 tablet 3     Pantoprazole Sodium (PROTONIX PO) Take 40 mg by mouth 2 times daily       PAPAV-PHENTOLAMINE-ALPROSTADIL IC Inject 1 Dose as directed daily as needed (erectile dysfunction) Trimix Injection       Psyllium (METAMUCIL PO) Take 1 Dose by mouth daily as needed        RaNITidine HCl (ZANTAC PO) Take 150 mg by mouth daily as needed for heartburn       rosuvastatin (CRESTOR) 10 MG tablet Take 1 tablet (10 mg) by mouth daily 90 tablet 4     TESTOSTERONE AQUEOUS IM Inject 1 mL into the muscle every 14 days        ZOLPIDEM TARTRATE PO Take 5-10 mg by mouth nightly as needed        fluticasone (FLONASE) 50 MCG/ACT spray Spray 1 spray into both nostrils daily as needed for rhinitis or allergies       latanoprost (XALATAN) 0.005 % ophthalmic solution INSTILL 1 DROP IN BOTH EYES QHS.  6     nitroGLYcerin (NITROSTAT) 0.4 MG sublingual tablet For chest pain place 1 tablet under the tongue every 5 minutes for 3 doses. If symptoms persist 5 minutes after 1st dose call 911. (Patient not taking: Reported on 2018) 25 tablet 3     pyridostigmine  (MESTINON) 60 MG tablet USE UP TO 3 TS DAILY AS NEEDED. DISPENSE IN ORIGINAL  CONTAINER  3     [DISCONTINUED] amLODIPine (NORVASC) 2.5 MG tablet Take 1 tablet (2.5 mg) by mouth daily (Patient not taking: Reported on 10/26/2018) 30 tablet 0     [DISCONTINUED] dabigatran ANTICOAGULANT (PRADAXA ANTICOAGULANT) 150 MG capsule Take 1 capsule (150 mg) by mouth 2 times daily 180 capsule 2     [DISCONTINUED] ezetimibe (ZETIA) 10 MG tablet Take 1 tablet (10 mg) by mouth daily 90 tablet 3     [DISCONTINUED] flecainide (TAMBOCOR) 100 MG tablet Take 1 tablet (100 mg) by mouth 2 times daily 180 tablet 3     [DISCONTINUED] isosorbide mononitrate (IMDUR) 30 MG 24 hr tablet Take 0.5 tablets (15 mg) by mouth daily (Patient not taking: Reported on 5/9/2018) 30 tablet 3     [DISCONTINUED] lisinopril (PRINIVIL/ZESTRIL) 10 MG tablet Take 1 tablet (10 mg) by mouth 2 times daily 180 tablet 3     [DISCONTINUED] Multiple Vitamins-Minerals (OCUVITE PO) Take 1 tablet by mouth 2 times daily       [DISCONTINUED] rosuvastatin (CRESTOR) 10 MG tablet Take 1 tablet (10 mg) by mouth daily 90 tablet 4     No facility-administered encounter medications on file as of 10/26/2018.        Again, thank you for allowing me to participate in the care of your patient.      Sincerely,    TALISHA RIDLEY MD     [facility]

## 2018-10-26 NOTE — LETTER
10/26/2018    Mike Cosby MD  [facility] [address]  [place] [address]    RE: Chava Valentine       Dear Colleague,    I had the pleasure of seeing Chava Valentine in the [facility].    HPI and Plan:   See dictation            Orders Placed This Encounter   Procedures     Comprehensive metabolic panel     No orders of the defined types were placed in this encounter.    Medications Discontinued During This Encounter   Medication Reason     amLODIPine (NORVASC) 2.5 MG tablet Stopped by Patient     isosorbide mononitrate (IMDUR) 30 MG 24 hr tablet Stopped by Patient     Multiple Vitamins-Minerals (OCUVITE PO) Medication Reconciliation Clean Up         Encounter Diagnoses   Name Primary?     Coronary artery disease involving coronary bypass graft of native heart without angina pectoris      Mixed hyperlipidemia      Essential hypertension      Paroxysmal atrial fibrillation (H)      Coronary artery disease involving native coronary artery of native heart without angina pectoris Yes     Benign essential hypertension        CURRENT MEDICATIONS:  Current Outpatient Prescriptions   Medication Sig Dispense Refill     albuterol (PROAIR HFA/PROVENTIL HFA/VENTOLIN HFA) 108 (90 Base) MCG/ACT Inhaler Inhale 2 puffs into the lungs every 6 hours as needed for shortness of breath / dyspnea or wheezing 1 Inhaler 0     CLONAZEPAM PO Take 0.25-0.5 mg by mouth 3 times daily as needed        dabigatran ANTICOAGULANT (PRADAXA ANTICOAGULANT) 150 MG capsule Take 1 capsule (150 mg) by mouth 2 times daily 180 capsule 2     ezetimibe (ZETIA) 10 MG tablet Take 1 tablet (10 mg) by mouth daily 90 tablet 3     flecainide (TAMBOCOR) 100 MG tablet Take 1 tablet (100 mg) by mouth 2 times daily 180 tablet 3     lisinopril (PRINIVIL/ZESTRIL) 10 MG tablet Take 1 tablet (10 mg) by mouth 2 times daily 180 tablet 3     Pantoprazole Sodium (PROTONIX PO) Take 40 mg by mouth 2 times daily        PAPAV-PHENTOLAMINE-ALPROSTADIL IC Inject 1 Dose as directed daily as needed (erectile dysfunction) Trimix Injection       Psyllium (METAMUCIL PO) Take 1 Dose by mouth daily as needed        RaNITidine HCl (ZANTAC PO) Take 150 mg by mouth daily as needed for heartburn       rosuvastatin (CRESTOR) 10 MG tablet Take 1 tablet (10 mg) by mouth daily 90 tablet 4     TESTOSTERONE AQUEOUS IM Inject 1 mL into the muscle every 14 days        ZOLPIDEM TARTRATE PO Take 5-10 mg by mouth nightly as needed        fluticasone (FLONASE) 50 MCG/ACT spray Spray 1 spray into both nostrils daily as needed for rhinitis or allergies       latanoprost (XALATAN) 0.005 % ophthalmic solution INSTILL 1 DROP IN BOTH EYES QHS.  6     nitroGLYcerin (NITROSTAT) 0.4 MG sublingual tablet For chest pain place 1 tablet under the tongue every 5 minutes for 3 doses. If symptoms persist 5 minutes after 1st dose call 911. (Patient not taking: Reported on 6/21/2018) 25 tablet 3     pyridostigmine (MESTINON) 60 MG tablet USE UP TO 3 TS DAILY AS NEEDED. DISPENSE IN ORIGINAL  CONTAINER  3       ALLERGIES     Allergies   Allergen Reactions     No Clinical Screening - See Comments Rash     Dove soap       PAST MEDICAL HISTORY:  Past Medical History:   Diagnosis Date     Allergic rhinitis      Anxiety      Contact dermatitis      Coronary artery disease     CABG with 4 grafts-LIMA to LAD, SVG to Diagonal,OM and RCA     Cough      Elevated prolactin level (H)      Erythrocytosis      Gastro-oesophageal reflux disease      GI bleed 10/10     Hyperlipidemia      Hypertension      Hypogonadism     with low testosterone     Insomnia      Paroxysmal atrial fibrillation (H)     flecainide therapy     Post-nasal drip      Prediabetes      Renal disease     CKD     Shoulder arthritis      SVT (supraventricular tachycardia) (H)      Tremor        PAST SURGICAL HISTORY:  Past Surgical History:   Procedure Laterality Date     BYPASS GRAFT ARTERY CORONARY        CARDIAC SURGERY       COLONOSCOPY  12/2010    tubular adenoma     LASER SELECTIVE TRABECULOPLASTY BILATERAL Bilateral 6/11/2018    Procedure: LASER SELECTIVE TRABECULOPLASTY BILATERAL;  LASER SELECTIVE TRABECULOPLASTY BILATERAL ;  Surgeon: Parmjit Camacho MD;  Location: [facility]     ORTHOPEDIC SURGERY      rotator cuff right     PHACOEMULSIFICATION CLEAR CORNEA WITH STANDARD IOL, VITRECTOMY PARSPLANA 23 GAGUE, COMBINED  2/20/2013    Procedure: COMBINED PHACOEMULSIFICATION CLEAR CORNEA WITH STANDARD INTRAOCULAR LENS IMPLANT, VITRECTOMY PARSPLANA 23 GAUGE;;  Surgeon: Guero Lockett MD;  Location: [facility]     PHACOEMULSIFICATION CLEAR CORNEA WITH STANDARD IOL, VITRECTOMY PARSPLANA 23 GAGUE, COMBINED  7/31/2013    Procedure: COMBINED PHACOEMULSIFICATION CLEAR CORNEA WITH STANDARD INTRAOCULAR LENS IMPLANT, VITRECTOMY PARSPLANA 23 GAUGE;;  Surgeon: Guero Lockett MD;  Location: [facility]     REVERSE ARTHROPLASTY SHOULDER Right 7/24/2017    Procedure: REVERSE ARTHROPLASTY SHOULDER;  RIGHT REVERSE TOTAL SHOULDER ARTHROPLASTY ;  Surgeon: Rodolfo Calhoun MD;  Location:  OR     VASCULAR SURGERY      cab     VITRECTOMY PARSPLANA, PHACOEMULSIFICATION CLEAR CORNEA W STANDARD INTRAOCULAR LENS IMPLANT, COMBINED  2/20/2013    Procedure: COMBINED VITRECTOMY PARSPLANA, PHACOEMULSIFICATION CLEAR CORNEA WITH STANDARD INTRAOCULAR LENS IMPLANT;  LEFT PHACOEMULSIFICATION CLEAR CORNEA WITH SAURABH TORIC INTRAOCULAR LENS IMPLANT,  LEFT EYE 23 GAUGE PARSPLANA VITRECTOMY;  Surgeon: Jorge Regan MD;  Location: [facility]     VITRECTOMY PARSPLANA, PHACOEMULSIFICATION CLEAR CORNEA W STANDARD INTRAOCULAR LENS IMPLANT, COMBINED  7/31/2013    Procedure: COMBINED VITRECTOMY PARSPLANA, PHACOEMULSIFICATION CLEAR CORNEA WITH STANDARD INTRAOCULAR LENS IMPLANT;  RIGHT PHACOEMULSIFICATION CLEAR CORNEA WITH TORIC INTRAOCULAR LENS IMPLANT, RIGHT 23 GAUGE PARSPLANA VITRECTOMY ;  Surgeon: Jorge Regan MD;  Location: [facility]       FAMILY  "HISTORY:  Family History   Problem Relation Age of Onset     Other - See Comments Mother      old age     Other - See Comments Father 97     Cerebrovascular Disease Father        SOCIAL HISTORY:  Social History     Social History     Marital status:      Spouse name: N/A     Number of children: N/A     Years of education: N/A     Social History Main Topics     Smoking status: Former Smoker     Packs/day: 0.00     Years: 20.00     Types: Cigarettes     Quit date: 1/1/1999     Smokeless tobacco: Never Used      Comment: recreational only     Alcohol use Yes      Comment: occas     Drug use: No     Sexual activity: Not Asked     Other Topics Concern     Parent/Sibling W/ Cabg, Mi Or Angioplasty Before 65f 55m? No     Sleep Concern Yes     Special Diet Yes     less meat      Exercise Yes     3 x weekly     Seat Belt Yes     Social History Narrative         Review of Systems:  Skin:  Negative       Eyes:  Positive for glasses    ENT:  Negative      Respiratory:  Negative       Cardiovascular:    fatigue;Positive for;palpitations same as before  Gastroenterology: Negative      Genitourinary:  Negative      Musculoskeletal:  Negative      Neurologic:  Positive for numbness or tingling of hands;numbness or tingling of feet when sitting still for awhile  Psychiatric:  Positive for anxiety    Heme/Lymph/Imm:  Negative      Endocrine:  Negative        Physical Exam:  Vitals: /60  Pulse 68  Ht 1.734 m (5' 8.25\")  Wt 78.5 kg (173 lb)  BMI 26.11 kg/m2    Constitutional:  cooperative, alert and oriented, well developed, well nourished, in no acute distress        Skin:  warm and dry to the touch          Head:  normocephalic        Eyes:  pupils equal and round;sclera white;EOMS intact        Lymph:No Cervical lymphadenopathy present     ENT:  no pallor or cyanosis        Neck:  carotid pulses are full and equal bilaterally;JVP normal;no carotid bruit        Respiratory:  clear to auscultation;normal respiratory " excursion;healed median sternotomy scar         Cardiac: regular rhythm;normal S1 and S2;no murmurs, gallops or rubs detected                                1+             1+          GI:  abdomen soft        Extremities and Muscular Skeletal:  no edema;no deformities, clubbing, cyanosis, erythema observed              Neurological:  affect appropriate        Psych:  oriented to time, person and place        Recent Lab Results:  LIPID RESULTS:  Lab Results   Component Value Date    CHOL 109 04/08/2017    HDL 40 04/08/2017    LDL 54 04/08/2017    TRIG 77 04/08/2017    CHOLHDLRATIO 2.6 01/22/2015       LIVER ENZYME RESULTS:  Lab Results   Component Value Date    AST 10 06/08/2017    ALT 9 06/08/2017       CBC RESULTS:  Lab Results   Component Value Date    WBC 6.4 09/01/2018    RBC 5.49 09/01/2018    HGB 15.4 09/01/2018    HCT 46.6 09/01/2018    MCV 85 09/01/2018    MCH 28.1 09/01/2018    MCHC 33.0 09/01/2018    RDW 14.0 09/01/2018     (L) 09/01/2018       BMP RESULTS:  Lab Results   Component Value Date     09/01/2018    POTASSIUM 4.2 09/01/2018    CHLORIDE 110 (H) 09/01/2018    CO2 26 09/01/2018    ANIONGAP 5 09/01/2018    GLC 94 09/01/2018    BUN 23 09/01/2018    CR 1.78 (H) 09/01/2018    GFRESTIMATED 37 (L) 09/01/2018    GFRESTBLACK 44 (L) 09/01/2018    FIDEL 8.2 (L) 09/01/2018        A1C RESULTS:  No results found for: A1C    INR RESULTS:  Lab Results   Component Value Date    INR 0.89 07/24/2017           CC  Michael Bernard MD  [address]  [place] [address]                HPI and Plan:   See dictation          Orders Placed This Encounter   Procedures     Comprehensive metabolic panel     No orders of the defined types were placed in this encounter.    Medications Discontinued During This Encounter   Medication Reason     amLODIPine (NORVASC) 2.5 MG tablet Stopped by Patient     isosorbide mononitrate (IMDUR) 30 MG 24 hr tablet Stopped by Patient     Multiple Vitamins-Minerals (OCUVITE  PO) Medication Reconciliation Clean Up         Encounter Diagnoses   Name Primary?     Coronary artery disease involving coronary bypass graft of native heart without angina pectoris      Mixed hyperlipidemia      Essential hypertension      Paroxysmal atrial fibrillation (H)      Coronary artery disease involving native coronary artery of native heart without angina pectoris Yes     Benign essential hypertension        CURRENT MEDICATIONS:  Current Outpatient Prescriptions   Medication Sig Dispense Refill     albuterol (PROAIR HFA/PROVENTIL HFA/VENTOLIN HFA) 108 (90 Base) MCG/ACT Inhaler Inhale 2 puffs into the lungs every 6 hours as needed for shortness of breath / dyspnea or wheezing 1 Inhaler 0     CLONAZEPAM PO Take 0.25-0.5 mg by mouth 3 times daily as needed        dabigatran ANTICOAGULANT (PRADAXA ANTICOAGULANT) 150 MG capsule Take 1 capsule (150 mg) by mouth 2 times daily 180 capsule 2     ezetimibe (ZETIA) 10 MG tablet Take 1 tablet (10 mg) by mouth daily 90 tablet 3     flecainide (TAMBOCOR) 100 MG tablet Take 1 tablet (100 mg) by mouth 2 times daily 180 tablet 3     lisinopril (PRINIVIL/ZESTRIL) 10 MG tablet Take 1 tablet (10 mg) by mouth 2 times daily 180 tablet 3     Pantoprazole Sodium (PROTONIX PO) Take 40 mg by mouth 2 times daily       PAPAV-PHENTOLAMINE-ALPROSTADIL IC Inject 1 Dose as directed daily as needed (erectile dysfunction) Trimix Injection       Psyllium (METAMUCIL PO) Take 1 Dose by mouth daily as needed        RaNITidine HCl (ZANTAC PO) Take 150 mg by mouth daily as needed for heartburn       rosuvastatin (CRESTOR) 10 MG tablet Take 1 tablet (10 mg) by mouth daily 90 tablet 4     TESTOSTERONE AQUEOUS IM Inject 1 mL into the muscle every 14 days        ZOLPIDEM TARTRATE PO Take 5-10 mg by mouth nightly as needed        fluticasone (FLONASE) 50 MCG/ACT spray Spray 1 spray into both nostrils daily as needed for rhinitis or allergies       latanoprost (XALATAN) 0.005 % ophthalmic solution  INSTILL 1 DROP IN BOTH EYES QHS.  6     nitroGLYcerin (NITROSTAT) 0.4 MG sublingual tablet For chest pain place 1 tablet under the tongue every 5 minutes for 3 doses. If symptoms persist 5 minutes after 1st dose call 911. (Patient not taking: Reported on 6/21/2018) 25 tablet 3     pyridostigmine (MESTINON) 60 MG tablet USE UP TO 3 TS DAILY AS NEEDED. DISPENSE IN ORIGINAL  CONTAINER  3       ALLERGIES     Allergies   Allergen Reactions     No Clinical Screening - See Comments Rash     Dove soap       PAST MEDICAL HISTORY:  Past Medical History:   Diagnosis Date     Allergic rhinitis      Anxiety      Contact dermatitis      Coronary artery disease     CABG with 4 grafts-LIMA to LAD, SVG to Diagonal,OM and RCA     Cough      Elevated prolactin level (H)      Erythrocytosis      Gastro-oesophageal reflux disease      GI bleed 10/10     Hyperlipidemia      Hypertension      Hypogonadism     with low testosterone     Insomnia      Paroxysmal atrial fibrillation (H)     flecainide therapy     Post-nasal drip      Prediabetes      Renal disease     CKD     Shoulder arthritis      SVT (supraventricular tachycardia) (H)      Tremor        PAST SURGICAL HISTORY:  Past Surgical History:   Procedure Laterality Date     BYPASS GRAFT ARTERY CORONARY       CARDIAC SURGERY       COLONOSCOPY  12/2010    tubular adenoma     LASER SELECTIVE TRABECULOPLASTY BILATERAL Bilateral 6/11/2018    Procedure: LASER SELECTIVE TRABECULOPLASTY BILATERAL;  LASER SELECTIVE TRABECULOPLASTY BILATERAL ;  Surgeon: Parmjit Camacho MD;  Location: [facility]     ORTHOPEDIC SURGERY      rotator cuff right     PHACOEMULSIFICATION CLEAR CORNEA WITH STANDARD IOL, VITRECTOMY PARSPLANA 23 GAGUE, COMBINED  2/20/2013    Procedure: COMBINED PHACOEMULSIFICATION CLEAR CORNEA WITH STANDARD INTRAOCULAR LENS IMPLANT, VITRECTOMY PARSPLANA 23 GAUGE;;  Surgeon: Guero Lockett MD;  Location: [facility]     PHACOEMULSIFICATION CLEAR CORNEA WITH STANDARD IOL,  VITRECTOMY PARSPLANA 23 GAGUE, COMBINED  7/31/2013    Procedure: COMBINED PHACOEMULSIFICATION CLEAR CORNEA WITH STANDARD INTRAOCULAR LENS IMPLANT, VITRECTOMY PARSPLANA 23 GAUGE;;  Surgeon: Guero Lockett MD;  Location: [facility]     REVERSE ARTHROPLASTY SHOULDER Right 7/24/2017    Procedure: REVERSE ARTHROPLASTY SHOULDER;  RIGHT REVERSE TOTAL SHOULDER ARTHROPLASTY ;  Surgeon: Rodolfo Calhoun MD;  Location:  OR     VASCULAR SURGERY      cab     VITRECTOMY PARSPLANA, PHACOEMULSIFICATION CLEAR CORNEA W STANDARD INTRAOCULAR LENS IMPLANT, COMBINED  2/20/2013    Procedure: COMBINED VITRECTOMY PARSPLANA, PHACOEMULSIFICATION CLEAR CORNEA WITH STANDARD INTRAOCULAR LENS IMPLANT;  LEFT PHACOEMULSIFICATION CLEAR CORNEA WITH SAURABH TORIC INTRAOCULAR LENS IMPLANT,  LEFT EYE 23 GAUGE PARSPLANA VITRECTOMY;  Surgeon: Jorge Regan MD;  Location: [facility]     VITRECTOMY PARSPLANA, PHACOEMULSIFICATION CLEAR CORNEA W STANDARD INTRAOCULAR LENS IMPLANT, COMBINED  7/31/2013    Procedure: COMBINED VITRECTOMY PARSPLANA, PHACOEMULSIFICATION CLEAR CORNEA WITH STANDARD INTRAOCULAR LENS IMPLANT;  RIGHT PHACOEMULSIFICATION CLEAR CORNEA WITH TORIC INTRAOCULAR LENS IMPLANT, RIGHT 23 GAUGE PARSPLANA VITRECTOMY ;  Surgeon: Jorge Regan MD;  Location: [facility]       FAMILY HISTORY:  Family History   Problem Relation Age of Onset     Other - See Comments Mother      old age     Other - See Comments Father 97     Cerebrovascular Disease Father        SOCIAL HISTORY:  Social History     Social History     Marital status:      Spouse name: N/A     Number of children: N/A     Years of education: N/A     Social History Main Topics     Smoking status: Former Smoker     Packs/day: 0.00     Years: 20.00     Types: Cigarettes     Quit date: 1/1/1999     Smokeless tobacco: Never Used      Comment: recreational only     Alcohol use Yes      Comment: occas     Drug use: No     Sexual activity: Not Asked     Other Topics Concern      "Parent/Sibling W/ Cabg, Mi Or Angioplasty Before 65f 55m? No     Sleep Concern Yes     Special Diet Yes     less meat      Exercise Yes     3 x weekly     Seat Belt Yes     Social History Narrative         Review of Systems:  Skin:  Negative       Eyes:  Positive for glasses    ENT:  Negative      Respiratory:  Negative       Cardiovascular:    fatigue;Positive for;palpitations same as before  Gastroenterology: Negative      Genitourinary:  Negative      Musculoskeletal:  Negative      Neurologic:  Positive for numbness or tingling of hands;numbness or tingling of feet when sitting still for awhile  Psychiatric:  Positive for anxiety    Heme/Lymph/Imm:  Negative      Endocrine:  Negative        Physical Exam:  Vitals: /60  Pulse 68  Ht 1.734 m (5' 8.25\")  Wt 78.5 kg (173 lb)  BMI 26.11 kg/m2    Constitutional:  cooperative, alert and oriented, well developed, well nourished, in no acute distress        Skin:  warm and dry to the touch          Head:  normocephalic        Eyes:  pupils equal and round;sclera white;EOMS intact        Lymph:No Cervical lymphadenopathy present     ENT:  no pallor or cyanosis        Neck:  carotid pulses are full and equal bilaterally;JVP normal;no carotid bruit        Respiratory:  clear to auscultation;normal respiratory excursion;healed median sternotomy scar         Cardiac: regular rhythm;normal S1 and S2;no murmurs, gallops or rubs detected                                1+             1+          GI:  abdomen soft        Extremities and Muscular Skeletal:  no edema;no deformities, clubbing, cyanosis, erythema observed              Neurological:  affect appropriate        Psych:  oriented to time, person and place        Recent Lab Results:  LIPID RESULTS:  Lab Results   Component Value Date    CHOL 109 04/08/2017    HDL 40 04/08/2017    LDL 54 04/08/2017    TRIG 77 04/08/2017    CHOLHDLRATIO 2.6 01/22/2015       LIVER ENZYME RESULTS:  Lab Results   Component Value Date "    AST 10 06/08/2017    ALT 9 06/08/2017       CBC RESULTS:  Lab Results   Component Value Date    WBC 6.4 09/01/2018    RBC 5.49 09/01/2018    HGB 15.4 09/01/2018    HCT 46.6 09/01/2018    MCV 85 09/01/2018    MCH 28.1 09/01/2018    MCHC 33.0 09/01/2018    RDW 14.0 09/01/2018     (L) 09/01/2018       BMP RESULTS:  Lab Results   Component Value Date     09/01/2018    POTASSIUM 4.2 09/01/2018    CHLORIDE 110 (H) 09/01/2018    CO2 26 09/01/2018    ANIONGAP 5 09/01/2018    GLC 94 09/01/2018    BUN 23 09/01/2018    CR 1.78 (H) 09/01/2018    GFRESTIMATED 37 (L) 09/01/2018    GFRESTBLACK 44 (L) 09/01/2018    FIDEL 8.2 (L) 09/01/2018        A1C RESULTS:  No results found for: A1C    INR RESULTS:  Lab Results   Component Value Date    INR 0.89 07/24/2017           CC  Talisha Bernard MD  [address]  [place] [address]                      Thank you for allowing me to participate in the care of your patient.      Sincerely,     TALISHA BERNARD MD     [facility]    cc:   Talisha Bernard MD  [address]  [place] [address]

## 2018-10-26 NOTE — PROGRESS NOTES
HPI and Plan:   See dictation            Orders Placed This Encounter   Procedures     Comprehensive metabolic panel     No orders of the defined types were placed in this encounter.    Medications Discontinued During This Encounter   Medication Reason     amLODIPine (NORVASC) 2.5 MG tablet Stopped by Patient     isosorbide mononitrate (IMDUR) 30 MG 24 hr tablet Stopped by Patient     Multiple Vitamins-Minerals (OCUVITE PO) Medication Reconciliation Clean Up         Encounter Diagnoses   Name Primary?     Coronary artery disease involving coronary bypass graft of native heart without angina pectoris      Mixed hyperlipidemia      Essential hypertension      Paroxysmal atrial fibrillation (H)      Coronary artery disease involving native coronary artery of native heart without angina pectoris Yes     Benign essential hypertension        CURRENT MEDICATIONS:  Current Outpatient Prescriptions   Medication Sig Dispense Refill     albuterol (PROAIR HFA/PROVENTIL HFA/VENTOLIN HFA) 108 (90 Base) MCG/ACT Inhaler Inhale 2 puffs into the lungs every 6 hours as needed for shortness of breath / dyspnea or wheezing 1 Inhaler 0     CLONAZEPAM PO Take 0.25-0.5 mg by mouth 3 times daily as needed        dabigatran ANTICOAGULANT (PRADAXA ANTICOAGULANT) 150 MG capsule Take 1 capsule (150 mg) by mouth 2 times daily 180 capsule 2     ezetimibe (ZETIA) 10 MG tablet Take 1 tablet (10 mg) by mouth daily 90 tablet 3     flecainide (TAMBOCOR) 100 MG tablet Take 1 tablet (100 mg) by mouth 2 times daily 180 tablet 3     lisinopril (PRINIVIL/ZESTRIL) 10 MG tablet Take 1 tablet (10 mg) by mouth 2 times daily 180 tablet 3     Pantoprazole Sodium (PROTONIX PO) Take 40 mg by mouth 2 times daily       PAPAV-PHENTOLAMINE-ALPROSTADIL IC Inject 1 Dose as directed daily as needed (erectile dysfunction) Trimix Injection       Psyllium (METAMUCIL PO) Take 1 Dose by mouth daily as needed        RaNITidine HCl (ZANTAC PO) Take 150 mg by mouth daily as  needed for heartburn       rosuvastatin (CRESTOR) 10 MG tablet Take 1 tablet (10 mg) by mouth daily 90 tablet 4     TESTOSTERONE AQUEOUS IM Inject 1 mL into the muscle every 14 days        ZOLPIDEM TARTRATE PO Take 5-10 mg by mouth nightly as needed        fluticasone (FLONASE) 50 MCG/ACT spray Spray 1 spray into both nostrils daily as needed for rhinitis or allergies       latanoprost (XALATAN) 0.005 % ophthalmic solution INSTILL 1 DROP IN BOTH EYES QHS.  6     nitroGLYcerin (NITROSTAT) 0.4 MG sublingual tablet For chest pain place 1 tablet under the tongue every 5 minutes for 3 doses. If symptoms persist 5 minutes after 1st dose call 911. (Patient not taking: Reported on 6/21/2018) 25 tablet 3     pyridostigmine (MESTINON) 60 MG tablet USE UP TO 3 TS DAILY AS NEEDED. DISPENSE IN ORIGINAL  CONTAINER  3       ALLERGIES     Allergies   Allergen Reactions     No Clinical Screening - See Comments Rash     Dove soap       PAST MEDICAL HISTORY:  Past Medical History:   Diagnosis Date     Allergic rhinitis      Anxiety      Contact dermatitis      Coronary artery disease     CABG with 4 grafts-LIMA to LAD, SVG to Diagonal,OM and RCA     Cough      Elevated prolactin level (H)      Erythrocytosis      Gastro-oesophageal reflux disease      GI bleed 10/10     Hyperlipidemia      Hypertension      Hypogonadism     with low testosterone     Insomnia      Paroxysmal atrial fibrillation (H)     flecainide therapy     Post-nasal drip      Prediabetes      Renal disease     CKD     Shoulder arthritis      SVT (supraventricular tachycardia) (H)      Tremor        PAST SURGICAL HISTORY:  Past Surgical History:   Procedure Laterality Date     BYPASS GRAFT ARTERY CORONARY       CARDIAC SURGERY       COLONOSCOPY  12/2010    tubular adenoma     LASER SELECTIVE TRABECULOPLASTY BILATERAL Bilateral 6/11/2018    Procedure: LASER SELECTIVE TRABECULOPLASTY BILATERAL;  LASER SELECTIVE TRABECULOPLASTY BILATERAL ;  Surgeon: [place],  Parmjit CRAWFORD MD;  Location: [facility]     ORTHOPEDIC SURGERY      rotator cuff right     PHACOEMULSIFICATION CLEAR CORNEA WITH STANDARD IOL, VITRECTOMY PARSPLANA 23 GAGUE, COMBINED  2/20/2013    Procedure: COMBINED PHACOEMULSIFICATION CLEAR CORNEA WITH STANDARD INTRAOCULAR LENS IMPLANT, VITRECTOMY PARSPLANA 23 GAUGE;;  Surgeon: Guero Lockett MD;  Location: [facility]     PHACOEMULSIFICATION CLEAR CORNEA WITH STANDARD IOL, VITRECTOMY PARSPLANA 23 GAGUE, COMBINED  7/31/2013    Procedure: COMBINED PHACOEMULSIFICATION CLEAR CORNEA WITH STANDARD INTRAOCULAR LENS IMPLANT, VITRECTOMY PARSPLANA 23 GAUGE;;  Surgeon: Guero Lockett MD;  Location: [facility]     REVERSE ARTHROPLASTY SHOULDER Right 7/24/2017    Procedure: REVERSE ARTHROPLASTY SHOULDER;  RIGHT REVERSE TOTAL SHOULDER ARTHROPLASTY ;  Surgeon: Rodolfo Calhoun MD;  Location: [facility]     VASCULAR SURGERY      cab     VITRECTOMY PARSPLANA, PHACOEMULSIFICATION CLEAR CORNEA W STANDARD INTRAOCULAR LENS IMPLANT, COMBINED  2/20/2013    Procedure: COMBINED VITRECTOMY PARSPLANA, PHACOEMULSIFICATION CLEAR CORNEA WITH STANDARD INTRAOCULAR LENS IMPLANT;  LEFT PHACOEMULSIFICATION CLEAR CORNEA WITH SAURABH TORIC INTRAOCULAR LENS IMPLANT,  LEFT EYE 23 GAUGE PARSPLANA VITRECTOMY;  Surgeon: Jorge Regan MD;  Location: [facility]     VITRECTOMY PARSPLANA, PHACOEMULSIFICATION CLEAR CORNEA W STANDARD INTRAOCULAR LENS IMPLANT, COMBINED  7/31/2013    Procedure: COMBINED VITRECTOMY PARSPLANA, PHACOEMULSIFICATION CLEAR CORNEA WITH STANDARD INTRAOCULAR LENS IMPLANT;  RIGHT PHACOEMULSIFICATION CLEAR CORNEA WITH TORIC INTRAOCULAR LENS IMPLANT, RIGHT 23 GAUGE PARSPLANA VITRECTOMY ;  Surgeon: Jorge Regan MD;  Location: [facility]       FAMILY HISTORY:  Family History   Problem Relation Age of Onset     Other - See Comments Mother      old age     Other - See Comments Father 97     Cerebrovascular Disease Father        SOCIAL HISTORY:  Social History     Social History     Marital status:   "    Spouse name: N/A     Number of children: N/A     Years of education: N/A     Social History Main Topics     Smoking status: Former Smoker     Packs/day: 0.00     Years: 20.00     Types: Cigarettes     Quit date: 1/1/1999     Smokeless tobacco: Never Used      Comment: recreational only     Alcohol use Yes      Comment: occas     Drug use: No     Sexual activity: Not Asked     Other Topics Concern     Parent/Sibling W/ Cabg, Mi Or Angioplasty Before 65f 55m? No     Sleep Concern Yes     Special Diet Yes     less meat      Exercise Yes     3 x weekly     Seat Belt Yes     Social History Narrative         Review of Systems:  Skin:  Negative       Eyes:  Positive for glasses    ENT:  Negative      Respiratory:  Negative       Cardiovascular:    fatigue;Positive for;palpitations same as before  Gastroenterology: Negative      Genitourinary:  Negative      Musculoskeletal:  Negative      Neurologic:  Positive for numbness or tingling of hands;numbness or tingling of feet when sitting still for awhile  Psychiatric:  Positive for anxiety    Heme/Lymph/Imm:  Negative      Endocrine:  Negative        Physical Exam:  Vitals: /60  Pulse 68  Ht 1.734 m (5' 8.25\")  Wt 78.5 kg (173 lb)  BMI 26.11 kg/m2    Constitutional:  cooperative, alert and oriented, well developed, well nourished, in no acute distress        Skin:  warm and dry to the touch          Head:  normocephalic        Eyes:  pupils equal and round;sclera white;EOMS intact        Lymph:No Cervical lymphadenopathy present     ENT:  no pallor or cyanosis        Neck:  carotid pulses are full and equal bilaterally;JVP normal;no carotid bruit        Respiratory:  clear to auscultation;normal respiratory excursion;healed median sternotomy scar         Cardiac: regular rhythm;normal S1 and S2;no murmurs, gallops or rubs detected                                1+             1+          GI:  abdomen soft        Extremities and Muscular Skeletal:  no edema;no " deformities, clubbing, cyanosis, erythema observed              Neurological:  affect appropriate        Psych:  oriented to time, person and place        Recent Lab Results:  LIPID RESULTS:  Lab Results   Component Value Date    CHOL 109 04/08/2017    HDL 40 04/08/2017    LDL 54 04/08/2017    TRIG 77 04/08/2017    CHOLHDLRATIO 2.6 01/22/2015       LIVER ENZYME RESULTS:  Lab Results   Component Value Date    AST 10 06/08/2017    ALT 9 06/08/2017       CBC RESULTS:  Lab Results   Component Value Date    WBC 6.4 09/01/2018    RBC 5.49 09/01/2018    HGB 15.4 09/01/2018    HCT 46.6 09/01/2018    MCV 85 09/01/2018    MCH 28.1 09/01/2018    MCHC 33.0 09/01/2018    RDW 14.0 09/01/2018     (L) 09/01/2018       BMP RESULTS:  Lab Results   Component Value Date     09/01/2018    POTASSIUM 4.2 09/01/2018    CHLORIDE 110 (H) 09/01/2018    CO2 26 09/01/2018    ANIONGAP 5 09/01/2018    GLC 94 09/01/2018    BUN 23 09/01/2018    CR 1.78 (H) 09/01/2018    GFRESTIMATED 37 (L) 09/01/2018    GFRESTBLACK 44 (L) 09/01/2018    FIDEL 8.2 (L) 09/01/2018        A1C RESULTS:  No results found for: A1C    INR RESULTS:  Lab Results   Component Value Date    INR 0.89 07/24/2017           CC  Michael Bernard MD  [id number]  [place] [id number]                HPI and Plan:   See dictation          Orders Placed This Encounter   Procedures     Comprehensive metabolic panel     No orders of the defined types were placed in this encounter.    Medications Discontinued During This Encounter   Medication Reason     amLODIPine (NORVASC) 2.5 MG tablet Stopped by Patient     isosorbide mononitrate (IMDUR) 30 MG 24 hr tablet Stopped by Patient     Multiple Vitamins-Minerals (OCUVITE PO) Medication Reconciliation Clean Up         Encounter Diagnoses   Name Primary?     Coronary artery disease involving coronary bypass graft of native heart without angina pectoris      Mixed hyperlipidemia      Essential hypertension      Paroxysmal  atrial fibrillation (H)      Coronary artery disease involving native coronary artery of native heart without angina pectoris Yes     Benign essential hypertension        CURRENT MEDICATIONS:  Current Outpatient Prescriptions   Medication Sig Dispense Refill     albuterol (PROAIR HFA/PROVENTIL HFA/VENTOLIN HFA) 108 (90 Base) MCG/ACT Inhaler Inhale 2 puffs into the lungs every 6 hours as needed for shortness of breath / dyspnea or wheezing 1 Inhaler 0     CLONAZEPAM PO Take 0.25-0.5 mg by mouth 3 times daily as needed        dabigatran ANTICOAGULANT (PRADAXA ANTICOAGULANT) 150 MG capsule Take 1 capsule (150 mg) by mouth 2 times daily 180 capsule 2     ezetimibe (ZETIA) 10 MG tablet Take 1 tablet (10 mg) by mouth daily 90 tablet 3     flecainide (TAMBOCOR) 100 MG tablet Take 1 tablet (100 mg) by mouth 2 times daily 180 tablet 3     lisinopril (PRINIVIL/ZESTRIL) 10 MG tablet Take 1 tablet (10 mg) by mouth 2 times daily 180 tablet 3     Pantoprazole Sodium (PROTONIX PO) Take 40 mg by mouth 2 times daily       PAPAV-PHENTOLAMINE-ALPROSTADIL IC Inject 1 Dose as directed daily as needed (erectile dysfunction) Trimix Injection       Psyllium (METAMUCIL PO) Take 1 Dose by mouth daily as needed        RaNITidine HCl (ZANTAC PO) Take 150 mg by mouth daily as needed for heartburn       rosuvastatin (CRESTOR) 10 MG tablet Take 1 tablet (10 mg) by mouth daily 90 tablet 4     TESTOSTERONE AQUEOUS IM Inject 1 mL into the muscle every 14 days        ZOLPIDEM TARTRATE PO Take 5-10 mg by mouth nightly as needed        fluticasone (FLONASE) 50 MCG/ACT spray Spray 1 spray into both nostrils daily as needed for rhinitis or allergies       latanoprost (XALATAN) 0.005 % ophthalmic solution INSTILL 1 DROP IN BOTH EYES QHS.  6     nitroGLYcerin (NITROSTAT) 0.4 MG sublingual tablet For chest pain place 1 tablet under the tongue every 5 minutes for 3 doses. If symptoms persist 5 minutes after 1st dose call 911. (Patient not taking: Reported on  6/21/2018) 25 tablet 3     pyridostigmine (MESTINON) 60 MG tablet USE UP TO 3 TS DAILY AS NEEDED. DISPENSE IN ORIGINAL  CONTAINER  3       ALLERGIES     Allergies   Allergen Reactions     No Clinical Screening - See Comments Rash     Dove soap       PAST MEDICAL HISTORY:  Past Medical History:   Diagnosis Date     Allergic rhinitis      Anxiety      Contact dermatitis      Coronary artery disease     CABG with 4 grafts-LIMA to LAD, SVG to Diagonal,OM and RCA     Cough      Elevated prolactin level (H)      Erythrocytosis      Gastro-oesophageal reflux disease      GI bleed 10/10     Hyperlipidemia      Hypertension      Hypogonadism     with low testosterone     Insomnia      Paroxysmal atrial fibrillation (H)     flecainide therapy     Post-nasal drip      Prediabetes      Renal disease     CKD     Shoulder arthritis      SVT (supraventricular tachycardia) (H)      Tremor        PAST SURGICAL HISTORY:  Past Surgical History:   Procedure Laterality Date     BYPASS GRAFT ARTERY CORONARY       CARDIAC SURGERY       COLONOSCOPY  12/2010    tubular adenoma     LASER SELECTIVE TRABECULOPLASTY BILATERAL Bilateral 6/11/2018    Procedure: LASER SELECTIVE TRABECULOPLASTY BILATERAL;  LASER SELECTIVE TRABECULOPLASTY BILATERAL ;  Surgeon: Parmjit Camacho MD;  Location: [facility]     ORTHOPEDIC SURGERY      rotator cuff right     PHACOEMULSIFICATION CLEAR CORNEA WITH STANDARD IOL, VITRECTOMY PARSPLANA 23 GAGUE, COMBINED  2/20/2013    Procedure: COMBINED PHACOEMULSIFICATION CLEAR CORNEA WITH STANDARD INTRAOCULAR LENS IMPLANT, VITRECTOMY PARSPLANA 23 GAUGE;;  Surgeon: Guero Lockett MD;  Location: [facility]     PHACOEMULSIFICATION CLEAR CORNEA WITH STANDARD IOL, VITRECTOMY PARSPLANA 23 GAGUE, COMBINED  7/31/2013    Procedure: COMBINED PHACOEMULSIFICATION CLEAR CORNEA WITH STANDARD INTRAOCULAR LENS IMPLANT, VITRECTOMY PARSPLANA 23 GAUGE;;  Surgeon: Guero Lockett MD;  Location: [facility]     REVERSE ARTHROPLASTY  SHOULDER Right 7/24/2017    Procedure: REVERSE ARTHROPLASTY SHOULDER;  RIGHT REVERSE TOTAL SHOULDER ARTHROPLASTY ;  Surgeon: Rodolfo Calhoun MD;  [facility]     VASCULAR SURGERY      cab     VITRECTOMY PARSPLANA, PHACOEMULSIFICATION CLEAR CORNEA W STANDARD INTRAOCULAR LENS IMPLANT, COMBINED  2/20/2013    Procedure: COMBINED VITRECTOMY PARSPLANA, PHACOEMULSIFICATION CLEAR CORNEA WITH STANDARD INTRAOCULAR LENS IMPLANT;  LEFT PHACOEMULSIFICATION CLEAR CORNEA WITH SAURABH TORIC INTRAOCULAR LENS IMPLANT,  LEFT EYE 23 GAUGE PARSPLANA VITRECTOMY;  Surgeon: Jorge Regan MD;  Location: [facility]     VITRECTOMY PARSPLANA, PHACOEMULSIFICATION CLEAR CORNEA W STANDARD INTRAOCULAR LENS IMPLANT, COMBINED  7/31/2013    Procedure: COMBINED VITRECTOMY PARSPLANA, PHACOEMULSIFICATION CLEAR CORNEA WITH STANDARD INTRAOCULAR LENS IMPLANT;  RIGHT PHACOEMULSIFICATION CLEAR CORNEA WITH TORIC INTRAOCULAR LENS IMPLANT, RIGHT 23 GAUGE PARSPLANA VITRECTOMY ;  Surgeon: Jorge Regan MD;  Location: [facility]       FAMILY HISTORY:  Family History   Problem Relation Age of Onset     Other - See Comments Mother      old age     Other - See Comments Father 97     Cerebrovascular Disease Father        SOCIAL HISTORY:  Social History     Social History     Marital status:      Spouse name: N/A     Number of children: N/A     Years of education: N/A     Social History Main Topics     Smoking status: Former Smoker     Packs/day: 0.00     Years: 20.00     Types: Cigarettes     Quit date: 1/1/1999     Smokeless tobacco: Never Used      Comment: recreational only     Alcohol use Yes      Comment: occas     Drug use: No     Sexual activity: Not Asked     Other Topics Concern     Parent/Sibling W/ Cabg, Mi Or Angioplasty Before 65f 55m? No     Sleep Concern Yes     Special Diet Yes     less meat      Exercise Yes     3 x weekly     Seat Belt Yes     Social History Narrative         Review of Systems:  Skin:  Negative       Eyes:  Positive for  "glasses    ENT:  Negative      Respiratory:  Negative       Cardiovascular:    fatigue;Positive for;palpitations same as before  Gastroenterology: Negative      Genitourinary:  Negative      Musculoskeletal:  Negative      Neurologic:  Positive for numbness or tingling of hands;numbness or tingling of feet when sitting still for awhile  Psychiatric:  Positive for anxiety    Heme/Lymph/Imm:  Negative      Endocrine:  Negative        Physical Exam:  Vitals: /60  Pulse 68  Ht 1.734 m (5' 8.25\")  Wt 78.5 kg (173 lb)  BMI 26.11 kg/m2    Constitutional:  cooperative, alert and oriented, well developed, well nourished, in no acute distress        Skin:  warm and dry to the touch          Head:  normocephalic        Eyes:  pupils equal and round;sclera white;EOMS intact        Lymph:No Cervical lymphadenopathy present     ENT:  no pallor or cyanosis        Neck:  carotid pulses are full and equal bilaterally;JVP normal;no carotid bruit        Respiratory:  clear to auscultation;normal respiratory excursion;healed median sternotomy scar         Cardiac: regular rhythm;normal S1 and S2;no murmurs, gallops or rubs detected                                1+             1+          GI:  abdomen soft        Extremities and Muscular Skeletal:  no edema;no deformities, clubbing, cyanosis, erythema observed              Neurological:  affect appropriate        Psych:  oriented to time, person and place        Recent Lab Results:  LIPID RESULTS:  Lab Results   Component Value Date    CHOL 109 04/08/2017    HDL 40 04/08/2017    LDL 54 04/08/2017    TRIG 77 04/08/2017    CHOLHDLRATIO 2.6 01/22/2015       LIVER ENZYME RESULTS:  Lab Results   Component Value Date    AST 10 06/08/2017    ALT 9 06/08/2017       CBC RESULTS:  Lab Results   Component Value Date    WBC 6.4 09/01/2018    RBC 5.49 09/01/2018    HGB 15.4 09/01/2018    HCT 46.6 09/01/2018    MCV 85 09/01/2018    MCH 28.1 09/01/2018    MCHC 33.0 09/01/2018    RDW 14.0 " 09/01/2018     (L) 09/01/2018       BMP RESULTS:  Lab Results   Component Value Date     09/01/2018    POTASSIUM 4.2 09/01/2018    CHLORIDE 110 (H) 09/01/2018    CO2 26 09/01/2018    ANIONGAP 5 09/01/2018    GLC 94 09/01/2018    BUN 23 09/01/2018    CR 1.78 (H) 09/01/2018    GFRESTIMATED 37 (L) 09/01/2018    GFRESTBLACK 44 (L) 09/01/2018    FIDEL 8.2 (L) 09/01/2018        A1C RESULTS:  No results found for: A1C    INR RESULTS:  Lab Results   Component Value Date    INR 0.89 07/24/2017           CC  Michael Bernard MD  [id number]  [place] [id number]

## 2018-10-26 NOTE — MR AVS SNAPSHOT
After Visit Summary   10/26/2018    Chava Valentine    MRN: [id number]           Patient Information     Date Of Birth          10/14/1933        Visit Information        Provider Department      10/26/2018 10:15 AM Michael Bernard MD [facility]        Today's Diagnoses     Coronary artery disease involving native coronary artery of native heart without angina pectoris    -  1    Coronary artery disease involving coronary bypass graft of native heart without angina pectoris        Mixed hyperlipidemia        Essential hypertension        Paroxysmal atrial fibrillation (H)        Benign essential hypertension           Follow-ups after your visit        Additional Services     Follow-Up with Cardiologist                 Your next 10 appointments already scheduled     Oct 26, 2018 11:10 AM CDT   LAB with [facility]   [facility] ([facility])    [address]  [place]  [place] [id number] [phone]           Please do not eat 10-12 hours before your appointment if you are coming in fasting for labs on lipids, cholesterol, or glucose (sugar). This does not apply to pregnant women. Water, hot tea and black coffee (with nothing added) are okay. Do not drink other fluids, diet soda or chew gum.              Future tests that were ordered for you today     Open Future Orders        Priority Expected Expires Ordered    Follow-Up with Cardiologist Routine 10/26/2019 10/27/2019 10/26/2018            Who to contact     If you have questions or need follow up information about today's clinic visit or your schedule please contact [facility]   [facility] directly at [phone].  Normal or non-critical lab and imaging results will be communicated to you by MyChart, letter or phone within 4 business days after the clinic has received the results. If you do not hear from us within 7 days,  "please contact the clinic through [employer] or phone. If you have a critical or abnormal lab result, we will notify you by phone as soon as possible.  Submit refill requests through [employer] or call your pharmacy and they will forward the refill request to us. Please allow 3 business days for your refill to be completed.          Additional Information About Your Visit        Care EveryWhere ID     This is your Care EveryWhere ID. This could be used by other organizations to access your [facility] medical records  [id number]        Your Vitals Were     Pulse Height BMI (Body Mass Index)             68 1.734 m (5' 8.25\") 26.11 kg/m2          Blood Pressure from Last 3 Encounters:   10/26/18 118/60   09/01/18 151/82   06/21/18 134/68    Weight from Last 3 Encounters:   10/26/18 78.5 kg (173 lb)   09/01/18 79.4 kg (175 lb)   06/21/18 76.2 kg (168 lb)              We Performed the Following     Follow-Up with Cardiologist          Where to get your medicines      These medications [id number] [address] AT [address] [place] [address] & [facility]  [address] [facility] [address]    Hours:  24-hours Phone:  [phone]     dabigatran ANTICOAGULANT 150 MG capsule    ezetimibe 10 MG tablet    flecainide 100 MG tablet    lisinopril 10 MG tablet    rosuvastatin 10 MG tablet          Primary Care Provider Office Phone # Fax #    Mike Cosby [phone]       [facility] [address]  [facility] [address]        Equal Access to Services     [facility]: Hadii jessenia Ho, waaxda saul, qaybta kaaltim grady. So [facility] [phone].    ATENCIÓN: Si habla español, tiene a diaz disposición servicios gratuitos de asistencia lingüística. Anders al [phone].    We comply with applicable federal civil rights laws and [place] laws. We do not discriminate on the basis of race, color, national " origin, age, disability, sex, sexual orientation, or gender identity.            Thank you!     Thank you for choosing [facility]  for your care. Our goal is always to provide you with excellent care. Hearing back from our patients is one way we can continue to improve our services. Please take a few minutes to complete the written survey that you may receive in the mail after your visit with us. Thank you!             Your Updated Medication List - Protect others around you: Learn how to safely use, store and throw away your medicines at www.disposemymeds.org.          This list is accurate as of 10/26/18 11:00 AM.  Always use your most recent med list.                   Brand Name Dispense Instructions for use Diagnosis    albuterol 108 (90 Base) MCG/ACT inhaler    PROAIR HFA/PROVENTIL HFA/VENTOLIN HFA    1 Inhaler    Inhale 2 puffs into the lungs every 6 hours as needed for shortness of breath / dyspnea or wheezing        CLONAZEPAM PO      Take 0.25-0.5 mg by mouth 3 times daily as needed        dabigatran ANTICOAGULANT 150 MG capsule    PRADAXA ANTICOAGULANT    180 capsule    Take 1 capsule (150 mg) by mouth 2 times daily    Paroxysmal atrial fibrillation (H)       ezetimibe 10 MG tablet    ZETIA    90 tablet    Take 1 tablet (10 mg) by mouth daily    Coronary artery disease involving coronary bypass graft of native heart without angina pectoris, Mixed hyperlipidemia       flecainide 100 MG tablet    TAMBOCOR    180 tablet    Take 1 tablet (100 mg) by mouth 2 times daily    Paroxysmal atrial fibrillation (H)       fluticasone 50 MCG/ACT spray    FLONASE     Spray 1 spray into both nostrils daily as needed for rhinitis or allergies        latanoprost 0.005 % ophthalmic solution    XALATAN     INSTILL 1 DROP IN BOTH EYES QHS.        lisinopril 10 MG tablet    PRINIVIL/ZESTRIL    180 tablet    Take 1 tablet (10 mg) by mouth 2 times daily    Essential hypertension        METAMUCIL PO      Take 1 Dose by mouth daily as needed        nitroGLYcerin 0.4 MG sublingual tablet    NITROSTAT    25 tablet    For chest pain place 1 tablet under the tongue every 5 minutes for 3 doses. If symptoms persist 5 minutes after 1st dose call 911.    Atherosclerosis of native coronary artery of native heart without angina pectoris       PAPAV-PHENTOLAMINE-ALPROSTADIL IC      Inject 1 Dose as directed daily as needed (erectile dysfunction) Trimix Injection        PROTONIX PO      Take 40 mg by mouth 2 times daily        pyridostigmine 60 MG tablet    MESTINON     USE UP TO 3 TS DAILY AS NEEDED. DISPENSE IN ORIGINAL  CONTAINER        rosuvastatin 10 MG tablet    CRESTOR    90 tablet    Take 1 tablet (10 mg) by mouth daily    Coronary artery disease involving coronary bypass graft of native heart without angina pectoris, Mixed hyperlipidemia       TESTOSTERONE AQUEOUS IM      Inject 1 mL into the muscle every 14 days        ZANTAC PO      Take 150 mg by mouth daily as needed for heartburn        ZOLPIDEM TARTRATE PO      Take 5-10 mg by mouth nightly as needed

## 2018-10-26 NOTE — PROGRESS NOTES
Service Date: 10/26/2018      HISTORY OF PRESENT ILLNESS:  I got together with Chava today.  Chava is 85.  I always have to say he is one of my favorite people in life.  Back when he was younger, Chava and I used to go on the [employer] ski trip every year.  The last couple of years he has not been able to do so, although he dearly wishes that he could.      He has a history of known coronary disease, remotely coronary artery bypass surgery, but ever since his bypass surgery he has not had angina.        He has had paroxysmal atrial fibrillation but has been very well controlled on flecainide 100 mg twice a day.  I have reviewed his situation with my colleagues in , and although in some people it is less than ideal to have ischemic heart disease -- inducible ischemia, he has been so stable on flecainide with virtually no side effects that we have continued it.  Along those lines also, his serial stress nuclear studies, the last done earlier this year, showed an EF of 77% with a trivial area of apical ischemia that he has had for the last decade.      He does get testosterone injections every 2 weeks.  Accordingly, his hemoglobin has tended to run a little high at 16 grams, but otherwise as best I can tell it has not bothered him.      CURRENT MEDICATIONS:  His current program then is:   1.  Zetia 10 mg a day.   2.  Pradaxa 150 mg b.i.d.   3.  Flecainide 100 mg b.i.d.   4.  Lisinopril 10 mg a day.   5.  Rosuvastatin 10 mg at bedtime.        He also takes Zantac, Mestinon p.r.n., Mysoline, Metamucil, Protonix 40 mg b.i.d., Flonase p.r.n. and clonazepam p.r.n.      PHYSICAL EXAMINATION:   GENERAL:  Exam shows that Chava has not really changed for as long as I have known him.  His weight is 173 pounds.   VITAL SIGNS:  Blood pressure 118/60, heart rate 68 beats a minute and regular.   HEAD AND NECK:  Normal.  Carotids were equal, no bruits heard.   HEART:  Regular without gallop or murmur.   LUNGS:  Clear.  Sternum is solid  and well-healed.   ABDOMEN:  Flat, without organomegaly, mass or bruit.   EXTREMITIES:  Show diminished pedal pulses with no edema.      Chava Cowan is 85.  He is, of course, a retired physician.  He continues to seem to be asymptomatic.  He has had a history of paroxysmal atrial fibrillation, but flecainide has been associated with a remarkable rhythm stability.      Today, I checked a complete metabolic panel.  I asked to see him in a year.  I did not order a stress study this year, as he has been asymptomatic and he had a stress nuclear study 5 months ago.      He is a wonderful juany.  I have always enjoyed his sense of humor, and I would do anything to keep him in good shape.  If he has problems, let me know.  Warm regards.      Thanks for the privilege.      IMPRESSION:   1.  Asymptomatic coronary artery disease.   2.  Treated hypertension.   3.  Treated hyperlipidemia.   4.  Chronic renal insufficiency.  Creatinines have been in the 1.4-1.78 range.  I checked that today.  This may merit some adjustment.  If his renal function continues to deteriorate, I would favor cutting back or stopping the ACE inhibitor.      Over 35 minutes was spent doing the consult, over half the time face-to-face with education and counseling.  I reviewed old records, imaging, stress nuclear study, echo, laboratory work, laboratory data, medications, medication side effects and multiple medical problems as listed above.      cc:   Mike Cosby MD    [facility]    [address] [place] [address], [address]    [place]  [address]         TALISHA RIDLEY MD, [facility]             D: 10/26/2018   T: 10/26/2018   MT: DAVID      Name:     CHAVA COWAN   MRN:      [id number]        Account:      [id number]   :      10/14/1933           Service Date: 10/26/2018      Document: [id number]

## 2018-12-11 DIAGNOSIS — I48.0 PAROXYSMAL ATRIAL FIBRILLATION (H): ICD-10-CM

## 2018-12-11 RX ORDER — FLECAINIDE ACETATE 100 MG/1
100 TABLET ORAL 2 TIMES DAILY
Qty: 180 TABLET | Refills: 3 | Status: SHIPPED | OUTPATIENT
Start: 2018-12-11 | End: 2019-12-19

## 2018-12-18 ENCOUNTER — TELEPHONE (OUTPATIENT)
Dept: CARDIOLOGY | Facility: CLINIC | Age: 83
End: 2018-12-18

## 2018-12-20 NOTE — TELEPHONE ENCOUNTER
PA Initiation    Medication: dabigatran ANTICOAGULANT (PRADAXA ANTICOAGULANT) 150 MG capsule -   Insurance Company: [employer] - Phone [phone] Fax [phone]  Pharmacy Filling the Rx: [facility] [address] [place], [place] - [address]  Filling Pharmacy Phone: [phone]  Filling Pharmacy Fax: [phone]  Start Date: 12/20/2018

## 2018-12-24 NOTE — TELEPHONE ENCOUNTER
Prior Authorization Not Needed per Insurance    Medication: dabigatran ANTICOAGULANT (PRADAXA ANTICOAGULANT) 150 MG capsule - NOT NEEDED  Insurance Company: [employer] - Phone [phone] Fax [phone]  Expected CoPay:      Pharmacy Filling the Rx: [facility] [address] [place] [address] - [address]  Pharmacy Notified: Yes  Patient Notified: Comment:  **Per Lizzy Monte, patient received 90 day supply on 12/4/18

## 2019-01-29 ENCOUNTER — TRANSFERRED RECORDS (OUTPATIENT)
Dept: HEALTH INFORMATION MANAGEMENT | Facility: CLINIC | Age: 84
End: 2019-01-29

## 2019-03-18 ENCOUNTER — TRANSFERRED RECORDS (OUTPATIENT)
Dept: HEALTH INFORMATION MANAGEMENT | Facility: CLINIC | Age: 84
End: 2019-03-18

## 2019-03-20 ENCOUNTER — MEDICAL CORRESPONDENCE (OUTPATIENT)
Dept: HEALTH INFORMATION MANAGEMENT | Facility: CLINIC | Age: 84
End: 2019-03-20

## 2019-03-20 ENCOUNTER — TRANSFERRED RECORDS (OUTPATIENT)
Dept: HEALTH INFORMATION MANAGEMENT | Facility: CLINIC | Age: 84
End: 2019-03-20

## 2019-04-05 ENCOUNTER — OFFICE VISIT (OUTPATIENT)
Dept: CARDIOLOGY | Facility: CLINIC | Age: 84
End: 2019-04-05
Payer: MEDICARE

## 2019-04-05 VITALS
SYSTOLIC BLOOD PRESSURE: 157 MMHG | HEIGHT: 68 IN | BODY MASS INDEX: 25.76 KG/M2 | WEIGHT: 170 LBS | HEART RATE: 67 BPM | DIASTOLIC BLOOD PRESSURE: 85 MMHG

## 2019-04-05 DIAGNOSIS — I25.810 CORONARY ARTERY DISEASE INVOLVING CORONARY BYPASS GRAFT OF NATIVE HEART WITHOUT ANGINA PECTORIS: Primary | ICD-10-CM

## 2019-04-05 DIAGNOSIS — I95.1 ORTHOSTATIC HYPOTENSION: ICD-10-CM

## 2019-04-05 DIAGNOSIS — E78.2 MIXED HYPERLIPIDEMIA: ICD-10-CM

## 2019-04-05 DIAGNOSIS — I44.7 LEFT BUNDLE BRANCH BLOCK: ICD-10-CM

## 2019-04-05 DIAGNOSIS — I10 ESSENTIAL HYPERTENSION: ICD-10-CM

## 2019-04-05 PROCEDURE — 99214 OFFICE O/P EST MOD 30 MIN: CPT | Performed by: INTERNAL MEDICINE

## 2019-04-05 RX ORDER — AMLODIPINE BESYLATE 5 MG/1
5 TABLET ORAL DAILY
Qty: 30 TABLET | Refills: 11 | Status: SHIPPED | OUTPATIENT
Start: 2019-04-05 | End: 2019-04-16 | Stop reason: ALTCHOICE

## 2019-04-05 ASSESSMENT — MIFFLIN-ST. JEOR: SCORE: 1434.58

## 2019-04-05 NOTE — PROGRESS NOTES
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 Bernard's F[facility].    He has a significant history for 4:    1-coronary artery disease with four-vessel CABG about 1995  2-essential hypertension  3-recurrent symptomatic orthostatic hypotension  4-mixed dyslipidemia with good control on his current regimen of rosuvastatin and ezetimibe  5-paroxysmal atrial fibrillation, for which she has been on flecainide for many years to suppress this fairly effectively.  6-chronic renal insufficiency, stage III    He comes in today noting he is having lots of problems the last few months with orthostatic hypotension.  He is always noted that when his blood pressure falls below around 125 systolic he gets very lightheaded and dizzy.  He is having more more these episodes recently.  He finally stopped his lisinopril recently and they have resolved, although his blood pressure is now creeping up, being in the 150 systolic range today.  He also noticed some essential tremor significantly improved and some mild peripheral edema resolved after stopping the lisinopril.  He is convinced those were related to the medication.    He is not have any dyspnea on exertion, chest discomfort or other ischemic symptoms, orthopnea PND or edema.  Occasionally gets palpitations first thing in the morning with heart rates around 130, when he does get this he does a Valsalva maneuver and it goes away.  He is otherwise asymptomatic at that time.  He has had no syncope or near syncope.  Denies myalgias or muscle weakness.      I reviewed recent labs he had in January of this year from his primary physician, Dr. Mike Cosby, showing normal electrolytes, fasting glucose, and LFTs.  The creatinine was 1.62 which is around his baseline.  For years now his LDLs have been in the 50-55 range with normal triglycerides on his current regimen.    Exam is detailed below.  In  summary:  Blood pressure as noted above around 150 systolic.  No acute distress.  Lungs-clear with good air movement  Cardiac-no JVD or HJR or heave.  Late systolic grade 2 murmur near the apex suggesting some late prolapse murmur.  Abdomen-no bruits  Extremities-soft left femoral bruit with normal right and left femoral pulses are normal right and left posterior tibial pulses    Impression/plan    1-coronary artery disease.  Currently without ischemic symptoms.  Recent ejection fraction in mid 2018 was normal.  A stress study in mid 2018 showed possible small region of distal anterior and apical ischemia but he has left bundle branch block which could be accounting for this.  He is asymptomatic.  I recommended continue medical therapy    2-dyslipidemia, good control.  Continue current regimen    3-essential hypertension.  Was not tolerating lisinopril but now blood pressure is suboptimal.  He is wanting to try something else so we will try amlodipine 5 mg daily.  He takes his blood pressure including orthostatic very reliably at home and keep us posted as to how he is doing.    4-orthostatic hypotension and drug side effect as per #3.    5-paroxysmal atrial fibrillation.  On chronic flecainide even though he has coronary disease.  This is been discussed with him through Dr. Bernard and also with EP and given his long track record Dr. Bernard wanted to continue this.  I reviewed the issues with flecainide in patients with coronary disease with Dr. Bland today.  Given his long track record and recent stress study results he prefers to continue his flecainide at this time.      It is very nice to see this gentleman who I had the opportunity to go and ski trips with in the past.  At this time I have switched his lisinopril to amlodipine, 5 mg daily.  Otherwise he will continue his prior cardiovascular regimen pending the results and response to amlodipine.    Orders Placed This Encounter   Procedures     Follow-Up with  Cardiologist       Orders Placed This Encounter   Medications     amLODIPine (NORVASC) 5 MG tablet     Sig: Take 1 tablet (5 mg) by mouth daily     Dispense:  30 tablet     Refill:  11       Medications Discontinued During This Encounter   Medication Reason     lisinopril (PRINIVIL/ZESTRIL) 10 MG tablet Side effects         Encounter Diagnoses   Name Primary?     Coronary artery disease involving coronary bypass graft of native heart without angina pectoris Yes     Mixed hyperlipidemia      Essential hypertension      Left bundle branch block      Orthostatic hypotension        CURRENT MEDICATIONS:  Current Outpatient Medications   Medication Sig Dispense Refill     albuterol (PROAIR HFA/PROVENTIL HFA/VENTOLIN HFA) 108 (90 Base) MCG/ACT Inhaler Inhale 2 puffs into the lungs every 6 hours as needed for shortness of breath / dyspnea or wheezing 1 Inhaler 0     amLODIPine (NORVASC) 5 MG tablet Take 1 tablet (5 mg) by mouth daily 30 tablet 11     CLONAZEPAM PO Take 0.25-0.5 mg by mouth 3 times daily as needed        dabigatran ANTICOAGULANT (PRADAXA ANTICOAGULANT) 150 MG capsule Take 1 capsule (150 mg) by mouth 2 times daily 180 capsule 3     ezetimibe (ZETIA) 10 MG tablet Take 1 tablet (10 mg) by mouth daily 90 tablet 3     flecainide (TAMBOCOR) 100 MG tablet Take 1 tablet (100 mg) by mouth 2 times daily 180 tablet 3     fluticasone (FLONASE) 50 MCG/ACT spray Spray 1 spray into both nostrils daily as needed for rhinitis or allergies       latanoprost (XALATAN) 0.005 % ophthalmic solution INSTILL 1 DROP IN BOTH EYES QHS.  6     nitroGLYcerin (NITROSTAT) 0.4 MG sublingual tablet For chest pain place 1 tablet under the tongue every 5 minutes for 3 doses. If symptoms persist 5 minutes after 1st dose call 911. 25 tablet 3     Pantoprazole Sodium (PROTONIX PO) Take 40 mg by mouth 2 times daily       PAPAV-PHENTOLAMINE-ALPROSTADIL IC Inject 1 Dose as directed daily as needed (erectile dysfunction) Trimix Injection        Psyllium (METAMUCIL PO) Take 1 Dose by mouth daily as needed        RaNITidine HCl (ZANTAC PO) Take 150 mg by mouth daily as needed for heartburn       rosuvastatin (CRESTOR) 10 MG tablet Take 1 tablet (10 mg) by mouth daily 90 tablet 4     TESTOSTERONE AQUEOUS IM Inject 1 mL into the muscle every 14 days        ZOLPIDEM TARTRATE PO Take 5-10 mg by mouth nightly as needed        pyridostigmine (MESTINON) 60 MG tablet USE UP TO 3 TS DAILY AS NEEDED. DISPENSE IN ORIGINAL  CONTAINER  3       ALLERGIES     Allergies   Allergen Reactions     No Clinical Screening - See Comments Rash     Dove soap       PAST MEDICAL HISTORY:  Past Medical History:   Diagnosis Date     Allergic rhinitis      Anxiety      Contact dermatitis      Coronary artery disease     CABG with 4 grafts-LIMA to LAD, SVG to Diagonal,OM and RCA     Cough      Elevated prolactin level (H)      Erythrocytosis      Gastro-oesophageal reflux disease      GI bleed 10/10     Hyperlipidemia      Hypertension      Hypogonadism     with low testosterone     Insomnia      Paroxysmal atrial fibrillation (H)     flecainide therapy     Post-nasal drip      Prediabetes      Renal disease     CKD     Shoulder arthritis      SVT (supraventricular tachycardia) (H)      Tremor        PAST SURGICAL HISTORY:  Past Surgical History:   Procedure Laterality Date     BYPASS GRAFT ARTERY CORONARY       CARDIAC SURGERY       COLONOSCOPY  12/2010    tubular adenoma     LASER SELECTIVE TRABECULOPLASTY BILATERAL Bilateral 6/11/2018    Procedure: LASER SELECTIVE TRABECULOPLASTY BILATERAL;  LASER SELECTIVE TRABECULOPLASTY BILATERAL ;  Surgeon: Janice, Parmjit CRAWFORD MD;  Location: [facility]     ORTHOPEDIC SURGERY      rotator cuff right     PHACOEMULSIFICATION CLEAR CORNEA WITH STANDARD IOL, VITRECTOMY PARSPLANA 23 GAGUE, COMBINED  2/20/2013    Procedure: COMBINED PHACOEMULSIFICATION CLEAR CORNEA WITH STANDARD INTRAOCULAR LENS IMPLANT, VITRECTOMY PARSPLANA 23 GAUGE;;  Surgeon:  Guero Lockett MD;  Location:  [facility]     PHACOEMULSIFICATION CLEAR CORNEA WITH STANDARD IOL, VITRECTOMY PARSPLANA 23 GAGUE, COMBINED  7/31/2013    Procedure: COMBINED PHACOEMULSIFICATION CLEAR CORNEA WITH STANDARD INTRAOCULAR LENS IMPLANT, VITRECTOMY PARSPLANA 23 GAUGE;;  Surgeon: Guero Lockett MD;  Location: [facility]     REVERSE ARTHROPLASTY SHOULDER Right 7/24/2017    Procedure: REVERSE ARTHROPLASTY SHOULDER;  RIGHT REVERSE TOTAL SHOULDER ARTHROPLASTY ;  Surgeon: Rodolfo Calhoun MD;  Location:  [facility]     VASCULAR SURGERY      cab     VITRECTOMY PARSPLANA, PHACOEMULSIFICATION CLEAR CORNEA W STANDARD INTRAOCULAR LENS IMPLANT, COMBINED  2/20/2013    Procedure: COMBINED VITRECTOMY PARSPLANA, PHACOEMULSIFICATION CLEAR CORNEA WITH STANDARD INTRAOCULAR LENS IMPLANT;  LEFT PHACOEMULSIFICATION CLEAR CORNEA WITH SAURABH TORIC INTRAOCULAR LENS IMPLANT,  LEFT EYE 23 GAUGE PARSPLANA VITRECTOMY;  Surgeon: Jorge Regan MD;  Location: [facility]     VITRECTOMY PARSPLANA, PHACOEMULSIFICATION CLEAR CORNEA W STANDARD INTRAOCULAR LENS IMPLANT, COMBINED  7/31/2013    Procedure: COMBINED VITRECTOMY PARSPLANA, PHACOEMULSIFICATION CLEAR CORNEA WITH STANDARD INTRAOCULAR LENS IMPLANT;  RIGHT PHACOEMULSIFICATION CLEAR CORNEA WITH TORIC INTRAOCULAR LENS IMPLANT, RIGHT 23 GAUGE PARSPLANA VITRECTOMY ;  Surgeon: Jorge Regan MD;  Location: [facility]       FAMILY HISTORY:  Family History   Problem Relation Age of Onset     Other - See Comments Mother         old age     Other - See Comments Father 97     Cerebrovascular Disease Father        SOCIAL HISTORY:  Social History     Socioeconomic History     Marital status:      Spouse name: None     Number of children: None     Years of education: None     Highest education level: None   Occupational History     None   Social Needs     Financial resource strain: None     Food insecurity:     Worry: None     Inability: None     Transportation needs:     Medical: None      Non-medical: None   Tobacco Use     Smoking status: Former Smoker     Packs/day: 0.10     Years: 20.00     Pack years: 2.00     Types: Cigarettes     Start date:      Last attempt to quit: 1999     Years since quittin.2     Smokeless tobacco: Never Used     Tobacco comment: recreational only   Substance and Sexual Activity     Alcohol use: Yes     Comment: occas     Drug use: No     Sexual activity: None   Lifestyle     Physical activity:     Days per week: None     Minutes per session: None     Stress: None   Relationships     Social connections:     Talks on phone: None     Gets together: None     Attends Jew service: None     Active member of club or organization: None     Attends meetings of clubs or organizations: None     Relationship status: None     Intimate partner violence:     Fear of current or ex partner: None     Emotionally abused: None     Physically abused: None     Forced sexual activity: None   Other Topics Concern     Parent/sibling w/ CABG, MI or angioplasty before 65F 55M? No      Service Not Asked     Blood Transfusions Not Asked     Caffeine Concern Not Asked     Occupational Exposure Not Asked     Hobby Hazards Not Asked     Sleep Concern Yes     Stress Concern Not Asked     Weight Concern Not Asked     Special Diet Yes     Comment: less meat      Back Care Not Asked     Exercise Yes     Comment: 3 x weekly     Bike Helmet Not Asked     Seat Belt Yes     Self-Exams Not Asked   Social History Narrative     None       Review of Systems:  Skin:  Negative       Eyes:  Positive for glasses    ENT:  Positive for nasal congestion;hearing loss;tinnitus    Respiratory:  Negative       Cardiovascular:    edema;Positive for;lightheadedness lightheaded from Lisinopril?  Gastroenterology: Positive for heartburn treated  Genitourinary:  Negative      Musculoskeletal:  Negative      Neurologic:  Positive for numbness or tingling of hands    Psychiatric:  Negative     "  Heme/Lymph/Imm:  Negative      Endocrine:  Negative        Physical Exam:  Vitals: /85   Pulse 67   Ht 1.734 m (5' 8.25\")   Wt 77.1 kg (170 lb)   BMI 25.66 kg/m      Constitutional:  cooperative, alert and oriented, well developed, well nourished, in no acute distress        Skin:  warm and dry to the touch          Head:  normocephalic        Eyes:  pupils equal and round;sclera white;EOMS intact        Lymph:      ENT:  no pallor or cyanosis        Neck:  carotid pulses are full and equal bilaterally;JVP normal;no carotid bruit        Respiratory:  clear to auscultation;normal respiratory excursion;healed median sternotomy scar         Cardiac: regular rhythm;normal S1 and S2;no S3 or S4;apical impulse not displaced       late systolic murmur;LLSB;apical;grade 2                        2+;right posterior tibial artery             left posterior tibial artery;2+   left femoral bruit (+)      GI:  abdomen soft;no HSM;no bruits        Extremities and Muscular Skeletal:  no edema;no deformities, clubbing, cyanosis, erythema observed              Neurological:  affect appropriate;no gross motor deficits        Psych:  oriented to time, person and place        CC  No referring provider defined for this encounter.              "

## 2019-04-05 NOTE — LETTER
4/5/2019    Mike Cosby MD  [facility] [address]  [place] [address]    RE: Chava Valentine       Dear Colleague,    I had the pleasure of seeing Chava BRYANT Meme in the [facility].    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 Bernard's USP.    He has a significant history for 4:    1-coronary artery disease with four-vessel CABG about 1995  2-essential hypertension  3-recurrent symptomatic orthostatic hypotension  4-mixed dyslipidemia with good control on his current regimen of rosuvastatin and ezetimibe  5-paroxysmal atrial fibrillation, for which she has been on flecainide for many years to suppress this fairly effectively.  6-chronic renal insufficiency, stage III    He comes in today noting he is having lots of problems the last few months with orthostatic hypotension.  He is always noted that when his blood pressure falls below around 125 systolic he gets very lightheaded and dizzy.  He is having more more these episodes recently.  He finally stopped his lisinopril recently and they have resolved, although his blood pressure is now creeping up, being in the 150 systolic range today.  He also noticed some essential tremor significantly improved and some mild peripheral edema resolved after stopping the lisinopril.  He is convinced those were related to the medication.    He is not have any dyspnea on exertion, chest discomfort or other ischemic symptoms, orthopnea PND or edema.  Occasionally gets palpitations first thing in the morning with heart rates around 130, when he does get this he does a Valsalva maneuver and it goes away.  He is otherwise asymptomatic at that time.  He has had no syncope or near syncope.  Denies myalgias or muscle weakness.      I reviewed recent labs he had in January of this year from his primary physician, Dr. Mckeon  Harjeet, showing normal electrolytes, fasting glucose, and LFTs.  The creatinine was 1.62 which is around his baseline.  For years now his LDLs have been in the 50-55 range with normal triglycerides on his current regimen.    Exam is detailed below.  In summary:  Blood pressure as noted above around 150 systolic.  No acute distress.  Lungs-clear with good air movement  Cardiac-no JVD or HJR or heave.  Late systolic grade 2 murmur near the apex suggesting some late prolapse murmur.  Abdomen-no bruits  Extremities-soft left femoral bruit with normal right and left femoral pulses are normal right and left posterior tibial pulses    Impression/plan    1-coronary artery disease.  Currently without ischemic symptoms.  Recent ejection fraction in mid 2018 was normal.  A stress study in mid 2018 showed possible small region of distal anterior and apical ischemia but he has left bundle branch block which could be accounting for this.  He is asymptomatic.  I recommended continue medical therapy    2-dyslipidemia, good control.  Continue current regimen    3-essential hypertension.  Was not tolerating lisinopril but now blood pressure is suboptimal.  He is wanting to try something else so we will try amlodipine 5 mg daily.  He takes his blood pressure including orthostatic very reliably at home and keep us posted as to how he is doing.    4-orthostatic hypotension and drug side effect as per #3.    5-paroxysmal atrial fibrillation.  On chronic flecainide even though he has coronary disease.  This is been discussed with him through Dr. Bernard and also with EP and given his long track record Dr. Bernard wanted to continue this.  I reviewed the issues with flecainide in patients with coronary disease with Dr. Bland today.  Given his long track record and recent stress study results he prefers to continue his flecainide at this time.      It is very nice to see this gentleman who I had the opportunity to go and ski trips with in  the past.  At this time I have switched his lisinopril to amlodipine, 5 mg daily.  Otherwise he will continue his prior cardiovascular regimen pending the results and response to amlodipine.    Orders Placed This Encounter   Procedures     Follow-Up with Cardiologist       Orders Placed This Encounter   Medications     amLODIPine (NORVASC) 5 MG tablet     Sig: Take 1 tablet (5 mg) by mouth daily     Dispense:  30 tablet     Refill:  11       Medications Discontinued During This Encounter   Medication Reason     lisinopril (PRINIVIL/ZESTRIL) 10 MG tablet Side effects         Encounter Diagnoses   Name Primary?     Coronary artery disease involving coronary bypass graft of native heart without angina pectoris Yes     Mixed hyperlipidemia      Essential hypertension      Left bundle branch block      Orthostatic hypotension        CURRENT MEDICATIONS:  Current Outpatient Medications   Medication Sig Dispense Refill     albuterol (PROAIR HFA/PROVENTIL HFA/VENTOLIN HFA) 108 (90 Base) MCG/ACT Inhaler Inhale 2 puffs into the lungs every 6 hours as needed for shortness of breath / dyspnea or wheezing 1 Inhaler 0     amLODIPine (NORVASC) 5 MG tablet Take 1 tablet (5 mg) by mouth daily 30 tablet 11     CLONAZEPAM PO Take 0.25-0.5 mg by mouth 3 times daily as needed        dabigatran ANTICOAGULANT (PRADAXA ANTICOAGULANT) 150 MG capsule Take 1 capsule (150 mg) by mouth 2 times daily 180 capsule 3     ezetimibe (ZETIA) 10 MG tablet Take 1 tablet (10 mg) by mouth daily 90 tablet 3     flecainide (TAMBOCOR) 100 MG tablet Take 1 tablet (100 mg) by mouth 2 times daily 180 tablet 3     fluticasone (FLONASE) 50 MCG/ACT spray Spray 1 spray into both nostrils daily as needed for rhinitis or allergies       latanoprost (XALATAN) 0.005 % ophthalmic solution INSTILL 1 DROP IN BOTH EYES QHS.  6     nitroGLYcerin (NITROSTAT) 0.4 MG sublingual tablet For chest pain place 1 tablet under the tongue every 5 minutes for 3 doses. If symptoms  persist 5 minutes after 1st dose call 911. 25 tablet 3     Pantoprazole Sodium (PROTONIX PO) Take 40 mg by mouth 2 times daily       PAPAV-PHENTOLAMINE-ALPROSTADIL IC Inject 1 Dose as directed daily as needed (erectile dysfunction) Trimix Injection       Psyllium (METAMUCIL PO) Take 1 Dose by mouth daily as needed        RaNITidine HCl (ZANTAC PO) Take 150 mg by mouth daily as needed for heartburn       rosuvastatin (CRESTOR) 10 MG tablet Take 1 tablet (10 mg) by mouth daily 90 tablet 4     TESTOSTERONE AQUEOUS IM Inject 1 mL into the muscle every 14 days        ZOLPIDEM TARTRATE PO Take 5-10 mg by mouth nightly as needed        pyridostigmine (MESTINON) 60 MG tablet USE UP TO 3 TS DAILY AS NEEDED. DISPENSE IN ORIGINAL  CONTAINER  3       ALLERGIES     Allergies   Allergen Reactions     No Clinical Screening - See Comments Rash     Dove soap       PAST MEDICAL HISTORY:  Past Medical History:   Diagnosis Date     Allergic rhinitis      Anxiety      Contact dermatitis      Coronary artery disease     CABG with 4 grafts-LIMA to LAD, SVG to Diagonal,OM and RCA     Cough      Elevated prolactin level (H)      Erythrocytosis      Gastro-oesophageal reflux disease      GI bleed 10/10     Hyperlipidemia      Hypertension      Hypogonadism     with low testosterone     Insomnia      Paroxysmal atrial fibrillation (H)     flecainide therapy     Post-nasal drip      Prediabetes      Renal disease     CKD     Shoulder arthritis      SVT (supraventricular tachycardia) (H)      Tremor        PAST SURGICAL HISTORY:  Past Surgical History:   Procedure Laterality Date     BYPASS GRAFT ARTERY CORONARY       CARDIAC SURGERY       COLONOSCOPY  12/2010    tubular adenoma     LASER SELECTIVE TRABECULOPLASTY BILATERAL Bilateral 6/11/2018    Procedure: LASER SELECTIVE TRABECULOPLASTY BILATERAL;  LASER SELECTIVE TRABECULOPLASTY BILATERAL ;  Surgeon: Melrose, Parmjit CRAWFORD MD;  Location: [facility]     ORTHOPEDIC SURGERY      rotator cuff  right     PHACOEMULSIFICATION CLEAR CORNEA WITH STANDARD IOL, VITRECTOMY PARSPLANA 23 GAGUE, COMBINED  2/20/2013    Procedure: COMBINED PHACOEMULSIFICATION CLEAR CORNEA WITH STANDARD INTRAOCULAR LENS IMPLANT, VITRECTOMY PARSPLANA 23 GAUGE;;  Surgeon: Guero Lockett MD;  Location: [facility]     PHACOEMULSIFICATION CLEAR CORNEA WITH STANDARD IOL, VITRECTOMY PARSPLANA 23 GAGUE, COMBINED  7/31/2013    Procedure: COMBINED PHACOEMULSIFICATION CLEAR CORNEA WITH STANDARD INTRAOCULAR LENS IMPLANT, VITRECTOMY PARSPLANA 23 GAUGE;;  Surgeon: Guero Lockett MD;  Location: [facility]     REVERSE ARTHROPLASTY SHOULDER Right 7/24/2017    Procedure: REVERSE ARTHROPLASTY SHOULDER;  RIGHT REVERSE TOTAL SHOULDER ARTHROPLASTY ;  Surgeon: Rodolfo Calhoun MD;  Location:  [facility]     VASCULAR SURGERY      cab     VITRECTOMY PARSPLANA, PHACOEMULSIFICATION CLEAR CORNEA W STANDARD INTRAOCULAR LENS IMPLANT, COMBINED  2/20/2013    Procedure: COMBINED VITRECTOMY PARSPLANA, PHACOEMULSIFICATION CLEAR CORNEA WITH STANDARD INTRAOCULAR LENS IMPLANT;  LEFT PHACOEMULSIFICATION CLEAR CORNEA WITH SAURABH TORIC INTRAOCULAR LENS IMPLANT,  LEFT EYE 23 GAUGE PARSPLANA VITRECTOMY;  Surgeon: Jorge Regan MD;  Location: [facility]     VITRECTOMY PARSPLANA, PHACOEMULSIFICATION CLEAR CORNEA W STANDARD INTRAOCULAR LENS IMPLANT, COMBINED  7/31/2013    Procedure: COMBINED VITRECTOMY PARSPLANA, PHACOEMULSIFICATION CLEAR CORNEA WITH STANDARD INTRAOCULAR LENS IMPLANT;  RIGHT PHACOEMULSIFICATION CLEAR CORNEA WITH TORIC INTRAOCULAR LENS IMPLANT, RIGHT 23 GAUGE PARSPLANA VITRECTOMY ;  Surgeon: Jorge Regan MD;  Location: [facility]       FAMILY HISTORY:  Family History   Problem Relation Age of Onset     Other - See Comments Mother         old age     Other - See Comments Father 97     Cerebrovascular Disease Father        SOCIAL HISTORY:  Social History     Socioeconomic History     Marital status:      Spouse name: None     Number of children: None     Years  of education: None     Highest education level: None   Occupational History     None   Social Needs     Financial resource strain: None     Food insecurity:     Worry: None     Inability: None     Transportation needs:     Medical: None     Non-medical: None   Tobacco Use     Smoking status: Former Smoker     Packs/day: 0.10     Years: 20.00     Pack years: 2.00     Types: Cigarettes     Start date:      Last attempt to quit: 1999     Years since quittin.2     Smokeless tobacco: Never Used     Tobacco comment: recreational only   Substance and Sexual Activity     Alcohol use: Yes     Comment: occas     Drug use: No     Sexual activity: None   Lifestyle     Physical activity:     Days per week: None     Minutes per session: None     Stress: None   Relationships     Social connections:     Talks on phone: None     Gets together: None     Attends [employer] service: None     Active member of club or organization: None     Attends meetings of clubs or organizations: None     Relationship status: None     Intimate partner violence:     Fear of current or ex partner: None     Emotionally abused: None     Physically abused: None     Forced sexual activity: None   Other Topics Concern     Parent/sibling w/ CABG, MI or angioplasty before 65F 55M? No      Service Not Asked     Blood Transfusions Not Asked     Caffeine Concern Not Asked     Occupational Exposure Not Asked     Hobby Hazards Not Asked     Sleep Concern Yes     Stress Concern Not Asked     Weight Concern Not Asked     Special Diet Yes     Comment: less meat      Back Care Not Asked     Exercise Yes     Comment: 3 x weekly     Bike Helmet Not Asked     Seat Belt Yes     Self-Exams Not Asked   Social History Narrative     None       Review of Systems:  Skin:  Negative       Eyes:  Positive for glasses    ENT:  Positive for nasal congestion;hearing loss;tinnitus    Respiratory:  Negative       Cardiovascular:    edema;Positive for;lightheadedness  "lightheaded from Lisinopril?  Gastroenterology: Positive for heartburn treated  Genitourinary:  Negative      Musculoskeletal:  Negative      Neurologic:  Positive for numbness or tingling of hands    Psychiatric:  Negative      Heme/Lymph/Imm:  Negative      Endocrine:  Negative        Physical Exam:  Vitals: /85   Pulse 67   Ht 1.734 m (5' 8.25\")   Wt 77.1 kg (170 lb)   BMI 25.66 kg/m       Constitutional:  cooperative, alert and oriented, well developed, well nourished, in no acute distress        Skin:  warm and dry to the touch          Head:  normocephalic        Eyes:  pupils equal and round;sclera white;EOMS intact        Lymph:      ENT:  no pallor or cyanosis        Neck:  carotid pulses are full and equal bilaterally;JVP normal;no carotid bruit        Respiratory:  clear to auscultation;normal respiratory excursion;healed median sternotomy scar         Cardiac: regular rhythm;normal S1 and S2;no S3 or S4;apical impulse not displaced       late systolic murmur;LLSB;apical;grade 2                        2+;right posterior tibial artery             left posterior tibial artery;2+   left femoral bruit (+)      GI:  abdomen soft;no HSM;no bruits        Extremities and Muscular Skeletal:  no edema;no deformities, clubbing, cyanosis, erythema observed              Neurological:  affect appropriate;no gross motor deficits        Psych:  oriented to time, person and place        CC  No referring provider defined for this encounter.                Thank you for allowing me to participate in the care of your patient.      Sincerely,     Jacques Vegas MD     [facility]    cc:   No referring provider defined for this encounter.        "

## 2019-04-05 NOTE — LETTER
4/5/2019    Mike Cosby MD  [facility] [address]  [place] [address]    RE: Chava Valentine       Dear Colleague,    I had the pleasure of seeing hCava BRYANT Meme in the [facility].    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 Bernard's shelter.    He has a significant history for 4:    1-coronary artery disease with four-vessel CABG about 1995  2-essential hypertension  3-recurrent symptomatic orthostatic hypotension  4-mixed dyslipidemia with good control on his current regimen of rosuvastatin and ezetimibe  5-paroxysmal atrial fibrillation, for which she has been on flecainide for many years to suppress this fairly effectively.  6-chronic renal insufficiency, stage III    He comes in today noting he is having lots of problems the last few months with orthostatic hypotension.  He is always noted that when his blood pressure falls below around 125 systolic he gets very lightheaded and dizzy.  He is having more more these episodes recently.  He finally stopped his lisinopril recently and they have resolved, although his blood pressure is now creeping up, being in the 150 systolic range today.  He also noticed some essential tremor significantly improved and some mild peripheral edema resolved after stopping the lisinopril.  He is convinced those were related to the medication.    He is not have any dyspnea on exertion, chest discomfort or other ischemic symptoms, orthopnea PND or edema.  Occasionally gets palpitations first thing in the morning with heart rates around 130, when he does get this he does a Valsalva maneuver and it goes away.  He is otherwise asymptomatic at that time.  He has had no syncope or near syncope.  Denies myalgias or muscle weakness.      I reviewed recent labs he had in January of this year from his primary physician, Dr. Mckeon  Harjeet, showing normal electrolytes, fasting glucose, and LFTs.  The creatinine was 1.62 which is around his baseline.  For years now his LDLs have been in the 50-55 range with normal triglycerides on his current regimen.    Exam is detailed below.  In summary:  Blood pressure as noted above around 150 systolic.  No acute distress.  Lungs-clear with good air movement  Cardiac-no JVD or HJR or heave.  Late systolic grade 2 murmur near the apex suggesting some late prolapse murmur.  Abdomen-no bruits  Extremities-soft left femoral bruit with normal right and left femoral pulses are normal right and left posterior tibial pulses    Impression/plan    1-coronary artery disease.  Currently without ischemic symptoms.  Recent ejection fraction in mid 2018 was normal.  A stress study in mid 2018 showed possible small region of distal anterior and apical ischemia but he has left bundle branch block which could be accounting for this.  He is asymptomatic.  I recommended continue medical therapy    2-dyslipidemia, good control.  Continue current regimen    3-essential hypertension.  Was not tolerating lisinopril but now blood pressure is suboptimal.  He is wanting to try something else so we will try amlodipine 5 mg daily.  He takes his blood pressure including orthostatic very reliably at home and keep us posted as to how he is doing.    4-orthostatic hypotension and drug side effect as per #3.    5-paroxysmal atrial fibrillation.  On chronic flecainide even though he has coronary disease.  This is been discussed with him through Dr. Bernard and also with EP and given his long track record Dr. Bernard wanted to continue this.  I reviewed the issues with flecainide in patients with coronary disease with Dr. Bland today.  Given his long track record and recent stress study results he prefers to continue his flecainide at this time.      It is very nice to see this gentleman who I had the opportunity to go and ski trips with in  the past.  At this time I have switched his lisinopril to amlodipine, 5 mg daily.  Otherwise he will continue his prior cardiovascular regimen pending the results and response to amlodipine.    Orders Placed This Encounter   Procedures     Follow-Up with Cardiologist       Orders Placed This Encounter   Medications     amLODIPine (NORVASC) 5 MG tablet     Sig: Take 1 tablet (5 mg) by mouth daily     Dispense:  30 tablet     Refill:  11       Medications Discontinued During This Encounter   Medication Reason     lisinopril (PRINIVIL/ZESTRIL) 10 MG tablet Side effects         Encounter Diagnoses   Name Primary?     Coronary artery disease involving coronary bypass graft of native heart without angina pectoris Yes     Mixed hyperlipidemia      Essential hypertension      Left bundle branch block      Orthostatic hypotension        CURRENT MEDICATIONS:  Current Outpatient Medications   Medication Sig Dispense Refill     albuterol (PROAIR HFA/PROVENTIL HFA/VENTOLIN HFA) 108 (90 Base) MCG/ACT Inhaler Inhale 2 puffs into the lungs every 6 hours as needed for shortness of breath / dyspnea or wheezing 1 Inhaler 0     amLODIPine (NORVASC) 5 MG tablet Take 1 tablet (5 mg) by mouth daily 30 tablet 11     CLONAZEPAM PO Take 0.25-0.5 mg by mouth 3 times daily as needed        dabigatran ANTICOAGULANT (PRADAXA ANTICOAGULANT) 150 MG capsule Take 1 capsule (150 mg) by mouth 2 times daily 180 capsule 3     ezetimibe (ZETIA) 10 MG tablet Take 1 tablet (10 mg) by mouth daily 90 tablet 3     flecainide (TAMBOCOR) 100 MG tablet Take 1 tablet (100 mg) by mouth 2 times daily 180 tablet 3     fluticasone (FLONASE) 50 MCG/ACT spray Spray 1 spray into both nostrils daily as needed for rhinitis or allergies       latanoprost (XALATAN) 0.005 % ophthalmic solution INSTILL 1 DROP IN BOTH EYES QHS.  6     nitroGLYcerin (NITROSTAT) 0.4 MG sublingual tablet For chest pain place 1 tablet under the tongue every 5 minutes for 3 doses. If symptoms  persist 5 minutes after 1st dose call 911. 25 tablet 3     Pantoprazole Sodium (PROTONIX PO) Take 40 mg by mouth 2 times daily       PAPAV-PHENTOLAMINE-ALPROSTADIL IC Inject 1 Dose as directed daily as needed (erectile dysfunction) Trimix Injection       Psyllium (METAMUCIL PO) Take 1 Dose by mouth daily as needed        RaNITidine HCl (ZANTAC PO) Take 150 mg by mouth daily as needed for heartburn       rosuvastatin (CRESTOR) 10 MG tablet Take 1 tablet (10 mg) by mouth daily 90 tablet 4     TESTOSTERONE AQUEOUS IM Inject 1 mL into the muscle every 14 days        ZOLPIDEM TARTRATE PO Take 5-10 mg by mouth nightly as needed        pyridostigmine (MESTINON) 60 MG tablet USE UP TO 3 TS DAILY AS NEEDED. DISPENSE IN ORIGINAL  CONTAINER  3       ALLERGIES     Allergies   Allergen Reactions     No Clinical Screening - See Comments Rash     Dove soap       PAST MEDICAL HISTORY:  Past Medical History:   Diagnosis Date     Allergic rhinitis      Anxiety      Contact dermatitis      Coronary artery disease     CABG with 4 grafts-LIMA to LAD, SVG to Diagonal,OM and RCA     Cough      Elevated prolactin level (H)      Erythrocytosis      Gastro-oesophageal reflux disease      GI bleed 10/10     Hyperlipidemia      Hypertension      Hypogonadism     with low testosterone     Insomnia      Paroxysmal atrial fibrillation (H)     flecainide therapy     Post-nasal drip      Prediabetes      Renal disease     CKD     Shoulder arthritis      SVT (supraventricular tachycardia) (H)      Tremor        PAST SURGICAL HISTORY:  Past Surgical History:   Procedure Laterality Date     BYPASS GRAFT ARTERY CORONARY       CARDIAC SURGERY       COLONOSCOPY  12/2010    tubular adenoma     LASER SELECTIVE TRABECULOPLASTY BILATERAL Bilateral 6/11/2018    Procedure: LASER SELECTIVE TRABECULOPLASTY BILATERAL;  LASER SELECTIVE TRABECULOPLASTY BILATERAL ;  Surgeon: Pahala, Parmjit CRAWFORD MD;  Location: [facility]     ORTHOPEDIC SURGERY      rotator cuff  right     PHACOEMULSIFICATION CLEAR CORNEA WITH STANDARD IOL, VITRECTOMY PARSPLANA 23 GAGUE, COMBINED  2/20/2013    Procedure: COMBINED PHACOEMULSIFICATION CLEAR CORNEA WITH STANDARD INTRAOCULAR LENS IMPLANT, VITRECTOMY PARSPLANA 23 GAUGE;;  Surgeon: Guero Lockett MD;  Location: [facility]     PHACOEMULSIFICATION CLEAR CORNEA WITH STANDARD IOL, VITRECTOMY PARSPLANA 23 GAGUE, COMBINED  7/31/2013    Procedure: COMBINED PHACOEMULSIFICATION CLEAR CORNEA WITH STANDARD INTRAOCULAR LENS IMPLANT, VITRECTOMY PARSPLANA 23 GAUGE;;  Surgeon: Guero Lockett MD;  Location: [facility]     REVERSE ARTHROPLASTY SHOULDER Right 7/24/2017    Procedure: REVERSE ARTHROPLASTY SHOULDER;  RIGHT REVERSE TOTAL SHOULDER ARTHROPLASTY ;  Surgeon: Rodolfo Calhoun MD;  Location:  OR     VASCULAR SURGERY      cab     VITRECTOMY PARSPLANA, PHACOEMULSIFICATION CLEAR CORNEA W STANDARD INTRAOCULAR LENS IMPLANT, COMBINED  2/20/2013    Procedure: COMBINED VITRECTOMY PARSPLANA, PHACOEMULSIFICATION CLEAR CORNEA WITH STANDARD INTRAOCULAR LENS IMPLANT;  LEFT PHACOEMULSIFICATION CLEAR CORNEA WITH SAURABH TORIC INTRAOCULAR LENS IMPLANT,  LEFT EYE 23 GAUGE PARSPLANA VITRECTOMY;  Surgeon: Jorge Regan MD;  Location: [facility]     VITRECTOMY PARSPLANA, PHACOEMULSIFICATION CLEAR CORNEA W STANDARD INTRAOCULAR LENS IMPLANT, COMBINED  7/31/2013    Procedure: COMBINED VITRECTOMY PARSPLANA, PHACOEMULSIFICATION CLEAR CORNEA WITH STANDARD INTRAOCULAR LENS IMPLANT;  RIGHT PHACOEMULSIFICATION CLEAR CORNEA WITH TORIC INTRAOCULAR LENS IMPLANT, RIGHT 23 GAUGE PARSPLANA VITRECTOMY ;  Surgeon: Jorge Regan MD;  Location: [facility]       FAMILY HISTORY:  Family History   Problem Relation Age of Onset     Other - See Comments Mother         old age     Other - See Comments Father 97     Cerebrovascular Disease Father        SOCIAL HISTORY:  Social History     Socioeconomic History     Marital status:      Spouse name: None     Number of children: None     Years  of education: None     Highest education level: None   Occupational History     None   Social Needs     Financial resource strain: None     Food insecurity:     Worry: None     Inability: None     Transportation needs:     Medical: None     Non-medical: None   Tobacco Use     Smoking status: Former Smoker     Packs/day: 0.10     Years: 20.00     Pack years: 2.00     Types: Cigarettes     Start date:      Last attempt to quit: 1999     Years since quittin.2     Smokeless tobacco: Never Used     Tobacco comment: recreational only   Substance and Sexual Activity     Alcohol use: Yes     Comment: occas     Drug use: No     Sexual activity: None   Lifestyle     Physical activity:     Days per week: None     Minutes per session: None     Stress: None   Relationships     Social connections:     Talks on phone: None     Gets together: None     Attends Confucianism service: None     Active member of club or organization: None     Attends meetings of clubs or organizations: None     Relationship status: None     Intimate partner violence:     Fear of current or ex partner: None     Emotionally abused: None     Physically abused: None     Forced sexual activity: None   Other Topics Concern     Parent/sibling w/ CABG, MI or angioplasty before 65F 55M? No      Service Not Asked     Blood Transfusions Not Asked     Caffeine Concern Not Asked     Occupational Exposure Not Asked     Hobby Hazards Not Asked     Sleep Concern Yes     Stress Concern Not Asked     Weight Concern Not Asked     Special Diet Yes     Comment: less meat      Back Care Not Asked     Exercise Yes     Comment: 3 x weekly     Bike Helmet Not Asked     Seat Belt Yes     Self-Exams Not Asked   Social History Narrative     None       Review of Systems:  Skin:  Negative       Eyes:  Positive for glasses    ENT:  Positive for nasal congestion;hearing loss;tinnitus    Respiratory:  Negative       Cardiovascular:    edema;Positive for;lightheadedness  "lightheaded from Lisinopril?  Gastroenterology: Positive for heartburn treated  Genitourinary:  Negative      Musculoskeletal:  Negative      Neurologic:  Positive for numbness or tingling of hands    Psychiatric:  Negative      Heme/Lymph/Imm:  Negative      Endocrine:  Negative        Physical Exam:  Vitals: /85   Pulse 67   Ht 1.734 m (5' 8.25\")   Wt 77.1 kg (170 lb)   BMI 25.66 kg/m       Constitutional:  cooperative, alert and oriented, well developed, well nourished, in no acute distress        Skin:  warm and dry to the touch          Head:  normocephalic        Eyes:  pupils equal and round;sclera white;EOMS intact        Lymph:      ENT:  no pallor or cyanosis        Neck:  carotid pulses are full and equal bilaterally;JVP normal;no carotid bruit        Respiratory:  clear to auscultation;normal respiratory excursion;healed median sternotomy scar         Cardiac: regular rhythm;normal S1 and S2;no S3 or S4;apical impulse not displaced       late systolic murmur;LLSB;apical;grade 2                        2+;right posterior tibial artery             left posterior tibial artery;2+   left femoral bruit (+)      GI:  abdomen soft;no HSM;no bruits        Extremities and Muscular Skeletal:  no edema;no deformities, clubbing, cyanosis, erythema observed              Neurological:  affect appropriate;no gross motor deficits        Psych:  oriented to time, person and place            Thank you for allowing me to participate in the care of your patient.    Sincerely,     Jacques Vegas MD     [facility]    "

## 2019-04-11 ENCOUNTER — TELEPHONE (OUTPATIENT)
Dept: CARDIOLOGY | Facility: CLINIC | Age: 84
End: 2019-04-11

## 2019-04-11 DIAGNOSIS — I10 ESSENTIAL HYPERTENSION: Primary | ICD-10-CM

## 2019-04-11 NOTE — TELEPHONE ENCOUNTER
Spoke with pt about BPs still being elevated. Pt reports that his BPs have been 160s/80-90s. Pt saw Dr. Vegas for the first time on 4/5/19 (previously Dr. Bernard pt) and was started on 5mg amlodipine. Pt was previously on lisinopril but was experiencing orthostatic hypotension. Pt reports his BPs have still been elevated since 4/5. Dr. Vegas out of office until Monday. Spoke with Dr. Braulio Nolasco ([facility]) and he suggested that pt start taking 10mg of amlodipine and see if that helps control his BP better. If pt has any orthostatic hypotension symptoms then he should go back down to 5mg. Pt will take 10mg throughout the weekend and see if this helps his BP. Informed pt to let us know if he develops any swelling in his feet or ankles. Pt gave verbal understanding. Will route to Dr. Vegas for update.

## 2019-04-15 NOTE — TELEPHONE ENCOUNTER
Pt calling again today to say that amlodipine is not working for him. Pt reports his blood pressures have been 140-160s/70-80s. Pt reports that since he started taking this medication, he has noticed headaches, dizziness, weakness, and lack of balance. Pt was vague and confused when explaining if he was taking 10mg daily over the weekend. Pt reported that he was taking 5mg at a time (not 10mg at once) and he would take 5mg again later if he was feeling better. Pt unhappy with this medication and wants to be changed to a different one. Pt reports no swelling in his feet or ankles. Pt did not report feeling orthostatic hypotension symptoms from sitting to standing. Will update Dr. Vegas and call pt back with recommendations.

## 2019-04-15 NOTE — TELEPHONE ENCOUNTER
Received message from Dr. Vegas:  Jacques Vegas MD Haley, Leandra K RN   Caller: Unspecified (4 days ago, 11:50 AM)             He can be a difficult historian at times.  He stopped his lisinopril because of orthostasis and then his blood pressures went up.  I think he will need to only measure his blood pressure when he is standing rather than sitting or lying because it sounds like if he treat his sitting blood pressure he will have orthostasis.  If he wants he can try stopping the amlodipine, measure his standing blood pressures and if they are elevated, he can then can retry lisinopril in a smaller dose, or losartan 25 mg.          Spoke with pt about Dr. Vegas's recommendations. Pt agreed and will stop taking his amlodipine and starting checking his standing blood pressures. Pt will check his standing blood pressures a few times a day and then update us.

## 2019-04-16 RX ORDER — LOSARTAN POTASSIUM 25 MG/1
25 TABLET ORAL DAILY
Qty: 30 TABLET | Refills: 0 | Status: SHIPPED | OUTPATIENT
Start: 2019-04-16 | End: 2019-04-19

## 2019-04-16 NOTE — TELEPHONE ENCOUNTER
Spoke with pt about recent blood pressure recordings.   Pt reported the following readings:  4/15/19  1130: 146/84  1500: 161/87  2000: 144/80  2200: 161/83  0000: 146/81    4/16/19  0500: 169/90  0845: 159/80  1000: 162/84  1245: 148/82  1345: 166/82  1500: 162/84    Pt reports that all the recordings starting at 1500 yesterday are standing blood pressures.   Pt has not taken any amlodipine in the last 2 days. Pt is willing to try 25mg of Losartan daily. Will send new prescription to pt's pharmacy. Will route update to Dr. Vegas.

## 2019-04-17 NOTE — TELEPHONE ENCOUNTER
Jacques Vegas MD Haley, Leandra K, RN   Caller: Unspecified (6 days ago, 11:50 AM)             OK.  Doesn't need to check BP that often. May cause some anxiety. Once or twice a day should be enough

## 2019-04-19 RX ORDER — LOSARTAN POTASSIUM 25 MG/1
25 TABLET ORAL 2 TIMES DAILY
Qty: 30 TABLET | Refills: 0 | COMMUNITY
Start: 2019-04-19 | End: 2019-05-06

## 2019-04-19 NOTE — TELEPHONE ENCOUNTER
Received VM from pt stating he wanted to discuss how things are going on the new medication. Attempted to call pt back at home and cell phone numbers, no answer, unable to LVM at either number.    Addendum: Pt returned call. Pt stated he started losartan 25 mg daily on 4/16/19, pt stated he takes this med at night. Pt stated since he started this med, his BP has been fairly steady around 160/80. Pt reported he last checked his BP while sitting this am and it was 152/79. Pt denied any side effects and stated he is feeling well. Pt inquired if Dr. Vegas would want him to increase the dose since his BP is still elevated. Advised will route to Dr. Vegas and call back with recommendations.

## 2019-04-19 NOTE — TELEPHONE ENCOUNTER
Received response below from Dr. Vegas:     Jacques Vegas MD Hair, Ashley W RN   Caller: Unspecified (1 week ago)             Ye - go to 25 mg twice daily      Called and spoke with pt, communicated Dr. Vegas's recommendations to increase losartan to 25 mg twice daily. Pt stated he may try taking half of a 25 mg tablet in the morning and if that goes OK will increase to a full tablet twice daily. Advised pt that is fine. Pt stated he will call back to let us know how he does with the dosage increase.

## 2019-05-06 DIAGNOSIS — I10 ESSENTIAL HYPERTENSION: ICD-10-CM

## 2019-05-06 RX ORDER — LOSARTAN POTASSIUM 25 MG/1
25 TABLET ORAL DAILY
Qty: 30 TABLET | Refills: 1 | Status: SHIPPED | OUTPATIENT
Start: 2019-05-06 | End: 2019-12-31

## 2019-05-06 NOTE — TELEPHONE ENCOUNTER
Spoke with pt about refilling his losartan 25mg medication. Pt was told to increase his dose to 25mg BID on 4/19/19 for elevated BPs but that did not go well for him and he complained of having a headache. Pt then went back to 25mg daily. Pt vague in explaining how his BP readings have been. Informed pt that he should check it daily to see what it's reading and make sure he dates the documentation. Pt takes his 25mg losartan daily at bedtime. Refilled medication for pt and asked him to keep track of his BP and then call back with an update to see if he needs an increase or a med change. Pt gave verbal understanding.

## 2019-05-14 DIAGNOSIS — I10 HTN (HYPERTENSION): Primary | ICD-10-CM

## 2019-05-29 DIAGNOSIS — I10 HTN (HYPERTENSION): Primary | ICD-10-CM

## 2019-06-26 ENCOUNTER — OFFICE VISIT (OUTPATIENT)
Dept: OPHTHALMOLOGY | Facility: CLINIC | Age: 84
End: 2019-06-26
Attending: OPHTHALMOLOGY
Payer: MEDICARE

## 2019-06-26 DIAGNOSIS — H53.10 SUBJECTIVE VISUAL DISTURBANCE: ICD-10-CM

## 2019-06-26 DIAGNOSIS — G31.9 POSTERIOR CORTICAL ATROPHY (H): Primary | ICD-10-CM

## 2019-06-26 PROCEDURE — 92133 CPTRZD OPH DX IMG PST SGM ON: CPT | Mod: ZF | Performed by: OPHTHALMOLOGY

## 2019-06-26 PROCEDURE — G0463 HOSPITAL OUTPT CLINIC VISIT: HCPCS | Mod: ZF | Performed by: TECHNICIAN/TECHNOLOGIST

## 2019-06-26 ASSESSMENT — CONF VISUAL FIELD
OS_NORMAL: 1
METHOD: COUNTING FINGERS
OD_NORMAL: 1

## 2019-06-26 ASSESSMENT — TONOMETRY
OS_IOP_MMHG: 14
OD_IOP_MMHG: 14
IOP_METHOD: ICARE

## 2019-06-26 ASSESSMENT — VISUAL ACUITY
OS_CC+: -3
METHOD: SNELLEN - LINEAR
OS_CC: 20/20
OD_CC+: -2+2
OD_CC: 20/25

## 2019-06-26 ASSESSMENT — REFRACTION_WEARINGRX
OS_AXIS: 140
OD_ADD: +2.50
OD_CYLINDER: +1.00
OD_SPHERE: -0.50
OS_SPHERE: +0.50
SPECS_TYPE: BIFOCAL
OD_AXIS: 180
OS_ADD: +2.50
OS_CYLINDER: +0.75

## 2019-06-26 ASSESSMENT — CUP TO DISC RATIO: OD_RATIO: 0.3

## 2019-06-26 ASSESSMENT — SLIT LAMP EXAM - LIDS
COMMENTS: MGD
COMMENTS: MGD

## 2019-06-26 ASSESSMENT — EXTERNAL EXAM - LEFT EYE: OS_EXAM: NORMAL

## 2019-06-26 ASSESSMENT — EXTERNAL EXAM - RIGHT EYE: OD_EXAM: NORMAL

## 2019-06-26 NOTE — PROGRESS NOTES
1. Visual impairment in both eyes beyond that which is expected from patient's age related macular degeneration and normal tension glaucoma.  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  I recommended the patient undergo formal neuro-psychological testing for a more detailed analysis of his cognition (including his complex visual processing) followed by a referral to Dr. Jones- a neurologist at the [facility] specializing in neurodegenerative conditions- but the patient declined.  He said he would think about it for the future.  The patient did not seem open to the possibility that dementia could be effecting his vision.  I also recommended that the patient not drive based upon concern that his inability to process multiple visual stimuli (simultanagnosia) would affect his ability to drive safely.  The patient and his wife expressed understanding of my official recommendation but expressed resistance. If the patient feels he is safe to drive he could undergo a formal driving evaluation yearly by an unbiased professional driving evaluator to prove that he is capable of driving safely.       2. Advanced non-neovascular age-related macular degeneration  3. Normal-tension glaucoma; continue follow-up with Dr. 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 Camacho for color vision disturbances, blurred vision, and difficulty focusing. First noticed problems when reading music a few years ago; difficulty reading the music stand (). And 6 months to 1 year ago he started to vocalize his difficulties with seeing; he used to read a lot but cannot anymore. They have tablet readers where the font size can be adjusted but regardless he has difficulty reading. He has been seeing Dr. Camacho who cannot find structural etiology besides mild AMD and probably low-tension  glaucoma. Per patient the last 6 months have noticed worsening vision. Denies diplopia, eye pain. No visual hallucinations. Chronic floaters, no flashes. Recent problems with memory and word finding difficulties.     No family history of Alzheimer's or dementia.    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 IOP, VF, EOM are full. Color plates are 10/11 and 7/11. Anterior segment exam is notable for MGD and occasional square wave jerks < 9 /min. He has PCIOLs in each eye. Posterior segment exam is notable for normal right optic nerve with cupped left optic nerve (0.3 / 0.75) with mild-moderate drusen of both maculae. Cognitive exam reveals orientation to time and person but not place. He was able to draw a clock face with appropriate time. He could not spell World backwards. He could repeat back 3 objects but could not remember them after a few minutes.     On simultagnosia testing patient had some object agnosia at times and had some difficulty piecing together complex images.  Along these lines he had difficulty with Ishihara color plates despite being able to recognize the colors within the plates that he missed.    CT Head without contrast 9/2018 (indication confusion):  IMPRESSION: Mild diffuse chronic changes of the brain. No acute  intracranial abnormality.    OCT rNFL today shows moderate superior thinning of both nerves.   OCT macula with drusenoid PEDs (large subfoveally) and subfoveal hyperreflective material both eyes (OS > OD). ERM both eyes.   Octopus automated 30 degree visual field showed superior arcuate scotoma defect in the right eye and superior  arcuate scotoma and inferior nasal step in the left eye.    In summary, this patient has has moderate dry age related macular degeneration and normal tension glaucoma but his vision complaints and exam are not explainable by these underlying diagnoses.  The patient today shows signs of a global dementia on a brief partial mini-mental exam and exhibited signs of simultanagnosia which is a specific indicator of posterior cortical atrophy (usually caused by a neurodegenerative process affecting the occipital and parietal lobes- most commonly alzheimer disease though it can  Occur with many other forms of dementia).    I  Will send this note  To Dr. Camacho as well as the patient's primary care physician hoping that over time he may reconsider evaluation  by neurology and to convey my belief that the patient should not drive unless he proves in an on the road test that he is safe.       I spent a total of 60 minutes face to face with Chava Valentine during today's office visit.  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    Complete documentation of historical and exam elements from today's encounter can be found in the full encounter summary report (not reduplicated in this progress note).  I personally obtained the chief complaint(s) and history of present illness.  I confirmed and edited as necessary the review of systems, past medical/surgical history, family history, social history, and examination findings as documented by others; and I examined the patient myself.  I personally reviewed the relevant tests, images, and reports as documented above.  I formulated and edited as necessary the assessment and plan and discussed the findings and management plan with the patient and family.  I personally reviewed the ophthalmic test(s) associated with this encounter, agree with  the interpretation(s) as documented by the resident/fellow, and have edited the corresponding report(s) as necessary.     MD Kulwinder Juarez M.D.  PGY-3, Ophthalmology

## 2019-06-26 NOTE — LETTER
2019    RE: Chava Valentine  : 10/14/1933  MRN: [id number]    Dear Dr. Camacho    Thank you for referring your patient, Chava Valentine, to my neuro-ophthalmology clinic recently.  After a thorough neuro-ophthalmic history and examination, I came to the following conclusions:      1. Visual impairment in both eyes beyond that which is expected from patient's age related macular degeneration and normal tension glaucoma.  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  I recommended the patient undergo formal neuro-psychological testing for a more detailed analysis of his cognition (including his complex visual processing) followed by a referral to Dr. Jones- a neurologist at the [facility] specializing in neurodegenerative conditions- but the patient declined.  He said he would think about it for the future.  The patient did not seem open to the possibility that dementia could be effecting his vision.  I also recommended that the patient not drive based upon concern that his inability to process multiple visual stimuli (simultanagnosia) would affect his ability to drive safely.  The patient and his wife expressed understanding of my official recommendation but expressed resistance. If the patient feels he is safe to drive he could undergo a formal driving evaluation yearly by an unbiased professional driving evaluator to prove that he is capable of driving safely.       2. Advanced non-neovascular age-related macular degeneration  3. Normal-tension glaucoma; continue follow-up with Dr. 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 First noticed problems when reading music a few years ago; difficulty reading the music stand (). And 6 months to 1 year ago he started to  vocalize his difficulties with seeing; he used to read a lot but cannot anymore. They have tablet readers where the font size can be adjusted but regardless he has difficulty reading. He has been seeing Dr. Camacho who cannot find structural etiology besides mild AMD and probably low-tension glaucoma. Per patient the last 6 months have noticed worsening vision. Denies diplopia, eye pain. No visual hallucinations. Chronic floaters, no flashes. Recent problems with memory and word finding difficulties.     No family history of Alzheimer's or dementia.    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 IOP, VF, EOM are full. Color plates are 10/11 and 7/11. Anterior segment exam is notable for MGD and occasional square wave jerks < 9 /min. He has PCIOLs in each eye. Posterior segment exam is notable for normal right optic nerve with cupped left optic nerve (0.3 / 0.75) with mild-moderate drusen of both maculae. Cognitive exam reveals orientation to time and person but not place. He was able to draw a clock face with appropriate time. He could not spell World backwards. He could repeat back 3 objects but could not remember them after a few minutes.     On simultagnosia testing patient had some object agnosia at times and had some difficulty piecing together complex images.  Along these lines he had difficulty with Ishihara color plates despite being able to recognize the colors within the plates that he missed.    CT Head without contrast 9/2018 (indication confusion):  IMPRESSION: Mild diffuse chronic changes of the brain. No acute  intracranial abnormality.    OCT rNFL today  shows moderate superior thinning of both nerves.   OCT macula with drusenoid PEDs (large subfoveally) and subfoveal hyperreflective material both eyes (OS > OD). ERM both eyes.   Octopus automated 30 degree visual field showed superior arcuate scotoma defect in the right eye and superior arcuate scotoma and inferior nasal step in the left eye.    In summary, this patient has has moderate dry age related macular degeneration and normal tension glaucoma but his vision complaints and exam are not explainable by these underlying diagnoses.  The patient today shows signs of a global dementia on a brief partial mini-mental exam and exhibited signs of simultanagnosia which is a specific indicator of posterior cortical atrophy (usually caused by a neurodegenerative process affecting the occipital and parietal lobes- most commonly alzheimer disease though it can  Occur with many other forms of dementia).    I  Will send this note  To Dr. Camacho as well as the patient's primary care physician hoping that over time he may reconsider evaluation  by neurology and to convey my belief that the patient should not drive unless he proves in an on the road test that he is safe.    Again, thank you for trusting me with the care of your patient.  For further exam details, please feel free to contact our office for additional records.  If you wish to contact me regarding this patient please email me at [email or web address] or give my clinic a call to arrange a phone conversation.    Sincerely,    Wallace Horvath MD  , Neuro-Ophthalmology and Adult Strabismus Surgery  The Stefan Hdez Chair in Neuro-Ophthalmology  Department of Ophthalmology and Visual Neurosciences  [facility]    Dx: posterior cortical atrophy

## 2019-06-26 NOTE — NURSING NOTE
"Chief Complaint(s) and History of Present Illness(es)     New Patient     In both eyes.  Charactertized as  change in color vision, blurred vision and difficulty focusing.  Associated symptoms include Negative for double vision.              Comments     Vision each eye has been deteriorating approximately 2 years ago. Diagnosed with MD. Patient states he was told that he has changes in his color vision but patient notes that that is not a \"big\" problem. Patient states not seeing well is the bigger problem.   Was an avid reader 2 years ago and for the past 6 months, patient has not been using his nook as much anymore.   Seeing by Dr. Jackson(?sp) in [place].    BETZAIDA Rodriguez 6/26/2019 1:21 PM                "

## 2019-07-08 ASSESSMENT — CUP TO DISC RATIO: OS_RATIO: 0.75

## 2019-12-19 DIAGNOSIS — I48.0 PAROXYSMAL ATRIAL FIBRILLATION (H): ICD-10-CM

## 2019-12-19 RX ORDER — FLECAINIDE ACETATE 100 MG/1
100 TABLET ORAL 2 TIMES DAILY
Qty: 180 TABLET | Refills: 1 | Status: SHIPPED | OUTPATIENT
Start: 2019-12-19 | End: 2020-06-16

## 2019-12-20 DIAGNOSIS — I48.0 PAROXYSMAL ATRIAL FIBRILLATION (H): Primary | ICD-10-CM

## 2019-12-31 ENCOUNTER — OFFICE VISIT (OUTPATIENT)
Dept: CARDIOLOGY | Facility: CLINIC | Age: 84
End: 2019-12-31
Payer: MEDICARE

## 2019-12-31 VITALS
HEIGHT: 68 IN | HEART RATE: 66 BPM | WEIGHT: 173.7 LBS | SYSTOLIC BLOOD PRESSURE: 117 MMHG | BODY MASS INDEX: 26.33 KG/M2 | DIASTOLIC BLOOD PRESSURE: 69 MMHG

## 2019-12-31 DIAGNOSIS — I48.0 PAROXYSMAL ATRIAL FIBRILLATION (H): Primary | ICD-10-CM

## 2019-12-31 DIAGNOSIS — I25.810 CORONARY ARTERY DISEASE INVOLVING CORONARY BYPASS GRAFT OF NATIVE HEART WITHOUT ANGINA PECTORIS: ICD-10-CM

## 2019-12-31 DIAGNOSIS — E78.2 MIXED HYPERLIPIDEMIA: ICD-10-CM

## 2019-12-31 PROCEDURE — 93000 ELECTROCARDIOGRAM COMPLETE: CPT | Performed by: INTERNAL MEDICINE

## 2019-12-31 PROCEDURE — 99214 OFFICE O/P EST MOD 30 MIN: CPT | Performed by: INTERNAL MEDICINE

## 2019-12-31 RX ORDER — MIRTAZAPINE 15 MG/1
15 TABLET, FILM COATED ORAL AT BEDTIME
COMMUNITY
End: 2021-08-26

## 2019-12-31 RX ORDER — LISINOPRIL 10 MG/1
2.5 TABLET ORAL 2 TIMES DAILY
COMMUNITY
End: 2021-06-22 | Stop reason: DRUGHIGH

## 2019-12-31 RX ORDER — DABIGATRAN ETEXILATE 150 MG/1
150 CAPSULE ORAL 2 TIMES DAILY
Qty: 180 CAPSULE | Refills: 3 | Status: SHIPPED | OUTPATIENT
Start: 2019-12-31 | End: 2021-01-05

## 2019-12-31 RX ORDER — TESTOSTERONE CYPIONATE 200 MG/ML
VIAL (ML) INTRAMUSCULAR
COMMUNITY
End: 2022-06-29

## 2019-12-31 RX ORDER — ROSUVASTATIN CALCIUM 10 MG/1
10 TABLET, COATED ORAL DAILY
Qty: 90 TABLET | Refills: 3 | Status: SHIPPED | OUTPATIENT
Start: 2019-12-31 | End: 2020-12-28

## 2019-12-31 RX ORDER — TIMOLOL MALEATE 6.8 MG/ML
SOLUTION/ DROPS OPHTHALMIC
COMMUNITY
End: 2022-06-29

## 2019-12-31 RX ORDER — EZETIMIBE 10 MG/1
10 TABLET ORAL DAILY
Qty: 90 TABLET | Refills: 3 | Status: SHIPPED | OUTPATIENT
Start: 2019-12-31 | End: 2020-12-28

## 2019-12-31 ASSESSMENT — MIFFLIN-ST. JEOR: SCORE: 1446.37

## 2019-12-31 NOTE — PROGRESS NOTES
HPI and Plan:   See dictation(#[id number])    Orders Placed This Encounter   Procedures     Follow-Up with Cardiologist     Follow-Up with Electrophysiologist     EKG 12-lead complete w/read - Clinics (performed today)     Echocardiogram Complete       Orders Placed This Encounter   Medications     lisinopril (PRINIVIL/ZESTRIL) 10 MG tablet     Sig: Take 10 mg by mouth 2 times daily     Testosterone Cypionate 200 MG/ML SOLN     timolol maleate (ISTALOL) 0.5 % opthalmic solution     Multiple Vitamins-Minerals (PRESERVISION AREDS 2 PO)     mirtazapine (REMERON) 15 MG tablet     Sig: Take 15 mg by mouth At Bedtime     dabigatran ANTICOAGULANT (PRADAXA ANTICOAGULANT) 150 MG capsule     Sig: Take 1 capsule (150 mg) by mouth 2 times daily     Dispense:  180 capsule     Refill:  3     rosuvastatin (CRESTOR) 10 MG tablet     Sig: Take 1 tablet (10 mg) by mouth daily     Dispense:  90 tablet     Refill:  3     ezetimibe (ZETIA) 10 MG tablet     Sig: Take 1 tablet (10 mg) by mouth daily     Dispense:  90 tablet     Refill:  3       Medications Discontinued During This Encounter   Medication Reason     losartan (COZAAR) 25 MG tablet Stopped by Patient     dabigatran ANTICOAGULANT (PRADAXA ANTICOAGULANT) 150 MG capsule Reorder     rosuvastatin (CRESTOR) 10 MG tablet Reorder     ezetimibe (ZETIA) 10 MG tablet Reorder         Encounter Diagnoses   Name Primary?     Paroxysmal atrial fibrillation (H) Yes     Coronary artery disease involving coronary bypass graft of native heart without angina pectoris      Mixed hyperlipidemia        CURRENT MEDICATIONS:  Current Outpatient Medications   Medication Sig Dispense Refill     albuterol (PROAIR HFA/PROVENTIL HFA/VENTOLIN HFA) 108 (90 Base) MCG/ACT Inhaler Inhale 2 puffs into the lungs every 6 hours as needed for shortness of breath / dyspnea or wheezing 1 Inhaler 0     CLONAZEPAM PO Take 0.25-0.5 mg by mouth 3 times daily as needed        dabigatran ANTICOAGULANT (PRADAXA ANTICOAGULANT)  150 MG capsule Take 1 capsule (150 mg) by mouth 2 times daily 180 capsule 3     ezetimibe (ZETIA) 10 MG tablet Take 1 tablet (10 mg) by mouth daily 90 tablet 3     flecainide (TAMBOCOR) 100 MG tablet Take 1 tablet (100 mg) by mouth 2 times daily 180 tablet 1     fluticasone (FLONASE) 50 MCG/ACT spray Spray 1 spray into both nostrils daily as needed for rhinitis or allergies       latanoprost (XALATAN) 0.005 % ophthalmic solution INSTILL 1 DROP IN BOTH EYES QHS.  6     lisinopril (PRINIVIL/ZESTRIL) 10 MG tablet Take 10 mg by mouth 2 times daily       mirtazapine (REMERON) 15 MG tablet Take 15 mg by mouth At Bedtime       Multiple Vitamins-Minerals (PRESERVISION AREDS 2 PO)        nitroGLYcerin (NITROSTAT) 0.4 MG sublingual tablet For chest pain place 1 tablet under the tongue every 5 minutes for 3 doses. If symptoms persist 5 minutes after 1st dose call 911. 25 tablet 3     Pantoprazole Sodium (PROTONIX PO) Take 40 mg by mouth 2 times daily       Psyllium (METAMUCIL PO) Take 1 Dose by mouth daily as needed        rosuvastatin (CRESTOR) 10 MG tablet Take 1 tablet (10 mg) by mouth daily 90 tablet 3     Testosterone Cypionate 200 MG/ML SOLN        timolol maleate (ISTALOL) 0.5 % opthalmic solution        ZOLPIDEM TARTRATE PO Take 5-10 mg by mouth nightly as needed        PAPAV-PHENTOLAMINE-ALPROSTADIL IC Inject 1 Dose as directed daily as needed (erectile dysfunction) Trimix Injection       pyridostigmine (MESTINON) 60 MG tablet USE UP TO 3 TS DAILY AS NEEDED. DISPENSE IN ORIGINAL  CONTAINER  3     RaNITidine HCl (ZANTAC PO) Take 150 mg by mouth daily as needed for heartburn       TESTOSTERONE AQUEOUS IM Inject 1 mL into the muscle every 14 days          ALLERGIES     Allergies   Allergen Reactions     No Clinical Screening - See Comments Rash     Dove soap       PAST MEDICAL HISTORY:  Past Medical History:   Diagnosis Date     Allergic rhinitis      Anxiety      Contact dermatitis      Coronary artery  disease     CABG with 4 grafts-LIMA to LAD, SVG to Diagonal,OM and RCA     Cough      Elevated prolactin level (H)      Erythrocytosis      Gastro-oesophageal reflux disease      GI bleed 10/10     Hyperlipidemia      Hypertension      Hypogonadism     with low testosterone     Insomnia      Paroxysmal atrial fibrillation (H)     flecainide therapy     Post-nasal drip      Prediabetes      Renal disease     CKD     Shoulder arthritis      SVT (supraventricular tachycardia) (H)      Tremor        PAST SURGICAL HISTORY:  Past Surgical History:   Procedure Laterality Date     BYPASS GRAFT ARTERY CORONARY       CARDIAC SURGERY       COLONOSCOPY  12/2010    tubular adenoma     LASER SELECTIVE TRABECULOPLASTY BILATERAL Bilateral 6/11/2018    Procedure: LASER SELECTIVE TRABECULOPLASTY BILATERAL;  LASER SELECTIVE TRABECULOPLASTY BILATERAL ;  Surgeon: Parmjit Camacho MD;  Location: [facility]     ORTHOPEDIC SURGERY      rotator cuff right     PHACOEMULSIFICATION CLEAR CORNEA WITH STANDARD IOL, VITRECTOMY PARSPLANA 23 GAGUE, COMBINED  2/20/2013    Procedure: COMBINED PHACOEMULSIFICATION CLEAR CORNEA WITH STANDARD INTRAOCULAR LENS IMPLANT, VITRECTOMY PARSPLANA 23 GAUGE;;  Surgeon: Guero Lockett MD;  Location: [facility]     PHACOEMULSIFICATION CLEAR CORNEA WITH STANDARD IOL, VITRECTOMY PARSPLANA 23 GAGUE, COMBINED  7/31/2013    Procedure: COMBINED PHACOEMULSIFICATION CLEAR CORNEA WITH STANDARD INTRAOCULAR LENS IMPLANT, VITRECTOMY PARSPLANA 23 GAUGE;;  Surgeon: Guero Lockett MD;  Location: [facility]     REVERSE ARTHROPLASTY SHOULDER Right 7/24/2017    Procedure: REVERSE ARTHROPLASTY SHOULDER;  RIGHT REVERSE TOTAL SHOULDER ARTHROPLASTY ;  Surgeon: Rodolfo Calhoun MD;  Location:  OR     VASCULAR SURGERY      cab     VITRECTOMY PARSPLANA, PHACOEMULSIFICATION CLEAR CORNEA W STANDARD INTRAOCULAR LENS IMPLANT, COMBINED  2/20/2013    Procedure: COMBINED VITRECTOMY PARSPLANA, PHACOEMULSIFICATION CLEAR CORNEA WITH STANDARD  INTRAOCULAR LENS IMPLANT;  LEFT PHACOEMULSIFICATION CLEAR CORNEA WITH SAURABH TORIC INTRAOCULAR LENS IMPLANT,  LEFT EYE 23 GAUGE PARSPLANA VITRECTOMY;  Surgeon: Jorge Regan MD;  Location: [facility]     VITRECTOMY PARSPLANA, PHACOEMULSIFICATION CLEAR CORNEA W STANDARD INTRAOCULAR LENS IMPLANT, COMBINED  2013    Procedure: COMBINED VITRECTOMY PARSPLANA, PHACOEMULSIFICATION CLEAR CORNEA WITH STANDARD INTRAOCULAR LENS IMPLANT;  RIGHT PHACOEMULSIFICATION CLEAR CORNEA WITH TORIC INTRAOCULAR LENS IMPLANT, RIGHT 23 GAUGE PARSPLANA VITRECTOMY ;  Surgeon: Jorge Regan MD;  Location: [facility]       FAMILY HISTORY:  Family History   Problem Relation Age of Onset     Other - See Comments Mother         old age     Other - See Comments Father 97     Cerebrovascular Disease Father        SOCIAL HISTORY:  Social History     Socioeconomic History     Marital status:      Spouse name: None     Number of children: None     Years of education: None     Highest education level: None   Occupational History     None   Social Needs     Financial resource strain: None     Food insecurity:     Worry: None     Inability: None     Transportation needs:     Medical: None     Non-medical: None   Tobacco Use     Smoking status: Former Smoker     Packs/day: 0.10     Years: 20.00     Pack years: 2.00     Types: Cigarettes     Start date:      Last attempt to quit: 1999     Years since quittin.0     Smokeless tobacco: Never Used     Tobacco comment: recreational only   Substance and Sexual Activity     Alcohol use: Yes     Comment: occas     Drug use: No     Sexual activity: None   Lifestyle     Physical activity:     Days per week: None     Minutes per session: None     Stress: None   Relationships     Social connections:     Talks on phone: None     Gets together: None     Attends Confucianist service: None     Active member of club or organization: None     Attends meetings of clubs or organizations: None     Relationship  "status: None     Intimate partner violence:     Fear of current or ex partner: None     Emotionally abused: None     Physically abused: None     Forced sexual activity: None   Other Topics Concern     Parent/sibling w/ CABG, MI or angioplasty before 65F 55M? No      Service Not Asked     Blood Transfusions Not Asked     Caffeine Concern Not Asked     Occupational Exposure Not Asked     Hobby Hazards Not Asked     Sleep Concern Yes     Stress Concern Not Asked     Weight Concern Not Asked     Special Diet Yes     Comment: less meat      Back Care Not Asked     Exercise Yes     Comment: 3 x weekly     Bike Helmet Not Asked     Seat Belt Yes     Self-Exams Not Asked   Social History Narrative     None       Review of Systems:  Skin:  Negative       Eyes:  Positive for glasses    ENT:  Positive for hearing loss    Respiratory:  Negative       Cardiovascular:  Negative;palpitations;chest pain;dizziness;syncope or near-syncope;cyanosis;edema Positive for;lightheadedness;dizziness new dx parkinsons  Gastroenterology: Positive for heartburn    Genitourinary:  not assessed      Musculoskeletal:  Negative      Neurologic:  Positive for memory problems;tremors parkinsons  Psychiatric:  Positive for anxiety    Heme/Lymph/Imm:  Negative      Endocrine:  Negative        Physical Exam:  Vitals: /69   Pulse 66   Ht 1.734 m (5' 8.25\")   Wt 78.8 kg (173 lb 11.2 oz)   BMI 26.22 kg/m      Constitutional:           Skin:             Head:           Eyes:           Lymph:      ENT:           Neck:           Respiratory:            Cardiac:                                                           GI:           Extremities and Muscular Skeletal:                 Neurological:           Psych:             CC  No referring provider defined for this encounter.                  "

## 2019-12-31 NOTE — PROGRESS NOTES
Service Date: 12/31/2019      REASON FOR CARDIOLOGY VISIT:  Establish cardiology care with a new cardiologist, follow up for history of CAD and history of paroxysmal atrial fibrillation.      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  I am seeing him for the first time.  He has been followed on a long-time basis with Dr. Bernard and of late with Dr. Vegas.        The patient has history of CAD with history of CABG in 1995 with LIMA to LAD, SVG to diagonal and SVG to OM and SVG to RCA and looks like from a coronary artery disease perspective, he has done quite well since that time.  He also has history of paroxysmal atrial fibrillation and has been on flecainide for a long time.  To be noted, he was seen by electrophysiologist, Dr. Guzmán, back in 2011.  At that time he was on 50 mg b.i.d. for a long time, now he is on 100 mg b.i.d. of flecainide.  To be noted, he is on flecainide despite history of CAD and from previous note, it looks like this has been extensively discussed with the patient and also Dr. Bernard and Dr. Vegas have discussed with the patient regarding the appropriateness of lack of appropriateness of flecainide for atrial fibrillation.        Today, he is coming for routine followup.  The patient tells me recently he has been diagnosed with Parkinson disease which is progressing in the form that he is now noticing slowness of his speech, slowness of his ambulation, tremors, fortunately no falls.  It has impacted his physical activity level, although still able to walk.  He was doing much more this summer.  No chest discomfort or shortness of breath, dizziness, presyncope.        He is presently on 100 mg b.i.d. of flecainide.  He is on Pradaxa, lisinopril, Crestor, Zetia.        His lipid panel has shown very well-controlled LDL.  The last one I have is from 2017 showing LDL of 54.  He does have chronic kidney disease, stage III,  with baseline creatinine around 1.7 to 1.8.  Latest GFR is around 35.        The patient has no specific cardiac complaints.  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      PHYSICAL EXAMINATION:   VITAL SIGNS:  Blood pressure 117/69, heart rate 66 and regular, weight 173 pounds, BMI 26.22.   GENERAL:  The patient appears pleasant, comfortable.   NECK:  Normal JVP, no bruit.   CARDIOVASCULAR SYSTEM:  S1, S2 normal, no murmur, rub or gallop.   RESPIRATORY SYSTEM:  Clear to auscultation bilaterally.   GASTROINTESTINAL SYSTEM:  Abdomen soft, nontender.   EXTREMITIES:  No pitting pedal edema.   NEUROLOGICAL:  Alert, oriented x3.   PSYCHIATRIC:  Normal affect.   SKIN:  No obvious rash.   HEENT:  No pallor.      EKG done today was personally reviewed by me shows sinus rhythm, first-degree AV block and left bundle branch block, similar to previous EKG.        To be noted, he had a stress test done last year that showed trivial area of apical inferior ischemia with LVEF of 77% at rest.        The last echocardiogram I have is from 2011 that showed LVEF of 55%-60% with septal wall motion consistent with underlying left bundle branch block and mild pulmonary regurgitation.      IMPRESSION AND PLAN:  A pleasant 86-year-old gentleman with history of CAD, history of CABG, paroxysmal atrial fibrillation, [id number] score of 4, on Pradaxa for stroke prophylaxis, on flecainide for rhythm control strategy, chronic kidney disease stage III, recently diagnosed Parkinson disease, other comorbidities of hypertension and dyslipidemia.        Overall, cardiac status-wise, I think he is doing reasonably well without any symptoms or signs of congestive heart failure or angina.  Cardiac auscultation was benign.        Regarding CAD, he is on Pradaxa, which I think is reasonable for secondary CAD prophylaxis given stable CAD.  He is on  lisinopril and statin.  LDL is well controlled.  Blood pressure is well controlled.        Regarding paroxysmal atrial fibrillation, I had a long discussion with the patient regarding the lack of appropriateness of flecainide in the setting of coronary artery disease.  The patient tells me that flecainide has worked well for him, although he is willing to consider alternative antiarrhythmic therapy, although he has not made up his mind regarding that.  I did discuss briefly with the patient options of other antiarrhythmic therapy, like sotalol and even amiodarone, and we did discuss common potential adverse effects of these alternative antiarrhythmic therapy.        The patient would like to discuss this with Electrophysiology.  I am setting up an appointment with them.  I can see him back in Cardiology Clinic with an echocardiogram in 6 months' time, sooner if he notes any change in clinical status, especially if he has any exertional-related symptoms.         MIKI MARTINEZ MD             D: 2019   T: 2019   MT: PAMELA      Name:     MAGAN COWAN   MRN:      [id number]        Account:      [id number]   :      10/14/1933           Service Date: 2019      Document: [id number]

## 2019-12-31 NOTE — LETTER
12/31/2019      Mike Cosby MD  [facility] [address]  [place] [address]      RE: Chava Valentine       Dear Colleague,    I had the pleasure of seeing Chava Morrellkev in the [facility].    Service Date: 12/31/2019      REASON FOR CARDIOLOGY VISIT:  Establish cardiology care with a new cardiologist, follow up for history of CAD and history of paroxysmal atrial fibrillation.      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  I am seeing him for the first time.  He has been followed on a long-time basis with Dr. Bernard and of late with Dr. Vegas.        The patient has history of CAD with history of CABG in 1995 with LIMA to LAD, SVG to diagonal and SVG to OM and SVG to RCA and looks like from a coronary artery disease perspective, he has done quite well since that time.  He also has history of paroxysmal atrial fibrillation and has been on flecainide for a long time.  To be noted, he was seen by electrophysiologist, Dr. Guzmán, back in 2011.  At that time he was on 50 mg b.i.d. for a long time, now he is on 100 mg b.i.d. of flecainide.  To be noted, he is on flecainide despite history of CAD and from previous note, it looks like this has been extensively discussed with the patient and also Dr. Bernard and Dr. Vegas have discussed with the patient regarding the appropriateness of lack of appropriateness of flecainide for atrial fibrillation.        Today, he is coming for routine followup.  The patient tells me recently he has been diagnosed with Parkinson disease which is progressing in the form that he is now noticing slowness of his speech, slowness of his ambulation, tremors, fortunately no falls.  It has impacted his physical activity level, although still able to walk.  He was doing much more this summer.  No chest discomfort or shortness of breath, dizziness, presyncope.         He is presently on 100 mg b.i.d. of flecainide.  He is on Pradaxa, lisinopril, Crestor, Zetia.        His lipid panel has shown very well-controlled LDL.  The last one I have is from 2017 showing LDL of 54.  He does have chronic kidney disease, stage III, with baseline creatinine around 1.7 to 1.8.  Latest GFR is around 35.        The patient has no specific cardiac complaints.  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      PHYSICAL EXAMINATION:   VITAL SIGNS:  Blood pressure 117/69, heart rate 66 and regular, weight 173 pounds, BMI 26.22.   GENERAL:  The patient appears pleasant, comfortable.   NECK:  Normal JVP, no bruit.   CARDIOVASCULAR SYSTEM:  S1, S2 normal, no murmur, rub or gallop.   RESPIRATORY SYSTEM:  Clear to auscultation bilaterally.   GASTROINTESTINAL SYSTEM:  Abdomen soft, nontender.   EXTREMITIES:  No pitting pedal edema.   NEUROLOGICAL:  Alert, oriented x3.   PSYCHIATRIC:  Normal affect.   SKIN:  No obvious rash.   HEENT:  No pallor.      EKG done today was personally reviewed by me shows sinus rhythm, first-degree AV block and left bundle branch block, similar to previous EKG.        To be noted, he had a stress test done last year that showed trivial area of apical inferior ischemia with LVEF of 77% at rest.        The last echocardiogram I have is from 2011 that showed LVEF of 55%-60% with septal wall motion consistent with underlying left bundle branch block and mild pulmonary regurgitation.      IMPRESSION AND PLAN:  A pleasant 86-year-old gentleman with history of CAD, history of CABG, paroxysmal atrial fibrillation, [id number] score of 4, on Pradaxa for stroke prophylaxis, on flecainide for rhythm control strategy, chronic kidney disease stage III, recently diagnosed Parkinson disease, other comorbidities of hypertension and dyslipidemia.        Overall, cardiac status-wise, I think  he is doing reasonably well without any symptoms or signs of congestive heart failure or angina.  Cardiac auscultation was benign.        Regarding CAD, he is on Pradaxa, which I think is reasonable for secondary CAD prophylaxis given stable CAD.  He is on lisinopril and statin.  LDL is well controlled.  Blood pressure is well controlled.        Regarding paroxysmal atrial fibrillation, I had a long discussion with the patient regarding the lack of appropriateness of flecainide in the setting of coronary artery disease.  The patient tells me that flecainide has worked well for him, although he is willing to consider alternative antiarrhythmic therapy, although he has not made up his mind regarding that.  I did discuss briefly with the patient options of other antiarrhythmic therapy, like sotalol and even amiodarone, and we did discuss common potential adverse effects of these alternative antiarrhythmic therapy.        The patient would like to discuss this with Electrophysiology.  I am setting up an appointment with them.  I can see him back in Cardiology Clinic with an echocardiogram in 6 months' time, sooner if he notes any change in clinical status, especially if he has any exertional-related symptoms.         MIKI MARTINEZ MD             D: 2019   T: 2019   MT: PAMELA      Name:     MAGAN COWAN   MRN:      [id number]        Account:      [id number]   :      10/14/1933           Service Date: 2019      Document: [id number]         Outpatient Encounter Medications as of 2019   Medication Sig Dispense Refill     albuterol (PROAIR HFA/PROVENTIL HFA/VENTOLIN HFA) 108 (90 Base) MCG/ACT Inhaler Inhale 2 puffs into the lungs every 6 hours as needed for shortness of breath / dyspnea or wheezing 1 Inhaler 0     CLONAZEPAM PO Take 0.25-0.5 mg by mouth 3 times daily as needed        dabigatran ANTICOAGULANT (PRADAXA ANTICOAGULANT) 150 MG capsule Take 1 capsule (150 mg) by mouth 2 times daily 180  capsule 3     ezetimibe (ZETIA) 10 MG tablet Take 1 tablet (10 mg) by mouth daily 90 tablet 3     flecainide (TAMBOCOR) 100 MG tablet Take 1 tablet (100 mg) by mouth 2 times daily 180 tablet 1     fluticasone (FLONASE) 50 MCG/ACT spray Spray 1 spray into both nostrils daily as needed for rhinitis or allergies       latanoprost (XALATAN) 0.005 % ophthalmic solution INSTILL 1 DROP IN BOTH EYES QHS.  6     lisinopril (PRINIVIL/ZESTRIL) 10 MG tablet Take 10 mg by mouth 2 times daily       mirtazapine (REMERON) 15 MG tablet Take 15 mg by mouth At Bedtime       Multiple Vitamins-Minerals (PRESERVISION AREDS 2 PO)        nitroGLYcerin (NITROSTAT) 0.4 MG sublingual tablet For chest pain place 1 tablet under the tongue every 5 minutes for 3 doses. If symptoms persist 5 minutes after 1st dose call 911. 25 tablet 3     Pantoprazole Sodium (PROTONIX PO) Take 40 mg by mouth 2 times daily       Psyllium (METAMUCIL PO) Take 1 Dose by mouth daily as needed        rosuvastatin (CRESTOR) 10 MG tablet Take 1 tablet (10 mg) by mouth daily 90 tablet 3     Testosterone Cypionate 200 MG/ML SOLN        timolol maleate (ISTALOL) 0.5 % opthalmic solution        ZOLPIDEM TARTRATE PO Take 5-10 mg by mouth nightly as needed        PAPAV-PHENTOLAMINE-ALPROSTADIL IC Inject 1 Dose as directed daily as needed (erectile dysfunction) Trimix Injection       pyridostigmine (MESTINON) 60 MG tablet USE UP TO 3 TS DAILY AS NEEDED. DISPENSE IN ORIGINAL  CONTAINER  3     RaNITidine HCl (ZANTAC PO) Take 150 mg by mouth daily as needed for heartburn       TESTOSTERONE AQUEOUS IM Inject 1 mL into the muscle every 14 days        [DISCONTINUED] dabigatran ANTICOAGULANT (PRADAXA ANTICOAGULANT) 150 MG capsule Take 1 capsule (150 mg) by mouth 2 times daily 180 capsule 3     [DISCONTINUED] ezetimibe (ZETIA) 10 MG tablet Take 1 tablet (10 mg) by mouth daily 90 tablet 3     [DISCONTINUED] losartan (COZAAR) 25 MG tablet Take 1 tablet (25 mg) by mouth daily  30 tablet 1     [DISCONTINUED] rosuvastatin (CRESTOR) 10 MG tablet Take 1 tablet (10 mg) by mouth daily 90 tablet 4     No facility-administered encounter medications on file as of 12/31/2019.        Again, thank you for allowing me to participate in the care of your patient.      Sincerely,    Fransisco Cartagena MD     [facility]

## 2019-12-31 NOTE — LETTER
12/31/2019    Mike Cosby MD  [facility] [address]  [place] [address]    RE: Chava Valentine       Dear Colleague,    I had the pleasure of seeing Chava Valentine in the [facility].    HPI and Plan:   See dictation(#[id number])    Orders Placed This Encounter   Procedures     Follow-Up with Cardiologist     Follow-Up with Electrophysiologist     EKG 12-lead complete w/read - Clinics (performed today)     Echocardiogram Complete       Orders Placed This Encounter   Medications     lisinopril (PRINIVIL/ZESTRIL) 10 MG tablet     Sig: Take 10 mg by mouth 2 times daily     Testosterone Cypionate 200 MG/ML SOLN     timolol maleate (ISTALOL) 0.5 % opthalmic solution     Multiple Vitamins-Minerals (PRESERVISION AREDS 2 PO)     mirtazapine (REMERON) 15 MG tablet     Sig: Take 15 mg by mouth At Bedtime     dabigatran ANTICOAGULANT (PRADAXA ANTICOAGULANT) 150 MG capsule     Sig: Take 1 capsule (150 mg) by mouth 2 times daily     Dispense:  180 capsule     Refill:  3     rosuvastatin (CRESTOR) 10 MG tablet     Sig: Take 1 tablet (10 mg) by mouth daily     Dispense:  90 tablet     Refill:  3     ezetimibe (ZETIA) 10 MG tablet     Sig: Take 1 tablet (10 mg) by mouth daily     Dispense:  90 tablet     Refill:  3       Medications Discontinued During This Encounter   Medication Reason     losartan (COZAAR) 25 MG tablet Stopped by Patient     dabigatran ANTICOAGULANT (PRADAXA ANTICOAGULANT) 150 MG capsule Reorder     rosuvastatin (CRESTOR) 10 MG tablet Reorder     ezetimibe (ZETIA) 10 MG tablet Reorder         Encounter Diagnoses   Name Primary?     Paroxysmal atrial fibrillation (H) Yes     Coronary artery disease involving coronary bypass graft of native heart without angina pectoris      Mixed hyperlipidemia        CURRENT MEDICATIONS:  Current Outpatient Medications   Medication Sig Dispense Refill     albuterol (PROAIR HFA/PROVENTIL HFA/VENTOLIN HFA) 108 (90 Base)  MCG/ACT Inhaler Inhale 2 puffs into the lungs every 6 hours as needed for shortness of breath / dyspnea or wheezing 1 Inhaler 0     CLONAZEPAM PO Take 0.25-0.5 mg by mouth 3 times daily as needed        dabigatran ANTICOAGULANT (PRADAXA ANTICOAGULANT) 150 MG capsule Take 1 capsule (150 mg) by mouth 2 times daily 180 capsule 3     ezetimibe (ZETIA) 10 MG tablet Take 1 tablet (10 mg) by mouth daily 90 tablet 3     flecainide (TAMBOCOR) 100 MG tablet Take 1 tablet (100 mg) by mouth 2 times daily 180 tablet 1     fluticasone (FLONASE) 50 MCG/ACT spray Spray 1 spray into both nostrils daily as needed for rhinitis or allergies       latanoprost (XALATAN) 0.005 % ophthalmic solution INSTILL 1 DROP IN BOTH EYES QHS.  6     lisinopril (PRINIVIL/ZESTRIL) 10 MG tablet Take 10 mg by mouth 2 times daily       mirtazapine (REMERON) 15 MG tablet Take 15 mg by mouth At Bedtime       Multiple Vitamins-Minerals (PRESERVISION AREDS 2 PO)        nitroGLYcerin (NITROSTAT) 0.4 MG sublingual tablet For chest pain place 1 tablet under the tongue every 5 minutes for 3 doses. If symptoms persist 5 minutes after 1st dose call 911. 25 tablet 3     Pantoprazole Sodium (PROTONIX PO) Take 40 mg by mouth 2 times daily       Psyllium (METAMUCIL PO) Take 1 Dose by mouth daily as needed        rosuvastatin (CRESTOR) 10 MG tablet Take 1 tablet (10 mg) by mouth daily 90 tablet 3     Testosterone Cypionate 200 MG/ML SOLN        timolol maleate (ISTALOL) 0.5 % opthalmic solution        ZOLPIDEM TARTRATE PO Take 5-10 mg by mouth nightly as needed        PAPAV-PHENTOLAMINE-ALPROSTADIL IC Inject 1 Dose as directed daily as needed (erectile dysfunction) Trimix Injection       pyridostigmine (MESTINON) 60 MG tablet USE UP TO 3 TS DAILY AS NEEDED. DISPENSE IN ORIGINAL  CONTAINER  3     RaNITidine HCl (ZANTAC PO) Take 150 mg by mouth daily as needed for heartburn       TESTOSTERONE AQUEOUS IM Inject 1 mL into the muscle every 14 days           ALLERGIES     Allergies   Allergen Reactions     No Clinical Screening - See Comments Rash     Dove soap       PAST MEDICAL HISTORY:  Past Medical History:   Diagnosis Date     Allergic rhinitis      Anxiety      Contact dermatitis      Coronary artery disease     CABG with 4 grafts-LIMA to LAD, SVG to Diagonal,OM and RCA     Cough      Elevated prolactin level (H)      Erythrocytosis      Gastro-oesophageal reflux disease      GI bleed 10/10     Hyperlipidemia      Hypertension      Hypogonadism     with low testosterone     Insomnia      Paroxysmal atrial fibrillation (H)     flecainide therapy     Post-nasal drip      Prediabetes      Renal disease     CKD     Shoulder arthritis      SVT (supraventricular tachycardia) (H)      Tremor        PAST SURGICAL HISTORY:  Past Surgical History:   Procedure Laterality Date     BYPASS GRAFT ARTERY CORONARY       CARDIAC SURGERY       COLONOSCOPY  12/2010    tubular adenoma     LASER SELECTIVE TRABECULOPLASTY BILATERAL Bilateral 6/11/2018    Procedure: LASER SELECTIVE TRABECULOPLASTY BILATERAL;  LASER SELECTIVE TRABECULOPLASTY BILATERAL ;  Surgeon: Parmjit Camacho MD;  Location: [facility]     ORTHOPEDIC SURGERY      rotator cuff right     PHACOEMULSIFICATION CLEAR CORNEA WITH STANDARD IOL, VITRECTOMY PARSPLANA 23 GAGUE, COMBINED  2/20/2013    Procedure: COMBINED PHACOEMULSIFICATION CLEAR CORNEA WITH STANDARD INTRAOCULAR LENS IMPLANT, VITRECTOMY PARSPLANA 23 GAUGE;;  Surgeon: Guero Lockett MD;  Location: [facility]     PHACOEMULSIFICATION CLEAR CORNEA WITH STANDARD IOL, VITRECTOMY PARSPLANA 23 GAGUE, COMBINED  7/31/2013    Procedure: COMBINED PHACOEMULSIFICATION CLEAR CORNEA WITH STANDARD INTRAOCULAR LENS IMPLANT, VITRECTOMY PARSPLANA 23 GAUGE;;  Surgeon: Guero Lockett MD;  Location: [facility]     REVERSE ARTHROPLASTY SHOULDER Right 7/24/2017    Procedure: REVERSE ARTHROPLASTY SHOULDER;  RIGHT REVERSE TOTAL SHOULDER ARTHROPLASTY ;  Surgeon: Rodolfo Calhoun,  MD;  Location:  OR     VASCULAR SURGERY      cab     VITRECTOMY PARSPLANA, PHACOEMULSIFICATION CLEAR CORNEA W STANDARD INTRAOCULAR LENS IMPLANT, COMBINED  2013    Procedure: COMBINED VITRECTOMY PARSPLANA, PHACOEMULSIFICATION CLEAR CORNEA WITH STANDARD INTRAOCULAR LENS IMPLANT;  LEFT PHACOEMULSIFICATION CLEAR CORNEA WITH SAURABH TORIC INTRAOCULAR LENS IMPLANT,  LEFT EYE 23 GAUGE PARSPLANA VITRECTOMY;  Surgeon: Jorge Regan MD;  Location: [facility]     VITRECTOMY PARSPLANA, PHACOEMULSIFICATION CLEAR CORNEA W STANDARD INTRAOCULAR LENS IMPLANT, COMBINED  2013    Procedure: COMBINED VITRECTOMY PARSPLANA, PHACOEMULSIFICATION CLEAR CORNEA WITH STANDARD INTRAOCULAR LENS IMPLANT;  RIGHT PHACOEMULSIFICATION CLEAR CORNEA WITH TORIC INTRAOCULAR LENS IMPLANT, RIGHT 23 GAUGE PARSPLANA VITRECTOMY ;  Surgeon: Jorge Regan MD;  Location: [facility]       FAMILY HISTORY:  Family History   Problem Relation Age of Onset     Other - See Comments Mother         old age     Other - See Comments Father 97     Cerebrovascular Disease Father        SOCIAL HISTORY:  Social History     Socioeconomic History     Marital status:      Spouse name: None     Number of children: None     Years of education: None     Highest education level: None   Occupational History     None   Social Needs     Financial resource strain: None     Food insecurity:     Worry: None     Inability: None     Transportation needs:     Medical: None     Non-medical: None   Tobacco Use     Smoking status: Former Smoker     Packs/day: 0.10     Years: 20.00     Pack years: 2.00     Types: Cigarettes     Start date:      Last attempt to quit: 1999     Years since quittin.0     Smokeless tobacco: Never Used     Tobacco comment: recreational only   Substance and Sexual Activity     Alcohol use: Yes     Comment: occas     Drug use: No     Sexual activity: None   Lifestyle     Physical activity:     Days per week: None     Minutes per session: None      "Stress: None   Relationships     Social connections:     Talks on phone: None     Gets together: None     Attends [employer] service: None     Active member of club or organization: None     Attends meetings of clubs or organizations: None     Relationship status: None     Intimate partner violence:     Fear of current or ex partner: None     Emotionally abused: None     Physically abused: None     Forced sexual activity: None   Other Topics Concern     Parent/sibling w/ CABG, MI or angioplasty before 65F 55M? No      Service Not Asked     Blood Transfusions Not Asked     Caffeine Concern Not Asked     Occupational Exposure Not Asked     Hobby Hazards Not Asked     Sleep Concern Yes     Stress Concern Not Asked     Weight Concern Not Asked     Special Diet Yes     Comment: less meat      Back Care Not Asked     Exercise Yes     Comment: 3 x weekly     Bike Helmet Not Asked     Seat Belt Yes     Self-Exams Not Asked   Social History Narrative     None       Review of Systems:  Skin:  Negative       Eyes:  Positive for glasses    ENT:  Positive for hearing loss    Respiratory:  Negative       Cardiovascular:  Negative;palpitations;chest pain;dizziness;syncope or near-syncope;cyanosis;edema Positive for;lightheadedness;dizziness new dx parkinsons  Gastroenterology: Positive for heartburn    Genitourinary:  not assessed      Musculoskeletal:  Negative      Neurologic:  Positive for memory problems;tremors parkinsons  Psychiatric:  Positive for anxiety    Heme/Lymph/Imm:  Negative      Endocrine:  Negative        Physical Exam:  Vitals: /69   Pulse 66   Ht 1.734 m (5' 8.25\")   Wt 78.8 kg (173 lb 11.2 oz)   BMI 26.22 kg/m       Constitutional:           Skin:             Head:           Eyes:           Lymph:      ENT:           Neck:           Respiratory:            Cardiac:                                                           GI:           Extremities and Muscular Skeletal:             "     Neurological:           Psych:             CC  No referring provider defined for this encounter.                    Thank you for allowing me to participate in the care of your patient.      Sincerely,     Fransisco Cartagena MD     [facility]    cc:   No referring provider defined for this encounter.

## 2020-06-08 ENCOUNTER — VIRTUAL VISIT (OUTPATIENT)
Dept: CARDIOLOGY | Facility: CLINIC | Age: 85
End: 2020-06-08
Attending: INTERNAL MEDICINE
Payer: MEDICARE

## 2020-06-08 VITALS
BODY MASS INDEX: 25.76 KG/M2 | HEIGHT: 68 IN | HEART RATE: 60 BPM | WEIGHT: 170 LBS | SYSTOLIC BLOOD PRESSURE: 122 MMHG | DIASTOLIC BLOOD PRESSURE: 64 MMHG

## 2020-06-08 DIAGNOSIS — I25.10 CORONARY ARTERY DISEASE INVOLVING NATIVE CORONARY ARTERY OF NATIVE HEART WITHOUT ANGINA PECTORIS: Primary | ICD-10-CM

## 2020-06-08 DIAGNOSIS — I48.0 PAROXYSMAL ATRIAL FIBRILLATION (H): ICD-10-CM

## 2020-06-08 PROCEDURE — 99214 OFFICE O/P EST MOD 30 MIN: CPT | Performed by: INTERNAL MEDICINE

## 2020-06-08 ASSESSMENT — MIFFLIN-ST. JEOR: SCORE: 1429.86

## 2020-06-08 NOTE — LETTER
6/8/2020    Mike Cosby MD  [facility] [address]  [place] [address]    RE: Chava Valentine       Dear Colleague,    I had the pleasure of seeing Chava Valentine in the [facility].       Porschekev is a very pleasant 86-year-old gentleman, who is a retired psychiatrist..  He has been followed on a long-time basis with Dr. Bernard and of late with Dr. Vegas.  I saw him the first and last time in December 2019.       The patient has history of CAD with history of CABG in 1995 with LIMA to LAD, SVG to diagonal and SVG to OM and SVG to RCA and looks like from a coronary artery disease perspective, he has done quite well since that time.  He also has history of paroxysmal atrial fibrillation and has been on flecainide for a long time.  To be noted, he was seen by electrophysiologist, Dr. Guzmán, back in 2011.  At that time he was on 50 mg b.i.d. for a long time, now he is on 100 mg b.i.d. of flecainide.  To be noted, he is on flecainide despite history of CAD and from previous note, it looks like this has been extensively discussed with the patient and also Dr. Bernard and Dr. Vegas have discussed with the patient regarding the appropriateness of lack of appropriateness of flecainide for atrial fibrillation.  During last clinic visit I discussed extensively with patient regarding the appropriateness or in fact lack of appropriateness of flecainide in the setting of known coronary disease and underlying chronic kidney disease.  I recommended patient to follow-up electrophysiology with Dr. Shaun Rosas who was seen in the past and also get an echocardiogram.  Unfortunately patient missed both these appointments.  This is a routine follow-up visit which has been converted into a virtual visit due to ongoing coronavirus pandemic and a phone visit at patient's preference.  Patient also has history of Parkinson disease.  Patient tells me that healthwise he feels  well.  He has noticed slowing down on his face physical activity due to Parkinson's but no particular falls, no chest discomfort, shortness of breath dizziness presyncope or syncope or palpitations.  Stress test in 2018 showed trivial inferior apical ischemia.    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  On Zetia, Crestor.  Overall history wise cardiac status appears to be stable without any symptoms concerning for angina or recurrence of atrial fibrillation.  He has noticed slowing  down of ambulation due to underlying Parkinson disease.  I again brought up the issue of flecainide which he has been on for many years tolerating quite well but I did raise my concern about potential adverse effect of flecainide in the setting of known CAD and underlying chronic kidney disease and I strongly recommend patient to reconnect with electrophysiology which I recommended last clinic visit as well but patient missed appointment.  Patient did acknowledge understanding of my concern about suitability of flecainide although he has been on it for many years.  He is willing to talk to electrophysiology about potential alternatives like sotalol which may also be an issue due to underlying chronic kidney disease versus amiodarone.  I personally do not favor continuation of flecainide and did discuss with patient the potential of adverse effects including malignant arrhythmia while on flecainide with underlying comorbidities which patient has.  I am not sure at this time if patient is willing to discontinue flecainide but he is agreeable to connect with electrophysiology to discuss more about it.  I am setting up a follow-up in 6 months with an JUANY, as noted above I strongly  urged patient to review with electrophysiology and have again given him the referral for same.      Phone call duration: 16 minutes      Thank you for allowing me to participate in the care of your patient.    Sincerely,     Fransisco Cartagena MD     [facility]

## 2020-06-08 NOTE — PROGRESS NOTES
"Review Of Systems  Skin:   Eyes:Ears/Nose/Throat:  Respiratory: NEGATIVE  Cardiovascular:NEGATIVE  Gastrointestinal: NEGATIVE  Genitourinary:NEGATIVE   Musculoskeletal: NEGATIVE  Neurologic: NEGATIVE  Psychiatric: NEGATIVE  Hematologic/Lymphatic/Immunologic:  Endocrine:      Reviewed:  PEDRO Singh    Chava Valentine is a 86 year old male who is being evaluated via a billable telephone visit.      The patient has been notified of following:     \"This telephone visit will be conducted via a call between you and your physician/provider. We have found that certain health care needs can be provided without the need for a physical exam.  This service lets us provide the care you need with a short phone conversation.  If a prescription is necessary we can send it directly to your pharmacy.  If lab work is needed we can place an order for that and you can then stop by our lab to have the test done at a later time.    Telephone visits are billed at different rates depending on your insurance coverage. During this emergency period, for some insurers they may be billed the same as an in-person visit.  Please reach out to your insurance provider with any questions.    If during the course of the call the physician/provider feels a telephone visit is not appropriate, you will not be charged for this service.\"    Patient has given verbal consent for Telephone visit?  Yes    - Writer spoke w/pt via phone.  He is expecting the telephone visit w/Dr. Cartagena  - BP:  122/64  - P:  60  - Wt.:  170.0 Lbs    PEDRO Singh  06/08/20    What phone number would you like to be contacted at?   [phone]    How would you like to obtain your AVS? 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  He has been followed on a long-time basis with Dr. Bernard and of late with Dr. Vegas.  I saw him the first and last time in December 2019.       The patient has history of CAD with history of CABG in " 1995 with LIMA to LAD, SVG to diagonal and SVG to OM and SVG to RCA and looks like from a coronary artery disease perspective, he has done quite well since that time.  He also has history of paroxysmal atrial fibrillation and has been on flecainide for a long time.  To be noted, he was seen by electrophysiologist, Dr. Guzmán, back in 2011.  At that time he was on 50 mg b.i.d. for a long time, now he is on 100 mg b.i.d. of flecainide.  To be noted, he is on flecainide despite history of CAD and from previous note, it looks like this has been extensively discussed with the patient and also Dr. Bernard and Dr. Vegas have discussed with the patient regarding the appropriateness of lack of appropriateness of flecainide for atrial fibrillation.  During last clinic visit I discussed extensively with patient regarding the appropriateness or in fact lack of appropriateness of flecainide in the setting of known coronary disease and underlying chronic kidney disease.  I recommended patient to follow-up electrophysiology with Dr. Shaun Rosas who was seen in the past and also get an echocardiogram.  Unfortunately patient missed both these appointments.  This is a routine follow-up visit which has been converted into a virtual visit due to ongoing coronavirus pandemic and a phone visit at patient's preference.  Patient also has history of Parkinson disease.  Patient tells me that healthwise he feels well.  He has noticed slowing down on his face physical activity due to Parkinson's but no particular falls, no chest discomfort, shortness of breath dizziness presyncope or syncope or palpitations.  Stress test in 2018 showed trivial inferior apical ischemia.    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  On Zetia, Crestor.  Overall history wise cardiac status appears to be stable without any symptoms concerning for angina or recurrence of atrial fibrillation.  He has noticed slowing  down of ambulation due to underlying Parkinson disease.  I again brought up the issue of flecainide which he has been on for many years tolerating quite well but I did raise my concern about potential adverse effect of flecainide in the setting of known CAD and underlying chronic kidney disease and I strongly recommend patient to reconnect with electrophysiology which I recommended last clinic visit as well but patient missed appointment.  Patient did acknowledge understanding of my concern about suitability of flecainide although he has been on it for many years.  He is willing to talk to electrophysiology about potential alternatives like sotalol which may also be an issue due to underlying chronic kidney disease versus amiodarone.  I personally do not favor continuation of flecainide and did discuss with patient the potential of adverse effects including malignant arrhythmia while on flecainide with underlying comorbidities which patient has.  I am not sure at this time if patient is willing to discontinue flecainide but he is agreeable to connect with electrophysiology to discuss more about it.  I am setting up a follow-up in 6 months with an JUANY, as noted above I strongly urged patient to review with electrophysiology and have again given him the referral for same.      Phone call duration: 16 minutes    Fransisco Cartagena MD

## 2020-06-16 ENCOUNTER — VIRTUAL VISIT (OUTPATIENT)
Dept: CARDIOLOGY | Facility: CLINIC | Age: 85
End: 2020-06-16
Attending: INTERNAL MEDICINE
Payer: MEDICARE

## 2020-06-16 VITALS — SYSTOLIC BLOOD PRESSURE: 128 MMHG | DIASTOLIC BLOOD PRESSURE: 63 MMHG | WEIGHT: 168 LBS | BODY MASS INDEX: 25.34 KG/M2

## 2020-06-16 DIAGNOSIS — I25.10 CORONARY ARTERY DISEASE INVOLVING NATIVE CORONARY ARTERY OF NATIVE HEART WITHOUT ANGINA PECTORIS: ICD-10-CM

## 2020-06-16 DIAGNOSIS — I48.0 PAROXYSMAL ATRIAL FIBRILLATION (H): Primary | ICD-10-CM

## 2020-06-16 PROCEDURE — 99443 ZZC PHYSICIAN TELEPHONE EVALUATION 21-30 MIN: CPT | Performed by: INTERNAL MEDICINE

## 2020-06-16 RX ORDER — METOPROLOL SUCCINATE 50 MG/1
50 TABLET, EXTENDED RELEASE ORAL DAILY
Qty: 30 TABLET | Refills: 11 | Status: SHIPPED | OUTPATIENT
Start: 2020-06-16 | End: 2020-06-17

## 2020-06-16 NOTE — PROGRESS NOTES
"Chava Morrellkev is a 86 year old male who is being evaluated via a billable telephone visit.      The patient has been notified of following:     \"This telephone visit will be conducted via a call between you and your physician/provider. We have found that certain health care needs can be provided without the need for a physical exam.  This service lets us provide the care you need with a short phone conversation.  If a prescription is necessary we can send it directly to your pharmacy.  If lab work is needed we can place an order for that and you can then stop by our lab to have the test done at a later time.    Telephone visits are billed at different rates depending on your insurance coverage. During this emergency period, for some insurers they may be billed the same as an in-person visit.  Please reach out to your insurance provider with any questions.    If during the course of the call the physician/provider feels a telephone visit is not appropriate, you will not be charged for this service.\"    Patient has given verbal consent for Telephone visit?  Yes    What phone number would you like to be contacted at? [phone]    How would you like to obtain your AVS? Mail a copy    Pt reported blood pressure: 128/63 no pulse      Review Of Systems  Skin: neg  Eyes:Ears/Nose/Throat: had cataracts, hearing loss  Respiratory: sob on exertion   Cardiovascular:fatigue, lightheaded, dizzy  Gastrointestinal: heartburn  Genitourinary:   Musculoskeletal: neg  Neurologic: parkinsons  Psychiatric: anxiety  Hematologic/Lymphatic/Immunologic: neg  Endocrine:  neg    Yvonne OLMOS         PHYSICIAN NOTE:  This visit was completed via telephone due to COVID-19 precautions.  The patient provided consent for a telephone visit.    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  He has been referred by Dr. Cartagena for an opinion regarding AA drug rx for AF management.    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  I saw the patient twice in 2011 for paroxysmal AF.  He has the following chronic medical issues:  1. Coronary artery disease with previous CABG (1995).  2. Normal LV function.  3. Paroxysmal atrial fibrillation, treated with flecainide since 2011.  4. Chronic kidney disease.  Baseline creatinine around 1.7-1.8.    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  I am pleased to see that over the years he has stuck with anticoagulation (dabigatran).  I had also recommended a trial of Multaq for rhythm control of symptomatic paroxysmal AF.  However, the patient insisted on staying on flecainide which worked well to keep him in SR.    It is now 9 years later and I am asked to see the patient again regarding the appropriateness of continuing flecainide.  Not much has changed from a cardiac arrhythmia standpoint.  He continues to tolerate flecainide and has not had proarrhythmia that we know of.  He has remained free of symptomatic AF.    The patient sounded frail over the phone today.  He told me that his Parkinson's is affecting him more and more and he has noticed a clear decrease in mobility and stability compared to last year.    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      DIAGNOSTIC STUDIES:  Labs: Creatinine in 12/2019 was 1.74  Echocardiogram: The patient has not had an echocardiogram in years.  A nuclear stress test in 2018 showed possible trivial apical ischemia and normal ejection fraction.      IMPRESSION:  1. History of symptomatic paroxysmal AF.  Maintained on flecainide for years with good success with AF suppression.  On Pradaxa for anticoagulation.  [id number] score is at least 4.    2. History of coronary artery disease with previous CABG (1995) and normal LV function (2018).  3. Parkinson's disease which has progressed in the past year.    RECOMMENDATIONS:  1. The patient has  done well with flecainide over the years, but this is not an appropriate AA medication given his significant underlying CAD.  I recommended stopping flecainide.  2. We have 2 reasonable pharmacologic options going forward:  the first is to substitute flecainide with metoprolol and then wait-and-see whether he develops symptomatic paroxysmal AF.  It is possible that at age 86 he may not be that symptomatic from recurrence of AF.  The second option is to substitute flecainide with amiodarone.  In theory, amiodarone is the most appropriate AA drug in the setting of CAD and renal dysfunction.  However, we often see neurologic toxicity in the age group.  Neurologic toxicity from amiodarone would be detrimental in this patient who is already compromised from Parkinson's.   3. Of the 2 options I favor starting with the simpler former option, i.e. stop flecainide and start Toprol-XL 50 mg daily.  4. See EP JUANY in 1 month.  I encouraged the patient to call if he has issues in the interim.    After the next appointment with EP, and assuming that he remains stable, please refer Dr. Valentine back to general cardiology.    Thank you for this consultation.    Meliton Guzmán MD, [facility]      Telephone visit documentation  Start: 1:45 PM  End: 2:12 PM  Total time spent: 40 minutes

## 2020-06-16 NOTE — LETTER
6/16/2020    [facility] [address]  [place] [address]    RE: Chava Valentine       Dear Colleague,    I had the pleasure of seeing Chava Valentine in the [facility].    Chava Valentine is a 86 year old male who is being evaluated via a billable telephone visit.        PHYSICIAN NOTE:  This visit was completed via telephone due to COVID-19 precautions.  The patient provided consent for a telephone visit.    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  He has been referred by Dr. Cartagena for an opinion regarding AA drug rx for AF management.    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  I saw the patient twice in 2011 for paroxysmal AF.  He has the following chronic medical issues:  1. Coronary artery disease with previous CABG (1995).  2. Normal LV function.  3. Paroxysmal atrial fibrillation, treated with flecainide since 2011.  4. Chronic kidney disease.  Baseline creatinine around 1.7-1.8.    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  I am pleased to see that over the years he has stuck with anticoagulation (dabigatran).  I had also recommended a trial of Multaq for rhythm control of symptomatic paroxysmal AF.  However, the patient insisted on staying on flecainide which worked well to keep him in SR.    It is now 9 years later and I am asked to see the patient again regarding the appropriateness of continuing flecainide.  Not much has changed from a cardiac arrhythmia standpoint.  He continues to tolerate flecainide and has not had proarrhythmia that we know of.  He has remained free of symptomatic AF.    The patient sounded frail over the phone today.  He told me that his Parkinson's is affecting him more and more and he has noticed a clear decrease in mobility and stability compared to last year.      Meme has not had bleeding issues on anticoagulation.      DIAGNOSTIC STUDIES:  Labs: Creatinine in 12/2019 was 1.74  Echocardiogram: The patient has not had an echocardiogram in years.  A nuclear stress test in 2018 showed possible trivial apical ischemia and normal ejection fraction.      IMPRESSION:  1. History of symptomatic paroxysmal AF.  Maintained on flecainide for years with good success with AF suppression.  On Pradaxa for anticoagulation.  [id number] score is at least 4.    2. History of coronary artery disease with previous CABG (1995) and normal LV function (2018).  3. Parkinson's disease which has progressed in the past year.    RECOMMENDATIONS:  1. The patient has done well with flecainide over the years, but this is not an appropriate AA medication given his significant underlying CAD.  I recommended stopping flecainide.  2. We have 2 reasonable pharmacologic options going forward:  the first is to substitute flecainide with metoprolol and then wait-and-see whether he develops symptomatic paroxysmal AF.  It is possible that at age 86 he may not be that symptomatic from recurrence of AF.  The second option is to substitute flecainide with amiodarone.  In theory, amiodarone is the most appropriate AA drug in the setting of CAD and renal dysfunction.  However, we often see neurologic toxicity in the age group.  Neurologic toxicity from amiodarone would be detrimental in this patient who is already compromised from Parkinson's.   3. Of the 2 options I favor starting with the simpler former option, i.e. stop flecainide and start Toprol-XL 50 mg daily.  4. See EP JUANY in 1 month.  I encouraged the patient to call if he has issues in the interim.    After the next appointment with EP, and assuming that he remains stable, please refer Dr. Valentine back to general cardiology.    Thank you for this consultation.    Sincerely,     Meliton Guzmán MD     [facility]

## 2020-06-17 DIAGNOSIS — I48.0 PAROXYSMAL ATRIAL FIBRILLATION (H): ICD-10-CM

## 2020-06-17 RX ORDER — METOPROLOL SUCCINATE 50 MG/1
50 TABLET, EXTENDED RELEASE ORAL DAILY
Qty: 90 TABLET | Refills: 3 | Status: SHIPPED | OUTPATIENT
Start: 2020-06-17 | End: 2021-08-19

## 2020-06-22 ENCOUNTER — TELEPHONE (OUTPATIENT)
Dept: CARDIOLOGY | Facility: CLINIC | Age: 85
End: 2020-06-22

## 2020-06-22 DIAGNOSIS — I48.0 PAROXYSMAL ATRIAL FIBRILLATION (H): Primary | ICD-10-CM

## 2020-06-26 RX ORDER — DILTIAZEM HYDROCHLORIDE 30 MG/1
30 TABLET, FILM COATED ORAL 2 TIMES DAILY
Qty: 180 TABLET | Refills: 3 | Status: SHIPPED | OUTPATIENT
Start: 2020-06-26 | End: 2020-07-02

## 2020-07-02 DIAGNOSIS — I25.10 CAD (CORONARY ARTERY DISEASE): Primary | ICD-10-CM

## 2020-07-02 PROCEDURE — 93000 ELECTROCARDIOGRAM COMPLETE: CPT | Performed by: INTERNAL MEDICINE

## 2020-07-02 RX ORDER — DILTIAZEM HYDROCHLORIDE 30 MG/1
30 TABLET, FILM COATED ORAL 3 TIMES DAILY
Qty: 180 TABLET | Refills: 3
Start: 2020-07-02 | End: 2020-07-14 | Stop reason: ALTCHOICE

## 2020-07-14 RX ORDER — FLECAINIDE ACETATE 100 MG/1
100 TABLET ORAL 2 TIMES DAILY
Qty: 180 TABLET | Refills: 2 | Status: SHIPPED | OUTPATIENT
Start: 2020-07-14 | End: 2021-03-10

## 2020-09-22 ENCOUNTER — VIRTUAL VISIT (OUTPATIENT)
Dept: CARDIOLOGY | Facility: CLINIC | Age: 85
End: 2020-09-22
Attending: INTERNAL MEDICINE
Payer: MEDICARE

## 2020-09-22 DIAGNOSIS — I25.10 CORONARY ARTERY DISEASE INVOLVING NATIVE CORONARY ARTERY OF NATIVE HEART WITHOUT ANGINA PECTORIS: ICD-10-CM

## 2020-09-22 DIAGNOSIS — I48.0 PAROXYSMAL ATRIAL FIBRILLATION (H): Primary | ICD-10-CM

## 2020-09-22 PROCEDURE — 99441 ZZC PHYSICIAN TELEPHONE EVALUATION 5-10 MIN: CPT | Mod: 95 | Performed by: INTERNAL MEDICINE

## 2020-09-22 NOTE — PROGRESS NOTES
"Chava Valentine is a 86 year old male who is being evaluated via a billable telephone visit.      The patient has been notified of following:     \"This telephone visit will be conducted via a call between you and your physician/provider. We have found that certain health care needs can be provided without the need for a physical exam.  This service lets us provide the care you need with a short phone conversation.  If a prescription is necessary we can send it directly to your pharmacy.  If lab work is needed we can place an order for that and you can then stop by our lab to have the test done at a later time.    Telephone visits are billed at different rates depending on your insurance coverage. During this emergency period, for some insurers they may be billed the same as an in-person visit.  Please reach out to your insurance provider with any questions.    If during the course of the call the physician/provider feels a telephone visit is not appropriate, you will not be charged for this service.\"    Patient has given verbal consent for Telephone visit?  Yes    What phone number would you like to be contacted at? [phone]    How would you like to obtain your AVS? Mail a copy   Pt reported vitals 110/72 170#  Review Of Systems  Skin: negative  Eyes: cataracts, glasses  Ears/Nose/Throat: negative, hearing loss  Respiratory: No shortness of breath, dyspnea on exertion, cough, or hemoptysis  Cardiovascular: fatigue, lightheaded, dizzy and palpitations  Gastrointestinal: heartburn  Musculoskeletal: negative  Neurologic: memory problems, tremor and parkionsons  Psychiatric: anxiety  Hematologic/Lymphatic/Immunologic: allergies  Endocrine: negative  Yvonne Gonzalez MA        PHYSICIAN NOTE:  This visit was completed via telephone due to COVID-19 precautions.  The patient provided consent for a telephone visit.    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 Coronary artery disease with previous CABG (1995).  2. Normal LV function.  3. Paroxysmal atrial fibrillation, treated with flecainide since 2011.  4. Chronic kidney disease.  Baseline creatinine around 1.7-1.8.  5. Parkinson's.      I spoke to the patient in June at the request of my colleague Dr. BETITO Cartagena.  The patient had been on flecainide for years for suppression of paroxysmal atrial fibrillation.  Given his history of CAD and previous CABG, this was felt not to be an ideal AA medication.    After much discussion, I initially convinced the patient to switch flecainide to metoprolol XL 50 mg daily.  Unfortunately, the patient did not tolerate this.  He called back just a few days later with various subjective side-effects.  The dose was decreased to 25 mg daily.  The patient then complained of \"chest thumping\" which was uncomfortable.  This was then switched to diltiazem 30 mg twice daily.  Dr. Valentine did not like this either.  He was quite concerned in trying amiodarone.     After further discussion, we mutually decided to resume flecainide, again not an ideal choice given his CAD, though a medication that Dr. Valentine is clearly very comfortable with while he is uncomfortable with pretty much everything else...    He has had no new health issues in the past 3 months.      IMPRESSION:  1. Paroxysmal atrial fibrillation in the setting of chronic ischemic heart disease.  Normal LV function.    RECOMMENDATIONS:  1. Continue flecainide.  Clearly a compromise, but the patient strongly prefers it. He is very happy with it.   2. Continue dabigatran.  3. Follow-up with EP as needed.    Meliton Guzmán MD, [facility]      Telephone visit documentation  Start: 2:20 PM  End: 2:28 PM  Time: 8 minutes      "

## 2020-09-22 NOTE — LETTER
"9/22/2020    [facility] [address]  [facility] [address]    RE: Chava Valentine       Dear Colleague,    I had the pleasure of seeing Chava Valentine in the [facility].    Chava Valentine is a 86 year old male who is being evaluated via a billable telephone visit.      The patient has been notified of following:     \"This telephone visit will be conducted via a call between you and your physician/provider. We have found that certain health care needs can be provided without the need for a physical exam.  This service lets us provide the care you need with a short phone conversation.  If a prescription is necessary we can send it directly to your pharmacy.  If lab work is needed we can place an order for that and you can then stop by our lab to have the test done at a later time.    Telephone visits are billed at different rates depending on your insurance coverage. During this emergency period, for some insurers they may be billed the same as an in-person visit.  Please reach out to your insurance provider with any questions.    If during the course of the call the physician/provider feels a telephone visit is not appropriate, you will not be charged for this service.\"    Patient has given verbal consent for Telephone visit?  Yes    What phone number would you like to be contacted at? [phone]    How would you like to obtain your AVS? Mail a copy   Pt reported vitals 110/72 170#  Review Of Systems  Skin: negative  Eyes: cataracts, glasses  Ears/Nose/Throat: negative, hearing loss  Respiratory: No shortness of breath, dyspnea on exertion, cough, or hemoptysis  Cardiovascular: fatigue, lightheaded, dizzy and palpitations  Gastrointestinal: heartburn  Musculoskeletal: negative  Neurologic: memory problems, tremor and parkionsons  Psychiatric: anxiety  Hematologic/Lymphatic/Immunologic: allergies  Endocrine: negative  Yvonne Gonzaelz, " "MA        PHYSICIAN NOTE:  This visit was completed via telephone due to COVID-19 precautions.  The patient provided consent for a telephone visit.    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 Coronary artery disease with previous CABG (1995).  2. Normal LV function.  3. Paroxysmal atrial fibrillation, treated with flecainide since 2011.  4. Chronic kidney disease.  Baseline creatinine around 1.7-1.8.  5. Parkinson's.      I spoke to the patient in June at the request of my colleague Dr. BETITO Cartagena.  The patient had been on flecainide for years for suppression of paroxysmal atrial fibrillation.  Given his history of CAD and previous CABG, this was felt not to be an ideal AA medication.    After much discussion, I initially convinced the patient to switch flecainide to metoprolol XL 50 mg daily.  Unfortunately, the patient did not tolerate this.  He called back just a few days later with various subjective side-effects.  The dose was decreased to 25 mg daily.  The patient then complained of \"chest thumping\" which was uncomfortable.  This was then switched to diltiazem 30 mg twice daily.  Dr. Valentine did not like this either.  He was quite concerned in trying amiodarone.     After further discussion, we mutually decided to resume flecainide, again not an ideal choice given his CAD, though a medication that Dr. Valentine is clearly very comfortable with while he is uncomfortable with pretty much everything else...    He has had no new health issues in the past 3 months.      IMPRESSION:  1. Paroxysmal atrial fibrillation in the setting of chronic ischemic heart disease.  Normal LV function.    RECOMMENDATIONS:  1. Continue flecainide.  Clearly a compromise, but the patient strongly prefers it. He is very happy with it.   2. Continue " dabigatran.  3. Follow-up with EP as needed.    Meliton Guzmán MD, [facility]      Telephone visit documentation  Start: 2:20 PM  End: 2:28 PM  Time: 8 minutes        Thank you for allowing me to participate in the care of your patient.    Sincerely,     Meliton Guzmán MD     [facility]

## 2020-10-19 ENCOUNTER — HOSPITAL ENCOUNTER (EMERGENCY)
Facility: CLINIC | Age: 85
Discharge: HOME | End: 2020-10-19
Payer: MEDICARE

## 2020-10-19 VITALS
OXYGEN SATURATION: 97 % | DIASTOLIC BLOOD PRESSURE: 59 MMHG | BODY MASS INDEX: 25.76 KG/M2 | TEMPERATURE: 97.6 F | HEART RATE: 60 BPM | HEIGHT: 68 IN | RESPIRATION RATE: 16 BRPM | SYSTOLIC BLOOD PRESSURE: 107 MMHG | WEIGHT: 170 LBS

## 2020-10-19 ASSESSMENT — MIFFLIN-ST. JEOR: SCORE: 1420.61

## 2020-10-19 NOTE — ED TRIAGE NOTES
Took BP this morning when woke up and was high, started getting headache. Called MD and told to come in.

## 2020-10-29 ENCOUNTER — HOSPITAL ENCOUNTER (EMERGENCY)
Facility: CLINIC | Age: 85
Discharge: HOME | End: 2020-10-29
Attending: EMERGENCY MEDICINE | Admitting: EMERGENCY MEDICINE
Payer: MEDICARE

## 2020-10-29 VITALS
DIASTOLIC BLOOD PRESSURE: 75 MMHG | SYSTOLIC BLOOD PRESSURE: 143 MMHG | OXYGEN SATURATION: 99 % | HEART RATE: 63 BPM | RESPIRATION RATE: 20 BRPM | WEIGHT: 170 LBS | BODY MASS INDEX: 25.76 KG/M2 | HEIGHT: 68 IN | TEMPERATURE: 97.8 F

## 2020-10-29 DIAGNOSIS — I10 HYPERTENSION, UNSPECIFIED TYPE: ICD-10-CM

## 2020-10-29 LAB
ANION GAP SERPL CALCULATED.3IONS-SCNC: 5 MMOL/L (ref 3–14)
BASOPHILS # BLD AUTO: 0 10E9/L (ref 0–0.2)
BASOPHILS NFR BLD AUTO: 0.5 %
BUN SERPL-MCNC: 25 MG/DL (ref 7–30)
CALCIUM SERPL-MCNC: 8.8 MG/DL (ref 8.5–10.1)
CHLORIDE SERPL-SCNC: 108 MMOL/L (ref 94–109)
CO2 SERPL-SCNC: 26 MMOL/L (ref 20–32)
CREAT SERPL-MCNC: 1.67 MG/DL (ref 0.66–1.25)
DIFFERENTIAL METHOD BLD: ABNORMAL
EOSINOPHIL # BLD AUTO: 0.2 10E9/L (ref 0–0.7)
EOSINOPHIL NFR BLD AUTO: 2.2 %
ERYTHROCYTE [DISTWIDTH] IN BLOOD BY AUTOMATED COUNT: 11.9 % (ref 10–15)
GFR SERPL CREATININE-BSD FRML MDRD: 36 ML/MIN/{1.73_M2}
GLUCOSE SERPL-MCNC: 101 MG/DL (ref 70–99)
HCT VFR BLD AUTO: 41.5 % (ref 40–53)
HGB BLD-MCNC: 14.2 G/DL (ref 13.3–17.7)
IMM GRANULOCYTES # BLD: 0 10E9/L (ref 0–0.4)
IMM GRANULOCYTES NFR BLD: 0.5 %
LYMPHOCYTES # BLD AUTO: 2.2 10E9/L (ref 0.8–5.3)
LYMPHOCYTES NFR BLD AUTO: 27.9 %
MCH RBC QN AUTO: 32.3 PG (ref 26.5–33)
MCHC RBC AUTO-ENTMCNC: 34.2 G/DL (ref 31.5–36.5)
MCV RBC AUTO: 94 FL (ref 78–100)
MONOCYTES # BLD AUTO: 0.5 10E9/L (ref 0–1.3)
MONOCYTES NFR BLD AUTO: 5.8 %
NEUTROPHILS # BLD AUTO: 5 10E9/L (ref 1.6–8.3)
NEUTROPHILS NFR BLD AUTO: 63.1 %
NRBC # BLD AUTO: 0 10*3/UL
NRBC BLD AUTO-RTO: 0 /100
PLATELET # BLD AUTO: 149 10E9/L (ref 150–450)
POTASSIUM SERPL-SCNC: 4.2 MMOL/L (ref 3.4–5.3)
RBC # BLD AUTO: 4.4 10E12/L (ref 4.4–5.9)
SODIUM SERPL-SCNC: 139 MMOL/L (ref 133–144)
TROPONIN I SERPL-MCNC: <0.015 UG/L (ref 0–0.04)
WBC # BLD AUTO: 7.9 10E9/L (ref 4–11)

## 2020-10-29 PROCEDURE — 99284 EMERGENCY DEPT VISIT MOD MDM: CPT | Mod: 25

## 2020-10-29 PROCEDURE — 85025 COMPLETE CBC W/AUTO DIFF WBC: CPT | Performed by: EMERGENCY MEDICINE

## 2020-10-29 PROCEDURE — 80048 BASIC METABOLIC PNL TOTAL CA: CPT | Performed by: EMERGENCY MEDICINE

## 2020-10-29 PROCEDURE — 84484 ASSAY OF TROPONIN QUANT: CPT | Performed by: EMERGENCY MEDICINE

## 2020-10-29 PROCEDURE — 96374 THER/PROPH/DIAG INJ IV PUSH: CPT

## 2020-10-29 PROCEDURE — 93005 ELECTROCARDIOGRAM TRACING: CPT

## 2020-10-29 PROCEDURE — 250N000011 HC RX IP 250 OP 636: Performed by: EMERGENCY MEDICINE

## 2020-10-29 RX ORDER — HYDRALAZINE HYDROCHLORIDE 20 MG/ML
10 INJECTION INTRAMUSCULAR; INTRAVENOUS ONCE
Status: COMPLETED | OUTPATIENT
Start: 2020-10-29 | End: 2020-10-29

## 2020-10-29 RX ADMIN — HYDRALAZINE HYDROCHLORIDE 10 MG: 20 INJECTION INTRAMUSCULAR; INTRAVENOUS at 03:32

## 2020-10-29 ASSESSMENT — MIFFLIN-ST. JEOR: SCORE: 1420.61

## 2020-10-29 ASSESSMENT — ENCOUNTER SYMPTOMS
NUMBNESS: 0
SHORTNESS OF BREATH: 0
HEADACHES: 0

## 2020-10-29 NOTE — ED NOTES
Bed: ED02  Expected date:   Expected time:   Means of arrival:   Comments:  Michelle 1 87M hypertension

## 2020-10-29 NOTE — ED AVS SNAPSHOT
[facility] Emergency Dept  [address] [facility] [address]  Phone: [phone]  Fax: [phone]                                    Chava Valentine   MRN: [id number]    Department: [facility] Emergency Dept   Date of Visit: 10/29/2020           After Visit Summary Signature Page    I have received my discharge instructions, and my questions have been answered. I have discussed any challenges I see with this plan with the nurse or doctor.    ..........................................................................................................................................  Patient/Patient Representative Signature      ..........................................................................................................................................  Patient Representative Print Name and Relationship to Patient    ..................................................               ................................................  Date                                   Time    ..........................................................................................................................................  Reviewed by Signature/Title    ...................................................              ..............................................  Date                                               Time          22EPIC Rev 08/18

## 2020-10-29 NOTE — ED TRIAGE NOTES
Pt routinely checks his bloods pressure once or twice a day depending on his blood pressure. His blood pressure was elevated last evening and he took an extra 10mg of lisinopril. Pt reports his systolic was 190's this AM so he called EMS.  Pt denies chest pain or shortness of breath

## 2020-10-29 NOTE — TELEPHONE ENCOUNTER
"Every medication we have tried has caused unusual (subjective) SE.  I am concerned using amiodarone in this patient given his numerous subjective complaints.  It will not go well and we will have a hard time gauging toxicity.  So I will honor his request, let's restart flecainide and use the dose he used to take.  Certainly not ideal, but I will point out that he did very well on flecainide for many years.    As the saying goes \"better the devil you know than the devil you don't\"...    DI  "
7/6/20 Pt called to report he is feeling much better on the Diltiazem 30 mg TID. Bps running 120-130/70-80 and pulse in the 70's. Pt is a poor historian and when asked about the diary he was encouraged to keep he did not have an answer. He does say his palpitations are not as frequent or intense. Pt would like to call again in 1 week to check in. Pt encouraged to do so and to call if sx worsen before then. Pt voiced understanding and agreement w mike Manjarrez 350 pm  
As far as he knows, he is not in AF, correct?    Let's decrease Toprol XL to 25 mg daily.  If he continues to have trouble, we would then stop altogether.  In that case, options would include replacing Toprol XL with either dilt XR (for rate control) or amiodarone (rhythm control).   But we don't want him to go back on flecainide.    Taylorx, D  
ECG looks fine (SR wth PAC) and does not explain why he did not feel well today...  DI  
OK, let's try short-acting diltiazem 30 mg bid instead of Toprol XL.  If he has issues on that low dilt dose, he can take it tid.  RADHA Leslie  
Patient called reporting he had a poor night.   Heart felt like it was pounding out of chest.  When asked what HR was he thought it was in the 70's however it was pounding hard and was uncomfortable.  Reports it is not as bad this morning however he continues to feel unwell.  Has had recent medication changes.  Will have patient come in for EKG today to evaluate heart rhythm.    Wellness Screening Tool    Symptom Screening:    Do you have one of the following NEW symptoms:      Fever (subjective or >100.0)?  No    New cough? No    Shortness of breath? No    Chills? No    New loss of taste or smell? No    Generalized body aches? No    New persistent headache? No    New sore throat? No    Nausea, vomiting or diarrhea? Yes  Reports he did have an episode of diarrhea yesterday and it was something he ate.  Today he has had normal stool    Within the past 3 weeks, have you been exposed to someone with a known positive illness below?      COVID - 19 (known or suspected) No    Chicken pox?  No    Measles? No    Pertussis? No    Patient notified of visitor restriction: Yes  Patient informed to wear a mask: Yes    Patient's appointment status: Patient will be seen in clinic for EKG today at 2pm.      YADY Ham  
Patient here for EKG will have Dr Guzmán review.  YADY Ham  
Pt called and states that he was good last week, but now he is having the thump thump thump every night. Pt states that he takes Ambien every night either 5 or 10 mg. States that he eventually falls asleep.  Pt is taking the Diltiazem 3 times a day as directed, but wondering what else there is.  Pt states that he was on Flecainide in the past and that worked well.  Pt was reminded why this was stopped. Discussed having pt wear a monitor to find out what the thumping is and pt agreed that would be ok, but still would be interested in another medication. Will message Dr Guzmán for recommendations. Allyn   
Pt made aware that he is to decrease the dose.  Pt states that he did that last night and he feels better.  Pt had not taken his BP or hr yet this morning, but knows that he is not in A Fib.  Pt will see how the reduced dose works for him, but will call if he begins to have issues again and also if he is having A Fib.  Pt states understanding. Allyn  
Pt states that he does not think the Metoprolol SL 50 mg is working for him.  Pt states that he has no strength, decreased appetite, BP is dropping and he is just not feeling well. Pt states that his BP yesterday at noon was 145/71, then it was 117/63 HR 48, at 2245-103/55 HR 51. Today-at 0945-140/69 and at noon 109/55 HR 54. Pt takes his Lisinopril in the AM and normally has been taking the Metoprolol at noon.  Has not taken yet today.  Pt stated that he would like to go back on the Flecainide, but will wait to see what Dr Guzmán has to say.  Did ask if he would be ok with reduced dose of Metoprolol and he again will wait to see what Dr Guzmán has to say.  Message to Dr Guzmán. Allyn  
Pt states that the dose decrease made him have more thumping and he actually increased the dose last night to 50 mg.  Pt states that heart rates are in the mid to high 40's.  Pt is wondering if he can try something else to see if that helps.  Last note Dr Guzmán mentioned Diltiazem for rate control and Amiodarone for rhythm.  Pt would like to try the Diltiazem. Pt states that HR is  Will message Dr Guzmán to make aware. JNelsonRBRANDY  
Pt was told that he could restart the Flecainide starting tonight and pt was very excited.  Escript has been sent in and pt told to stop taking the Diltiazem. Med list updated.  Pt to call with any  Issues. Allyn   
Spoke to patient to let him know his EKG looked fine (SR with PAC at 74 bpm).  Patient did report when he came in for EKG that he was feeling a little bit better than earlier in the day.  He reports his palpitations vary in intensity and for the past week have been daily.  Currently patent is taking diltiazem short acting 30mg po bid and reports he does not feel it is helping his palpitations.  Dr Guzmán on 6/25 did indicate he could increase dose to tid if he still had issues.  Reviewed with patient this and patient is willing to try increasing dose.  Asked that patient keep diary of his events with symptoms.  Asked patient to call on Monday with update on how tid cardizem is working.  Instructed patient to take at least 6-8 hours apart. Medication list updated in epic to reflect increase.   Patient provided verbal understanding.  Will update Dr Guzmán.  YADY Ham  
Spoke to pt and made him aware that Dr Guzmán has changed the Metoprolol to Diltiazem 30 mg bid.  Escript has been sent.  Pt will call if any issues once started. Allyn  
impaired balance/decreased strength

## 2020-10-29 NOTE — ED PROVIDER NOTES
"History     Chief Complaint:  Hypertension       The history is provided by the patient.     Chava Valentine is an 87 year old male currently taking Pradaxa for paroxysmal atrial fibrillation with a history of Parkinson's and hypertension among others who presents via EMS for evaluation of hypertension. The patient lives independently at home with his wife. Last night around 2330 he took his blood pressure, which is routine for him. His blood pressure was 190 systolic, which he was concerned about as his baseline blood pressure is 140 systolic. As a result, he took an extra 10 mg of lisinopril before bed. Because he was concerned about the high reading before he lazarus to bed, the patient continued to take his blood pressure every hour throughout the night which continued to read around 180 systolic, prompting him to call EMS. En route, EMS reports that the patient's blood pressure continued to be 180 systolic.     Here, he states that he \"feels pretty good\". He denies any chest pain, shortness of breath, headache, numbness, tingling, or vision changes. He did not eat any abnormal foods yesterday and has never had an episode like this before.     Allergies:  Carbidopa W-Levodopa     Medications:   Albuterol inhaler  Clonazepam  Pradaxa  Ezetimibe  Donepezil  Flecainide  Lisinopril  Mirtazapine  Metoprolol  Nitroglycerin  Protonix  Pyridostigmine  Rantidine  Rosuvastatin  Testosterone  Zolpidem tartrate    Medical History:   Anxiety   Parkinsonism  CAD  GI bleed  Hyperlipidemia  Hypertension  Hypogonadism  Insomnia  Paroxysmal atrial fibrillation  Prediabetes  CKD - stage III  Generalized muscle weakness  Cardiac arrhythmia  SVT  Tremor  Erythrocytosis  GERD    Surgical History   CABG  Cardiac surgery   Colonoscopy   Laser selective trabeculoplasty - bilateral  Rotator cuff surgery - right  Phacoemulsification clear cornea with standard IOL, vitrectomy parsplana 23 gauge, combined x2  Reverse arthroplasty - right " "shoulder    Family History:   Father -  Cerebrovascular disease    Social History:  The patient was accompanied to the ED by EMS.  Smoking Status: Former - 1999  Smokeless Tobacco: Never   Alcohol Use: Yes - occasional  Drug Use: No   Marital Status:   PCP: Mike Cosby      Review of Systems   Eyes: Negative for visual disturbance.   Respiratory: Negative for shortness of breath.    Cardiovascular: Negative for chest pain.   Neurological: Negative for numbness and headaches.        Negative for tingling   All other systems reviewed and are negative.      Physical Exam     Patient Vitals for the past 24 hrs:   BP Temp Temp src Pulse Resp SpO2 Height Weight   10/29/20 0332 (!) 180/94 -- -- -- -- -- -- --   10/29/20 0326 (!) 190/94 97.8  F (36.6  C) Oral 60 20 99 % 1.727 m (5' 8\") 77.1 kg (170 lb)        Physical Exam  Nursing note and vitals reviewed.  Constitutional:  Oriented to person, place, and time. Cooperative. Slightly anxious.   HENT:   Nose:    Nose normal.   Mouth/Throat:   Mucous membranes are normal.   Eyes:    Conjunctivae normal and EOM are normal.      Pupils are equal, round, and reactive to light.   Neck:    Trachea normal.   Cardiovascular:  Normal rate, regular rhythm, normal heart sounds and normal pulses. No murmur heard.  Pulmonary/Chest:  Effort normal and breath sounds normal.   Abdominal:   Soft. Normal appearance and bowel sounds are normal.      There is no tenderness.      There is no rebound and no CVA tenderness.   Musculoskeletal:  Extremities atraumatic x 4.   Lymphadenopathy:  No cervical adenopathy.   Neurological:   Alert and oriented to person, place, and time. Normal strength.      No cranial nerve deficit or sensory deficit. GCS eye subscore is 4. GCS verbal subscore is 5. GCS motor subscore is 6.   Skin:    Skin is intact. No rash noted.   Psychiatric:   Normal mood and affect.     Emergency Department Course     ECG:  ECG taken at 0320, ECG read at 0327  Normal sinus " rhythm  Left axis deviation  Left bundle branch block  Abnormal ECG  Rate 62 bpm. IN interval 192 ms. QRS duration 156 ms. QT/QTc 492/499 ms. P-R-T axes * -56 60.    Laboratory:  Laboratory findings were communicated with the patient who voiced understanding of the findings.    CBC: WBC 7.9, HGB 14.2,  (L)   BMP: Glucose 101 (H), Creatinine 1.67 (H), GFR 36 (L), o/w WNL   Troponin (Collected 0328): <0.015      Interventions:   0332 Hydralazine 10 mg IV    Emergency Department Course:    Nursing notes and vitals reviewed.    0315 I performed an exam of the patient as documented above.     0320 EKG obtained as noted above.     0328 IV was inserted and blood was drawn for laboratory testing, results above.    0414 Patient rechecked and updated.      Prior to discharge, the patient passed a road test with a walker here in the ED.    0428 Findings and plan explained to the Patient. Patient discharged home with instructions regarding supportive care, medications, and reasons to return. The importance of close follow-up was reviewed.     Impression & Plan   Medical Decision Making:  This is a 87-year-old male came in by ambulance for further evaluation of high blood pressure.  While his blood pressure was high at home and for EMS and here, he has essentially no symptoms that make me worried.  However I felt it was reasonable to check the above blood work and EKG to look for any signs of end organ damage.  Given his anxiety over this as well, I felt it was reasonable to provide him a dose of hydralazine to see if that might help bring down his blood pressure at least temporarily.  His blood pressure did come down and his work-up here is unremarkable, with his renal function being about the same if not a little better than previous.  He has not had any symptoms either to warrant admission to the hospital.  We then got him up to walk, and he was able to do so.  We recommended that he take a walker since he does not  have one, and that seemed to help quite a bit.  Therefore we will send him home with one.  I stressed the importance of close outpatient follow-up to discuss his blood pressure and any potential medication changes as soon as possible.  However I suspect part of the problem is his severe anxiety over his blood pressure, as he seemed to perseverate over his blood pressure being elevated tonight.  I recommended returning though if he does develop any worrisome symptoms as well.      Diagnosis:     ICD-10-CM    1. Hypertension, unspecified type  I10       Disposition:  Discharged to home.    Scribe Disclosure:  I, Tiffany Tineo, am serving as a scribe at 3:42 AM on 10/29/2020 to document services personally performed by Seb Martin MD based on my observations and the provider's statements to me.        Seb Martin MD  10/29/20 0447

## 2020-10-30 LAB — INTERPRETATION ECG - MUSE: NORMAL

## 2020-11-13 NOTE — TELEPHONE ENCOUNTER
RECORDS RECEIVED FROM: parkinsons, per wife, records at [facility]   Date of Appt: 3.2.21   NOTES (FOR ALL VISITS) STATUS DETAILS   OFFICE NOTE from referring provider N/A    OFFICE NOTE from other specialist [facility] Everywhere 10.26.20 Dr. Lieberman, [facility]  6.29.20 Dr. Lieberman, [facility]  5.13.20 Dr. Lieberman, [facility]  2.28.20 PT,  [facility]  2.3.20 GREG Lieberman  1.29.20 Lucina,   More in [place]   DISCHARGE SUMMARY from hospital N/A    DISCHARGE REPORT from the ER N/A    OPERATIVE REPORT N/A    MEDICATION LIST Internal    IMAGING  (FOR ALL VISITS)     EMG N/A    EEG N/A    LUMBAR PUNCTURE N/A    AMBAR SCAN N/A    DEXA SCAN *NEUROSURGERY* N/A    ULTRASOUND (CAROTID BILAT) *VASCULAR* N/A    MRI (HEAD, NECK, SPINE) N/A    XRAY (SPINE) *NEUROSURGERY* N/A    CT (HEAD, NECK, SPINE) Internal 9.1.18 head

## 2020-12-28 DIAGNOSIS — I25.810 CORONARY ARTERY DISEASE INVOLVING CORONARY BYPASS GRAFT OF NATIVE HEART WITHOUT ANGINA PECTORIS: ICD-10-CM

## 2020-12-28 DIAGNOSIS — E78.2 MIXED HYPERLIPIDEMIA: ICD-10-CM

## 2020-12-28 RX ORDER — ROSUVASTATIN CALCIUM 10 MG/1
10 TABLET, COATED ORAL DAILY
Qty: 90 TABLET | Refills: 0 | Status: SHIPPED | OUTPATIENT
Start: 2020-12-28 | End: 2021-03-10

## 2020-12-28 RX ORDER — EZETIMIBE 10 MG/1
10 TABLET ORAL DAILY
Qty: 90 TABLET | Refills: 0 | Status: SHIPPED | OUTPATIENT
Start: 2020-12-28 | End: 2021-03-10

## 2021-01-05 DIAGNOSIS — I48.0 PAROXYSMAL ATRIAL FIBRILLATION (H): ICD-10-CM

## 2021-01-05 RX ORDER — DABIGATRAN ETEXILATE 150 MG/1
150 CAPSULE ORAL 2 TIMES DAILY
Qty: 180 CAPSULE | Refills: 0 | Status: SHIPPED | OUTPATIENT
Start: 2021-01-05 | End: 2021-04-16

## 2021-02-19 ENCOUNTER — IMMUNIZATION (OUTPATIENT)
Dept: NURSING | Facility: CLINIC | Age: 86
End: 2021-02-19
Payer: MEDICARE

## 2021-02-19 PROCEDURE — 0001A PR COVID VAC PFIZER DIL RECON 30 MCG/0.3 ML IM: CPT

## 2021-02-19 PROCEDURE — 91300 PR COVID VAC PFIZER DIL RECON 30 MCG/0.3 ML IM: CPT

## 2021-03-02 ENCOUNTER — PRE VISIT (OUTPATIENT)
Dept: NEUROLOGY | Facility: CLINIC | Age: 86
End: 2021-03-02

## 2021-03-10 DIAGNOSIS — I48.0 PAROXYSMAL ATRIAL FIBRILLATION (H): ICD-10-CM

## 2021-03-10 DIAGNOSIS — E78.2 MIXED HYPERLIPIDEMIA: ICD-10-CM

## 2021-03-10 DIAGNOSIS — I25.810 CORONARY ARTERY DISEASE INVOLVING CORONARY BYPASS GRAFT OF NATIVE HEART WITHOUT ANGINA PECTORIS: ICD-10-CM

## 2021-03-10 RX ORDER — FLECAINIDE ACETATE 100 MG/1
100 TABLET ORAL 2 TIMES DAILY
Qty: 180 TABLET | Refills: 2 | Status: SHIPPED | OUTPATIENT
Start: 2021-03-10 | End: 2021-08-25

## 2021-03-10 RX ORDER — EZETIMIBE 10 MG/1
10 TABLET ORAL DAILY
Qty: 90 TABLET | Refills: 0 | Status: SHIPPED | OUTPATIENT
Start: 2021-03-10 | End: 2021-06-03

## 2021-03-10 RX ORDER — ROSUVASTATIN CALCIUM 10 MG/1
10 TABLET, COATED ORAL DAILY
Qty: 90 TABLET | Refills: 0 | Status: SHIPPED | OUTPATIENT
Start: 2021-03-10 | End: 2021-06-03

## 2021-03-12 ENCOUNTER — IMMUNIZATION (OUTPATIENT)
Dept: NURSING | Facility: CLINIC | Age: 86
End: 2021-03-12
Attending: EMERGENCY MEDICINE
Payer: MEDICARE

## 2021-03-12 PROCEDURE — 0002A PR COVID VAC PFIZER DIL RECON 30 MCG/0.3 ML IM: CPT

## 2021-03-12 PROCEDURE — 91300 PR COVID VAC PFIZER DIL RECON 30 MCG/0.3 ML IM: CPT

## 2021-03-17 ENCOUNTER — TRANSFERRED RECORDS (OUTPATIENT)
Dept: HEALTH INFORMATION MANAGEMENT | Facility: CLINIC | Age: 86
End: 2021-03-17

## 2021-04-16 DIAGNOSIS — I48.0 PAROXYSMAL ATRIAL FIBRILLATION (H): ICD-10-CM

## 2021-04-16 RX ORDER — DABIGATRAN ETEXILATE 150 MG/1
150 CAPSULE ORAL 2 TIMES DAILY
Qty: 180 CAPSULE | Refills: 0 | Status: SHIPPED | OUTPATIENT
Start: 2021-04-16 | End: 2021-07-02

## 2021-06-03 DIAGNOSIS — E78.2 MIXED HYPERLIPIDEMIA: ICD-10-CM

## 2021-06-03 DIAGNOSIS — I25.810 CORONARY ARTERY DISEASE INVOLVING CORONARY BYPASS GRAFT OF NATIVE HEART WITHOUT ANGINA PECTORIS: ICD-10-CM

## 2021-06-03 RX ORDER — ROSUVASTATIN CALCIUM 10 MG/1
10 TABLET, COATED ORAL DAILY
Qty: 90 TABLET | Refills: 0 | Status: SHIPPED | OUTPATIENT
Start: 2021-06-03 | End: 2021-08-31

## 2021-06-03 RX ORDER — EZETIMIBE 10 MG/1
10 TABLET ORAL DAILY
Qty: 90 TABLET | Refills: 0 | Status: SHIPPED | OUTPATIENT
Start: 2021-06-03 | End: 2021-08-31

## 2021-06-10 ENCOUNTER — HOSPITAL ENCOUNTER (OUTPATIENT)
Dept: CARDIOLOGY | Facility: CLINIC | Age: 86
Discharge: HOME | End: 2021-06-10
Attending: INTERNAL MEDICINE | Admitting: INTERNAL MEDICINE
Payer: MEDICARE

## 2021-06-10 ENCOUNTER — TRANSFERRED RECORDS (OUTPATIENT)
Dept: HEALTH INFORMATION MANAGEMENT | Facility: CLINIC | Age: 86
End: 2021-06-10

## 2021-06-10 DIAGNOSIS — I48.0 PAROXYSMAL ATRIAL FIBRILLATION (H): ICD-10-CM

## 2021-06-10 PROCEDURE — 93306 TTE W/DOPPLER COMPLETE: CPT | Mod: 26 | Performed by: INTERNAL MEDICINE

## 2021-06-10 PROCEDURE — 93306 TTE W/DOPPLER COMPLETE: CPT

## 2021-06-11 ENCOUNTER — TELEPHONE (OUTPATIENT)
Dept: CARDIOLOGY | Facility: CLINIC | Age: 86
End: 2021-06-11

## 2021-06-11 NOTE — TELEPHONE ENCOUNTER
Phone dante to patient and spoke to spouse as patient was not available.    I told her that his echocardiogram from yesterday showed normal heart function and structure and that his heart was in regular rhythm at the time.    She was pleased to hear this and will pass this on to him. I reminded her that he has a visit with Dr. Cartagena on 6/22 and she said its on there calendar. She had no questions for me.

## 2021-06-22 ENCOUNTER — OFFICE VISIT (OUTPATIENT)
Dept: CARDIOLOGY | Facility: CLINIC | Age: 86
End: 2021-06-22
Payer: MEDICARE

## 2021-06-22 VITALS
HEIGHT: 68 IN | SYSTOLIC BLOOD PRESSURE: 111 MMHG | WEIGHT: 159.5 LBS | BODY MASS INDEX: 24.17 KG/M2 | HEART RATE: 62 BPM | DIASTOLIC BLOOD PRESSURE: 63 MMHG

## 2021-06-22 DIAGNOSIS — I25.810 CORONARY ARTERY DISEASE INVOLVING CORONARY BYPASS GRAFT OF NATIVE HEART WITHOUT ANGINA PECTORIS: Primary | ICD-10-CM

## 2021-06-22 DIAGNOSIS — I48.0 PAROXYSMAL ATRIAL FIBRILLATION (H): ICD-10-CM

## 2021-06-22 PROCEDURE — 99214 OFFICE O/P EST MOD 30 MIN: CPT | Performed by: INTERNAL MEDICINE

## 2021-06-22 RX ORDER — LISINOPRIL 5 MG/1
2.5 TABLET ORAL DAILY
COMMUNITY

## 2021-06-22 RX ORDER — RIVASTIGMINE 4.6 MG/24H
1 PATCH, EXTENDED RELEASE TRANSDERMAL DAILY
COMMUNITY

## 2021-06-22 ASSESSMENT — MIFFLIN-ST. JEOR: SCORE: 1376.96

## 2021-06-22 NOTE — PROGRESS NOTES
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  He has been followed on a long-time basis with Dr. Bernard and of late with Dr. Vegas.  I saw him the first  in December 2019 and then subsequently in June 2020 through a virtual visit.  Patient has history of CAD, history of CABG, hypertension, chronic kidney disease stage III with GFR in low 40s to high 30s, paroxysmal atrial fibrillation on flecainide for rhythm control strategy, intolerance to only beta-blocker therapy and diltiazem therapy been seen by electrophysiology, history of Parkinson disease.     The patient has history of CAD with history of CABG in 1995 with LIMA to LAD, SVG to diagonal and SVG to OM and SVG to RCA and looks like from a coronary artery disease perspective, he has done quite well since that time.  As noted above patient has personal atrial fibrillation and is on flecainide for rhythm control strategy.  Given underlying CAD, history of CABG and chronic kidney disease I recommended patient to see electrophysiology again as flecainide is not the optimal choice.  Patient was seen by electrophysiology however he was not able to tolerate other medication like diltiazem and beta-blocker alone and eventually electrophysiology along with patient made a mutually shared decision to restart flecainide.  Today patient is coming for routine follow-up.  He is accompanied by his wife.  Earlier this year patient had a visit to the ER because of hypotension.  He follows Dr. Cosby's office, his primary care physician's office on a very close basis for hypertension management.  He is on lisinopril which he takes 5 mg mostly 3 times a day in concert with his primary care physician's office and looks like blood pressure has been reasonably controlled.  In addition to lisinopril he is on dabigatran, Zetia, flecainide, metoprolol succinate, Crestor.  Historically LDL has been well controlled.  Recent  echocardiogram showed normal LV function and no significant valvular abnormalities.  He denies any exertional symptoms like chest discomfort, shortness of breath, dizziness, presyncope or syncope.    Assessment and plan  A very pleasant 87-year gentleman history of CAD, CABG, proximal atrial fibrillation on flecainide rhythm control strategy, chronic renal disease, Parkinson disease, hypertension.  Overall cardiac status wise he appears to be doing well without any symptoms concerning for angina congestive heart failure.  Blood pressures well controlled, cardiac auscultation was benign today.  At this time no new cardiac recommendations.  If patient continues to feel stable cardiac status wise we can see him back next year, sooner if he notes any change in clinical status.  1.  CAD with history of CABG.  Clinically no anginal symptoms.  Normal LV function.  On beta-blocker, ACE inhibitor, dabigatran, Crestor and Zetia.  Historically LDL has been well controlled.  Blood pressure well controlled.  Stress test in 2018 showed a trivial area of apical inferior ischemia.  2.  Paroxysmal atrial fibrillation.  Chads 2 Vascor of 4.  On dabigatran for stroke prophylaxis.  On rhythm control strategy with flecainide.  Seen by electrophysiology as patient was not able to tolerate other medications decision was made by both electrophysiology and patient initiated basis to continue flecainide.  Tolerating it quite well.  No breakthrough episode.  Rhythm appears quite regular today.  3.  Hypertension.  Has some labile episodes recently.  Being followed by primary care physician's office.  Well controlled.    Recommendations  No new cardiac recommendations.  Follow-up early next year, sooner if he notes any change in clinical status.    Orders Placed This Encounter   Procedures     Follow-Up with Cardiologist       Orders Placed This Encounter   Medications     Acetaminophen (TYLENOL) 325 MG CAPS     Sig: Take 325-650 mg by mouth  every 4 hours as needed     rivastigmine (EXELON) 4.6 MG/24HR 24 hr patch     Sig: Place 1 patch onto the skin daily     lisinopril (ZESTRIL) 5 MG tablet     Sig: Take 5 mg by mouth 2 times daily       Medications Discontinued During This Encounter   Medication Reason     lisinopril (PRINIVIL/ZESTRIL) 10 MG tablet Dose adjustment         Encounter Diagnoses   Name Primary?     Paroxysmal atrial fibrillation (H)      Coronary artery disease involving coronary bypass graft of native heart without angina pectoris Yes       CURRENT MEDICATIONS:  Current Outpatient Medications   Medication Sig Dispense Refill     Acetaminophen (TYLENOL) 325 MG CAPS Take 325-650 mg by mouth every 4 hours as needed       albuterol (PROAIR HFA/PROVENTIL HFA/VENTOLIN HFA) 108 (90 Base) MCG/ACT Inhaler Inhale 2 puffs into the lungs every 6 hours as needed for shortness of breath / dyspnea or wheezing 1 Inhaler 0     CLONAZEPAM PO Take 0.25-0.5 mg by mouth 3 times daily as needed        dabigatran ANTICOAGULANT (PRADAXA ANTICOAGULANT) 150 MG capsule Take 1 capsule (150 mg) by mouth 2 times daily 180 capsule 0     ezetimibe (ZETIA) 10 MG tablet Take 1 tablet (10 mg) by mouth daily 90 tablet 0     flecainide (TAMBOCOR) 100 MG tablet Take 1 tablet (100 mg) by mouth 2 times daily 180 tablet 2     fluticasone (FLONASE) 50 MCG/ACT spray Spray 1 spray into both nostrils daily as needed for rhinitis or allergies       latanoprost (XALATAN) 0.005 % ophthalmic solution INSTILL 1 DROP IN BOTH EYES QHS.  6     lisinopril (ZESTRIL) 5 MG tablet Take 5 mg by mouth 2 times daily       metoprolol succinate ER (TOPROL XL) 50 MG 24 hr tablet Take 1 tablet (50 mg) by mouth daily 90 tablet 3     mirtazapine (REMERON) 15 MG tablet Take 15 mg by mouth At Bedtime       Multiple Vitamins-Minerals (PRESERVISION AREDS 2 PO) Take by mouth daily        nitroGLYcerin (NITROSTAT) 0.4 MG sublingual tablet For chest pain place 1 tablet under the tongue every 5 minutes for 3  doses. If symptoms persist 5 minutes after 1st dose call 911. 25 tablet 3     Pantoprazole Sodium (PROTONIX PO) Take 40 mg by mouth 2 times daily       rivastigmine (EXELON) 4.6 MG/24HR 24 hr patch Place 1 patch onto the skin daily       rosuvastatin (CRESTOR) 10 MG tablet Take 1 tablet (10 mg) by mouth daily 90 tablet 0     timolol maleate (ISTALOL) 0.5 % opthalmic solution        ZOLPIDEM TARTRATE PO Take 5-10 mg by mouth nightly as needed        PAPAV-PHENTOLAMINE-ALPROSTADIL IC Inject 1 Dose as directed daily as needed (erectile dysfunction) Trimix Injection       Psyllium (METAMUCIL PO) Take 1 Dose by mouth daily as needed        pyridostigmine (MESTINON) 60 MG tablet USE UP TO 3 TS DAILY AS NEEDED. DISPENSE IN ORIGINAL  CONTAINER  3     RaNITidine HCl (ZANTAC PO) Take 150 mg by mouth daily as needed for heartburn       TESTOSTERONE AQUEOUS IM Inject 1 mL into the muscle every 14 days        Testosterone Cypionate 200 MG/ML SOLN          ALLERGIES     Allergies   Allergen Reactions     Other [No Clinical Screening - See Comments] Rash     Dove soap       PAST MEDICAL HISTORY:  Past Medical History:   Diagnosis Date     Allergic rhinitis      Anxiety      Contact dermatitis      Coronary artery disease     CABG with 4 grafts-LIMA to LAD, SVG to Diagonal,OM and RCA     Cough      Elevated prolactin level      Erythrocytosis      Gastro-oesophageal reflux disease      GI bleed 10/10     Hyperlipidemia      Hypertension      Hypogonadism     with low testosterone     Insomnia      Paroxysmal atrial fibrillation (H)     flecainide therapy     Post-nasal drip      Prediabetes      Renal disease     CKD     Shoulder arthritis      SVT (supraventricular tachycardia) (H)      Tremor        PAST SURGICAL HISTORY:  Past Surgical History:   Procedure Laterality Date     BYPASS GRAFT ARTERY CORONARY       CARDIAC SURGERY       COLONOSCOPY  12/2010    tubular adenoma     LASER SELECTIVE TRABECULOPLASTY BILATERAL  Bilateral 6/11/2018    Procedure: LASER SELECTIVE TRABECULOPLASTY BILATERAL;  LASER SELECTIVE TRABECULOPLASTY BILATERAL ;  Surgeon: Parmjit Camacho MD;  Location: [facility]     ORTHOPEDIC SURGERY      rotator cuff right     PHACOEMULSIFICATION CLEAR CORNEA WITH STANDARD IOL, VITRECTOMY PARSPLANA 23 GAGUE, COMBINED  2/20/2013    Procedure: COMBINED PHACOEMULSIFICATION CLEAR CORNEA WITH STANDARD INTRAOCULAR LENS IMPLANT, VITRECTOMY PARSPLANA 23 GAUGE;;  Surgeon: Guero Lockett MD;  Location: [facility]     PHACOEMULSIFICATION CLEAR CORNEA WITH STANDARD IOL, VITRECTOMY PARSPLANA 23 GAGUE, COMBINED  7/31/2013    Procedure: COMBINED PHACOEMULSIFICATION CLEAR CORNEA WITH STANDARD INTRAOCULAR LENS IMPLANT, VITRECTOMY PARSPLANA 23 GAUGE;;  Surgeon: Guero Lockett MD;  Location: [facility]     REVERSE ARTHROPLASTY SHOULDER Right 7/24/2017    Procedure: REVERSE ARTHROPLASTY SHOULDER;  RIGHT REVERSE TOTAL SHOULDER ARTHROPLASTY ;  Surgeon: Rodolfo Calhoun MD;  Location: [facility]     VASCULAR SURGERY      cab     VITRECTOMY PARSPLANA, PHACOEMULSIFICATION CLEAR CORNEA W STANDARD INTRAOCULAR LENS IMPLANT, COMBINED  2/20/2013    Procedure: COMBINED VITRECTOMY PARSPLANA, PHACOEMULSIFICATION CLEAR CORNEA WITH STANDARD INTRAOCULAR LENS IMPLANT;  LEFT PHACOEMULSIFICATION CLEAR CORNEA WITH SAURABH TORIC INTRAOCULAR LENS IMPLANT,  LEFT EYE 23 GAUGE PARSPLANA VITRECTOMY;  Surgeon: Jorge Regan MD;  Location: [facility]     VITRECTOMY PARSPLANA, PHACOEMULSIFICATION CLEAR CORNEA W STANDARD INTRAOCULAR LENS IMPLANT, COMBINED  7/31/2013    Procedure: COMBINED VITRECTOMY PARSPLANA, PHACOEMULSIFICATION CLEAR CORNEA WITH STANDARD INTRAOCULAR LENS IMPLANT;  RIGHT PHACOEMULSIFICATION CLEAR CORNEA WITH TORIC INTRAOCULAR LENS IMPLANT, RIGHT 23 GAUGE PARSPLANA VITRECTOMY ;  Surgeon: Jorge Regan MD;  Location: [facility]       FAMILY HISTORY:  Family History   Problem Relation Age of Onset     Other - See Comments Mother         old age     Other -  See Comments Father 97     Cerebrovascular Disease Father        SOCIAL HISTORY:  Social History     Socioeconomic History     Marital status:      Spouse name: None     Number of children: None     Years of education: None     Highest education level: None   Occupational History     None   Social Needs     Financial resource strain: None     Food insecurity     Worry: None     Inability: None     Transportation needs     Medical: None     Non-medical: None   Tobacco Use     Smoking status: Former Smoker     Packs/day: 0.10     Years: 20.00     Pack years: 2.00     Types: Cigarettes     Start date:      Quit date: 1999     Years since quittin.4     Smokeless tobacco: Never Used     Tobacco comment: recreational only   Substance and Sexual Activity     Alcohol use: Yes     Comment: 1-2 drinks year     Drug use: No     Sexual activity: None   Lifestyle     Physical activity     Days per week: None     Minutes per session: None     Stress: None   Relationships     Social connections     Talks on phone: None     Gets together: None     Attends Hindu service: None     Active member of club or organization: None     Attends meetings of clubs or organizations: None     Relationship status: None     Intimate partner violence     Fear of current or ex partner: None     Emotionally abused: None     Physically abused: None     Forced sexual activity: None   Other Topics Concern     Parent/sibling w/ CABG, MI or angioplasty before 65F 55M? No      Service Not Asked     Blood Transfusions Not Asked     Caffeine Concern Not Asked     Occupational Exposure Not Asked     Hobby Hazards Not Asked     Sleep Concern Yes     Stress Concern Not Asked     Weight Concern Not Asked     Special Diet Yes     Comment: less meat      Back Care Not Asked     Exercise Yes     Comment: 3 x weekly     Bike Helmet Not Asked     Seat Belt Yes     Self-Exams Not Asked   Social History Narrative     None       Review of  "Systems:  Skin:  Negative       Eyes:  Positive for glasses macular degeneration  ENT:  Negative      Respiratory:  Negative       Cardiovascular:  Negative;chest pain;palpitations;syncope or near-syncope;cyanosis;lightheadedness;dizziness;edema Positive for;fatigue    Gastroenterology: Positive for constipation    Genitourinary:  Negative      Musculoskeletal:  Positive for   fell 3 weeks ago  Neurologic:  Positive for local weakness;memory problems Parkinson  Psychiatric:  Positive for depression    Heme/Lymph/Imm:  Positive for easy bruising    Endocrine:  Negative        Physical Exam:  Vitals: /63 (BP Location: Left arm, Cuff Size: Adult Regular)   Pulse 62   Ht 1.734 m (5' 8.25\")   Wt 72.3 kg (159 lb 8 oz)   BMI 24.07 kg/m      General patient appears comfortable  Neck normal JVP  Cardiovascular system S1-S2 normal no murmur rub or gallop  Respite system clear to auscultation  Extremities no edema  Neurological alert, oriented      CC  No referring provider defined for this encounter.                  "

## 2021-06-22 NOTE — LETTER
6/22/2021    [facility] [address]  [place] [address]    RE: Chava Valentine       Dear Colleague,    I had the pleasure of seeing Chava Valentine in the [facility].    HPI and Plan:    Marcelaar is a very pleasant 87-year-old gentleman, who is a retired psychiatrist..  He has been followed on a long-time basis with Dr. Bernard and of late with Dr. Vegas.  I saw him the first  in December 2019 and then subsequently in June 2020 through a virtual visit.  Patient has history of CAD, history of CABG, hypertension, chronic kidney disease stage III with GFR in low 40s to high 30s, paroxysmal atrial fibrillation on flecainide for rhythm control strategy, intolerance to only beta-blocker therapy and diltiazem therapy been seen by electrophysiology, history of Parkinson disease.     The patient has history of CAD with history of CABG in 1995 with LIMA to LAD, SVG to diagonal and SVG to OM and SVG to RCA and looks like from a coronary artery disease perspective, he has done quite well since that time.  As noted above patient has personal atrial fibrillation and is on flecainide for rhythm control strategy.  Given underlying CAD, history of CABG and chronic kidney disease I recommended patient to see electrophysiology again as flecainide is not the optimal choice.  Patient was seen by electrophysiology however he was not able to tolerate other medication like diltiazem and beta-blocker alone and eventually electrophysiology along with patient made a mutually shared decision to restart flecainide.  Today patient is coming for routine follow-up.  He is accompanied by his wife.  Earlier this year patient had a visit to the ER because of hypotension.  He follows Dr. Cosby's office, his primary care physician's office on a very close basis for hypertension management.  He is on lisinopril which he takes 5 mg mostly 3 times  a day in concert with his primary care physician's office and looks like blood pressure has been reasonably controlled.  In addition to lisinopril he is on dabigatran, Zetia, flecainide, metoprolol succinate, Crestor.  Historically LDL has been well controlled.  Recent echocardiogram showed normal LV function and no significant valvular abnormalities.  He denies any exertional symptoms like chest discomfort, shortness of breath, dizziness, presyncope or syncope.    Assessment and plan  A very pleasant 87-year gentleman history of CAD, CABG, proximal atrial fibrillation on flecainide rhythm control strategy, chronic renal disease, Parkinson disease, hypertension.  Overall cardiac status wise he appears to be doing well without any symptoms concerning for angina congestive heart failure.  Blood pressures well controlled, cardiac auscultation was benign today.  At this time no new cardiac recommendations.  If patient continues to feel stable cardiac status wise we can see him back next year, sooner if he notes any change in clinical status.  1.  CAD with history of CABG.  Clinically no anginal symptoms.  Normal LV function.  On beta-blocker, ACE inhibitor, dabigatran, Crestor and Zetia.  Historically LDL has been well controlled.  Blood pressure well controlled.  Stress test in 2018 showed a trivial area of apical inferior ischemia.  2.  Paroxysmal atrial fibrillation.  Chads 2 Vascor of 4.  On dabigatran for stroke prophylaxis.  On rhythm control strategy with flecainide.  Seen by electrophysiology as patient was not able to tolerate other medications decision was made by both electrophysiology and patient initiated basis to continue flecainide.  Tolerating it quite well.  No breakthrough episode.  Rhythm appears quite regular today.  3.  Hypertension.  Has some labile episodes recently.  Being followed by primary care physician's office.  Well controlled.    Recommendations  No new cardiac recommendations.  Follow-up  early next year, sooner if he notes any change in clinical status.    Orders Placed This Encounter   Procedures     Follow-Up with Cardiologist       Orders Placed This Encounter   Medications     Acetaminophen (TYLENOL) 325 MG CAPS     Sig: Take 325-650 mg by mouth every 4 hours as needed     rivastigmine (EXELON) 4.6 MG/24HR 24 hr patch     Sig: Place 1 patch onto the skin daily     lisinopril (ZESTRIL) 5 MG tablet     Sig: Take 5 mg by mouth 2 times daily       Medications Discontinued During This Encounter   Medication Reason     lisinopril (PRINIVIL/ZESTRIL) 10 MG tablet Dose adjustment         Encounter Diagnoses   Name Primary?     Paroxysmal atrial fibrillation (H)      Coronary artery disease involving coronary bypass graft of native heart without angina pectoris Yes       CURRENT MEDICATIONS:  Current Outpatient Medications   Medication Sig Dispense Refill     Acetaminophen (TYLENOL) 325 MG CAPS Take 325-650 mg by mouth every 4 hours as needed       albuterol (PROAIR HFA/PROVENTIL HFA/VENTOLIN HFA) 108 (90 Base) MCG/ACT Inhaler Inhale 2 puffs into the lungs every 6 hours as needed for shortness of breath / dyspnea or wheezing 1 Inhaler 0     CLONAZEPAM PO Take 0.25-0.5 mg by mouth 3 times daily as needed        dabigatran ANTICOAGULANT (PRADAXA ANTICOAGULANT) 150 MG capsule Take 1 capsule (150 mg) by mouth 2 times daily 180 capsule 0     ezetimibe (ZETIA) 10 MG tablet Take 1 tablet (10 mg) by mouth daily 90 tablet 0     flecainide (TAMBOCOR) 100 MG tablet Take 1 tablet (100 mg) by mouth 2 times daily 180 tablet 2     fluticasone (FLONASE) 50 MCG/ACT spray Spray 1 spray into both nostrils daily as needed for rhinitis or allergies       latanoprost (XALATAN) 0.005 % ophthalmic solution INSTILL 1 DROP IN BOTH EYES QHS.  6     lisinopril (ZESTRIL) 5 MG tablet Take 5 mg by mouth 2 times daily       metoprolol succinate ER (TOPROL XL) 50 MG 24 hr tablet Take 1 tablet (50 mg) by mouth daily 90 tablet 3      mirtazapine (REMERON) 15 MG tablet Take 15 mg by mouth At Bedtime       Multiple Vitamins-Minerals (PRESERVISION AREDS 2 PO) Take by mouth daily        nitroGLYcerin (NITROSTAT) 0.4 MG sublingual tablet For chest pain place 1 tablet under the tongue every 5 minutes for 3 doses. If symptoms persist 5 minutes after 1st dose call 911. 25 tablet 3     Pantoprazole Sodium (PROTONIX PO) Take 40 mg by mouth 2 times daily       rivastigmine (EXELON) 4.6 MG/24HR 24 hr patch Place 1 patch onto the skin daily       rosuvastatin (CRESTOR) 10 MG tablet Take 1 tablet (10 mg) by mouth daily 90 tablet 0     timolol maleate (ISTALOL) 0.5 % opthalmic solution        ZOLPIDEM TARTRATE PO Take 5-10 mg by mouth nightly as needed        PAPAV-PHENTOLAMINE-ALPROSTADIL IC Inject 1 Dose as directed daily as needed (erectile dysfunction) Trimix Injection       Psyllium (METAMUCIL PO) Take 1 Dose by mouth daily as needed        pyridostigmine (MESTINON) 60 MG tablet USE UP TO 3 TS DAILY AS NEEDED. DISPENSE IN ORIGINAL  CONTAINER  3     RaNITidine HCl (ZANTAC PO) Take 150 mg by mouth daily as needed for heartburn       TESTOSTERONE AQUEOUS IM Inject 1 mL into the muscle every 14 days        Testosterone Cypionate 200 MG/ML SOLN          ALLERGIES     Allergies   Allergen Reactions     Other [No Clinical Screening - See Comments] Rash     Dove soap       PAST MEDICAL HISTORY:  Past Medical History:   Diagnosis Date     Allergic rhinitis      Anxiety      Contact dermatitis      Coronary artery disease     CABG with 4 grafts-LIMA to LAD, SVG to Diagonal,OM and RCA     Cough      Elevated prolactin level      Erythrocytosis      Gastro-oesophageal reflux disease      GI bleed 10/10     Hyperlipidemia      Hypertension      Hypogonadism     with low testosterone     Insomnia      Paroxysmal atrial fibrillation (H)     flecainide therapy     Post-nasal drip      Prediabetes      Renal disease     CKD     Shoulder arthritis      SVT  (supraventricular tachycardia) (H)      Tremor        PAST SURGICAL HISTORY:  Past Surgical History:   Procedure Laterality Date     BYPASS GRAFT ARTERY CORONARY       CARDIAC SURGERY       COLONOSCOPY  12/2010    tubular adenoma     LASER SELECTIVE TRABECULOPLASTY BILATERAL Bilateral 6/11/2018    Procedure: LASER SELECTIVE TRABECULOPLASTY BILATERAL;  LASER SELECTIVE TRABECULOPLASTY BILATERAL ;  Surgeon: Parmjit Camacho MD;  Location: [facility]     ORTHOPEDIC SURGERY      rotator cuff right     PHACOEMULSIFICATION CLEAR CORNEA WITH STANDARD IOL, VITRECTOMY PARSPLANA 23 GAGUE, COMBINED  2/20/2013    Procedure: COMBINED PHACOEMULSIFICATION CLEAR CORNEA WITH STANDARD INTRAOCULAR LENS IMPLANT, VITRECTOMY PARSPLANA 23 GAUGE;;  Surgeon: Guero Lockett MD;  Location: [facility]     PHACOEMULSIFICATION CLEAR CORNEA WITH STANDARD IOL, VITRECTOMY PARSPLANA 23 GAGUE, COMBINED  7/31/2013    Procedure: COMBINED PHACOEMULSIFICATION CLEAR CORNEA WITH STANDARD INTRAOCULAR LENS IMPLANT, VITRECTOMY PARSPLANA 23 GAUGE;;  Surgeon: Guero Lockett MD;  Location: [facility]     REVERSE ARTHROPLASTY SHOULDER Right 7/24/2017    Procedure: REVERSE ARTHROPLASTY SHOULDER;  RIGHT REVERSE TOTAL SHOULDER ARTHROPLASTY ;  Surgeon: Rodolfo Calhoun MD;  Location:  OR     VASCULAR SURGERY      cab     VITRECTOMY PARSPLANA, PHACOEMULSIFICATION CLEAR CORNEA W STANDARD INTRAOCULAR LENS IMPLANT, COMBINED  2/20/2013    Procedure: COMBINED VITRECTOMY PARSPLANA, PHACOEMULSIFICATION CLEAR CORNEA WITH STANDARD INTRAOCULAR LENS IMPLANT;  LEFT PHACOEMULSIFICATION CLEAR CORNEA WITH SAURABH TORIC INTRAOCULAR LENS IMPLANT,  LEFT EYE 23 GAUGE PARSPLANA VITRECTOMY;  Surgeon: Jorge Regan MD;  Location: [facility]     VITRECTOMY PARSPLANA, PHACOEMULSIFICATION CLEAR CORNEA W STANDARD INTRAOCULAR LENS IMPLANT, COMBINED  7/31/2013    Procedure: COMBINED VITRECTOMY PARSPLANA, PHACOEMULSIFICATION CLEAR CORNEA WITH STANDARD INTRAOCULAR LENS IMPLANT;  RIGHT  PHACOEMULSIFICATION CLEAR CORNEA WITH TORIC INTRAOCULAR LENS IMPLANT, RIGHT 23 GAUGE PARSPLANA VITRECTOMY ;  Surgeon: Jorge Regan MD;  Location: [facility]       FAMILY HISTORY:  Family History   Problem Relation Age of Onset     Other - See Comments Mother         old age     Other - See Comments Father 97     Cerebrovascular Disease Father        SOCIAL HISTORY:  Social History     Socioeconomic History     Marital status:      Spouse name: None     Number of children: None     Years of education: None     Highest education level: None   Occupational History     None   Social Needs     Financial resource strain: None     Food insecurity     Worry: None     Inability: None     Transportation needs     Medical: None     Non-medical: None   Tobacco Use     Smoking status: Former Smoker     Packs/day: 0.10     Years: 20.00     Pack years: 2.00     Types: Cigarettes     Start date:      Quit date: 1999     Years since quittin.4     Smokeless tobacco: Never Used     Tobacco comment: recreational only   Substance and Sexual Activity     Alcohol use: Yes     Comment: 1-2 drinks year     Drug use: No     Sexual activity: None   Lifestyle     Physical activity     Days per week: None     Minutes per session: None     Stress: None   Relationships     Social connections     Talks on phone: None     Gets together: None     Attends [employer] service: None     Active member of club or organization: None     Attends meetings of clubs or organizations: None     Relationship status: None     Intimate partner violence     Fear of current or ex partner: None     Emotionally abused: None     Physically abused: None     Forced sexual activity: None   Other Topics Concern     Parent/sibling w/ CABG, MI or angioplasty before 65F 55M? No      Service Not Asked     Blood Transfusions Not Asked     Caffeine Concern Not Asked     Occupational Exposure Not Asked     Hobby Hazards Not Asked     Sleep Concern Yes      "Stress Concern Not Asked     Weight Concern Not Asked     Special Diet Yes     Comment: less meat      Back Care Not Asked     Exercise Yes     Comment: 3 x weekly     Bike Helmet Not Asked     Seat Belt Yes     Self-Exams Not Asked   Social History Narrative     None       Review of Systems:  Skin:  Negative       Eyes:  Positive for glasses macular degeneration  ENT:  Negative      Respiratory:  Negative       Cardiovascular:  Negative;chest pain;palpitations;syncope or near-syncope;cyanosis;lightheadedness;dizziness;edema Positive for;fatigue    Gastroenterology: Positive for constipation    Genitourinary:  Negative      Musculoskeletal:  Positive for   fell 3 weeks ago  Neurologic:  Positive for local weakness;memory problems Parkinson  Psychiatric:  Positive for depression    Heme/Lymph/Imm:  Positive for easy bruising    Endocrine:  Negative        Physical Exam:  Vitals: /63 (BP Location: Left arm, Cuff Size: Adult Regular)   Pulse 62   Ht 1.734 m (5' 8.25\")   Wt 72.3 kg (159 lb 8 oz)   BMI 24.07 kg/m      General patient appears comfortable  Neck normal JVP  Cardiovascular system S1-S2 normal no murmur rub or gallop  Respite system clear to auscultation  Extremities no edema  Neurological alert, oriented  Thank you for allowing me to participate in the care of your patient.      Sincerely,     Fransisco Cartagena MD     [facility]  cc:   No referring provider defined for this encounter.        "

## 2021-07-02 DIAGNOSIS — I48.0 PAROXYSMAL ATRIAL FIBRILLATION (H): ICD-10-CM

## 2021-07-02 RX ORDER — DABIGATRAN ETEXILATE 150 MG/1
150 CAPSULE ORAL 2 TIMES DAILY
Qty: 180 CAPSULE | Refills: 3 | Status: SHIPPED | OUTPATIENT
Start: 2021-07-02 | End: 2021-08-19

## 2021-08-12 ENCOUNTER — HOSPITAL ENCOUNTER (EMERGENCY)
Facility: CLINIC | Age: 86
Discharge: HOME | End: 2021-08-12
Attending: EMERGENCY MEDICINE | Admitting: EMERGENCY MEDICINE
Payer: MEDICARE

## 2021-08-12 ENCOUNTER — APPOINTMENT (OUTPATIENT)
Dept: CT IMAGING | Facility: CLINIC | Age: 86
End: 2021-08-12
Attending: EMERGENCY MEDICINE
Payer: MEDICARE

## 2021-08-12 VITALS
SYSTOLIC BLOOD PRESSURE: 170 MMHG | BODY MASS INDEX: 22.81 KG/M2 | HEIGHT: 69 IN | TEMPERATURE: 98.8 F | HEART RATE: 59 BPM | OXYGEN SATURATION: 99 % | DIASTOLIC BLOOD PRESSURE: 88 MMHG | WEIGHT: 154 LBS | RESPIRATION RATE: 16 BRPM

## 2021-08-12 DIAGNOSIS — S09.90XA CLOSED HEAD INJURY, INITIAL ENCOUNTER: ICD-10-CM

## 2021-08-12 DIAGNOSIS — S61.511A TEAR OF SKIN OF RIGHT WRIST, INITIAL ENCOUNTER: ICD-10-CM
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
## 2021-08-12 DIAGNOSIS — S00.01XA ABRASION OF SCALP, INITIAL ENCOUNTER: ICD-10-CM

## 2021-08-12 LAB
BASOPHILS # BLD AUTO: 0 10E3/UL (ref 0–0.2)
BASOPHILS NFR BLD AUTO: 0 %
EOSINOPHIL # BLD AUTO: 0.1 10E3/UL (ref 0–0.7)
EOSINOPHIL NFR BLD AUTO: 2 %
ERYTHROCYTE [DISTWIDTH] IN BLOOD BY AUTOMATED COUNT: 12 % (ref 10–15)
HCT VFR BLD AUTO: 39.4 % (ref 40–53)
HGB BLD-MCNC: 13.2 G/DL (ref 13.3–17.7)
HOLD SPECIMEN: NORMAL
HOLD SPECIMEN: NORMAL
IMM GRANULOCYTES # BLD: 0 10E3/UL
IMM GRANULOCYTES NFR BLD: 1 %
LYMPHOCYTES # BLD AUTO: 1.4 10E3/UL (ref 0.8–5.3)
LYMPHOCYTES NFR BLD AUTO: 20 %
MCH RBC QN AUTO: 31.1 PG (ref 26.5–33)
MCHC RBC AUTO-ENTMCNC: 33.5 G/DL (ref 31.5–36.5)
MCV RBC AUTO: 93 FL (ref 78–100)
MONOCYTES # BLD AUTO: 0.6 10E3/UL (ref 0–1.3)
MONOCYTES NFR BLD AUTO: 8 %
NEUTROPHILS # BLD AUTO: 4.9 10E3/UL (ref 1.6–8.3)
NEUTROPHILS NFR BLD AUTO: 69 %
NRBC # BLD AUTO: 0 10E3/UL
NRBC BLD AUTO-RTO: 0 /100
PLATELET # BLD AUTO: 166 10E3/UL (ref 150–450)
RBC # BLD AUTO: 4.24 10E6/UL (ref 4.4–5.9)
WBC # BLD AUTO: 7.1 10E3/UL (ref 4–11)

## 2021-08-12 PROCEDURE — 70450 CT HEAD/BRAIN W/O DYE: CPT | Mod: MG

## 2021-08-12 PROCEDURE — 36415 COLL VENOUS BLD VENIPUNCTURE: CPT | Performed by: EMERGENCY MEDICINE

## 2021-08-12 PROCEDURE — 99284 EMERGENCY DEPT VISIT MOD MDM: CPT | Mod: 25

## 2021-08-12 PROCEDURE — 85025 COMPLETE CBC W/AUTO DIFF WBC: CPT | Performed by: EMERGENCY MEDICINE

## 2021-08-12 ASSESSMENT — ENCOUNTER SYMPTOMS
DIZZINESS: 0
ARTHRALGIAS: 0
VOMITING: 0
LIGHT-HEADEDNESS: 0
HEADACHES: 0
NECK PAIN: 0
WOUND: 1
FEVER: 0
ABDOMINAL PAIN: 0

## 2021-08-12 ASSESSMENT — MIFFLIN-ST. JEOR: SCORE: 1355.98

## 2021-08-13 NOTE — ED PROVIDER NOTES
"  History   Chief Complaint:  Fall      The history is provided by the patient.      Chava Valentine is a 87 year old male on Pradaxa with history of Parkinson's, Afib, CAD, hypertension and hyperlipidemia who presents via EMS after a fall. The patient reports that he fell onto the hardwood floor of his kitchen and hit his head earlier tonight. He denies loss of consciousness, vomiting, headache and neck pain. His wife states she did not witness the fall, but she endorses that the patient was unable to ambulate after the fall and did not respond to her right away. The patient denies fevers, chest pain, abdominal pain, dizziness, lightheadedness and arthralgias. He does have a skin tear to his scalp and right wrist, but denies any pain. Of note, the patient has Parkinson's, walks with a walker at baseline and does tend to fall when he gets exhausted. However, he has not had numerous recent falls.     Review of Systems   Constitutional: Negative for fever.   Cardiovascular: Negative for chest pain.   Gastrointestinal: Negative for abdominal pain and vomiting.   Musculoskeletal: Negative for arthralgias and neck pain.   Skin: Positive for wound.   Neurological: Negative for dizziness, syncope, light-headedness and headaches.   All other systems reviewed and are negative.    Allergies:  The patient has no known allergies.     Medications:  Aricept  Apresoline  Zestril  Protonix   Klonopin  Toprol XL  Remeron  Exelon  Ambien  Pradaxa   Albuterol inhaler   Zetia   Tambocor   Nitrostat  Crestor     Past Medical History:    Anxiety   CAD  GERD  Hyperlipidemia   Hypertension  Afib  Parkinson's    Past Surgical History:    CABG    Social History:  Presents with his wife  Patient is     Physical Exam     Patient Vitals for the past 24 hrs:   BP Temp Temp src Pulse Resp SpO2 Height Weight   08/12/21 1944 (!) 170/88 98.8  F (37.1  C) Oral 59 16 99 % 1.74 m (5' 8.5\") 69.9 kg (154 lb)       Physical Exam  VS: Reviewed per " above  HENT: Mucous membranes moist, no nuchal rigidity  EYES: sclera anicteric  CV: Rate as noted, regular rhythm.   RESP: Effort normal. Breath sounds are normal bilaterally.  GI: no tenderness/rebound/guarding, not distended.  NEURO: GCS 15, cranial nerves II through XII are intact, 5 out of 5 strength in all 4 extremities, sensation is intact light touch in all 4 extremities  MSK: No deformity of the extremities.  No midline vertebral tenderness C/T/L-spine.  No pain with passive range of motion of the joints of the extremities.  SKIN: Warm and dry, skin tear/abrasion to right superior scalp.  Skin tear to right lateral wrist.    Emergency Department Course   Imaging:  CT Head w/o IV contrast:   1. No acute intracranial abnormality.   2. Chronic changes as detailed.   3. Right parietal scalp contusion, as per radiology.    Laboratory:  CBC: WBC 7.1, HGB 13.2 (L),      Emergency Department Course:    Reviewed:  I reviewed nursing notes, vitals, past medical history and care everywhere    Assessments:  2000 I obtained history and examined the patient as noted above.   2120 I rechecked the patient and explained findings.     Disposition:  The patient was discharged to home.     Impression & Plan   Medical Decision Making:  Patient presents to the ER for evaluation of injury sustained after fall.  On arrival vital signs are reassuring.  Based on history it sounds as though fall is mechanical in nature.  On exam there are no focal neurological deficits.  He has wounds to the right occiput and right wrist.  CT of the head is negative for acute intracranial process.  No clinical findings of occult vertebral injury or other bony injury.  Wounds were cleansed and dressed.  Tdap up-to-date.  Discussed unlikely risk of delayed head bleed with patient and significant other.  Hemoglobin is stable here.  Encouraged primary care follow.  Return precautions discussed prior to discharge.    Diagnosis:    ICD-10-CM    1.  Closed head injury, initial encounter  S09.90XA    2. Abrasion of scalp, initial encounter  S00.01XA    3. Tear of skin of right wrist, initial encounter  S61.511A    4. Fall, initial encounter  W19.XXXA        Discharge Medications:  New Prescriptions    No medications on file       Scribe Disclosure:  I, Pee Pacheco, am serving as a scribe at 7:55 PM on 8/12/2021 to document services personally performed by Saul Lackey MD based on my observations and the provider's statements to me.            Saul Lackey MD  08/12/21 [id number]

## 2021-08-13 NOTE — ED TRIAGE NOTES
Pt. fell at home, unsure if lost balance or tripped. Pt.has HX of parkinsons. Now laceration to back of head and skin tear to right wrist. No LOC.

## 2021-08-13 NOTE — ED NOTES
Bed: FT01  Expected date:   Expected time:   Means of arrival:   Comments:  [place] 87M fall head lac

## 2021-08-18 ENCOUNTER — APPOINTMENT (OUTPATIENT)
Dept: GENERAL RADIOLOGY | Facility: CLINIC | Age: 86
End: 2021-08-18
Attending: EMERGENCY MEDICINE
Payer: MEDICARE

## 2021-08-18 ENCOUNTER — APPOINTMENT (OUTPATIENT)
Dept: CT IMAGING | Facility: CLINIC | Age: 86
End: 2021-08-18
Attending: EMERGENCY MEDICINE
Payer: MEDICARE

## 2021-08-18 ENCOUNTER — NURSE TRIAGE (OUTPATIENT)
Dept: CARDIOLOGY | Facility: CLINIC | Age: 86
End: 2021-08-18

## 2021-08-18 ENCOUNTER — HOSPITAL ENCOUNTER (EMERGENCY)
Facility: CLINIC | Age: 86
Discharge: HOME | End: 2021-08-18
Attending: EMERGENCY MEDICINE | Admitting: EMERGENCY MEDICINE
Payer: MEDICARE

## 2021-08-18 VITALS
WEIGHT: 160 LBS | BODY MASS INDEX: 24.25 KG/M2 | TEMPERATURE: 97.6 F | HEIGHT: 68 IN | SYSTOLIC BLOOD PRESSURE: 134 MMHG | DIASTOLIC BLOOD PRESSURE: 73 MMHG | OXYGEN SATURATION: 99 % | RESPIRATION RATE: 12 BRPM | HEART RATE: 48 BPM

## 2021-08-18 DIAGNOSIS — G44.209 TENSION HEADACHE: ICD-10-CM

## 2021-08-18 DIAGNOSIS — R00.1 SINUS BRADYCARDIA: ICD-10-CM

## 2021-08-18 DIAGNOSIS — S06.0X0A CONCUSSION WITHOUT LOSS OF CONSCIOUSNESS, INITIAL ENCOUNTER: ICD-10-CM

## 2021-08-18 LAB
ALBUMIN UR-MCNC: NEGATIVE MG/DL
ANION GAP SERPL CALCULATED.3IONS-SCNC: 4 MMOL/L (ref 3–14)
APPEARANCE UR: CLEAR
ATRIAL RATE - MUSE: 56 BPM
BASOPHILS # BLD AUTO: 0 10E3/UL (ref 0–0.2)
BASOPHILS NFR BLD AUTO: 1 %
BILIRUB UR QL STRIP: NEGATIVE
BUN SERPL-MCNC: 28 MG/DL (ref 7–30)
CALCIUM SERPL-MCNC: 8.5 MG/DL (ref 8.5–10.1)
CHLORIDE BLD-SCNC: 104 MMOL/L (ref 94–109)
CO2 SERPL-SCNC: 26 MMOL/L (ref 20–32)
COLOR UR AUTO: NORMAL
CREAT SERPL-MCNC: 1.54 MG/DL (ref 0.66–1.25)
DIASTOLIC BLOOD PRESSURE - MUSE: NORMAL MMHG
EOSINOPHIL # BLD AUTO: 0.1 10E3/UL (ref 0–0.7)
EOSINOPHIL NFR BLD AUTO: 2 %
ERYTHROCYTE [DISTWIDTH] IN BLOOD BY AUTOMATED COUNT: 11.9 % (ref 10–15)
GFR SERPL CREATININE-BSD FRML MDRD: 40 ML/MIN/1.73M2
GLUCOSE BLD-MCNC: 109 MG/DL (ref 70–99)
GLUCOSE UR STRIP-MCNC: NEGATIVE MG/DL
HCT VFR BLD AUTO: 38.9 % (ref 40–53)
HGB BLD-MCNC: 12.9 G/DL (ref 13.3–17.7)
HGB UR QL STRIP: NEGATIVE
HOLD SPECIMEN: NORMAL
IMM GRANULOCYTES # BLD: 0 10E3/UL
IMM GRANULOCYTES NFR BLD: 0 %
INTERPRETATION ECG - MUSE: NORMAL
KETONES UR STRIP-MCNC: NEGATIVE MG/DL
LEUKOCYTE ESTERASE UR QL STRIP: NEGATIVE
LYMPHOCYTES # BLD AUTO: 1.5 10E3/UL (ref 0.8–5.3)
LYMPHOCYTES NFR BLD AUTO: 24 %
MCH RBC QN AUTO: 30.9 PG (ref 26.5–33)
MCHC RBC AUTO-ENTMCNC: 33.2 G/DL (ref 31.5–36.5)
MCV RBC AUTO: 93 FL (ref 78–100)
MONOCYTES # BLD AUTO: 0.5 10E3/UL (ref 0–1.3)
MONOCYTES NFR BLD AUTO: 9 %
NEUTROPHILS # BLD AUTO: 4.1 10E3/UL (ref 1.6–8.3)
NEUTROPHILS NFR BLD AUTO: 64 %
NITRATE UR QL: NEGATIVE
NRBC # BLD AUTO: 0 10E3/UL
NRBC BLD AUTO-RTO: 0 /100
NT-PROBNP SERPL-MCNC: 399 PG/ML (ref 0–1800)
P AXIS - MUSE: NORMAL DEGREES
PH UR STRIP: 5 [PH] (ref 5–7)
PLATELET # BLD AUTO: 168 10E3/UL (ref 150–450)
POTASSIUM BLD-SCNC: 4.1 MMOL/L (ref 3.4–5.3)
PR INTERVAL - MUSE: NORMAL MS
QRS DURATION - MUSE: 128 MS
QT - MUSE: 484 MS
QTC - MUSE: 446 MS
R AXIS - MUSE: -63 DEGREES
RBC # BLD AUTO: 4.17 10E6/UL (ref 4.4–5.9)
RBC URINE: 0 /HPF
SARS-COV-2 RNA RESP QL NAA+PROBE: NEGATIVE
SODIUM SERPL-SCNC: 134 MMOL/L (ref 133–144)
SP GR UR STRIP: 1.01 (ref 1–1.03)
SYSTOLIC BLOOD PRESSURE - MUSE: NORMAL MMHG
T AXIS - MUSE: -4 DEGREES
TROPONIN I SERPL-MCNC: <0.015 UG/L (ref 0–0.04)
UROBILINOGEN UR STRIP-MCNC: NORMAL MG/DL
VENTRICULAR RATE- MUSE: 51 BPM
WBC # BLD AUTO: 6.4 10E3/UL (ref 4–11)
WBC URINE: 0 /HPF

## 2021-08-18 PROCEDURE — 99285 EMERGENCY DEPT VISIT HI MDM: CPT | Mod: 25

## 2021-08-18 PROCEDURE — 81001 URINALYSIS AUTO W/SCOPE: CPT | Performed by: EMERGENCY MEDICINE

## 2021-08-18 PROCEDURE — 84484 ASSAY OF TROPONIN QUANT: CPT | Performed by: EMERGENCY MEDICINE

## 2021-08-18 PROCEDURE — 70450 CT HEAD/BRAIN W/O DYE: CPT | Mod: ME

## 2021-08-18 PROCEDURE — 83880 ASSAY OF NATRIURETIC PEPTIDE: CPT | Performed by: EMERGENCY MEDICINE

## 2021-08-18 PROCEDURE — 80181 DRUG ASSAY FLECAINIDE: CPT | Performed by: EMERGENCY MEDICINE

## 2021-08-18 PROCEDURE — 71046 X-RAY EXAM CHEST 2 VIEWS: CPT

## 2021-08-18 PROCEDURE — 80048 BASIC METABOLIC PNL TOTAL CA: CPT | Mod: GZ | Performed by: EMERGENCY MEDICINE

## 2021-08-18 PROCEDURE — 85025 COMPLETE CBC W/AUTO DIFF WBC: CPT | Performed by: EMERGENCY MEDICINE

## 2021-08-18 PROCEDURE — 93005 ELECTROCARDIOGRAM TRACING: CPT

## 2021-08-18 PROCEDURE — 87635 SARS-COV-2 COVID-19 AMP PRB: CPT | Performed by: EMERGENCY MEDICINE

## 2021-08-18 PROCEDURE — 72125 CT NECK SPINE W/O DYE: CPT | Mod: ME

## 2021-08-18 PROCEDURE — C9803 HOPD COVID-19 SPEC COLLECT: HCPCS

## 2021-08-18 PROCEDURE — 36415 COLL VENOUS BLD VENIPUNCTURE: CPT | Performed by: EMERGENCY MEDICINE

## 2021-08-18 RX ORDER — METOPROLOL SUCCINATE 25 MG/1
25 TABLET, EXTENDED RELEASE ORAL DAILY
Qty: 14 TABLET | Refills: 0 | Status: SHIPPED | OUTPATIENT
Start: 2021-08-18 | End: 2021-08-19

## 2021-08-18 ASSESSMENT — ENCOUNTER SYMPTOMS
WEAKNESS: 1
VOMITING: 0
DIARRHEA: 0
DIZZINESS: 0
ABDOMINAL PAIN: 0
HEADACHES: 1
NECK PAIN: 1

## 2021-08-18 ASSESSMENT — MIFFLIN-ST. JEOR: SCORE: 1375.26

## 2021-08-18 NOTE — TELEPHONE ENCOUNTER
Call transferred from Nancy in scheduling to talk with wife about symptoms.  Spoke to Luci who has a few concerns, and was hard to follow and she was talking about multiple things at once.. She says Chava is listless today and is sleeping a lot. She says he has a headache above his right eyebrow and a stiff neck. She says he was recently in the ER after a fall and hit his head. She says his blood pressure is on the low side for him. There was an [facility] OT person there today and his blood pressures were 94/59, 91/60. She says he is just lying in bed all day. She is also having concerns about all of his medications. I asked to speak to Chava who was coherent at first but could not tell me the month or year. He claims he is not as weak as he was earlier today, and feels he is sleeping more. He then started to mumble and could not follow the conversation or answer questions appropriately. I asked to speak to Luci again and she said that he typically is able to answer questions appropriately. I advised that she call 911 and go to ER for an evaluation due to concern for his recent closed head injury. She verbalized agreement and understanding of the plan to call 911.

## 2021-08-18 NOTE — ED PROVIDER NOTES
History     Chief Complaint:  Hypertension       The history is provided by the patient and the spouse.      Chava Valentine is a 87 year old male with a history of Parkinson's disease who presents with Hypotension. The patient's wife called EMS because the patient was hypotensive. Her currently feels fine, but his vision is a little blurry. The patient had a fall about 6 days ago which resulted in trauma to the head. There is no association chest pain, abdominal pain, syncope, diarrhea, vomiting, or dizziness.     The wife arrived to the room and mentions that a visiting nurse measured his blood pressure which came back with the top number in the 90s. She notes that she started noticing his weakness after a hair cut. They got home and was working with health services when it turned out he started overdosing on his medications (specifically zolpidem) on Friday. Since then she has been managing his medications with assistance from the nurse.  She also states that he has been complaining of a headache and neck pain for the past couple of days. He has been using a heating pad to alleviate the pain with mild success.  Was here for a head injury on 8/12.  Is on blood thinners.    Review of Systems   Eyes: Positive for visual disturbance.   Cardiovascular: Negative for chest pain.   Gastrointestinal: Negative for abdominal pain, diarrhea and vomiting.   Musculoskeletal: Positive for neck pain.   Neurological: Positive for weakness and headaches. Negative for dizziness and syncope.   All other systems reviewed and are negative.        Allergies:  Dove soap     Medications:    Albuterol   Clonazepam   Pradaxa   Zetia   Tambocor   Lisinopril   Toprol   Mirtazapine   Nitrostat   Metamucil   Mestinon   Zantac   Exelon   Crestor   Zolpidem tartrate     Past Medical History:    Allergic rhinitis   Anxiety   Contact dermatitis   Coronary artery disease   Elevated prolactin level   Erythrocytosis   Gastro-oesophageal reflux  "disease   GI bleed   Hyperlipidemia   Hypertension   Hypogonadism   Insomnia   Paroxysmal atrial fibrillation    Prediabetes   Renal disease   Shoulder arthritis   SVT (supraventricular tachycardia) (H)   CAD  Hypogonadism   Erythrocytosis   Stage 3 CKD  Vitamin D deficiency   Parkinson's disease     Past Surgical History:    CABG  Bilateral trabeculoplasty   Right rotator cuff repair   Reverse shoulder arthroplasty   Vitrectomy     Family History:    Father - cerebrovascular disease     Social History:  Patient was accompanied to the ED with wife.  Lives with wife in apartment complex.     Physical Exam     Patient Vitals for the past 24 hrs:   BP Temp Temp src Pulse Resp SpO2 Height Weight   21 1930 134/73 -- -- (!) 48 12 99 % -- --   21 1915 -- -- -- 50 11 100 % -- --   21 1830 117/62 -- -- 55 16 96 % -- --   21 1730 129/65 -- -- 51 23 99 % -- --   21 1724 (!) 141/74 97.6  F (36.4  C) Oral 52 18 96 % 1.727 m (5' 8\") 72.6 kg (160 lb)       Physical Exam  Eyes:               Sclera white; Pupils are equal and round  ENT:                External ears and nares normal  CV:                  Rate as above with regular rhythm   Resp:               Breath sounds clear and equal bilaterally                          Non-labored, no retractions or accessory muscle use  GI:                   Abdomen is soft, non-tender, non-distended                          No rebound tenderness or peritoneal features  MS:                  Moves all extremities  Skin:                Warm and dry  Neuro:             Speech is not slurred.  Slow responses, sometimes cannot remember details to questions or the word he wants to say (wife says this is chronic).  Follows commands.  No drift in any extremity.  Symmetric strength    Emergency Department Course   EC  ECG taken at 1731, ECG read at 1733  Wide QRS rhythm   Left axis deviation   Left bundle branch block   Abnormal ECG   No significant change as compared " to prior, dated 10/29/2020.  Rate 51 bpm. NM interval * ms. QRS duration 128 ms. QT/QTc 484/446 ms. P-R-T axes * -63 -4.     Imaging:  XR Chest 2 Views  Final Result  IMPRESSION: Mild left basilar linear atelectasis and elevated left  hemidiaphragm. No focal infiltrate, pleural effusion or pneumothorax.  The cardiac and mediastinal silhouettes are normal. Median sternotomy  and CABG. Right reverse total shoulder arthroplasty.  JOLLY SERRANO MD   Per Radiology     CT Head w/o Contrast  Final Result  IMPRESSION: Diffuse cerebral volume loss and cerebral white matter  changes consistent with chronic small vessel ischemic disease. No  evidence for acute intracranial pathology.   Radiation dose for this scan was reduced using automated exposure  control, adjustment of the mA and/or kV according to patient size, or  iterative reconstruction technique  COLIN GRADY MD   Per Radiology     Cervical spine CT w/o contrast  Final Result  IMPRESSION: There is normal alignment of the cervical vertebrae.  Vertebral body heights of the cervical spine are normal.  Craniocervical alignment is normal. There are no fractures of the  cervical spine. No significant degenerative spinal canal stenosis of  the cervical spine. No prevertebral soft tissue swelling. Moderate  degenerative neural foraminal stenosis bilaterally at C5-C6.  Radiation dose for this scan was reduced using automated exposure  control, adjustment of the mA and/or kV according to patient size, or  iterative reconstruction technique  COLIN GRADY MD   Per Radiology     Laboratory:  BMP: GFR Estimate 40 (L) o/w WNL (Creatinine 1.54 (H))     Troponin (Collected 1816): <0.015    NT probnp inpatient: 399    CBC: WBC 6.4, HGB 12.9 (L),      UA with microscopic: WNL     Asymptomatic COVID-19 Virus (Coronavirus) by PCR Nasopharyngeal swab: Pending     Emergency Department Course:    Reviewed:  I reviewed nursing notes, vitals, past history and care  everywhere    Assessments:  1722 I obtained history and examined the patient as noted above.   1914 I rechecked the patient and explained findings.     Disposition:  The patient was discharged to home.    Impression & Plan    Medical Decision Making:  Chava Valentine is for a consultation of of symptoms in the setting of a recent head injury. He was noted to be bradycardic, but has not been hypotensive for EMS or for us throughout his ED stay. EKG shows some artifact that is obscuring the possibility of a sinus rhythm. Morphology looks the exact same as prior which why I believe this is sinus bradycardia. Workup does not show any other etiology of him winded, specifically there is no evidence of ACS, pulmonary edema, pneumonia, pneumothorax, or pleural effusion. Hemoglobin is stable compared to a week ago when it was 13.2. Flecainide level was sent, but is pending at this time. There were no infections as a source of his symptoms. Repeat CT head and CT of his neck shows no delayed intracranial hemorrhage from his fall and no associated fractures. I suspect that many of his symptoms are secondary to concussion. He was able to walk with a walker without desaturating and did well with this. The feel comfortable managing at home. They understand that if he is getting worse, he can always return for admission and consideration for placement. They will need close follow up with both his regular physician which is scheduled in 2 days time and with cardiology. In the mean time he will stop his metoprolol 50mg and will take 25 mg instead. New prescription was sent to their pharmacy.       Covid-19  Chava Valentine was evaluated during a global COVID-19 pandemic, which necessitated consideration that the patient might be at risk for infection with the SARS-CoV-2 virus that causes COVID-19.   Applicable protocols for evaluation were followed during the patient's care.   COVID-19 was considered as part of the patient's  evaluation. The plan for testing is:  a test was obtained during this visit.  Diagnosis:    ICD-10-CM    1. Sinus bradycardia  R00.1    2. Tension headache  G44.209 Concussion  Referral   3. Concussion without loss of consciousness, initial encounter  S06.0X0A Concussion  Referral       Discharge Medications:  Discharge Medication List as of 8/18/2021  7:42 PM      START taking these medications    Details   !! metoprolol succinate ER (TOPROL-XL) 25 MG 24 hr tablet Take 1 tablet (25 mg) by mouth daily, Disp-14 tablet, R-0, E-Prescribe       !! - Potential duplicate medications found. Please discuss with provider.            Scribe Disclosure:  I, Kade Monte, am serving as a scribe at 5:22 PM on 8/18/2021 to document services personally performed by Judy Zhu, based on my observations and the provider's statements to me.      Judy Zhu MD  08/18/21 2261

## 2021-08-18 NOTE — ED NOTES
Bed: ED19  Expected date: 8/18/21  Expected time: 5:09 PM  Means of arrival:   Comments:  [place] 87 year old claudy ETA 10

## 2021-08-19 ENCOUNTER — OFFICE VISIT (OUTPATIENT)
Dept: CARDIOLOGY | Facility: CLINIC | Age: 86
End: 2021-08-19
Payer: MEDICARE

## 2021-08-19 VITALS
WEIGHT: 152.8 LBS | OXYGEN SATURATION: 99 % | HEART RATE: 60 BPM | DIASTOLIC BLOOD PRESSURE: 60 MMHG | BODY MASS INDEX: 23.16 KG/M2 | SYSTOLIC BLOOD PRESSURE: 94 MMHG | HEIGHT: 68 IN

## 2021-08-19 DIAGNOSIS — E78.2 MIXED HYPERLIPIDEMIA: ICD-10-CM

## 2021-08-19 DIAGNOSIS — I48.0 PAROXYSMAL ATRIAL FIBRILLATION (H): ICD-10-CM

## 2021-08-19 DIAGNOSIS — I95.9 HYPOTENSION, UNSPECIFIED HYPOTENSION TYPE: Primary | ICD-10-CM

## 2021-08-19 DIAGNOSIS — I25.10 CORONARY ARTERY DISEASE INVOLVING NATIVE CORONARY ARTERY OF NATIVE HEART WITHOUT ANGINA PECTORIS: ICD-10-CM

## 2021-08-19 DIAGNOSIS — R29.6 FALLS FREQUENTLY: ICD-10-CM

## 2021-08-19 PROCEDURE — 99214 OFFICE O/P EST MOD 30 MIN: CPT | Performed by: NURSE PRACTITIONER

## 2021-08-19 ASSESSMENT — MIFFLIN-ST. JEOR: SCORE: 1346.57

## 2021-08-19 NOTE — LETTER
8/19/2021    Mike Cosby MD  [facility] [address]  [place] [address]    RE: Chava Valentine       Dear Colleague,    I had the pleasure of seeing Chava Valentine in the [facility].    Cardiology Clinic Progress Note  Chava Valentine MRN# [id number]   YOB: 1933 Age: 87 year old     Reason For Visit:  Hypotension w/ recent fall   Primary Cardiologist:   Dr. Cartagena          History of Presenting Illness:      Chava Valentine is a pleasant 87 year old patient who carries a past medical history significant for coronary artery disease status post remote CABG in 1995 with LIMA to the LAD, SVG to diagonal, SVG to OM, and SVG to RCA, paroxysmal atrial fibrillation managed on flecainide and Pradaxa for stroke prevention, chronic renal insufficiency, Parkinson's disease, hypertension, and hyperlipidemia.    In the last week, he has had 2 separate ER visits for falls and hypotension.  According to his wife and son, he has had progressive weakness in his legs, unsteady gait, and multiple falls over the last few years.  His recent ER evaluation showed a wound to his right occiput and right wrist.  CT of the head was negative for acute intracranial process.  No further work-up was performed.  Yesterday, they returned to the emergency room via EMS with hypotension and blurred vision.  ER work-up showed stable blood pressures, bradycardia in the upper 40s low 50s. EKG showing chronic left bundle branch block and no acute ischemic changes.  Chest x-ray was negative for any acute process and  repeat head CT and CT of the neck showed no intracranial hemorrhage or associated fractures.  Symptoms were felt to be secondary to concussion.  Due to bradycardia, Metoprolol was reduced from 50 mg to 25 mg daily. He presents to the office today accompanied by his wife and son who are providing the majority of today's information.     His  primary complaint is weakness and lightheadedness.  This a.m. he reported significant ear and head pain which has now resolved.  Gait is visibly unsteady.  He denies chest pain, shortness of breath, PND, orthopnea,  edema, heart racing, or palpitations. Blood pressure is low at 94/60, HR in the upper 50's low 60's.  In review of medical records, rhythm and rate have been controlled on flecainide, metoprolol, and pradaxa for stroke prevention . However, in review of his medication list with family, they are unaware of multiple medications listed.  He receives medications via blister packs and family cannot confirm accuracy.  Our office attempted to contact patient's PCP office for medication confirmation, unfortunately their office was closed.    Last echocardiogram showed a normal ejection fraction estimated at 55 to 60%, normal RV size and function, and no significant valvular disease.  Nuclear stress test in 2018 showed a trivial area of apical inferior ischemia.    Recent labs showed a sodium of 134, potassium 4.1, BUN 28, creatinine 1.54, GFR 40  Hemoglobin 12.9, WBC 6.4, and platelet 168.   BMI 23.06                       Assessment and Plan:       Chava Valentine is a pleasant 87 year old patient who carries a past medical history significant for coronary artery disease status post remote CABG in 1995 with LIMA to the LAD, SVG to diagonal, SVG to OM, and SVG to RCA, paroxysmal atrial fibrillation managed on flecainide and Pradaxa for stroke prevention, chronic renal insufficiency, Parkinson's disease, hypertension, and hyperlipidemia.  Blood pressure today is low, accompanied by lightheadedness and weakness.  He is  visibly unsteady on his feet.  He does not consistently use his walker. We had a long conversation regarding his chronic anticoagulation use in the setting of unsteady gait and frequent falls.  Family is requesting to stop Pradaxa for now and reassess once his blood pressures remain stable and  strength has improved.  They are aware of stroke risk in the absence of anticoagulation and willing to take that risk.  There is significant confusion as to what his current medications are. Per his home medication list, he is on multiple antihypertensives.  They are unaware of metoprolol and do not believe he is on this.  In the setting of hypotension as well as bradycardia,  I have asked him to discontinue metoprolol ( if taking) as well as hydralazine.  Decrease lisinopril to 5 mg daily and uptitrate as needed for hypertension.  He remains in sinus rhythm on flecainide which I will continue.  Monitor blood pressures daily and notify office should pressures remain consistently above 140s systolic or below 90s.  Should he develop any worsening symptoms of lightheadedness, dizziness, unsteady gait, or syncope, notify office or if severe, seek ER evaluation immediately.                   Thank you for allowing me to participate in this delightful patient's care. I have recommended he follow up in 1 week with JUANY for clinical reassessment..       Karlie Robin, GAMAL CNP         Review of Systems:     Review of Systems:  Skin:  Negative     Eyes:  Positive for glasses  ENT:  Positive for hearing loss;tinnitus  Respiratory:  Negative dyspnea on exertion  Cardiovascular:  chest pain;palpitations;cyanosis;lightheadedness;dizziness;edema;Negative for;syncope or near-syncope Positive for;fatigue  Gastroenterology: Negative    Genitourinary:  Negative    Musculoskeletal:  Positive for    Neurologic:  Positive for memory problems  Psychiatric:  Positive for depression  Heme/Lymph/Imm:  Positive for easy bruising  Endocrine:  Negative                Physical Exam:     GEN:  In general, this is a frail elderly male in no acute distress.  Confused  HEENT:  Pupils equal, round. Sclerae nonicteric. Clear oropharynx. Mucous membranes moist.  NECK: Supple, no masses appreciated. Trachea midline.  No JVD   C/V:  Regular rate and  rhythm, no murmur, rub or gallop. No S3 or RV heave.   RESP: Respirations are unlabored. No use of accessory muscles. Clear to auscultation bilaterally without wheezing, rales, or rhonchi.  GI: Abdomen soft, nontender, nondistended. No HSM appreciated.   EXTREM: No LE edema. No cyanosis or clubbing.  NEURO: Alert and oriented, cooperative.  Shuffling gait, unsteady.   PSYCH: Normal affect.  SKIN: Warm and dry. No rashes or petechiae appreciated.          Past Medical History:     Past Medical History:   Diagnosis Date     Allergic rhinitis      Anxiety      Contact dermatitis      Coronary artery disease     CABG with 4 grafts-LIMA to LAD, SVG to Diagonal,OM and RCA     Cough      Elevated prolactin level      Erythrocytosis      Gastro-oesophageal reflux disease      GI bleed 10/10     Hyperlipidemia      Hypertension      Hypogonadism     with low testosterone     Insomnia      Paroxysmal atrial fibrillation (H)     flecainide therapy     Post-nasal drip      Prediabetes      Renal disease     CKD     Shoulder arthritis      SVT (supraventricular tachycardia) (H)      Tremor               Past Surgical History:     Past Surgical History:   Procedure Laterality Date     BYPASS GRAFT ARTERY CORONARY       CARDIAC SURGERY       COLONOSCOPY  12/2010    tubular adenoma     LASER SELECTIVE TRABECULOPLASTY BILATERAL Bilateral 6/11/2018    Procedure: LASER SELECTIVE TRABECULOPLASTY BILATERAL;  LASER SELECTIVE TRABECULOPLASTY BILATERAL ;  Surgeon: Parmjit Camacho MD;  Location: [facility]     ORTHOPEDIC SURGERY      rotator cuff right     PHACOEMULSIFICATION CLEAR CORNEA WITH STANDARD IOL, VITRECTOMY PARSPLANA 23 GAGUE, COMBINED  2/20/2013    Procedure: COMBINED PHACOEMULSIFICATION CLEAR CORNEA WITH STANDARD INTRAOCULAR LENS IMPLANT, VITRECTOMY PARSPLANA 23 GAUGE;;  Surgeon: Guero Lockett MD;  Location: [facility]     PHACOEMULSIFICATION CLEAR CORNEA WITH STANDARD IOL, VITRECTOMY PARSPLANA 23 GAGUE, COMBINED  7/31/2013     Procedure: COMBINED PHACOEMULSIFICATION CLEAR CORNEA WITH STANDARD INTRAOCULAR LENS IMPLANT, VITRECTOMY PARSPLANA 23 GAUGE;;  Surgeon: Guero Lockett MD;  Location: [facility]     REVERSE ARTHROPLASTY SHOULDER Right 7/24/2017    Procedure: REVERSE ARTHROPLASTY SHOULDER;  RIGHT REVERSE TOTAL SHOULDER ARTHROPLASTY ;  Surgeon: Rodolfo Calhoun MD;  Location:  OR     VASCULAR SURGERY      cab     VITRECTOMY PARSPLANA, PHACOEMULSIFICATION CLEAR CORNEA W STANDARD INTRAOCULAR LENS IMPLANT, COMBINED  2/20/2013    Procedure: COMBINED VITRECTOMY PARSPLANA, PHACOEMULSIFICATION CLEAR CORNEA WITH STANDARD INTRAOCULAR LENS IMPLANT;  LEFT PHACOEMULSIFICATION CLEAR CORNEA WITH SAURABH TORIC INTRAOCULAR LENS IMPLANT,  LEFT EYE 23 GAUGE PARSPLANA VITRECTOMY;  Surgeon: Jorge Regan MD;  Location: [facility]     VITRECTOMY PARSPLANA, PHACOEMULSIFICATION CLEAR CORNEA W STANDARD INTRAOCULAR LENS IMPLANT, COMBINED  7/31/2013    Procedure: COMBINED VITRECTOMY PARSPLANA, PHACOEMULSIFICATION CLEAR CORNEA WITH STANDARD INTRAOCULAR LENS IMPLANT;  RIGHT PHACOEMULSIFICATION CLEAR CORNEA WITH TORIC INTRAOCULAR LENS IMPLANT, RIGHT 23 GAUGE PARSPLANA VITRECTOMY ;  Surgeon: Jorge Regan MD;  Location: [facility]              Allergies:   Other [no clinical screening - see comments]       Data:   All laboratory data reviewed:    LAST CHOLESTEROL:  Lab Results   Component Value Date    CHOL 109 04/08/2017     Lab Results   Component Value Date    HDL 40 04/08/2017     Lab Results   Component Value Date    LDL 54 04/08/2017     Lab Results   Component Value Date    TRIG 77 04/08/2017     Lab Results   Component Value Date    CHOLHDLRATIO 2.6 01/22/2015       LAST BMP:  Lab Results   Component Value Date     08/18/2021     10/29/2020      Lab Results   Component Value Date    POTASSIUM 4.1 08/18/2021    POTASSIUM 4.2 10/29/2020     Lab Results   Component Value Date    CHLORIDE 104 08/18/2021    CHLORIDE 108 10/29/2020     Lab Results    Component Value Date    FIDEL 8.5 08/18/2021    FIDEL 8.8 10/29/2020     Lab Results   Component Value Date    CO2 26 08/18/2021    CO2 26 10/29/2020     Lab Results   Component Value Date    BUN 28 08/18/2021    BUN 25 10/29/2020     Lab Results   Component Value Date    CR 1.54 08/18/2021    CR 1.67 10/29/2020     Lab Results   Component Value Date     08/18/2021     10/29/2020       LAST CBC:  Lab Results   Component Value Date    WBC 6.4 08/18/2021    WBC 7.9 10/29/2020     Lab Results   Component Value Date    RBC 4.17 08/18/2021    RBC 4.40 10/29/2020     Lab Results   Component Value Date    HGB 12.9 08/18/2021    HGB 14.2 10/29/2020     Lab Results   Component Value Date    HCT 38.9 08/18/2021    HCT 41.5 10/29/2020     Lab Results   Component Value Date    MCV 93 08/18/2021    MCV 94 10/29/2020     Lab Results   Component Value Date    MCH 30.9 08/18/2021    MCH 32.3 10/29/2020     Lab Results   Component Value Date    MCHC 33.2 08/18/2021    MCHC 34.2 10/29/2020     Lab Results   Component Value Date    RDW 11.9 08/18/2021    RDW 11.9 10/29/2020     Lab Results   Component Value Date     08/18/2021     10/29/2020               Thank you for allowing me to participate in the care of your patient.      Sincerely,     GAMAL [facility]  cc:   No referring provider defined for this encounter.

## 2021-08-19 NOTE — DISCHARGE INSTRUCTIONS
STOP taking Metoprolol 50mg.    START taking Metoprolol 25mg until follow up with your doctor and cardiologist.    Discharge Instructions  Concussion    You were seen today for signs of a concussion.  The symptoms will vary, depending on the nature of your injury and your health. You may have: headache, confusion, nausea (feel sick to your stomach), vomiting (throwing up) and problems with memory, concentrating, or sleep. You may feel dizzy, irritable, and tired. Children and teens may need help from their parents, teachers, and coaches to watch for symptoms as they recover.    Generally, every Emergency Department visit should have a follow-up clinic visit with either a primary or a specialty clinic/provider. Please follow-up as instructed by your emergency provider today.     Return to the Emergency Department if:  Your headache gets worse or you start to have a really bad headache even with the recommended treatment plan.   You feel drowsier, have growing confusion, or slurred speech.   You keep repeating yourself.   You have strange behavior or are feeling more irritable.   You have a seizure.   You vomit (throw up) more than once.   You have trouble walking.   You have weakness or numbness.  Your neck pain gets worse.   You have a loss of consciousness.   You have blood for fluid coming from your ears or nose.   You have new symptoms or anything that worries you.     Home Care:  Get lots of rest and get enough sleep at night. Take daytime naps or rest if you feel tired.   Limit physical activity and  thinking  activities. These can make symptoms worse.   Physical activities include gym, sports, weight training, running, exercise, and heavy lifting.   Thinking activities include homework, class work, job-related work, and screen time (phone, computer, tablet, TV, and video games).   Stick to a healthy diet and drink lots of fluids. Avoid alcohol.  As symptoms improve, you may slowly return to your daily  activities. If symptoms get worse or return, reduce your activity.   Know that it is normal to feel sad or frustrated when you do not feel right and are less active.     Going Back to Work:  Your care team will tell you when you are ready to return to work.    Limit the amount of work you do soon after your injury. This may speed healing. Take breaks if your symptoms get worse. You should also reduce your physical activity as well as activities that require a lot of thinking until you see your doctor. You may need shorter work days and a lighter workload.  Avoid heavy lifting, working with machinery, driving and working at heights until your symptoms are gone or you are cleared by a provider.    Going Back to School:  If you are still having symptoms, you may need extra help at school.  Tell your teachers and school nurse about your injury and symptoms. Ask them to watch for problems with learning, memory, and concentrating. Symptoms may get worse when you do schoolwork, and you may become more irritable. You may need shorter school days, a reduced workload, and to postpone testing.  Do not drive or take gym class (physical activity) until cleared by a provider.    Returning to Sports:  Never return to play if you have any symptoms. A full recovery will reduce the chances of getting hurt again. Remember, it is better to miss one or two games than a whole season.  You should rest from all physical activity until you see your provider. Generally, if all symptoms have completely cleared, your provider can help guide you to slowly return to sports. If symptoms return or worsen, stop the activity and see your provider.  Important: If you are in an organized sport and under age 18, you will need written consent from a healthcare provider before you return to sports. Typically, this will be your primary care or sports medicine provider. Please make an appointment.    If you were given a prescription for medicine here  today, be sure to read all of the information (including the package insert) that comes with your prescription.  This will include important information about the medicine, its side effects, and any warnings that you need to know about.  The pharmacist who fills the prescription can provide more information and answer questions you may have about the medicine.  If you have questions or concerns that the pharmacist cannot address, please call or return to the Emergency Department.     Remember that you can always come back to the Emergency Department if you are not able to see your regular provider in the amount of time listed above, if you get any new symptoms, or if there is anything that worries you.

## 2021-08-19 NOTE — ED NOTES
Patient taken on road test with walker and oxygen monitoring.  Was able to ambulate with walker fine.  Oxygen 93% and HR in the 60s.

## 2021-08-19 NOTE — PATIENT INSTRUCTIONS
Thanks for participating in a office visit with the [facility] today.    Recent episodes of hypotension with fall.   Blood pressure low today.   Reviewed patients current medication list ( Many medications different from cardiology list).   Stop metoprolol  ( not taking)  Stop hydralazine ( not taking)  Decrease lisinopril to 5 mg daily   Currently in regular rhythm  Continue on flecainide  Due to falls risk and recent head injury will hold Pradaxa for now. Reviewed risks of stroke with patient and family. Ok to remain off anticoagulation until gait improves.     Follow up in 1 week with JUANY    Please call my nurse at [phone] with any questions or concerns.    Scheduling phone number: [phone]  Reminder: Please bring in all current medications, over the counter supplements and vitamin bottles to your next appointment.

## 2021-08-19 NOTE — PROGRESS NOTES
Cardiology Clinic Progress Note  Chava Vlaentine MRN# [id number]   YOB: 1933 Age: 87 year old     Reason For Visit:  Hypotension w/ recent fall   Primary Cardiologist:   Dr. Cartagena          History of Presenting Illness:      Chava Valentine is a pleasant 87 year old patient who carries a past medical history significant for coronary artery disease status post remote CABG in 1995 with LIMA to the LAD, SVG to diagonal, SVG to OM, and SVG to RCA, paroxysmal atrial fibrillation managed on flecainide and Pradaxa for stroke prevention, chronic renal insufficiency, Parkinson's disease, hypertension, and hyperlipidemia.    In the last week, he has had 2 separate ER visits for falls and hypotension.  According to his wife and son, he has had progressive weakness in his legs, unsteady gait, and multiple falls over the last few years.  His recent ER evaluation showed a wound to his right occiput and right wrist.  CT of the head was negative for acute intracranial process.  No further work-up was performed.  Yesterday, they returned to the emergency room via EMS with hypotension and blurred vision.  ER work-up showed stable blood pressures, bradycardia in the upper 40s low 50s. EKG showing chronic left bundle branch block and no acute ischemic changes.  Chest x-ray was negative for any acute process and  repeat head CT and CT of the neck showed no intracranial hemorrhage or associated fractures.  Symptoms were felt to be secondary to concussion.  Due to bradycardia, Metoprolol was reduced from 50 mg to 25 mg daily. He presents to the office today accompanied by his wife and son who are providing the majority of today's information.     His primary complaint is weakness and lightheadedness.  This a.m. he reported significant ear and head pain which has now resolved.  Gait is visibly unsteady.  He denies chest pain, shortness of breath, PND, orthopnea,  edema, heart racing, or palpitations. Blood pressure is low at  94/60, HR in the upper 50's low 60's.  In review of medical records, rhythm and rate have been controlled on flecainide, metoprolol, and pradaxa for stroke prevention . However, in review of his medication list with family, they are unaware of multiple medications listed.  He receives medications via blister packs and family cannot confirm accuracy.  Our office attempted to contact patient's PCP office for medication confirmation, unfortunately their office was closed.    Last echocardiogram showed a normal ejection fraction estimated at 55 to 60%, normal RV size and function, and no significant valvular disease.  Nuclear stress test in 2018 showed a trivial area of apical inferior ischemia.    Recent labs showed a sodium of 134, potassium 4.1, BUN 28, creatinine 1.54, GFR 40  Hemoglobin 12.9, WBC 6.4, and platelet 168.   BMI 23.06                       Assessment and Plan:       Chava Valentine is a pleasant 87 year old patient who carries a past medical history significant for coronary artery disease status post remote CABG in 1995 with LIMA to the LAD, SVG to diagonal, SVG to OM, and SVG to RCA, paroxysmal atrial fibrillation managed on flecainide and Pradaxa for stroke prevention, chronic renal insufficiency, Parkinson's disease, hypertension, and hyperlipidemia.  Blood pressure today is low, accompanied by lightheadedness and weakness.  He is  visibly unsteady on his feet.  He does not consistently use his walker. We had a long conversation regarding his chronic anticoagulation use in the setting of unsteady gait and frequent falls.  Family is requesting to stop Pradaxa for now and reassess once his blood pressures remain stable and strength has improved.  They are aware of stroke risk in the absence of anticoagulation and willing to take that risk.  There is significant confusion as to what his current medications are. Per his home medication list, he is on multiple antihypertensives.  They are unaware of  metoprolol and do not believe he is on this.  In the setting of hypotension as well as bradycardia,  I have asked him to discontinue metoprolol ( if taking) as well as hydralazine.  Decrease lisinopril to 5 mg daily and uptitrate as needed for hypertension.  He remains in sinus rhythm on flecainide which I will continue.  Monitor blood pressures daily and notify office should pressures remain consistently above 140s systolic or below 90s.  Should he develop any worsening symptoms of lightheadedness, dizziness, unsteady gait, or syncope, notify office or if severe, seek ER evaluation immediately.                   Thank you for allowing me to participate in this delightful patient's care. I have recommended he follow up in 1 week with JUANY for clinical reassessment..       Karlie Robin, GAMAL CNP         Review of Systems:     Review of Systems:  Skin:  Negative     Eyes:  Positive for glasses  ENT:  Positive for hearing loss;tinnitus  Respiratory:  Negative dyspnea on exertion  Cardiovascular:  chest pain;palpitations;cyanosis;lightheadedness;dizziness;edema;Negative for;syncope or near-syncope Positive for;fatigue  Gastroenterology: Negative    Genitourinary:  Negative    Musculoskeletal:  Positive for    Neurologic:  Positive for memory problems  Psychiatric:  Positive for depression  Heme/Lymph/Imm:  Positive for easy bruising  Endocrine:  Negative                Physical Exam:     GEN:  In general, this is a frail elderly male in no acute distress.  Confused  HEENT:  Pupils equal, round. Sclerae nonicteric. Clear oropharynx. Mucous membranes moist.  NECK: Supple, no masses appreciated. Trachea midline.  No JVD   C/V:  Regular rate and rhythm, no murmur, rub or gallop. No S3 or RV heave.   RESP: Respirations are unlabored. No use of accessory muscles. Clear to auscultation bilaterally without wheezing, rales, or rhonchi.  GI: Abdomen soft, nontender, nondistended. No HSM appreciated.   EXTREM: No LE edema. No  cyanosis or clubbing.  NEURO: Alert and oriented, cooperative.  Shuffling gait, unsteady.   PSYCH: Normal affect.  SKIN: Warm and dry. No rashes or petechiae appreciated.          Past Medical History:     Past Medical History:   Diagnosis Date     Allergic rhinitis      Anxiety      Contact dermatitis      Coronary artery disease     CABG with 4 grafts-LIMA to LAD, SVG to Diagonal,OM and RCA     Cough      Elevated prolactin level      Erythrocytosis      Gastro-oesophageal reflux disease      GI bleed 10/10     Hyperlipidemia      Hypertension      Hypogonadism     with low testosterone     Insomnia      Paroxysmal atrial fibrillation (H)     flecainide therapy     Post-nasal drip      Prediabetes      Renal disease     CKD     Shoulder arthritis      SVT (supraventricular tachycardia) (H)      Tremor               Past Surgical History:     Past Surgical History:   Procedure Laterality Date     BYPASS GRAFT ARTERY CORONARY       CARDIAC SURGERY       COLONOSCOPY  12/2010    tubular adenoma     LASER SELECTIVE TRABECULOPLASTY BILATERAL Bilateral 6/11/2018    Procedure: LASER SELECTIVE TRABECULOPLASTY BILATERAL;  LASER SELECTIVE TRABECULOPLASTY BILATERAL ;  Surgeon: Parmjit Camacho MD;  Location: [facility]     ORTHOPEDIC SURGERY      rotator cuff right     PHACOEMULSIFICATION CLEAR CORNEA WITH STANDARD IOL, VITRECTOMY PARSPLANA 23 GAGUE, COMBINED  2/20/2013    Procedure: COMBINED PHACOEMULSIFICATION CLEAR CORNEA WITH STANDARD INTRAOCULAR LENS IMPLANT, VITRECTOMY PARSPLANA 23 GAUGE;;  Surgeon: Guero Lockett MD;  Location: [facility]     PHACOEMULSIFICATION CLEAR CORNEA WITH STANDARD IOL, VITRECTOMY PARSPLANA 23 GAGUE, COMBINED  7/31/2013    Procedure: COMBINED PHACOEMULSIFICATION CLEAR CORNEA WITH STANDARD INTRAOCULAR LENS IMPLANT, VITRECTOMY PARSPLANA 23 GAUGE;;  Surgeon: Guero Lockett MD;  Location: [facility]     REVERSE ARTHROPLASTY SHOULDER Right 7/24/2017    Procedure: REVERSE ARTHROPLASTY SHOULDER;  RIGHT  REVERSE TOTAL SHOULDER ARTHROPLASTY ;  Surgeon: Rodolfo Calhoun MD;  Location:  [facility]     VASCULAR SURGERY      cab     VITRECTOMY PARSPLANA, PHACOEMULSIFICATION CLEAR CORNEA W STANDARD INTRAOCULAR LENS IMPLANT, COMBINED  2/20/2013    Procedure: COMBINED VITRECTOMY PARSPLANA, PHACOEMULSIFICATION CLEAR CORNEA WITH STANDARD INTRAOCULAR LENS IMPLANT;  LEFT PHACOEMULSIFICATION CLEAR CORNEA WITH SAURABH TORIC INTRAOCULAR LENS IMPLANT,  LEFT EYE 23 GAUGE PARSPLANA VITRECTOMY;  Surgeon: Jorge Regan MD;  Location: [facility]     VITRECTOMY PARSPLANA, PHACOEMULSIFICATION CLEAR CORNEA W STANDARD INTRAOCULAR LENS IMPLANT, COMBINED  7/31/2013    Procedure: COMBINED VITRECTOMY PARSPLANA, PHACOEMULSIFICATION CLEAR CORNEA WITH STANDARD INTRAOCULAR LENS IMPLANT;  RIGHT PHACOEMULSIFICATION CLEAR CORNEA WITH TORIC INTRAOCULAR LENS IMPLANT, RIGHT 23 GAUGE PARSPLANA VITRECTOMY ;  Surgeon: Jorge Regan MD;  Location: [facility]              Allergies:   Other [no clinical screening - see comments]       Data:   All laboratory data reviewed:    LAST CHOLESTEROL:  Lab Results   Component Value Date    CHOL 109 04/08/2017     Lab Results   Component Value Date    HDL 40 04/08/2017     Lab Results   Component Value Date    LDL 54 04/08/2017     Lab Results   Component Value Date    TRIG 77 04/08/2017     Lab Results   Component Value Date    CHOLHDLRATIO 2.6 01/22/2015       LAST BMP:  Lab Results   Component Value Date     08/18/2021     10/29/2020      Lab Results   Component Value Date    POTASSIUM 4.1 08/18/2021    POTASSIUM 4.2 10/29/2020     Lab Results   Component Value Date    CHLORIDE 104 08/18/2021    CHLORIDE 108 10/29/2020     Lab Results   Component Value Date    FIDEL 8.5 08/18/2021    FIDEL 8.8 10/29/2020     Lab Results   Component Value Date    CO2 26 08/18/2021    CO2 26 10/29/2020     Lab Results   Component Value Date    BUN 28 08/18/2021    BUN 25 10/29/2020     Lab Results   Component Value Date    CR 1.54  08/18/2021    CR 1.67 10/29/2020     Lab Results   Component Value Date     08/18/2021     10/29/2020       LAST CBC:  Lab Results   Component Value Date    WBC 6.4 08/18/2021    WBC 7.9 10/29/2020     Lab Results   Component Value Date    RBC 4.17 08/18/2021    RBC 4.40 10/29/2020     Lab Results   Component Value Date    HGB 12.9 08/18/2021    HGB 14.2 10/29/2020     Lab Results   Component Value Date    HCT 38.9 08/18/2021    HCT 41.5 10/29/2020     Lab Results   Component Value Date    MCV 93 08/18/2021    MCV 94 10/29/2020     Lab Results   Component Value Date    MCH 30.9 08/18/2021    MCH 32.3 10/29/2020     Lab Results   Component Value Date    MCHC 33.2 08/18/2021    MCHC 34.2 10/29/2020     Lab Results   Component Value Date    RDW 11.9 08/18/2021    RDW 11.9 10/29/2020     Lab Results   Component Value Date     08/18/2021     10/29/2020

## 2021-08-20 PROBLEM — R29.6 FALLS FREQUENTLY: Status: ACTIVE | Noted: 2021-08-20

## 2021-08-23 ENCOUNTER — TRANSFERRED RECORDS (OUTPATIENT)
Dept: HEALTH INFORMATION MANAGEMENT | Facility: CLINIC | Age: 86
End: 2021-08-23
Payer: MEDICARE

## 2021-08-23 LAB — FLECAINIDE SERPL-MCNC: 1.19 UG/ML

## 2021-08-24 ENCOUNTER — TELEPHONE (OUTPATIENT)
Dept: CARDIOLOGY | Facility: CLINIC | Age: 86
End: 2021-08-24

## 2021-08-24 DIAGNOSIS — I48.0 PAROXYSMAL ATRIAL FIBRILLATION (H): ICD-10-CM

## 2021-08-24 NOTE — TELEPHONE ENCOUNTER
8/24/21 Msg recd from Triage    Received a call from Jhoana at [facility] lab reporting a critical flecainide level drawn while patient was in ER 8/18/21. Flecainide level 1.19.     Pt was most recently seen in ED on 8/18 for hypotension. Upon assessment with ED MD and conversation with pts wife it was noted pt was incorrectly taking his medications at home. A Flecainide level was drawn but result was not available until today. Of note, pt was also in ED 6 days prior with a fall at home resulting in head trauma.  Will have Dr Guzmán review in Karlie Leroy's absence as she saw pt in follow up to ED visit.    Josseline 320 pm

## 2021-08-25 RX ORDER — FLECAINIDE ACETATE 100 MG/1
TABLET ORAL
Qty: 180 TABLET | Refills: 2 | Status: SHIPPED | OUTPATIENT
Start: 2021-08-25 | End: 2023-02-23

## 2021-08-25 NOTE — TELEPHONE ENCOUNTER
Spoke to Marilyn Ruiz (wife of patient) to review recommendations per Dr Guzmán:  Initially had patient on line who then handed phone over to wife.    ----Which medications was he taking incorrectly, do we know?   After speaking to wife she indicated patient was taking ALL of his medications incorrectly.  She was unable to provide me with how much of his medication he was taking.  Since his ED visit on 8/18 patient NO longer is managing his medications.  They have a nurse involved and wife will be administering.      ----In any case, it is reasonable to cut flecainide by 1/2 at this point (50 mg bid). Instructed wife to cut back on his flecainide dosing to 50mg po bid.  Medication list updated and sent to pharmacy so profile can be update    Reviewed OV on 8/19 with Karlie Robin, JUANY and it was recommended that patient follow up in one week.  Appointment had not been made.  Discussed this with wife.  Will send to scheduling for this to get set up.  Asked wife to bring in medication list and BP and HR that have been done since OV on 8/19 so they can be reviewed.  Patient is scheduled to see JUANY tomorrow at 1:10pm.  Will update Dr Guzmán regarding above and also send message to JUANY alerting her about flecainide change.  YADY Ham

## 2021-08-25 NOTE — TELEPHONE ENCOUNTER
This patient has always his own strong convictions about taking flecainide...    Which medications was he taking incorrectly, do we know?    In any case, it is reasonable to cut flecainide by 1/2 at this point (50 mg bid).      DI

## 2021-08-26 ENCOUNTER — OFFICE VISIT (OUTPATIENT)
Dept: CARDIOLOGY | Facility: CLINIC | Age: 86
End: 2021-08-26
Payer: MEDICARE

## 2021-08-26 VITALS
WEIGHT: 153 LBS | HEART RATE: 76 BPM | DIASTOLIC BLOOD PRESSURE: 69 MMHG | BODY MASS INDEX: 22.66 KG/M2 | HEIGHT: 69 IN | SYSTOLIC BLOOD PRESSURE: 118 MMHG | OXYGEN SATURATION: 99 %

## 2021-08-26 DIAGNOSIS — I48.0 PAROXYSMAL ATRIAL FIBRILLATION (H): ICD-10-CM

## 2021-08-26 DIAGNOSIS — I95.9 HYPOTENSION, UNSPECIFIED HYPOTENSION TYPE: ICD-10-CM

## 2021-08-26 PROCEDURE — 99214 OFFICE O/P EST MOD 30 MIN: CPT | Performed by: NURSE PRACTITIONER

## 2021-08-26 ASSESSMENT — MIFFLIN-ST. JEOR: SCORE: 1351.44

## 2021-08-26 NOTE — PATIENT INSTRUCTIONS
Thanks for participating in a office visit with the [facility] today.    Doing well on a cardiac standpoint  Blood pressures well controlled on low dose lisinopril  Continue on current medical therapy      Follow up in 3 months with Dr. Cartagena    Please call my nurse at [phone] with any questions or concerns.    Scheduling phone number: [phone]  Reminder: Please bring in all current medications, over the counter supplements and vitamin bottles to your next appointment.

## 2021-08-26 NOTE — LETTER
"8/26/2021    Mike Cosby MD  [facility] [address]  [facility] [address]    RE: Chava Valentine       Dear Colleague,    I had the pleasure of seeing Chava Valentine in the [facility].    Cardiology Clinic Progress Note  Chava Valentine MRN# [id number]   YOB: 1933 Age: 87 year old     Reason For Visit:  1 week follow up   Primary Cardiologist:   Dr. Cartagena           History of Presenting Illness:      Chava Valentine is a pleasant 87 year old patient who carries a past medical history significant for coronary artery disease status post remote CABG in 1995 with LIMA to the LAD, SVG to diagonal, SVG to OM, and SVG to RCA, paroxysmal atrial fibrillation managed on flecainide, chronic renal insufficiency, Parkinson's disease, hypertension, hyperlipidemia and cognitive impairment.      He was last seen on 8/19/2021 in follow up to an ER evaluation for hypotension and falls. Please see that office visit for a more complete HPI. Multiple medication changes were made including stopping hydralazine, metoprolol, and Pradaxa. Lisinopril was reduced to 5 mg daily and flecainide was continued at 100mg BID. A flecainide level was drawn during his ER evaluation showing a mild elevated at 1.19. Results were reviewed with Dr. Guzmán ( ) who reduced the dose to 50 mg BID.      Today he returns to the office accompanied by his son. He is feeling well on a cardiac standpoint, denies chest pain, shortness of breath, PND, orthopnea, presyncope, syncope, edema, heart racing, or palpitations. Lightheadedness and dizziness have completely resolved. Sons states his dad is much \"clearer\". He has had no recurrent falls.    Blood pressure is well controlled at 118/69, consistent with home pressures.    BMI 22.93  Eating and sleeping well.   Remains compliant with all medications.                   Assessment and Plan:       Chava Valentine is " a pleasant 87 year old patient who carries a past medical history significant for coronary artery disease status post remote CABG in 1995 with LIMA to the LAD, SVG to diagonal, SVG to OM, and SVG to RCA, paroxysmal atrial fibrillation managed on flecainide, chronic renal insufficiency, Parkinson's disease, hypertension, hyperlipidemia and cognitive impairment.  Overall, he is feeling much better. Blood pressure is well controlled on low dose lisinopril. Heart rhythm and rate are well controlled on flecainide. He will remain off anticoagulation due to falls risk. Follow up in 3 months with Dr. Cartagena or sooner if needed.              Thank you for allowing me to participate in this delightful patient's care.       Karlie Robin, APRN CNP         Review of Systems:     Review of Systems:  Skin:  Negative     Eyes:  Positive for glasses  ENT:  Positive for postnasal drainage  Respiratory:  Negative    Cardiovascular:  Negative    Gastroenterology: Positive for heartburn  Genitourinary:  Negative    Musculoskeletal:  Negative    Neurologic:  Negative    Psychiatric:  Positive for anxiety  Heme/Lymph/Imm:  Negative    Endocrine:  Negative                Physical Exam:     GEN:  In general, this is a thin elderly male in no acute distress. Cognitive impairment.   HEENT:  Pupils equal, round. Sclerae nonicteric. Clear oropharynx. Mucous membranes moist.  NECK: Supple, no masses appreciated. Trachea midline. No JVD   C/V:  Regular rate and rhythm, No murmur, rub or gallop. No S3 or RV heave.   RESP: Respirations are unlabored. No use of accessory muscles. Clear to auscultation bilaterally without wheezing, rales, or rhonchi.  GI: Abdomen soft, nontender, nondistended. No HSM appreciated.   EXTREM: No LE edema. No cyanosis or clubbing.  NEURO: Alert and oriented, cooperative. Gait steady with walker. No obvious focal deficits.   PSYCH: Normal affect.  SKIN: Warm and dry. No rashes or petechiae appreciated.          Past  Medical History:     Past Medical History:   Diagnosis Date     Allergic rhinitis      Anxiety      Contact dermatitis      Coronary artery disease     CABG with 4 grafts-LIMA to LAD, SVG to Diagonal,OM and RCA     Cough      Elevated prolactin level      Erythrocytosis      Gastro-oesophageal reflux disease      GI bleed 10/10     Hyperlipidemia      Hypertension      Hypogonadism     with low testosterone     Insomnia      Paroxysmal atrial fibrillation (H)     flecainide therapy     Post-nasal drip      Prediabetes      Renal disease     CKD     Shoulder arthritis      SVT (supraventricular tachycardia) (H)      Tremor               Past Surgical History:     Past Surgical History:   Procedure Laterality Date     BYPASS GRAFT ARTERY CORONARY       CARDIAC SURGERY       COLONOSCOPY  12/2010    tubular adenoma     LASER SELECTIVE TRABECULOPLASTY BILATERAL Bilateral 6/11/2018    Procedure: LASER SELECTIVE TRABECULOPLASTY BILATERAL;  LASER SELECTIVE TRABECULOPLASTY BILATERAL ;  Surgeon: Parmjit Camacho MD;  Location: [facility]     ORTHOPEDIC SURGERY      rotator cuff right     PHACOEMULSIFICATION CLEAR CORNEA WITH STANDARD IOL, VITRECTOMY PARSPLANA 23 GAGUE, COMBINED  2/20/2013    Procedure: COMBINED PHACOEMULSIFICATION CLEAR CORNEA WITH STANDARD INTRAOCULAR LENS IMPLANT, VITRECTOMY PARSPLANA 23 GAUGE;;  Surgeon: Guero Lockett MD;  Location: [facility]     PHACOEMULSIFICATION CLEAR CORNEA WITH STANDARD IOL, VITRECTOMY PARSPLANA 23 GAGUE, COMBINED  7/31/2013    Procedure: COMBINED PHACOEMULSIFICATION CLEAR CORNEA WITH STANDARD INTRAOCULAR LENS IMPLANT, VITRECTOMY PARSPLANA 23 GAUGE;;  Surgeon: Guero Lockett MD;  Location: [facility]     REVERSE ARTHROPLASTY SHOULDER Right 7/24/2017    Procedure: REVERSE ARTHROPLASTY SHOULDER;  RIGHT REVERSE TOTAL SHOULDER ARTHROPLASTY ;  Surgeon: Rodolfo Calhoun MD;  Location:  [facility]     VASCULAR SURGERY      cab     VITRECTOMY PARSPLANA, PHACOEMULSIFICATION CLEAR CORNEA W  STANDARD INTRAOCULAR LENS IMPLANT, COMBINED  2/20/2013    Procedure: COMBINED VITRECTOMY PARSPLANA, PHACOEMULSIFICATION CLEAR CORNEA WITH STANDARD INTRAOCULAR LENS IMPLANT;  LEFT PHACOEMULSIFICATION CLEAR CORNEA WITH SAURABH TORIC INTRAOCULAR LENS IMPLANT,  LEFT EYE 23 GAUGE PARSPLANA VITRECTOMY;  Surgeon: Jorge Regan MD;  Location: [facility]     VITRECTOMY PARSPLANA, PHACOEMULSIFICATION CLEAR CORNEA W STANDARD INTRAOCULAR LENS IMPLANT, COMBINED  7/31/2013    Procedure: COMBINED VITRECTOMY PARSPLANA, PHACOEMULSIFICATION CLEAR CORNEA WITH STANDARD INTRAOCULAR LENS IMPLANT;  RIGHT PHACOEMULSIFICATION CLEAR CORNEA WITH TORIC INTRAOCULAR LENS IMPLANT, RIGHT 23 GAUGE PARSPLANA VITRECTOMY ;  Surgeon: Jorge Regan MD;  Location: [facility]              Allergies:   Patient has no known allergies.       Data:   All laboratory data reviewed:    LAST CHOLESTEROL:  Lab Results   Component Value Date    CHOL 109 04/08/2017     Lab Results   Component Value Date    HDL 40 04/08/2017     Lab Results   Component Value Date    LDL 54 04/08/2017     Lab Results   Component Value Date    TRIG 77 04/08/2017     Lab Results   Component Value Date    CHOLHDLRATIO 2.6 01/22/2015       LAST BMP:  Lab Results   Component Value Date     08/18/2021     10/29/2020      Lab Results   Component Value Date    POTASSIUM 4.1 08/18/2021    POTASSIUM 4.2 10/29/2020     Lab Results   Component Value Date    CHLORIDE 104 08/18/2021    CHLORIDE 108 10/29/2020     Lab Results   Component Value Date    FIDEL 8.5 08/18/2021    FIDEL 8.8 10/29/2020     Lab Results   Component Value Date    CO2 26 08/18/2021    CO2 26 10/29/2020     Lab Results   Component Value Date    BUN 28 08/18/2021    BUN 25 10/29/2020     Lab Results   Component Value Date    CR 1.54 08/18/2021    CR 1.67 10/29/2020     Lab Results   Component Value Date     08/18/2021     10/29/2020       LAST CBC:  Lab Results   Component Value Date    WBC 6.4 08/18/2021    WBC  7.9 10/29/2020     Lab Results   Component Value Date    RBC 4.17 08/18/2021    RBC 4.40 10/29/2020     Lab Results   Component Value Date    HGB 12.9 08/18/2021    HGB 14.2 10/29/2020     Lab Results   Component Value Date    HCT 38.9 08/18/2021    HCT 41.5 10/29/2020     Lab Results   Component Value Date    MCV 93 08/18/2021    MCV 94 10/29/2020     Lab Results   Component Value Date    MCH 30.9 08/18/2021    MCH 32.3 10/29/2020     Lab Results   Component Value Date    MCHC 33.2 08/18/2021    MCHC 34.2 10/29/2020     Lab Results   Component Value Date    RDW 11.9 08/18/2021    RDW 11.9 10/29/2020     Lab Results   Component Value Date     08/18/2021     10/29/2020               Thank you for allowing me to participate in the care of your patient.      Sincerely,     GAMAL Pepe CNP     [facility]  cc:   GAMAL Pepe CNP  [address] [place] [address]

## 2021-08-26 NOTE — PROGRESS NOTES
"Cardiology Clinic Progress Note  Chava Valentine MRN# [id number]   YOB: 1933 Age: 87 year old     Reason For Visit:  1 week follow up   Primary Cardiologist:   Dr. Cartagena           History of Presenting Illness:      Chava Valentine is a pleasant 87 year old patient who carries a past medical history significant for coronary artery disease status post remote CABG in 1995 with LIMA to the LAD, SVG to diagonal, SVG to OM, and SVG to RCA, paroxysmal atrial fibrillation managed on flecainide, chronic renal insufficiency, Parkinson's disease, hypertension, hyperlipidemia and cognitive impairment.      He was last seen on 8/19/2021 in follow up to an ER evaluation for hypotension and falls. Please see that office visit for a more complete HPI. Multiple medication changes were made including stopping hydralazine, metoprolol, and Pradaxa. Lisinopril was reduced to 5 mg daily and flecainide was continued at 100mg BID. A flecainide level was drawn during his ER evaluation showing a mild elevated at 1.19. Results were reviewed with Dr. Guzmán ( EP) who reduced the dose to 50 mg BID.      Today he returns to the office accompanied by his son. He is feeling well on a cardiac standpoint, denies chest pain, shortness of breath, PND, orthopnea, presyncope, syncope, edema, heart racing, or palpitations. Lightheadedness and dizziness have completely resolved. Sons states his dad is much \"clearer\". He has had no recurrent falls.    Blood pressure is well controlled at 118/69, consistent with home pressures.    BMI 22.93  Eating and sleeping well.   Remains compliant with all medications.                   Assessment and Plan:       Chava Valentine is a pleasant 87 year old patient who carries a past medical history significant for coronary artery disease status post remote CABG in 1995 with LIMA to the LAD, SVG to diagonal, SVG to OM, and SVG to RCA, paroxysmal atrial fibrillation managed on flecainide, chronic renal " insufficiency, Parkinson's disease, hypertension, hyperlipidemia and cognitive impairment.  Overall, he is feeling much better. Blood pressure is well controlled on low dose lisinopril. Heart rhythm and rate are well controlled on flecainide. He will remain off anticoagulation due to falls risk. Follow up in 3 months with Dr. Cartagena or sooner if needed.              Thank you for allowing me to participate in this delightful patient's care.       Karlie Robin, APRN CNP         Review of Systems:     Review of Systems:  Skin:  Negative     Eyes:  Positive for glasses  ENT:  Positive for postnasal drainage  Respiratory:  Negative    Cardiovascular:  Negative    Gastroenterology: Positive for heartburn  Genitourinary:  Negative    Musculoskeletal:  Negative    Neurologic:  Negative    Psychiatric:  Positive for anxiety  Heme/Lymph/Imm:  Negative    Endocrine:  Negative                Physical Exam:     GEN:  In general, this is a thin elderly male in no acute distress. Cognitive impairment.   HEENT:  Pupils equal, round. Sclerae nonicteric. Clear oropharynx. Mucous membranes moist.  NECK: Supple, no masses appreciated. Trachea midline. No JVD   C/V:  Regular rate and rhythm, No murmur, rub or gallop. No S3 or RV heave.   RESP: Respirations are unlabored. No use of accessory muscles. Clear to auscultation bilaterally without wheezing, rales, or rhonchi.  GI: Abdomen soft, nontender, nondistended. No HSM appreciated.   EXTREM: No LE edema. No cyanosis or clubbing.  NEURO: Alert and oriented, cooperative. Gait steady with walker. No obvious focal deficits.   PSYCH: Normal affect.  SKIN: Warm and dry. No rashes or petechiae appreciated.          Past Medical History:     Past Medical History:   Diagnosis Date     Allergic rhinitis      Anxiety      Contact dermatitis      Coronary artery disease     CABG with 4 grafts-LIMA to LAD, SVG to Diagonal,OM and RCA     Cough      Elevated prolactin level      Erythrocytosis       Gastro-oesophageal reflux disease      GI bleed 10/10     Hyperlipidemia      Hypertension      Hypogonadism     with low testosterone     Insomnia      Paroxysmal atrial fibrillation (H)     flecainide therapy     Post-nasal drip      Prediabetes      Renal disease     CKD     Shoulder arthritis      SVT (supraventricular tachycardia) (H)      Tremor               Past Surgical History:     Past Surgical History:   Procedure Laterality Date     BYPASS GRAFT ARTERY CORONARY       CARDIAC SURGERY       COLONOSCOPY  12/2010    tubular adenoma     LASER SELECTIVE TRABECULOPLASTY BILATERAL Bilateral 6/11/2018    Procedure: LASER SELECTIVE TRABECULOPLASTY BILATERAL;  LASER SELECTIVE TRABECULOPLASTY BILATERAL ;  Surgeon: Parmjit Camacho MD;  Location: [facility]     ORTHOPEDIC SURGERY      rotator cuff right     PHACOEMULSIFICATION CLEAR CORNEA WITH STANDARD IOL, VITRECTOMY PARSPLANA 23 GAGUE, COMBINED  2/20/2013    Procedure: COMBINED PHACOEMULSIFICATION CLEAR CORNEA WITH STANDARD INTRAOCULAR LENS IMPLANT, VITRECTOMY PARSPLANA 23 GAUGE;;  Surgeon: Guero Lockett MD;  Location: [facility]     PHACOEMULSIFICATION CLEAR CORNEA WITH STANDARD IOL, VITRECTOMY PARSPLANA 23 GAGUE, COMBINED  7/31/2013    Procedure: COMBINED PHACOEMULSIFICATION CLEAR CORNEA WITH STANDARD INTRAOCULAR LENS IMPLANT, VITRECTOMY PARSPLANA 23 GAUGE;;  Surgeon: Guero Lockett MD;  Location: [facility]     REVERSE ARTHROPLASTY SHOULDER Right 7/24/2017    Procedure: REVERSE ARTHROPLASTY SHOULDER;  RIGHT REVERSE TOTAL SHOULDER ARTHROPLASTY ;  Surgeon: Rodolfo Calhoun MD;  Location:  OR     VASCULAR SURGERY      cab     VITRECTOMY PARSPLANA, PHACOEMULSIFICATION CLEAR CORNEA W STANDARD INTRAOCULAR LENS IMPLANT, COMBINED  2/20/2013    Procedure: COMBINED VITRECTOMY PARSPLANA, PHACOEMULSIFICATION CLEAR CORNEA WITH STANDARD INTRAOCULAR LENS IMPLANT;  LEFT PHACOEMULSIFICATION CLEAR CORNEA WITH SAURABH TORIC INTRAOCULAR LENS IMPLANT,  LEFT EYE 23 GAUGE  PARSPLANA VITRECTOMY;  Surgeon: Jorge Regan MD;  Location: [facility]     VITRECTOMY PARSPLANA, PHACOEMULSIFICATION CLEAR CORNEA W STANDARD INTRAOCULAR LENS IMPLANT, COMBINED  7/31/2013    Procedure: COMBINED VITRECTOMY PARSPLANA, PHACOEMULSIFICATION CLEAR CORNEA WITH STANDARD INTRAOCULAR LENS IMPLANT;  RIGHT PHACOEMULSIFICATION CLEAR CORNEA WITH TORIC INTRAOCULAR LENS IMPLANT, RIGHT 23 GAUGE PARSPLANA VITRECTOMY ;  Surgeon: Jorge Regan MD;  Location: [facility]              Allergies:   Patient has no known allergies.       Data:   All laboratory data reviewed:    LAST CHOLESTEROL:  Lab Results   Component Value Date    CHOL 109 04/08/2017     Lab Results   Component Value Date    HDL 40 04/08/2017     Lab Results   Component Value Date    LDL 54 04/08/2017     Lab Results   Component Value Date    TRIG 77 04/08/2017     Lab Results   Component Value Date    CHOLHDLRATIO 2.6 01/22/2015       LAST BMP:  Lab Results   Component Value Date     08/18/2021     10/29/2020      Lab Results   Component Value Date    POTASSIUM 4.1 08/18/2021    POTASSIUM 4.2 10/29/2020     Lab Results   Component Value Date    CHLORIDE 104 08/18/2021    CHLORIDE 108 10/29/2020     Lab Results   Component Value Date    FIDEL 8.5 08/18/2021    FIDEL 8.8 10/29/2020     Lab Results   Component Value Date    CO2 26 08/18/2021    CO2 26 10/29/2020     Lab Results   Component Value Date    BUN 28 08/18/2021    BUN 25 10/29/2020     Lab Results   Component Value Date    CR 1.54 08/18/2021    CR 1.67 10/29/2020     Lab Results   Component Value Date     08/18/2021     10/29/2020       LAST CBC:  Lab Results   Component Value Date    WBC 6.4 08/18/2021    WBC 7.9 10/29/2020     Lab Results   Component Value Date    RBC 4.17 08/18/2021    RBC 4.40 10/29/2020     Lab Results   Component Value Date    HGB 12.9 08/18/2021    HGB 14.2 10/29/2020     Lab Results   Component Value Date    HCT 38.9 08/18/2021    HCT 41.5 10/29/2020      Lab Results   Component Value Date    MCV 93 08/18/2021    MCV 94 10/29/2020     Lab Results   Component Value Date    MCH 30.9 08/18/2021    MCH 32.3 10/29/2020     Lab Results   Component Value Date    MCHC 33.2 08/18/2021    MCHC 34.2 10/29/2020     Lab Results   Component Value Date    RDW 11.9 08/18/2021    RDW 11.9 10/29/2020     Lab Results   Component Value Date     08/18/2021     10/29/2020

## 2021-08-31 ENCOUNTER — TELEPHONE (OUTPATIENT)
Dept: CARDIOLOGY | Facility: CLINIC | Age: 86
End: 2021-08-31

## 2021-08-31 DIAGNOSIS — E78.2 MIXED HYPERLIPIDEMIA: ICD-10-CM

## 2021-08-31 DIAGNOSIS — I25.10 ATHEROSCLEROSIS OF NATIVE CORONARY ARTERY OF NATIVE HEART WITHOUT ANGINA PECTORIS: ICD-10-CM

## 2021-08-31 DIAGNOSIS — I25.810 CORONARY ARTERY DISEASE INVOLVING CORONARY BYPASS GRAFT OF NATIVE HEART WITHOUT ANGINA PECTORIS: ICD-10-CM

## 2021-08-31 RX ORDER — ROSUVASTATIN CALCIUM 10 MG/1
10 TABLET, COATED ORAL DAILY
Qty: 90 TABLET | Refills: 0 | Status: SHIPPED | OUTPATIENT
Start: 2021-08-31 | End: 2021-12-01

## 2021-08-31 RX ORDER — NITROGLYCERIN 0.4 MG/1
TABLET SUBLINGUAL
Qty: 25 TABLET | Refills: 3 | Status: SHIPPED | OUTPATIENT
Start: 2021-08-31 | End: 2022-11-03

## 2021-08-31 RX ORDER — EZETIMIBE 10 MG/1
10 TABLET ORAL DAILY
Qty: 90 TABLET | Refills: 1 | Status: SHIPPED | OUTPATIENT
Start: 2021-08-31 | End: 2022-02-28

## 2021-08-31 NOTE — TELEPHONE ENCOUNTER
BP was noted normal in recent clinic visit including home recoridngs- advise against prn use of antihypertensive meds  Looks like flecainide dose was appropriately decreased after reviewing with Dr Guzmán a few days ago in clinic visit due to elevated drug levels- will defer to EP but I personally do not recommend increasing flecainide back again.    Thanks  Fransisco

## 2021-08-31 NOTE — TELEPHONE ENCOUNTER
Received call from pt's son, Alexander, who has several questions.     1. Alexander states his father does not have a prescription for NTG ~ refill sent to [facility] as requested.     2. Alexander states his father's BP will spike at times & asking if he should have some hydralazine to use as needed?    3. Alexander is stating that his father told him him that his heart was out of rhythm last night, denies any significant symptoms but states he was uncomfortable last night and couldn't sleep & asking his flecainide should be increased as he is taking a much lower dose than he was previously?     Will message Dr. Cartagena & Karlie Robin NP to review. Lynn CONTEH

## 2021-08-31 NOTE — TELEPHONE ENCOUNTER
Called pt's sonAlexander with recommendations from Dr. Cartagena. Son states he will be in the ED then as he was having difficulty walking before. Son asking for an appt with Dr. Cartagena. Son transferred to scheduling to arrange. Lynn CONTEH

## 2021-09-04 ENCOUNTER — HEALTH MAINTENANCE LETTER (OUTPATIENT)
Age: 86
End: 2021-09-04

## 2021-09-17 ENCOUNTER — DOCUMENTATION ONLY (OUTPATIENT)
Dept: OTHER | Facility: CLINIC | Age: 86
End: 2021-09-17

## 2021-10-12 ENCOUNTER — PRE VISIT (OUTPATIENT)
Dept: CARDIOLOGY | Facility: CLINIC | Age: 86
End: 2021-10-12

## 2021-10-12 ENCOUNTER — TELEPHONE (OUTPATIENT)
Dept: PHYSICAL MEDICINE AND REHAB | Facility: CLINIC | Age: 86
End: 2021-10-12

## 2021-10-12 ENCOUNTER — TRANSFERRED RECORDS (OUTPATIENT)
Dept: HEALTH INFORMATION MANAGEMENT | Facility: CLINIC | Age: 86
End: 2021-10-12

## 2021-10-13 ENCOUNTER — TELEPHONE (OUTPATIENT)
Dept: CARDIOLOGY | Facility: CLINIC | Age: 86
End: 2021-10-13

## 2021-10-13 DIAGNOSIS — I48.0 PAROXYSMAL ATRIAL FIBRILLATION (H): Primary | ICD-10-CM

## 2021-10-13 NOTE — TELEPHONE ENCOUNTER
Received call from pt's son, Alexander stating that pt has been c/o increased irregular heart beats or skipped beats. BP has been running 115/60 to 125/70 with heart rates mostly in the 70's per son's report. He does c/o feeling lightheaded or dizzy at times but states he has been doing much better & is more steady on his feet & less likely to fall since the Ambien & clonazepam were stopped. Son is asking flecainide to 50 mg twice daily. Will message Karlie Robin NP to review. Lynn CONTEH

## 2021-10-13 NOTE — TELEPHONE ENCOUNTER
Patient notified and offered appointment due to no Show. Contact information provided. Will call if he decides if needed.

## 2021-10-13 NOTE — TELEPHONE ENCOUNTER
Please see documentation from Dr. Cartagena.   Recommend EP manage flecainide dosing.   Pt follows with Dr. Guzmán   Chief Complaint : Patient is a 75y old  Female who presents with a chief complaint of diabetic foot ulcer (10 Sep 2019 14:32)    HPI : HPI:  74 Female from [facility] ,walks with walker with PMH of HTN, Diabetes, Osteoarthritis, Osteoporosis, Peripheral arterial disease, Diabetic neuropathy, HLD, Schizophrenia presented to Ed with worsening non healing heel ulcers x 1 month. Pt was sent in by PMD Dr Llanes for suspected Osteomyelitis . Pt states she doesnt remember when the ulcers originate but they are getting worse from last few months. Pt denies any fever,chills , pain , cough , SOB,CP ,abd pain or any other symptoms. Pt states she is compliant with her diabetic medications and nurse at the facility give her insulin when her sugars go beyond 200.   Outpatient records showed  negative duplex scan of the LE arteries. XRay of left calcaneous remarkable for possible osteomyelitis. XRay  of right calcaneous remarkable for osteomyelitis in the dorsal calcaneous.  ED course : afebrile, no WBC count , lactate 2.5 s/p 1 L NS bolus , Xray feet : didnt showed any evidence of OM . ESR elevated 74     S: Pt seen bedside resting. Pt states Rukhsana romo sent her to ED because her heels look worse. Pt states she walks with a walker but is often in bed. Pt denies pain to her heels. Pt denies F/N/V/C/SOB.       SH : Denies Smoking, alcohol or drug use (10 Sep 2019 14:32)      Patient admits to  (-) Fevers, (-) Chills, (-) Nausea, (-) Vomiting, (-) Shortness of Breath      PMH: DM (diabetes mellitus)  Paranoid schizophrenia  HLD (hyperlipidemia)  PAD (peripheral artery disease)  HTN (hypertension)    PSH:No significant past surgical history      Allergies:No Known Allergies      Labs:                              9.3    9.00  )-----------( 308      ( 11 Sep 2019 06:28 )             28.6   	  WBC Trend  WBC Count: 9.00 K/uL (09.11.19 @ 06:28)  9.75 Date (09-10 @ 09:50)      09-11    137  |  105  |  9   ----------------------------<  141<H>  3.8   |  27  |  0.43<L>    Ca    8.9      11 Sep 2019 06:28  Phos  3.6     09-11  Mg     2.3     09-11        Vital Signs Last 24 Hrs  T(C): 36.9 (11 Sep 2019 07:41), Max: 37.2 (10 Sep 2019 15:57)  T(F): 98.5 (11 Sep 2019 07:41), Max: 99 (10 Sep 2019 15:57)  HR: 63 (11 Sep 2019 07:41) (63 - 79)  BP: 112/46 (11 Sep 2019 07:41) (101/48 - 120/74)  BP(mean): --  RR: 17 (11 Sep 2019 07:41) (16 - 18)  SpO2: 100% (11 Sep 2019 07:41) (98% - 100%)    O:   General: Pleasant  female NAD & AOX3.    Integument:  Skin warm, dry and supple bilateral.    Ulceration Right plantar heel, + hyperkeratotic border, wound base fibrotic with necrotic cap, boggy and probes to bone wound size (4.8 cm X 5.6cm X 2.4cm) - edema, - mac-wound erythema, - purulence, +tunneling, + probe to bone. Left lateral heel ulceration has fibrogranular base with nectrotic eschar, 1.3cmx1.0cmx0.2cm, -purulence, -tunneling/tracking, -flatulence, -mac-wound erythema   Vascular: Dorsalis Pedis and Posterior Tibial pulses 2/4.  Capillary re-fill time less then 3 seconds digits 1-5 bilateral.    Neuro: Protective sensation diminished to the level of the digits bilateral.    A: Right plantar heel ulceration   left lateral heel ulceration       P:   Chart reviewed and Patient evaluated  Discussed diagnosis and treatment with patient  Culture pending  Applied betadine with dry sterile dressing  X-rays reviewed however xrays from [facility] were read positive OM changes   Recommend MRI to evaluate for OM b/l feet  Continue with IV antibiotics As Per ID  NWB to right foot, Full WB to left foot   Offloading to bilateral Heels, please dispense offloading boots  Podiatry will follow while in house.  Discussed with Attending Cirlincione and seen with

## 2021-10-13 NOTE — TELEPHONE ENCOUNTER
Patient has history of atrial fibrillation, controlled on flecainide.   It is not uncommon to have break through episode of A-fib on flecainide.   Per telephone encounter, blood pressures and rates are well controlled and symptoms are occasional.   Is the question to stop flecainide?  He has upcoming appointment with Dr. Ni in early November.

## 2021-10-13 NOTE — TELEPHONE ENCOUNTER
Spoke to pt son who states that patient is currently taking Flecainide 50 mg bid. Pt son did state that at one time pt had increased his dose to 100 mg bid and then he got into trouble with all his medications and they had to go through and pt was eliminated of many of them.Pt was started on Metoprolol XL 8/18 at the ER and then stopped at OV on 8/19 with Karlie Robin. Pt last BP was 105/50's and HR's have been in the mid 70's.  Pt not complaining of episodes of palpitations again and wanting to increased the Flecainide to 100 mg. Discussed that Flecainide was not the  Best for pt because his CAD, so will message Dr Guzmán with this request. Did stat that maybe a monitor would be nice to see what pt is actually experiencing. :Pt last wore a Holter in 2016. Allyn

## 2021-10-14 NOTE — TELEPHONE ENCOUNTER
Unclear what is bothering him at this point...  We should not increase flecainide now.   Agree with monitor, let's do a 10-day ZP.  D

## 2021-10-15 NOTE — TELEPHONE ENCOUNTER
Spoke to pt son Alexander who agreed that a monitor would be good and no increase to the Flecainide. Order placed an scheduling to call Alexander to have placed. Allyn

## 2021-11-07 ENCOUNTER — HOSPITAL ENCOUNTER (EMERGENCY)
Facility: CLINIC | Age: 86
Discharge: HOME | End: 2021-11-07
Attending: EMERGENCY MEDICINE | Admitting: EMERGENCY MEDICINE
Payer: MEDICARE

## 2021-11-07 ENCOUNTER — TELEPHONE (OUTPATIENT)
Dept: EMERGENCY MEDICINE | Facility: CLINIC | Age: 86
End: 2021-11-07

## 2021-11-07 VITALS
HEART RATE: 60 BPM | WEIGHT: 170 LBS | OXYGEN SATURATION: 98 % | SYSTOLIC BLOOD PRESSURE: 149 MMHG | HEIGHT: 68 IN | DIASTOLIC BLOOD PRESSURE: 74 MMHG | TEMPERATURE: 98.1 F | RESPIRATION RATE: 16 BRPM | BODY MASS INDEX: 25.76 KG/M2

## 2021-11-07 DIAGNOSIS — U07.1 INFECTION DUE TO 2019 NOVEL CORONAVIRUS: ICD-10-CM

## 2021-11-07 LAB
FLUAV RNA SPEC QL NAA+PROBE: NEGATIVE
FLUBV RNA RESP QL NAA+PROBE: NEGATIVE
SARS-COV-2 RNA RESP QL NAA+PROBE: POSITIVE

## 2021-11-07 PROCEDURE — 99283 EMERGENCY DEPT VISIT LOW MDM: CPT

## 2021-11-07 PROCEDURE — C9803 HOPD COVID-19 SPEC COLLECT: HCPCS

## 2021-11-07 PROCEDURE — 87636 SARSCOV2 & INF A&B AMP PRB: CPT | Performed by: EMERGENCY MEDICINE

## 2021-11-07 ASSESSMENT — ENCOUNTER SYMPTOMS
FEVER: 1
SORE THROAT: 1
SHORTNESS OF BREATH: 0
COUGH: 1
VOMITING: 0
DIARRHEA: 0

## 2021-11-07 ASSESSMENT — MIFFLIN-ST. JEOR: SCORE: 1415.61

## 2021-11-07 NOTE — ED PROVIDER NOTES
History   Chief Complaint:  COVID complaint     The history is provided by the patient and the spouse. History limited by: dementia, with the wife being the primary historian.      Chava Valentine is a 88 year old male with history of Parkinson disease, hypertension, SVT, CAD, and atrial fibrillation who presents with COVID complaint. He has had approximately one week of mild cough and congestion. His wife was sick first for two days and then passed it to him. He has had low grade fevers, a tickle in his throat, and a non-productive cough. He denies any shortness of breath, chest pain, vomiting, or diarrhea. They both took an at home COVID test a few days ago and were positive. His wife brings him in today to have him evaluated, chiefly because she is feeling sicker than he is.     Review of Systems   Unable to perform ROS: Dementia   Constitutional: Positive for fever.   HENT: Positive for congestion and sore throat.    Respiratory: Positive for cough. Negative for shortness of breath.    Cardiovascular: Negative for chest pain.   Gastrointestinal: Negative for diarrhea and vomiting.       Allergies:  Carbidopa W/Levodopa    Medications:  Cardizem  Aricept  Apresoline  Zestril  Toprol  Remeron  Protonix  Klonopin  Zetia  Tambocor  Nitrostat  Mestinon  Zantac  Crestor  Ambien    Past Medical History:     Allergic rhinitis  Anxiety  CAD  GERD  Hyperlipidemia  Hypertension  Hypogonadism  Parkinson disease  Atrial fibrillation  SVT  Left bundle branch block  Renal disease    Past Surgical History:    CABG  Colonoscopy  Bilateral trabeculoplasty  Right rotator cuff repair  Bilateral IOL  Reverse right shoulder arthroplasty  Bilateral vitrectomy      Family History:    Stroke, father    Social History:  Presents to ED with wife.    Physical Exam     Patient Vitals for the past 24 hrs:   BP Temp Temp src Pulse Resp SpO2 Height Weight   11/07/21 1658 -- -- -- -- -- 98 % -- --   11/07/21 1644 -- -- -- -- -- 98 % -- --  "  11/07/21 1643 (!) 149/74 -- -- 60 -- -- -- --   11/07/21 1258 128/64 98.1  F (36.7  C) Temporal 60 16 98 % 1.727 m (5' 8\") 77.1 kg (170 lb)       Physical Exam  Eye:  Pupils are equal, round, and reactive.  Extraocular movements intact.    ENT:  Tympanic membranes are normal bilaterally.  + nasal congestion.  Moist mucus membranes.  Normal tongue and tonsil.    Cardiac:  Regular rate and rhythm.  No murmurs, gallops, or rubs.    Pulmonary:  Clear to auscultation bilaterally.  No wheezes, rales, or rhonchi.  Infrequent dry cough without increased work of breathing.    Abdomen:  Positive bowel sounds.  Abdomen is soft and non-distended, without focal tenderness.    Musculoskeletal:  Normal movement of all extremities without evidence for deficit.    Skin:  Warm and dry without rashes.    Neurologic:  Non-focal exam without asymmetric weakness or numbness.    Psychiatric:  Normal affect with appropriate interaction.   Emergency Department Course     Emergency Department Course:  Reviewed:  I reviewed nursing notes, vitals, past medical history, Care Everywhere and [facility].    Assessments:  1742 I obtained history and examined the patient as noted above.     Disposition:  The patient was discharged to home.     Impression & Plan     Medical Decision Making:  This unfortunate 88-year-old man with a recent diagnosis of Covid presents to us with his wife for an assessment. Both the patient and his wife took home test which were positive. His wife notes that she has been doing worse than him, and she went to come in for assessment to be sure that they were \"safe\" and to be sure that \"we really have Covid.\" The patient is unable to provide me with any history due to his Parkinson's and dementia, though tells me that \"I feel absolutely fine and do not know why I am here.\" Head to toe physical exam on him show some mild congestion and a mild cough but normal oxygenation, normal strength, no other signs of concerning features. I " then spoke with his wife at length who is being assessed in a different room, and she feels comfortable taking him home without further intervention at this time. The patient and his wife are both vaccinated and I am anticipating a mild course, though I did explain that with his advanced age and Parkinson's that he is higher risk to decompensate and they should check his oxygenation regularly. Otherwise, they will return to us for any worsening of condition or other emergent concerns.    Diagnosis:    ICD-10-CM    1. Infection due to 2019 novel coronavirus  U07.1      Scribe Disclosure:  I, Kasey Paredes, am serving as a scribe at 5:22 PM on 11/7/2021 to document services personally performed by Trierweiler, Chad A, MD based on my observations and the provider's statements to me.      Trierweiler, Chad A, MD  11/08/21 0019

## 2021-11-07 NOTE — ED TRIAGE NOTES
Dx with covid three days ago. O2 sat 94-96% on room air at home. Presents with wife who also has covid

## 2021-11-08 NOTE — TELEPHONE ENCOUNTER
Coronavirus (COVID-19) Notification    Reason for call  Notify of POSITIVE  COVID-19 lab result, assess symptoms,  review [facility] recommendations    Lab Result   Lab test for 2019-nCoV rRt-PCR or SARS-COV-2 PCR  Oropharyngeal AND/OR nasopharyngeal swabs were POSITIVE for 2019-nCoV RNA [OR] SARS-COV-2 RNA (COVID-19) RNA     We have been unable to reach Patient by phone at this time to notify of their Positive COVID-19 result.  Left voicemail message requesting a call back to [phone] [facility] for results.        Wife requests a call back tomorrow as they are just getting home.    POSITIVE COVID-19 Letter sent.    Tiffany Mckenzie LPN

## 2021-12-01 DIAGNOSIS — I25.810 CORONARY ARTERY DISEASE INVOLVING CORONARY BYPASS GRAFT OF NATIVE HEART WITHOUT ANGINA PECTORIS: ICD-10-CM

## 2021-12-01 DIAGNOSIS — E78.2 MIXED HYPERLIPIDEMIA: ICD-10-CM

## 2021-12-01 RX ORDER — ROSUVASTATIN CALCIUM 10 MG/1
10 TABLET, COATED ORAL DAILY
Qty: 90 TABLET | Refills: 0 | Status: SHIPPED | OUTPATIENT
Start: 2021-12-01 | End: 2022-03-01

## 2022-01-06 ENCOUNTER — PRE VISIT (OUTPATIENT)
Dept: CARDIOLOGY | Facility: CLINIC | Age: 87
End: 2022-01-06
Payer: MEDICARE

## 2022-01-12 ENCOUNTER — TRANSFERRED RECORDS (OUTPATIENT)
Dept: HEALTH INFORMATION MANAGEMENT | Facility: CLINIC | Age: 87
End: 2022-01-12
Payer: MEDICARE

## 2022-02-28 DIAGNOSIS — I25.810 CORONARY ARTERY DISEASE INVOLVING CORONARY BYPASS GRAFT OF NATIVE HEART WITHOUT ANGINA PECTORIS: ICD-10-CM

## 2022-02-28 DIAGNOSIS — E78.2 MIXED HYPERLIPIDEMIA: ICD-10-CM

## 2022-02-28 RX ORDER — EZETIMIBE 10 MG/1
10 TABLET ORAL DAILY
Qty: 90 TABLET | Refills: 0 | Status: SHIPPED | OUTPATIENT
Start: 2022-02-28 | End: 2022-05-31

## 2022-03-01 DIAGNOSIS — I25.810 CORONARY ARTERY DISEASE INVOLVING CORONARY BYPASS GRAFT OF NATIVE HEART WITHOUT ANGINA PECTORIS: ICD-10-CM

## 2022-03-01 DIAGNOSIS — E78.2 MIXED HYPERLIPIDEMIA: ICD-10-CM

## 2022-03-01 RX ORDER — ROSUVASTATIN CALCIUM 10 MG/1
10 TABLET, COATED ORAL DAILY
Qty: 90 TABLET | Refills: 0 | Status: SHIPPED | OUTPATIENT
Start: 2022-03-01 | End: 2022-05-31

## 2022-03-28 ENCOUNTER — PRE VISIT (OUTPATIENT)
Dept: CARDIOLOGY | Facility: CLINIC | Age: 87
End: 2022-03-28
Payer: MEDICARE

## 2022-04-25 ENCOUNTER — APPOINTMENT (OUTPATIENT)
Dept: URBAN - METROPOLITAN AREA CLINIC 256 | Age: 87
Setting detail: DERMATOLOGY
End: 2022-04-26

## 2022-04-25 VITALS — WEIGHT: 150 LBS | HEIGHT: 68 IN

## 2022-04-25 DIAGNOSIS — L82.0 INFLAMED SEBORRHEIC KERATOSIS: ICD-10-CM

## 2022-04-25 DIAGNOSIS — L21.8 OTHER SEBORRHEIC DERMATITIS: ICD-10-CM

## 2022-04-25 DIAGNOSIS — L23.1 ALLERGIC CONTACT DERMATITIS DUE TO ADHESIVES: ICD-10-CM

## 2022-04-25 DIAGNOSIS — L82.1 OTHER SEBORRHEIC KERATOSIS: ICD-10-CM

## 2022-04-25 PROCEDURE — OTHER PRESCRIPTION: OTHER

## 2022-04-25 PROCEDURE — OTHER LIQUID NITROGEN: OTHER

## 2022-04-25 PROCEDURE — 17110 DESTRUCT B9 LESION 1-14: CPT

## 2022-04-25 PROCEDURE — OTHER COUNSELING: OTHER

## 2022-04-25 PROCEDURE — 99203 OFFICE O/P NEW LOW 30 MIN: CPT | Mod: 25

## 2022-04-25 RX ORDER — TRIAMCINOLONE ACETONIDE 1 MG/G
APPLY THIN LAYER CREAM TOPICAL QHS
Qty: 453.6 | Refills: 4 | Status: ERX | COMMUNITY
Start: 2022-04-25

## 2022-04-25 RX ORDER — KETOCONAZOLE 20 MG/ML
APPLY THIN LAYER SHAMPOO, SUSPENSION TOPICAL QD
Qty: 120 | Refills: 4 | Status: ERX | COMMUNITY
Start: 2022-04-25

## 2022-04-25 ASSESSMENT — LOCATION DETAILED DESCRIPTION DERM
LOCATION DETAILED: LEFT MEDIAL FRONTAL SCALP
LOCATION DETAILED: RIGHT MEDIAL UPPER BACK
LOCATION DETAILED: LEFT LATERAL FRONTAL SCALP
LOCATION DETAILED: RIGHT SUPERIOR PARIETAL SCALP
LOCATION DETAILED: LEFT SUPERIOR FOREHEAD
LOCATION DETAILED: MID-FRONTAL SCALP
LOCATION DETAILED: LEFT INFERIOR MEDIAL UPPER BACK
LOCATION DETAILED: LEFT CENTRAL FRONTAL SCALP
LOCATION DETAILED: MEDIAL FRONTAL SCALP

## 2022-04-25 ASSESSMENT — LOCATION SIMPLE DESCRIPTION DERM
LOCATION SIMPLE: ANTERIOR SCALP
LOCATION SIMPLE: LEFT FOREHEAD
LOCATION SIMPLE: LEFT SCALP
LOCATION SIMPLE: RIGHT UPPER BACK
LOCATION SIMPLE: LEFT UPPER BACK
LOCATION SIMPLE: SCALP
LOCATION SIMPLE: FRONTAL SCALP

## 2022-04-25 ASSESSMENT — LOCATION ZONE DERM
LOCATION ZONE: SCALP
LOCATION ZONE: TRUNK
LOCATION ZONE: FACE

## 2022-04-25 NOTE — PROCEDURE: LIQUID NITROGEN
Show Topical Anesthesia Variable?: Yes
Add 52 Modifier (Optional): no
Medical Necessity Information: It is in your best interest to select a reason for this procedure from the list below. All of these items fulfill various CMS LCD requirements except the new and changing color options.
Duration Of Freeze Thaw-Cycle (Seconds): 5-10
Number Of Freeze-Thaw Cycles: 3 freeze-thaw cycles
Medical Necessity Clause: This procedure was medically necessary because the lesions that were treated were:
Post-Care Instructions: I reviewed with the patient in detail post-care instructions. Patient is to wear sunprotection, and avoid picking at any of the treated lesions. Pt may apply Vaseline to crusted or scabbing areas.
Consent: The patient's consent was obtained including but not limited to risks of crusting, scabbing, blistering, scarring, darker or lighter pigmentary change, recurrence, incomplete removal and infection.
Detail Level: Detailed
Spray Paint Text: The liquid nitrogen was applied to the skin utilizing a spray paint frosting technique.

## 2022-04-27 ENCOUNTER — OFFICE VISIT (OUTPATIENT)
Dept: CARDIOLOGY | Facility: CLINIC | Age: 87
End: 2022-04-27
Attending: NURSE PRACTITIONER
Payer: MEDICARE

## 2022-04-27 ENCOUNTER — TELEPHONE (OUTPATIENT)
Dept: CARDIOLOGY | Facility: CLINIC | Age: 87
End: 2022-04-27

## 2022-04-27 VITALS
WEIGHT: 155 LBS | SYSTOLIC BLOOD PRESSURE: 130 MMHG | DIASTOLIC BLOOD PRESSURE: 73 MMHG | OXYGEN SATURATION: 98 % | HEIGHT: 68 IN | BODY MASS INDEX: 23.49 KG/M2 | HEART RATE: 48 BPM

## 2022-04-27 DIAGNOSIS — E78.2 MIXED HYPERLIPIDEMIA: ICD-10-CM

## 2022-04-27 DIAGNOSIS — I25.10 CORONARY ARTERY DISEASE INVOLVING NATIVE CORONARY ARTERY OF NATIVE HEART WITHOUT ANGINA PECTORIS: Primary | ICD-10-CM

## 2022-04-27 DIAGNOSIS — R54 FRAILTY SYNDROME IN GERIATRIC PATIENT: ICD-10-CM

## 2022-04-27 DIAGNOSIS — N18.31 CHRONIC RENAL FAILURE, STAGE 3A (H): ICD-10-CM

## 2022-04-27 DIAGNOSIS — I48.0 PAROXYSMAL ATRIAL FIBRILLATION (H): Primary | ICD-10-CM

## 2022-04-27 DIAGNOSIS — G20.A1 PARKINSON DISEASE (H): ICD-10-CM

## 2022-04-27 DIAGNOSIS — I25.10 CORONARY ARTERY DISEASE INVOLVING NATIVE CORONARY ARTERY OF NATIVE HEART WITHOUT ANGINA PECTORIS: ICD-10-CM

## 2022-04-27 DIAGNOSIS — R29.6 FALLS FREQUENTLY: ICD-10-CM

## 2022-04-27 DIAGNOSIS — I10 BENIGN ESSENTIAL HYPERTENSION: ICD-10-CM

## 2022-04-27 PROCEDURE — 99215 OFFICE O/P EST HI 40 MIN: CPT | Performed by: INTERNAL MEDICINE

## 2022-04-27 RX ORDER — MIRTAZAPINE 7.5 MG/1
7.5 TABLET, FILM COATED ORAL
COMMUNITY
Start: 2022-04-14

## 2022-04-27 RX ORDER — CHOLECALCIFEROL (VITAMIN D3) 1250 MCG
CAPSULE ORAL
COMMUNITY
Start: 2022-04-22

## 2022-04-27 RX ORDER — DORZOLAMIDE HYDROCHLORIDE AND TIMOLOL MALEATE 20; 5 MG/ML; MG/ML
SOLUTION/ DROPS OPHTHALMIC
COMMUNITY
Start: 2021-04-27

## 2022-04-27 RX ORDER — AMOXICILLIN 500 MG/1
2000 TABLET, FILM COATED ORAL
COMMUNITY

## 2022-04-27 RX ORDER — FEXOFENADINE HCL 180 MG/1
180 TABLET ORAL DAILY
COMMUNITY
End: 2022-06-29

## 2022-04-27 NOTE — TELEPHONE ENCOUNTER
[facility] called requesting when last FLP's done.  Last FLP's done 2018.    Labs added to next OV f/up in 6 months.

## 2022-04-27 NOTE — LETTER
2022    Lucrecia Pacheco MD  [facility] [address]  [place] [address]    RE: Chava Valentine       Dear Colleague,     I had the pleasure of seeing Chava Valentine in the [facility].  HPI and Plan:   See dictation    Today's clinic visit entailed:  Review of external notes as documented elsewhere in note  Review of the result(s) of each unique test - Echocardiogram, EKG, Lexiscan, lab work  The following tests were independently interpreted by me as noted in my documentation: EKG  Ordering of each unique test  Prescription drug management  40 minutes spent on the date of the encounter doing chart review, history and exam, documentation and further activities per the note  Provider  Link to [facility] Help Grid     The level of medical decision making during this visit was of moderate complexity.      Orders Placed This Encounter   Procedures     Basic metabolic panel     Lipid Profile     ALT     Follow-Up with Cardiology JUANY     Follow-Up with Cardiology     EKG 12-lead complete w/read - Clinics       Orders Placed This Encounter   Medications     mirtazapine (REMERON) 7.5 MG tablet     cholecalciferol (VITAMIN D3) 1250 mcg (22862 units) capsule     Sig: TAKE 1 CAPSULE BY MOUTH WEEKLY FOR 12 WEEKS     dorzolamide-timolol (COSOPT) 2-0.5 % ophthalmic solution     Si  dorzolamide-timolol ophthalmic 2.23%-0.68% ophthalmic solution; 1 drop(s) in each eye 2 times a day 2.23%-0.68% solution Apply 1 drop(s) in each eye 2 times a day; ADMINSITER 1 DROP INTO BOTH EYES TWICE DAILY     Multiple Vitamins-Minerals (OCUVITE EYE + MULTI PO)     Sig: Take 1 tablet by mouth     amoxicillin (AMOXIL) 500 MG tablet     Sig: Take 2,000 mg by mouth     fexofenadine (ALLEGRA ALLERGY) 180 MG tablet     Sig: Take 180 mg by mouth daily       There are no discontinued medications.      Encounter Diagnoses   Name Primary?     Paroxysmal atrial fibrillation (H) Yes     Coronary artery  disease involving native coronary artery of native heart without angina pectoris      Benign essential hypertension      Mixed hyperlipidemia      Falls frequently      Chronic renal failure, stage 3a (H)      Parkinson disease (H)      Frailty syndrome in geriatric patient        CURRENT MEDICATIONS:  Current Outpatient Medications   Medication Sig Dispense Refill     cholecalciferol (VITAMIN D3) 1250 mcg (16445 units) capsule TAKE 1 CAPSULE BY MOUTH WEEKLY FOR 12 WEEKS       CLONAZEPAM PO Take 0.25-0.5 mg by mouth 3 times daily as needed        dorzolamide-timolol (COSOPT) 2-0.5 % ophthalmic solution 20220418  dorzolamide-timolol ophthalmic 2.23%-0.68% ophthalmic solution; 1 drop(s) in each eye 2 times a day 2.23%-0.68% solution Apply 1 drop(s) in each eye 2 times a day; ADMINSITER 1 DROP INTO BOTH EYES TWICE DAILY       ezetimibe (ZETIA) 10 MG tablet Take 1 tablet (10 mg) by mouth daily 90 tablet 0     fexofenadine (ALLEGRA ALLERGY) 180 MG tablet Take 180 mg by mouth daily       flecainide (TAMBOCOR) 100 MG tablet Take 50mg (1/2 tablet) po bid 180 tablet 2     fluticasone (FLONASE) 50 MCG/ACT spray Spray 1 spray into both nostrils daily as needed for rhinitis or allergies       latanoprost (XALATAN) 0.005 % ophthalmic solution INSTILL 1 DROP IN BOTH EYES QHS.  6     lisinopril (ZESTRIL) 5 MG tablet Take 5 mg by mouth daily       mirtazapine (REMERON) 7.5 MG tablet        Multiple Vitamins-Minerals (OCUVITE EYE + MULTI PO) Take 1 tablet by mouth       Multiple Vitamins-Minerals (PRESERVISION AREDS 2 PO) Take by mouth daily        nitroGLYcerin (NITROSTAT) 0.4 MG sublingual tablet For chest pain place 1 tablet under the tongue every 5 minutes for 3 doses. If symptoms persist 5 minutes after 1st dose call 911. 25 tablet 3     rivastigmine (EXELON) 4.6 MG/24HR 24 hr patch Place 1 patch onto the skin daily       rosuvastatin (CRESTOR) 10 MG tablet Take 1 tablet (10 mg) by mouth daily 90 tablet 0     ZOLPIDEM TARTRATE  PO Take 5-10 mg by mouth nightly as needed        Acetaminophen (TYLENOL) 325 MG CAPS Take 325-650 mg by mouth every 4 hours as needed (Patient not taking: No sig reported)       albuterol (PROAIR HFA/PROVENTIL HFA/VENTOLIN HFA) 108 (90 Base) MCG/ACT Inhaler Inhale 2 puffs into the lungs every 6 hours as needed for shortness of breath / dyspnea or wheezing (Patient not taking: No sig reported) 1 Inhaler 0     amoxicillin (AMOXIL) 500 MG tablet Take 2,000 mg by mouth       Pantoprazole Sodium (PROTONIX PO) Take 40 mg by mouth 2 times daily (Patient not taking: Reported on 4/27/2022)       Psyllium (METAMUCIL PO) Take 1 Dose by mouth daily as needed  (Patient not taking: No sig reported)       pyridostigmine (MESTINON) 60 MG tablet USE UP TO 3 TS DAILY AS NEEDED. DISPENSE IN ORIGINAL  CONTAINER (Patient not taking: No sig reported)  3     RaNITidine HCl (ZANTAC PO) Take 150 mg by mouth daily as needed for heartburn (Patient not taking: No sig reported)       TESTOSTERONE AQUEOUS IM Inject 1 mL into the muscle every 14 days  (Patient not taking: No sig reported)       Testosterone Cypionate 200 MG/ML SOLN  (Patient not taking: No sig reported)       timolol maleate (ISTALOL) 0.5 % opthalmic solution  (Patient not taking: Reported on 4/27/2022)         ALLERGIES     Allergies   Allergen Reactions     Carbidopa W/Levodopa Dizziness     Nausea       PAST MEDICAL HISTORY:  Past Medical History:   Diagnosis Date     Allergic rhinitis      Anxiety      Cognitive impairment      Contact dermatitis      Coronary artery disease     CABG with 4 grafts-LIMA to LAD, SVG to Diagonal,OM and RCA     Cough      Elevated prolactin level      Erythrocytosis      Gastro-oesophageal reflux disease      GI bleed 10/2010     Hyperlipidemia      Hypertension      Hypogonadism     with low testosterone     Insomnia      Parkinson disease (H)      Paroxysmal atrial fibrillation (H)     flecainide therapy     Post-nasal drip       Prediabetes      Renal disease     CKD     Shoulder arthritis      SVT (supraventricular tachycardia) (H)      Tremor        PAST SURGICAL HISTORY:  Past Surgical History:   Procedure Laterality Date     BYPASS GRAFT ARTERY CORONARY       CARDIAC SURGERY       COLONOSCOPY  12/2010    tubular adenoma     LASER SELECTIVE TRABECULOPLASTY BILATERAL Bilateral 6/11/2018    Procedure: LASER SELECTIVE TRABECULOPLASTY BILATERAL;  LASER SELECTIVE TRABECULOPLASTY BILATERAL ;  Surgeon: Parmjit Camacho MD;  Location: [facility]     ORTHOPEDIC SURGERY      rotator cuff right     PHACOEMULSIFICATION CLEAR CORNEA WITH STANDARD IOL, VITRECTOMY PARSPLANA 23 GAGUE, COMBINED  2/20/2013    Procedure: COMBINED PHACOEMULSIFICATION CLEAR CORNEA WITH STANDARD INTRAOCULAR LENS IMPLANT, VITRECTOMY PARSPLANA 23 GAUGE;;  Surgeon: Guero Lockett MD;  Location: [facility]     PHACOEMULSIFICATION CLEAR CORNEA WITH STANDARD IOL, VITRECTOMY PARSPLANA 23 GAGUE, COMBINED  7/31/2013    Procedure: COMBINED PHACOEMULSIFICATION CLEAR CORNEA WITH STANDARD INTRAOCULAR LENS IMPLANT, VITRECTOMY PARSPLANA 23 GAUGE;;  Surgeon: Guero Lockett MD;  Location: [facility]     REVERSE ARTHROPLASTY SHOULDER Right 7/24/2017    Procedure: REVERSE ARTHROPLASTY SHOULDER;  RIGHT REVERSE TOTAL SHOULDER ARTHROPLASTY ;  Surgeon: Rodolfo Calhoun MD;  Location: [facility]     VASCULAR SURGERY      cab     VITRECTOMY PARSPLANA, PHACOEMULSIFICATION CLEAR CORNEA W STANDARD INTRAOCULAR LENS IMPLANT, COMBINED  2/20/2013    Procedure: COMBINED VITRECTOMY PARSPLANA, PHACOEMULSIFICATION CLEAR CORNEA WITH STANDARD INTRAOCULAR LENS IMPLANT;  LEFT PHACOEMULSIFICATION CLEAR CORNEA WITH SAURABH TORIC INTRAOCULAR LENS IMPLANT,  LEFT EYE 23 GAUGE PARSPLANA VITRECTOMY;  Surgeon: Jorge Regan MD;  Location: [facility]     VITRECTOMY PARSPLANA, PHACOEMULSIFICATION CLEAR CORNEA W STANDARD INTRAOCULAR LENS IMPLANT, COMBINED  7/31/2013    Procedure: COMBINED VITRECTOMY PARSPLANA, PHACOEMULSIFICATION  "CLEAR CORNEA WITH STANDARD INTRAOCULAR LENS IMPLANT;  RIGHT PHACOEMULSIFICATION CLEAR CORNEA WITH TORIC INTRAOCULAR LENS IMPLANT, RIGHT 23 GAUGE PARSPLANA VITRECTOMY ;  Surgeon: Jorge Regan MD;  Location: [facility]       FAMILY HISTORY:  Family History   Problem Relation Age of Onset     Other - See Comments Mother         old age     Other - See Comments Father 97     Cerebrovascular Disease Father        SOCIAL HISTORY:  Social History     Socioeconomic History     Marital status:      Spouse name: None     Number of children: None     Years of education: None     Highest education level: None   Tobacco Use     Smoking status: Former Smoker     Packs/day: 0.10     Years: 20.00     Pack years: 2.00     Types: Cigarettes     Start date:      Quit date: 1999     Years since quittin.3     Smokeless tobacco: Never Used     Tobacco comment: recreational only   Substance and Sexual Activity     Alcohol use: Yes     Comment: 1-2 drinks year     Drug use: No   Other Topics Concern     Parent/sibling w/ CABG, MI or angioplasty before 65F 55M? No     Sleep Concern Yes     Special Diet Yes     Comment: less meat      Exercise Yes     Comment: 3 x weekly     Seat Belt Yes       Review of Systems:  Skin:  Negative       Eyes:  Positive for glasses macular degeneration  ENT:  Positive for postnasal drainage    Respiratory:  Positive for cough dry cough related to allergies   Cardiovascular:  Negative Positive for;fatigue dx parkinsons  Gastroenterology: Positive for heartburn treated  Genitourinary:  Negative      Musculoskeletal:  Negative   fell 3 weeks ago  Neurologic:  Negative memory problems Parkinson  Psychiatric:  Positive for anxiety occ  Heme/Lymph/Imm:  Negative easy bruising    Endocrine:  Negative        Physical Exam:  Vitals: /73   Pulse (!) 48   Ht 1.734 m (5' 8.25\")   Wt 70.3 kg (155 lb)   SpO2 98%   BMI 23.40 kg/m      Constitutional:  cooperative, alert and oriented, well " developed, well nourished, in no acute distress   Parkinsonian features.  In wheelchair    Skin:  warm and dry to the touch, no apparent skin lesions or masses noted          Head:  normocephalic, no masses or lesions        Eyes:           Lymph:      ENT:  no pallor or cyanosis        Neck:  carotid pulses are full and equal bilaterally;no carotid bruit        Respiratory:  clear to auscultation;normal respiratory excursion;healed median sternotomy scar         Cardiac: regular rhythm bradycardic     late systolic murmur;LLSB;apical;grade 2                                                 GI:           Extremities and Muscular Skeletal:  no edema;no spinal abnormalities noted              Neurological:  no gross motor deficits        Psych:  oriented to time, person and place        JESSE Robin, GAMAL CNP  [address] [place] [address]            HPI and Plan:   See dictation    Today's clinic visit entailed:  Review of the result(s) of each unique test - Echocardiogram, EKG, Lexiscan, lab work  The following tests were independently interpreted by me as noted in my documentation: EKG  Ordering of each unique test  Prescription drug management  40 minutes spent on the date of the encounter doing chart review, history and exam, documentation and further activities per the note  Provider  Link to [facility] Help Grid     The level of medical decision making during this visit was of moderate complexity.      Orders Placed This Encounter   Procedures     Basic metabolic panel     Lipid Profile     ALT     Follow-Up with Cardiology JUANY     Follow-Up with Cardiology     EKG 12-lead complete w/read - Clinics       Orders Placed This Encounter   Medications     mirtazapine (REMERON) 7.5 MG tablet     cholecalciferol (VITAMIN D3) 1250 mcg (24632 units) capsule     Sig: TAKE 1 CAPSULE BY MOUTH WEEKLY FOR 12 WEEKS     dorzolamide-timolol (COSOPT) 2-0.5 % ophthalmic solution     Si  dorzolamide-timolol  ophthalmic 2.23%-0.68% ophthalmic solution; 1 drop(s) in each eye 2 times a day 2.23%-0.68% solution Apply 1 drop(s) in each eye 2 times a day; ADMINSITER 1 DROP INTO BOTH EYES TWICE DAILY     Multiple Vitamins-Minerals (OCUVITE EYE + MULTI PO)     Sig: Take 1 tablet by mouth     amoxicillin (AMOXIL) 500 MG tablet     Sig: Take 2,000 mg by mouth     fexofenadine (ALLEGRA ALLERGY) 180 MG tablet     Sig: Take 180 mg by mouth daily       There are no discontinued medications.      Encounter Diagnoses   Name Primary?     Paroxysmal atrial fibrillation (H) Yes     Coronary artery disease involving native coronary artery of native heart without angina pectoris      Benign essential hypertension      Mixed hyperlipidemia      Falls frequently      Chronic renal failure, stage 3a (H)      Parkinson disease (H)      Frailty syndrome in geriatric patient        CURRENT MEDICATIONS:  Current Outpatient Medications   Medication Sig Dispense Refill     cholecalciferol (VITAMIN D3) 1250 mcg (41261 units) capsule TAKE 1 CAPSULE BY MOUTH WEEKLY FOR 12 WEEKS       CLONAZEPAM PO Take 0.25-0.5 mg by mouth 3 times daily as needed        dorzolamide-timolol (COSOPT) 2-0.5 % ophthalmic solution 20220418  dorzolamide-timolol ophthalmic 2.23%-0.68% ophthalmic solution; 1 drop(s) in each eye 2 times a day 2.23%-0.68% solution Apply 1 drop(s) in each eye 2 times a day; ADMINSITER 1 DROP INTO BOTH EYES TWICE DAILY       ezetimibe (ZETIA) 10 MG tablet Take 1 tablet (10 mg) by mouth daily 90 tablet 0     fexofenadine (ALLEGRA ALLERGY) 180 MG tablet Take 180 mg by mouth daily       flecainide (TAMBOCOR) 100 MG tablet Take 50mg (1/2 tablet) po bid 180 tablet 2     fluticasone (FLONASE) 50 MCG/ACT spray Spray 1 spray into both nostrils daily as needed for rhinitis or allergies       latanoprost (XALATAN) 0.005 % ophthalmic solution INSTILL 1 DROP IN BOTH EYES QHS.  6     lisinopril (ZESTRIL) 5 MG tablet Take 5 mg by mouth daily       mirtazapine  (REMERON) 7.5 MG tablet        Multiple Vitamins-Minerals (OCUVITE EYE + MULTI PO) Take 1 tablet by mouth       Multiple Vitamins-Minerals (PRESERVISION AREDS 2 PO) Take by mouth daily        nitroGLYcerin (NITROSTAT) 0.4 MG sublingual tablet For chest pain place 1 tablet under the tongue every 5 minutes for 3 doses. If symptoms persist 5 minutes after 1st dose call 911. 25 tablet 3     rivastigmine (EXELON) 4.6 MG/24HR 24 hr patch Place 1 patch onto the skin daily       rosuvastatin (CRESTOR) 10 MG tablet Take 1 tablet (10 mg) by mouth daily 90 tablet 0     ZOLPIDEM TARTRATE PO Take 5-10 mg by mouth nightly as needed        Acetaminophen (TYLENOL) 325 MG CAPS Take 325-650 mg by mouth every 4 hours as needed (Patient not taking: No sig reported)       albuterol (PROAIR HFA/PROVENTIL HFA/VENTOLIN HFA) 108 (90 Base) MCG/ACT Inhaler Inhale 2 puffs into the lungs every 6 hours as needed for shortness of breath / dyspnea or wheezing (Patient not taking: No sig reported) 1 Inhaler 0     amoxicillin (AMOXIL) 500 MG tablet Take 2,000 mg by mouth       Pantoprazole Sodium (PROTONIX PO) Take 40 mg by mouth 2 times daily (Patient not taking: Reported on 4/27/2022)       Psyllium (METAMUCIL PO) Take 1 Dose by mouth daily as needed  (Patient not taking: No sig reported)       pyridostigmine (MESTINON) 60 MG tablet USE UP TO 3 TS DAILY AS NEEDED. DISPENSE IN ORIGINAL  CONTAINER (Patient not taking: No sig reported)  3     RaNITidine HCl (ZANTAC PO) Take 150 mg by mouth daily as needed for heartburn (Patient not taking: No sig reported)       TESTOSTERONE AQUEOUS IM Inject 1 mL into the muscle every 14 days  (Patient not taking: No sig reported)       Testosterone Cypionate 200 MG/ML SOLN  (Patient not taking: No sig reported)       timolol maleate (ISTALOL) 0.5 % opthalmic solution  (Patient not taking: Reported on 4/27/2022)         ALLERGIES     Allergies   Allergen Reactions     Carbidopa W/Levodopa Dizziness      Nausea       PAST MEDICAL HISTORY:  Past Medical History:   Diagnosis Date     Allergic rhinitis      Anxiety      Cognitive impairment      Contact dermatitis      Coronary artery disease     CABG with 4 grafts-LIMA to LAD, SVG to Diagonal,OM and RCA     Cough      Elevated prolactin level      Erythrocytosis      Gastro-oesophageal reflux disease      GI bleed 10/2010     Hyperlipidemia      Hypertension      Hypogonadism     with low testosterone     Insomnia      Parkinson disease (H)      Paroxysmal atrial fibrillation (H)     flecainide therapy     Post-nasal drip      Prediabetes      Renal disease     CKD     Shoulder arthritis      SVT (supraventricular tachycardia) (H)      Tremor        PAST SURGICAL HISTORY:  Past Surgical History:   Procedure Laterality Date     BYPASS GRAFT ARTERY CORONARY       CARDIAC SURGERY       COLONOSCOPY  12/2010    tubular adenoma     LASER SELECTIVE TRABECULOPLASTY BILATERAL Bilateral 6/11/2018    Procedure: LASER SELECTIVE TRABECULOPLASTY BILATERAL;  LASER SELECTIVE TRABECULOPLASTY BILATERAL ;  Surgeon: Parmjit Camacho MD;  Location: [facility]     ORTHOPEDIC SURGERY      rotator cuff right     PHACOEMULSIFICATION CLEAR CORNEA WITH STANDARD IOL, VITRECTOMY PARSPLANA 23 GAGUE, COMBINED  2/20/2013    Procedure: COMBINED PHACOEMULSIFICATION CLEAR CORNEA WITH STANDARD INTRAOCULAR LENS IMPLANT, VITRECTOMY PARSPLANA 23 GAUGE;;  Surgeon: Guero Lockett MD;  Location: [facility]     PHACOEMULSIFICATION CLEAR CORNEA WITH STANDARD IOL, VITRECTOMY PARSPLANA 23 GAGUE, COMBINED  7/31/2013    Procedure: COMBINED PHACOEMULSIFICATION CLEAR CORNEA WITH STANDARD INTRAOCULAR LENS IMPLANT, VITRECTOMY PARSPLANA 23 GAUGE;;  Surgeon: Guero Lockett MD;  Location: [facility]     REVERSE ARTHROPLASTY SHOULDER Right 7/24/2017    Procedure: REVERSE ARTHROPLASTY SHOULDER;  RIGHT REVERSE TOTAL SHOULDER ARTHROPLASTY ;  Surgeon: Rodolfo Calhoun MD;  [facility]     VASCULAR SURGERY      cab      VITRECTOMY PARSPLANA, PHACOEMULSIFICATION CLEAR CORNEA W STANDARD INTRAOCULAR LENS IMPLANT, COMBINED  2013    Procedure: COMBINED VITRECTOMY PARSPLANA, PHACOEMULSIFICATION CLEAR CORNEA WITH STANDARD INTRAOCULAR LENS IMPLANT;  LEFT PHACOEMULSIFICATION CLEAR CORNEA WITH SAURABH TORIC INTRAOCULAR LENS IMPLANT,  LEFT EYE 23 GAUGE PARSPLANA VITRECTOMY;  Surgeon: Jorge Regan MD;  Location: [facility]     VITRECTOMY PARSPLANA, PHACOEMULSIFICATION CLEAR CORNEA W STANDARD INTRAOCULAR LENS IMPLANT, COMBINED  2013    Procedure: COMBINED VITRECTOMY PARSPLANA, PHACOEMULSIFICATION CLEAR CORNEA WITH STANDARD INTRAOCULAR LENS IMPLANT;  RIGHT PHACOEMULSIFICATION CLEAR CORNEA WITH TORIC INTRAOCULAR LENS IMPLANT, RIGHT 23 GAUGE PARSPLANA VITRECTOMY ;  Surgeon: Jorge Regan MD;  Location: [facility]       FAMILY HISTORY:  Family History   Problem Relation Age of Onset     Other - See Comments Mother         old age     Other - See Comments Father 97     Cerebrovascular Disease Father        SOCIAL HISTORY:  Social History     Socioeconomic History     Marital status:      Spouse name: None     Number of children: None     Years of education: None     Highest education level: None   Tobacco Use     Smoking status: Former Smoker     Packs/day: 0.10     Years: 20.00     Pack years: 2.00     Types: Cigarettes     Start date:      Quit date: 1999     Years since quittin.3     Smokeless tobacco: Never Used     Tobacco comment: recreational only   Substance and Sexual Activity     Alcohol use: Yes     Comment: 1-2 drinks year     Drug use: No   Other Topics Concern     Parent/sibling w/ CABG, MI or angioplasty before 65F 55M? No     Sleep Concern Yes     Special Diet Yes     Comment: less meat      Exercise Yes     Comment: 3 x weekly     Seat Belt Yes       Review of Systems:  Skin:  Negative       Eyes:  Positive for glasses macular degeneration  ENT:  Positive for postnasal drainage    Respiratory:  Positive  "for cough dry cough related to allergies   Cardiovascular:  Negative Positive for;fatigue dx parkinsons  Gastroenterology: Positive for heartburn treated  Genitourinary:  Negative      Musculoskeletal:  Negative   fell 3 weeks ago  Neurologic:  Negative memory problems Parkinson  Psychiatric:  Positive for anxiety occ  Heme/Lymph/Imm:  Negative easy bruising    Endocrine:  Negative        Physical Exam:  Vitals: /73   Pulse (!) 48   Ht 1.734 m (5' 8.25\")   Wt 70.3 kg (155 lb)   SpO2 98%   BMI 23.40 kg/m      Constitutional:           Skin:             Head:           Eyes:           Lymph:      ENT:           Neck:           Respiratory:            Cardiac:                                                           GI:           Extremities and Muscular Skeletal:                 Neurological:           Psych:           CC  Karlie Robin, APRN CNP  [address] [place] [address]    Thank you for allowing me to participate in the care of your patient.      Sincerely,     Ricardo Ni MD     [facility]  "

## 2022-04-27 NOTE — TELEPHONE ENCOUNTER
----- Message from Ricardo Ni MD sent at 4/27/2022  2:00 PM CDT -----  See if [facility] has any recent fasting lipid profile.  Last one in the computer is 2017.  If not add 1 to his follow-up in 6 months.

## 2022-04-27 NOTE — PROGRESS NOTES
HPI and Plan:   See dictation    Today's clinic visit entailed:  Review of external notes as documented elsewhere in note  Review of the result(s) of each unique test - Echocardiogram, EKG, Lexiscan, lab work  The following tests were independently interpreted by me as noted in my documentation: EKG  Ordering of each unique test  Prescription drug management  40 minutes spent on the date of the encounter doing chart review, history and exam, documentation and further activities per the note  Provider  Link to [facility] Help Grid     The level of medical decision making during this visit was of moderate complexity.      Orders Placed This Encounter   Procedures     Basic metabolic panel     Lipid Profile     ALT     Follow-Up with Cardiology JUANY     Follow-Up with Cardiology     EKG 12-lead complete w/read - Clinics       Orders Placed This Encounter   Medications     mirtazapine (REMERON) 7.5 MG tablet     cholecalciferol (VITAMIN D3) 1250 mcg (08695 units) capsule     Sig: TAKE 1 CAPSULE BY MOUTH WEEKLY FOR 12 WEEKS     dorzolamide-timolol (COSOPT) 2-0.5 % ophthalmic solution     Si  dorzolamide-timolol ophthalmic 2.23%-0.68% ophthalmic solution; 1 drop(s) in each eye 2 times a day 2.23%-0.68% solution Apply 1 drop(s) in each eye 2 times a day; ADMINSITER 1 DROP INTO BOTH EYES TWICE DAILY     Multiple Vitamins-Minerals (OCUVITE EYE + MULTI PO)     Sig: Take 1 tablet by mouth     amoxicillin (AMOXIL) 500 MG tablet     Sig: Take 2,000 mg by mouth     fexofenadine (ALLEGRA ALLERGY) 180 MG tablet     Sig: Take 180 mg by mouth daily       There are no discontinued medications.      Encounter Diagnoses   Name Primary?     Paroxysmal atrial fibrillation (H) Yes     Coronary artery disease involving native coronary artery of native heart without angina pectoris      Benign essential hypertension      Mixed hyperlipidemia      Falls frequently      Chronic renal failure, stage 3a (H)      Parkinson disease (H)       Frailty syndrome in geriatric patient        CURRENT MEDICATIONS:  Current Outpatient Medications   Medication Sig Dispense Refill     cholecalciferol (VITAMIN D3) 1250 mcg (54981 units) capsule TAKE 1 CAPSULE BY MOUTH WEEKLY FOR 12 WEEKS       CLONAZEPAM PO Take 0.25-0.5 mg by mouth 3 times daily as needed        dorzolamide-timolol (COSOPT) 2-0.5 % ophthalmic solution [id number]  dorzolamide-timolol ophthalmic 2.23%-0.68% ophthalmic solution; 1 drop(s) in each eye 2 times a day 2.23%-0.68% solution Apply 1 drop(s) in each eye 2 times a day; ADMINSITER 1 DROP INTO BOTH EYES TWICE DAILY       ezetimibe (ZETIA) 10 MG tablet Take 1 tablet (10 mg) by mouth daily 90 tablet 0     fexofenadine (ALLEGRA ALLERGY) 180 MG tablet Take 180 mg by mouth daily       flecainide (TAMBOCOR) 100 MG tablet Take 50mg (1/2 tablet) po bid 180 tablet 2     fluticasone (FLONASE) 50 MCG/ACT spray Spray 1 spray into both nostrils daily as needed for rhinitis or allergies       latanoprost (XALATAN) 0.005 % ophthalmic solution INSTILL 1 DROP IN BOTH EYES QHS.  6     lisinopril (ZESTRIL) 5 MG tablet Take 5 mg by mouth daily       mirtazapine (REMERON) 7.5 MG tablet        Multiple Vitamins-Minerals (OCUVITE EYE + MULTI PO) Take 1 tablet by mouth       Multiple Vitamins-Minerals (PRESERVISION AREDS 2 PO) Take by mouth daily        nitroGLYcerin (NITROSTAT) 0.4 MG sublingual tablet For chest pain place 1 tablet under the tongue every 5 minutes for 3 doses. If symptoms persist 5 minutes after 1st dose call 911. 25 tablet 3     rivastigmine (EXELON) 4.6 MG/24HR 24 hr patch Place 1 patch onto the skin daily       rosuvastatin (CRESTOR) 10 MG tablet Take 1 tablet (10 mg) by mouth daily 90 tablet 0     ZOLPIDEM TARTRATE PO Take 5-10 mg by mouth nightly as needed        Acetaminophen (TYLENOL) 325 MG CAPS Take 325-650 mg by mouth every 4 hours as needed (Patient not taking: No sig reported)       albuterol (PROAIR HFA/PROVENTIL HFA/VENTOLIN HFA) 108 (90  Base) MCG/ACT Inhaler Inhale 2 puffs into the lungs every 6 hours as needed for shortness of breath / dyspnea or wheezing (Patient not taking: No sig reported) 1 Inhaler 0     amoxicillin (AMOXIL) 500 MG tablet Take 2,000 mg by mouth       Pantoprazole Sodium (PROTONIX PO) Take 40 mg by mouth 2 times daily (Patient not taking: Reported on 4/27/2022)       Psyllium (METAMUCIL PO) Take 1 Dose by mouth daily as needed  (Patient not taking: No sig reported)       pyridostigmine (MESTINON) 60 MG tablet USE UP TO 3 TS DAILY AS NEEDED. DISPENSE IN ORIGINAL  CONTAINER (Patient not taking: No sig reported)  3     RaNITidine HCl (ZANTAC PO) Take 150 mg by mouth daily as needed for heartburn (Patient not taking: No sig reported)       TESTOSTERONE AQUEOUS IM Inject 1 mL into the muscle every 14 days  (Patient not taking: No sig reported)       Testosterone Cypionate 200 MG/ML SOLN  (Patient not taking: No sig reported)       timolol maleate (ISTALOL) 0.5 % opthalmic solution  (Patient not taking: Reported on 4/27/2022)         ALLERGIES     Allergies   Allergen Reactions     Carbidopa W/Levodopa Dizziness     Nausea       PAST MEDICAL HISTORY:  Past Medical History:   Diagnosis Date     Allergic rhinitis      Anxiety      Cognitive impairment      Contact dermatitis      Coronary artery disease     CABG with 4 grafts-LIMA to LAD, SVG to Diagonal,OM and RCA     Cough      Elevated prolactin level      Erythrocytosis      Gastro-oesophageal reflux disease      GI bleed 10/2010     Hyperlipidemia      Hypertension      Hypogonadism     with low testosterone     Insomnia      Parkinson disease (H)      Paroxysmal atrial fibrillation (H)     flecainide therapy     Post-nasal drip      Prediabetes      Renal disease     CKD     Shoulder arthritis      SVT (supraventricular tachycardia) (H)      Tremor        PAST SURGICAL HISTORY:  Past Surgical History:   Procedure Laterality Date     BYPASS GRAFT ARTERY CORONARY        CARDIAC SURGERY       COLONOSCOPY  12/2010    tubular adenoma     LASER SELECTIVE TRABECULOPLASTY BILATERAL Bilateral 6/11/2018    Procedure: LASER SELECTIVE TRABECULOPLASTY BILATERAL;  LASER SELECTIVE TRABECULOPLASTY BILATERAL ;  Surgeon: Parmjit Camacho MD;  Location: [facility]     ORTHOPEDIC SURGERY      rotator cuff right     PHACOEMULSIFICATION CLEAR CORNEA WITH STANDARD IOL, VITRECTOMY PARSPLANA 23 GAGUE, COMBINED  2/20/2013    Procedure: COMBINED PHACOEMULSIFICATION CLEAR CORNEA WITH STANDARD INTRAOCULAR LENS IMPLANT, VITRECTOMY PARSPLANA 23 GAUGE;;  Surgeon: Guero Lockett MD;  Location: [facility]     PHACOEMULSIFICATION CLEAR CORNEA WITH STANDARD IOL, VITRECTOMY PARSPLANA 23 GAGUE, COMBINED  7/31/2013    Procedure: COMBINED PHACOEMULSIFICATION CLEAR CORNEA WITH STANDARD INTRAOCULAR LENS IMPLANT, VITRECTOMY PARSPLANA 23 GAUGE;;  Surgeon: Guero Lockett MD;  Location: [facility]     REVERSE ARTHROPLASTY SHOULDER Right 7/24/2017    Procedure: REVERSE ARTHROPLASTY SHOULDER;  RIGHT REVERSE TOTAL SHOULDER ARTHROPLASTY ;  Surgeon: Rodolfo Calhoun MD;  Location:  OR     VASCULAR SURGERY      cab     VITRECTOMY PARSPLANA, PHACOEMULSIFICATION CLEAR CORNEA W STANDARD INTRAOCULAR LENS IMPLANT, COMBINED  2/20/2013    Procedure: COMBINED VITRECTOMY PARSPLANA, PHACOEMULSIFICATION CLEAR CORNEA WITH STANDARD INTRAOCULAR LENS IMPLANT;  LEFT PHACOEMULSIFICATION CLEAR CORNEA WITH SAURABH TORIC INTRAOCULAR LENS IMPLANT,  LEFT EYE 23 GAUGE PARSPLANA VITRECTOMY;  Surgeon: Jorge Regan MD;  Location: [facility]     VITRECTOMY PARSPLANA, PHACOEMULSIFICATION CLEAR CORNEA W STANDARD INTRAOCULAR LENS IMPLANT, COMBINED  7/31/2013    Procedure: COMBINED VITRECTOMY PARSPLANA, PHACOEMULSIFICATION CLEAR CORNEA WITH STANDARD INTRAOCULAR LENS IMPLANT;  RIGHT PHACOEMULSIFICATION CLEAR CORNEA WITH TORIC INTRAOCULAR LENS IMPLANT, RIGHT 23 GAUGE PARSPLANA VITRECTOMY ;  Surgeon: Jorge Regan MD;  Location: [facility]       FAMILY  "HISTORY:  Family History   Problem Relation Age of Onset     Other - See Comments Mother         old age     Other - See Comments Father 97     Cerebrovascular Disease Father        SOCIAL HISTORY:  Social History     Socioeconomic History     Marital status:      Spouse name: None     Number of children: None     Years of education: None     Highest education level: None   Tobacco Use     Smoking status: Former Smoker     Packs/day: 0.10     Years: 20.00     Pack years: 2.00     Types: Cigarettes     Start date:      Quit date: 1999     Years since quittin.3     Smokeless tobacco: Never Used     Tobacco comment: recreational only   Substance and Sexual Activity     Alcohol use: Yes     Comment: 1-2 drinks year     Drug use: No   Other Topics Concern     Parent/sibling w/ CABG, MI or angioplasty before 65F 55M? No     Sleep Concern Yes     Special Diet Yes     Comment: less meat      Exercise Yes     Comment: 3 x weekly     Seat Belt Yes       Review of Systems:  Skin:  Negative       Eyes:  Positive for glasses macular degeneration  ENT:  Positive for postnasal drainage    Respiratory:  Positive for cough dry cough related to allergies   Cardiovascular:  Negative Positive for;fatigue dx parkinsons  Gastroenterology: Positive for heartburn treated  Genitourinary:  Negative      Musculoskeletal:  Negative   fell 3 weeks ago  Neurologic:  Negative memory problems Parkinson  Psychiatric:  Positive for anxiety occ  Heme/Lymph/Imm:  Negative easy bruising    Endocrine:  Negative        Physical Exam:  Vitals: /73   Pulse (!) 48   Ht 1.734 m (5' 8.25\")   Wt 70.3 kg (155 lb)   SpO2 98%   BMI 23.40 kg/m      Constitutional:  cooperative, alert and oriented, well developed, well nourished, in no acute distress   Parkinsonian features.  In wheelchair    Skin:  warm and dry to the touch, no apparent skin lesions or masses noted          Head:  normocephalic, no masses or lesions        Eyes:      "      Lymph:      ENT:  no pallor or cyanosis        Neck:  carotid pulses are full and equal bilaterally;no carotid bruit        Respiratory:  clear to auscultation;normal respiratory excursion;healed median sternotomy scar         Cardiac: regular rhythm bradycardic     late systolic murmur;LLSB;apical;grade 2                                                 GI:           Extremities and Muscular Skeletal:  no edema;no spinal abnormalities noted              Neurological:  no gross motor deficits        Psych:  oriented to time, person and place        CC  Karlie Robin, GAMAL CNP  [address] [place] [address]            HPI and Plan:   See dictation    Today's clinic visit entailed:  Review of the result(s) of each unique test - Echocardiogram, EKG, Lexiscan, lab work  The following tests were independently interpreted by me as noted in my documentation: EKG  Ordering of each unique test  Prescription drug management  40 minutes spent on the date of the encounter doing chart review, history and exam, documentation and further activities per the note  Provider  Link to [email or web address] Help Grid     The level of medical decision making during this visit was of moderate complexity.      Orders Placed This Encounter   Procedures     Basic metabolic panel     Lipid Profile     ALT     Follow-Up with Cardiology JUANY     Follow-Up with Cardiology     EKG 12-lead complete w/read - Clinics       Orders Placed This Encounter   Medications     mirtazapine (REMERON) 7.5 MG tablet     cholecalciferol (VITAMIN D3) 1250 mcg (92412 units) capsule     Sig: TAKE 1 CAPSULE BY MOUTH WEEKLY FOR 12 WEEKS     dorzolamide-timolol (COSOPT) 2-0.5 % ophthalmic solution     Si  dorzolamide-timolol ophthalmic 2.23%-0.68% ophthalmic solution; 1 drop(s) in each eye 2 times a day 2.23%-0.68% solution Apply 1 drop(s) in each eye 2 times a day; ADMINSITER 1 DROP INTO BOTH EYES TWICE DAILY     Multiple Vitamins-Minerals (OCUVITE EYE + MULTI  PO)     Sig: Take 1 tablet by mouth     amoxicillin (AMOXIL) 500 MG tablet     Sig: Take 2,000 mg by mouth     fexofenadine (ALLEGRA ALLERGY) 180 MG tablet     Sig: Take 180 mg by mouth daily       There are no discontinued medications.      Encounter Diagnoses   Name Primary?     Paroxysmal atrial fibrillation (H) Yes     Coronary artery disease involving native coronary artery of native heart without angina pectoris      Benign essential hypertension      Mixed hyperlipidemia      Falls frequently      Chronic renal failure, stage 3a (H)      Parkinson disease (H)      Frailty syndrome in geriatric patient        CURRENT MEDICATIONS:  Current Outpatient Medications   Medication Sig Dispense Refill     cholecalciferol (VITAMIN D3) 1250 mcg (78976 units) capsule TAKE 1 CAPSULE BY MOUTH WEEKLY FOR 12 WEEKS       CLONAZEPAM PO Take 0.25-0.5 mg by mouth 3 times daily as needed        dorzolamide-timolol (COSOPT) 2-0.5 % ophthalmic solution [id number]  dorzolamide-timolol ophthalmic 2.23%-0.68% ophthalmic solution; 1 drop(s) in each eye 2 times a day 2.23%-0.68% solution Apply 1 drop(s) in each eye 2 times a day; ADMINSITER 1 DROP INTO BOTH EYES TWICE DAILY       ezetimibe (ZETIA) 10 MG tablet Take 1 tablet (10 mg) by mouth daily 90 tablet 0     fexofenadine (ALLEGRA ALLERGY) 180 MG tablet Take 180 mg by mouth daily       flecainide (TAMBOCOR) 100 MG tablet Take 50mg (1/2 tablet) po bid 180 tablet 2     fluticasone (FLONASE) 50 MCG/ACT spray Spray 1 spray into both nostrils daily as needed for rhinitis or allergies       latanoprost (XALATAN) 0.005 % ophthalmic solution INSTILL 1 DROP IN BOTH EYES QHS.  6     lisinopril (ZESTRIL) 5 MG tablet Take 5 mg by mouth daily       mirtazapine (REMERON) 7.5 MG tablet        Multiple Vitamins-Minerals (OCUVITE EYE + MULTI PO) Take 1 tablet by mouth       Multiple Vitamins-Minerals (PRESERVISION AREDS 2 PO) Take by mouth daily        nitroGLYcerin (NITROSTAT) 0.4 MG sublingual tablet  For chest pain place 1 tablet under the tongue every 5 minutes for 3 doses. If symptoms persist 5 minutes after 1st dose call 911. 25 tablet 3     rivastigmine (EXELON) 4.6 MG/24HR 24 hr patch Place 1 patch onto the skin daily       rosuvastatin (CRESTOR) 10 MG tablet Take 1 tablet (10 mg) by mouth daily 90 tablet 0     ZOLPIDEM TARTRATE PO Take 5-10 mg by mouth nightly as needed        Acetaminophen (TYLENOL) 325 MG CAPS Take 325-650 mg by mouth every 4 hours as needed (Patient not taking: No sig reported)       albuterol (PROAIR HFA/PROVENTIL HFA/VENTOLIN HFA) 108 (90 Base) MCG/ACT Inhaler Inhale 2 puffs into the lungs every 6 hours as needed for shortness of breath / dyspnea or wheezing (Patient not taking: No sig reported) 1 Inhaler 0     amoxicillin (AMOXIL) 500 MG tablet Take 2,000 mg by mouth       Pantoprazole Sodium (PROTONIX PO) Take 40 mg by mouth 2 times daily (Patient not taking: Reported on 4/27/2022)       Psyllium (METAMUCIL PO) Take 1 Dose by mouth daily as needed  (Patient not taking: No sig reported)       pyridostigmine (MESTINON) 60 MG tablet USE UP TO 3 TS DAILY AS NEEDED. DISPENSE IN ORIGINAL  CONTAINER (Patient not taking: No sig reported)  3     RaNITidine HCl (ZANTAC PO) Take 150 mg by mouth daily as needed for heartburn (Patient not taking: No sig reported)       TESTOSTERONE AQUEOUS IM Inject 1 mL into the muscle every 14 days  (Patient not taking: No sig reported)       Testosterone Cypionate 200 MG/ML SOLN  (Patient not taking: No sig reported)       timolol maleate (ISTALOL) 0.5 % opthalmic solution  (Patient not taking: Reported on 4/27/2022)         ALLERGIES     Allergies   Allergen Reactions     Carbidopa W/Levodopa Dizziness     Nausea       PAST MEDICAL HISTORY:  Past Medical History:   Diagnosis Date     Allergic rhinitis      Anxiety      Cognitive impairment      Contact dermatitis      Coronary artery disease     CABG with 4 grafts-LIMA to LAD, SVG to Diagonal,OM  and RCA     Cough      Elevated prolactin level      Erythrocytosis      Gastro-oesophageal reflux disease      GI bleed 10/2010     Hyperlipidemia      Hypertension      Hypogonadism     with low testosterone     Insomnia      Parkinson disease (H)      Paroxysmal atrial fibrillation (H)     flecainide therapy     Post-nasal drip      Prediabetes      Renal disease     CKD     Shoulder arthritis      SVT (supraventricular tachycardia) (H)      Tremor        PAST SURGICAL HISTORY:  Past Surgical History:   Procedure Laterality Date     BYPASS GRAFT ARTERY CORONARY       CARDIAC SURGERY       COLONOSCOPY  12/2010    tubular adenoma     LASER SELECTIVE TRABECULOPLASTY BILATERAL Bilateral 6/11/2018    Procedure: LASER SELECTIVE TRABECULOPLASTY BILATERAL;  LASER SELECTIVE TRABECULOPLASTY BILATERAL ;  Surgeon: Parmjit Camacho MD;  Location: [facility]     ORTHOPEDIC SURGERY      rotator cuff right     PHACOEMULSIFICATION CLEAR CORNEA WITH STANDARD IOL, VITRECTOMY PARSPLANA 23 GAGUE, COMBINED  2/20/2013    Procedure: COMBINED PHACOEMULSIFICATION CLEAR CORNEA WITH STANDARD INTRAOCULAR LENS IMPLANT, VITRECTOMY PARSPLANA 23 GAUGE;;  Surgeon: Geuro Lockett MD;  Location: [facility]     PHACOEMULSIFICATION CLEAR CORNEA WITH STANDARD IOL, VITRECTOMY PARSPLANA 23 GAGUE, COMBINED  7/31/2013    Procedure: COMBINED PHACOEMULSIFICATION CLEAR CORNEA WITH STANDARD INTRAOCULAR LENS IMPLANT, VITRECTOMY PARSPLANA 23 GAUGE;;  Surgeon: Guero Lockett MD;  Location: [facility]     REVERSE ARTHROPLASTY SHOULDER Right 7/24/2017    Procedure: REVERSE ARTHROPLASTY SHOULDER;  RIGHT REVERSE TOTAL SHOULDER ARTHROPLASTY ;  Surgeon: Rodolfo Calhoun MD;  Location:  OR     VASCULAR SURGERY      cab     VITRECTOMY PARSPLANA, PHACOEMULSIFICATION CLEAR CORNEA W STANDARD INTRAOCULAR LENS IMPLANT, COMBINED  2/20/2013    Procedure: COMBINED VITRECTOMY PARSPLANA, PHACOEMULSIFICATION CLEAR CORNEA WITH STANDARD INTRAOCULAR LENS IMPLANT;  LEFT  PHACOEMULSIFICATION CLEAR CORNEA WITH SAURABH TORIC INTRAOCULAR LENS IMPLANT,  LEFT EYE 23 GAUGE PARSPLANA VITRECTOMY;  Surgeon: Jorge Regan MD;  Location: [facility]     VITRECTOMY PARSPLANA, PHACOEMULSIFICATION CLEAR CORNEA W STANDARD INTRAOCULAR LENS IMPLANT, COMBINED  2013    Procedure: COMBINED VITRECTOMY PARSPLANA, PHACOEMULSIFICATION CLEAR CORNEA WITH STANDARD INTRAOCULAR LENS IMPLANT;  RIGHT PHACOEMULSIFICATION CLEAR CORNEA WITH TORIC INTRAOCULAR LENS IMPLANT, RIGHT 23 GAUGE PARSPLANA VITRECTOMY ;  Surgeon: Jorge Regan MD;  Location: [facility]       FAMILY HISTORY:  Family History   Problem Relation Age of Onset     Other - See Comments Mother         old age     Other - See Comments Father 97     Cerebrovascular Disease Father        SOCIAL HISTORY:  Social History     Socioeconomic History     Marital status:      Spouse name: None     Number of children: None     Years of education: None     Highest education level: None   Tobacco Use     Smoking status: Former Smoker     Packs/day: 0.10     Years: 20.00     Pack years: 2.00     Types: Cigarettes     Start date:      Quit date: 1999     Years since quittin.3     Smokeless tobacco: Never Used     Tobacco comment: recreational only   Substance and Sexual Activity     Alcohol use: Yes     Comment: 1-2 drinks year     Drug use: No   Other Topics Concern     Parent/sibling w/ CABG, MI or angioplasty before 65F 55M? No     Sleep Concern Yes     Special Diet Yes     Comment: less meat      Exercise Yes     Comment: 3 x weekly     Seat Belt Yes       Review of Systems:  Skin:  Negative       Eyes:  Positive for glasses macular degeneration  ENT:  Positive for postnasal drainage    Respiratory:  Positive for cough dry cough related to allergies   Cardiovascular:  Negative Positive for;fatigue dx parkinsons  Gastroenterology: Positive for heartburn treated  Genitourinary:  Negative      Musculoskeletal:  Negative   fell 3 weeks  "ago  Neurologic:  Negative memory problems Parkinson  Psychiatric:  Positive for anxiety occ  Heme/Lymph/Imm:  Negative easy bruising    Endocrine:  Negative        Physical Exam:  Vitals: /73   Pulse (!) 48   Ht 1.734 m (5' 8.25\")   Wt 70.3 kg (155 lb)   SpO2 98%   BMI 23.40 kg/m      Constitutional:           Skin:             Head:           Eyes:           Lymph:      ENT:           Neck:           Respiratory:            Cardiac:                                                           GI:           Extremities and Muscular Skeletal:                 Neurological:           Psych:           CC  Karlie Robin, APRN CNP  [address] [place] [address]              "

## 2022-04-27 NOTE — PROGRESS NOTES
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  Over the years, he has followed with Dr. Bernard, Dr. Vegas, Dr. Cartagena, Dr. Guzmán and is now seeking me out.     Meme's coronary history dates back to 1995 when he had coronary artery bypass grafting x4.  He has paroxysmal atrial fibrillation for which he has been on flecainide for 11 years, chronic renal insufficiency grade 3A, hyperlipidemia, severe Parkinson disease, hypertension, frequent falls.    Review of the chart shows his coronary bypass was a LIMA to left anterior descending artery and what appears to be separate saphenous vein grafts to the diagonal/obtuse marginal and right coronary artery.  A Lexiscan in 2018 demonstrated trivial apical ischemia.  An echocardiogram in 06/2021 demonstrated ejection fraction of 55%-60% without focal wall motion abnormality and no significant valvular pathology.  EKG in 08/2021 demonstrated sinus bradycardia at 51 beats per minute.    He has been on flecainide since 2011.  Dr. Guzmán saw him in 2020 after he had a flecainide level from the ER that came back high at 1.9.  Attempts were made to switch him from flecainide to metoprolol.  He did not tolerate this very well.  The dose was adjusted multiple times.  Ultimately, after some discussion, it was decided to go back on flecainide despite his history of underlying coronary artery disease.  The patient was on dabigatran, but after a series of falls, it was decided that anticoagulation was too high a risk and he has not been on anticoagulation.    Hayes Valentine returns to clinic stating he thinks he is doing fairly well.  He has no chest, arm, neck, jaw or shoulder discomfort.  No dyspnea on exertion, orthopnea or PND.  He only has trace peripheral edema.  He notes no side effects or problems with his current medical regimen.  His nocturnal palpitations have not been an issue.  His wife is in agreement  with all of this.  He states he tries to walk several times a day every day.  He thinks he gets about 15-20 minutes in, and he walks in the halls of his apartment.  He uses his walker to try to avoid falls, but his wife does describe 3 or 4 pretty significant falls in the last year, one of which he struck his head.  Fortunately, not on anticoagulation.    Review of the chart shows that he has lost about 20 pounds of weight over the last 3 years.  He is surprised by that, although his wife states he is a very picky eater and does not eat very well.  Review of the chart also shows low total protein of 5.8 last summer.  His last fasting lipid profile that I see is from 2017, although he follows with [facility] who is not on [facility], so I will contact their office to see if there is a more recent fasting lipid profile. Again, the last thyroid function test I see is 2017 and I will try to obtain those from Dr. Lucrecia Pacheco as well.    ASSESSMENT AND PLAN:  Dr. Valentine appears to be doing well from a cardiac standpoint without clinical evidence of ischemia, heart failure or significant arrhythmia.    EKG does show that he is significantly bradycardic, but as he does not appear to be having any symptoms, I will leave his medical regimen as is, but I have told him I would consider decreasing his flecainide to 25 b.i.d. if he should have any problems.    I congratulated him on his exercise.  I encouraged him to continue to do so.    We talked about several of his numbers look like they may be malnutrition and this, along with his weight loss, I have encouraged him to eat more and eat better.    I will have him follow up in 6 months with my JUANY.  I will see him back in a year.  If he should have any problems, I would be glad to see him sooner.    We reviewed his falling thing and I agree he should not be on anticoagulation and we told him from our standpoint we would not recommend flecainide.  However, he knows  that it is at increased risk for sudden death.  Obviously, he has tolerated it well for 11 years.  He is now 88 years old, so we will continue it at its current dose, adjusting if he should become more bradycardic or symptomatic.    Ricardo Ni MD, [facility]        D: 2022   T: 2022   MT: angela    Name:     MAGAN COWAN  MRN:      [id number]        Account:      [id number]   :      10/14/1933           Service Date: 2022       Document: [id number]

## 2022-05-31 DIAGNOSIS — E78.2 MIXED HYPERLIPIDEMIA: ICD-10-CM

## 2022-05-31 DIAGNOSIS — I25.810 CORONARY ARTERY DISEASE INVOLVING CORONARY BYPASS GRAFT OF NATIVE HEART WITHOUT ANGINA PECTORIS: ICD-10-CM

## 2022-05-31 RX ORDER — ROSUVASTATIN CALCIUM 10 MG/1
10 TABLET, COATED ORAL DAILY
Qty: 90 TABLET | Refills: 3 | Status: SHIPPED | OUTPATIENT
Start: 2022-05-31 | End: 2023-03-13

## 2022-05-31 RX ORDER — EZETIMIBE 10 MG/1
10 TABLET ORAL DAILY
Qty: 90 TABLET | Refills: 3 | Status: SHIPPED | OUTPATIENT
Start: 2022-05-31 | End: 2023-02-23

## 2022-06-29 ENCOUNTER — VIRTUAL VISIT (OUTPATIENT)
Dept: PHARMACY | Facility: PHYSICIAN GROUP | Age: 87
End: 2022-06-29
Payer: COMMERCIAL

## 2022-06-29 DIAGNOSIS — I25.10 CORONARY ARTERY DISEASE INVOLVING NATIVE CORONARY ARTERY OF NATIVE HEART WITHOUT ANGINA PECTORIS: Primary | ICD-10-CM

## 2022-06-29 DIAGNOSIS — I48.0 PAROXYSMAL ATRIAL FIBRILLATION (H): ICD-10-CM

## 2022-06-29 DIAGNOSIS — Z13.5 SCREENING FOR EYE CONDITION: ICD-10-CM

## 2022-06-29 DIAGNOSIS — K21.9 GASTROESOPHAGEAL REFLUX DISEASE, UNSPECIFIED WHETHER ESOPHAGITIS PRESENT: ICD-10-CM

## 2022-06-29 DIAGNOSIS — I10 BENIGN ESSENTIAL HYPERTENSION: ICD-10-CM

## 2022-06-29 DIAGNOSIS — G20.A1 PARKINSON DISEASE (H): ICD-10-CM

## 2022-06-29 DIAGNOSIS — E78.2 MIXED HYPERLIPIDEMIA: ICD-10-CM

## 2022-06-29 DIAGNOSIS — G47.00 INSOMNIA, UNSPECIFIED TYPE: ICD-10-CM

## 2022-06-29 DIAGNOSIS — J30.1 ALLERGIC RHINITIS DUE TO POLLEN, UNSPECIFIED SEASONALITY: ICD-10-CM

## 2022-06-29 DIAGNOSIS — E55.9 VITAMIN D DEFICIENCY: ICD-10-CM

## 2022-06-29 DIAGNOSIS — Z78.9 TAKES DIETARY SUPPLEMENTS: ICD-10-CM

## 2022-06-29 PROCEDURE — 99605 MTMS BY PHARM NP 15 MIN: CPT | Performed by: PHARMACIST

## 2022-06-29 PROCEDURE — 99607 MTMS BY PHARM ADDL 15 MIN: CPT | Performed by: PHARMACIST

## 2022-06-29 RX ORDER — FEXOFENADINE HCL 180 MG/1
180 TABLET ORAL DAILY
COMMUNITY

## 2022-06-29 RX ORDER — CYANOCOBALAMIN 1000 UG/ML
1 INJECTION, SOLUTION INTRAMUSCULAR; SUBCUTANEOUS
COMMUNITY

## 2022-06-29 RX ORDER — LATANOPROSTENE BUNOD 0.24 MG/ML
1 SOLUTION/ DROPS OPHTHALMIC AT BEDTIME
COMMUNITY

## 2022-06-29 NOTE — PATIENT INSTRUCTIONS
"Recommendations from today's MTM visit:                                                    MTM (medication therapy management) is a service provided by a clinical pharmacist designed to help you get the most of out of your medicines.   Today we reviewed what your medicines are for, how to know if they are working, that your medicines are safe and how to make your medicine regimen as easy as possible.      1. Recommend Pantoprazole 40mg once daily.     2. Clonazepam 1/2 tab (0.25mg) with Mirtazpine 1 tablet at bedtime    3. Stop cetirizine and start fexofenadine.     4. Okay to skip calcium, due for recheck of Vitamin D level with next office visit.       Follow-up: Return in about 2 weeks (around 7/13/2022) for Phone call with [facility].    It was great speaking with you today.  I value your experience and would be very thankful for your time in providing feedback in our clinic survey. In the next few days, you may receive an email or text message from [employer] with a link to a survey related to your  clinical pharmacist.\"       My Clinical Pharmacist's contact information:                                                      Please feel free to contact me with any questions or concerns you have.     Mary Ellen Amaya, Pharm.D, [facility]  Medication Therapy Management Pharmacist  [phone]    "

## 2022-06-29 NOTE — PROGRESS NOTES
Medication Therapy Management (MTM) Encounter    ASSESSMENT:                            Medication Adherence/Access: See below for considerations    CAD/Hypertension/Afib: Blood pressure at goal, need to minimize risk of falls. Has been evaluated for risk/benefit of anticoagulation. Continue same regimen.     Vitamin D Deficiency: Due for recheck.     Hyperlipidemia: Stable.     Insomnia: medication regimen is high risk given his age and comorbidies. Would be best to minimize the use of zolpidem and benzodiazepines to lower risk of falls, confusion etc and maximize the slightly safe option with mirtazapine. Recommend increase mirtazapine back to 7.5mg daily while lowering the clonazepam to 0.25mg daily for now. Would consider possible ongoing taper if patient is able to tolerate tapering down on both or one of the clonazepam or zolpidem medications.     GERD: recommend lower dose to minimize side effects and if ineffective then can consider dose increase.     Parkinson's: continue follow up with neurology.     Allergic Rhinitis: given concern for memory issues, would consider switching antihistamine to less anticholinergic option like fexofenadine to decrease potential impact on cognition.     Eyes: continue to work with eye clinic.     Supplements: Stable.    PLAN:                            1. Recommend Pantoprazole 40mg once daily.     2. Clonazepam 1/2 tab (0.25mg) with Mirtazpine 1 tablet at bedtime    3. Stop cetirizine and start fexofenadine.     4. Okay to skip calcium, due for recheck of Vitamin D level with next office visit.     Follow-up: Return in about 2 weeks (around 7/13/2022) for Phone call with MTM.    SUBJECTIVE/OBJECTIVE:                          Chava Valentine is a 88 year old male called for an initial visit. He was referred to me from Dr. Pacheco.      Reason for visit: Med review, patient along with wife and home care nurse (Zaina) present at time of call -  Was on speaker phone. Only had 30  mins scheduled for visit, focused on medication reconciliation for nurse.     Allergies/ADRs: Reviewed in chart  Past Medical History: Reviewed in chart  Tobacco: He reports that he quit smoking about 23 years ago. His smoking use included cigarettes. He started smoking about 43 years ago. He has a 2.00 pack-year smoking history. He has never used smokeless tobacco.  Alcohol: not currently using    Medication Adherence/Access: Home care med set up every 2 weeks.     CAD/Hypertension/Afib:   Coronary artery Bypass x4 in 1995 on lisinopril 5mg daily. Not on beta blocker due to flecainide use and beta blocker was not tolerated.   Paroxysmal afib taking flecainide for over 11 years and not on any anticoagulation due to frequent falls and risk/benefit discuss that was had around bleed vs clot risks.    BP checks have been 120s/70s, HR 60-80 - home care checking.   BP Readings from Last 3 Encounters:   04/27/22 130/73   11/07/21 (!) 149/74   08/26/21 118/69   Scr 1.51, GFR 44 on 6/14/22.     Vitamin D Deficiency: Currently taking weekly D3 90100. No recent level on file. Has on/off calcium in his regimen managed by patient's wife. Looking to simplify where they can.     Hyperlipidemia: Current therapy includes rosuvastatin 5mg twice daily and Ezetimibe (Zetia) 10mg once daily.  Patient reports no significant myalgias or other side effects.  Date: 6/14/22  Total Cholesterol: 142mg/dl  Triglycerides: 212mg/dl  HDL: 39mg/dl  LDL: 68mg/dl    Insomnia: Currently mirtazapine 3.25mg (1/2 of 7.5mg) daily, Clonazepam 0.5mg tablet at night and zolpidem 5mg daily. Family and patient report sleeping well at this time. The mirtazapine was started at 7.5mg daily, but decreased by neurology after side effects when using in combination with the clonazepam and zolpidem. Patient at high risk of falls.     GERD: Current medications include: Protonix (pantoprazole) 40mg  once daily and sometimes twice daily. Patient reports no current  symptoms.  Patient feels that current regimen is effective.    Parkinson's: Currently getting Exelon patch replacing at bedtime, managed by his wife. Denies side effects at this time.     Allergic Rhinitis: Current medications include cetirizine 10mg once daily but managed by the wife and unclear frequency and consistency of use. Primary symptoms are nasal congestion. Patient feels that current therapy is effective.     Eyes: Seeing eye doctor, updated med list with drops and eye vitamin, did not go into further details on this today.     Supplements: Currently getting b12 injections once monthly. Last checked Aug 2021= 260pg/ml.      ----------------    I spent 30 minutes with this patient today. All changes were made via collaborative practice agreement with Lucercia Pacheco MD. A copy of the visit note was provided to the patient's provider(s).    The patient was mailed a summary of these recommendations.     Mary Ellen Amaya, Pharm.D, [facility]  Medication Therapy Management Pharmacist  [phone]    Telemedicine Visit Details  Type of service:  Telephone visit  Start Time: 10:31 AM  End Time: 11:01 AM  Originating Location (patient location): [place]  Distant Location (provider location):  [facility] MTM     Medication Therapy Recommendations  Allergic rhinitis due to pollen, unspecified seasonality    Rationale: Unsafe medication for the patient - Adverse medication event - Safety   Recommendation: Change Medication - fexofenadine 180 MG tablet - stop cetirizine and use fexofenadine   Status: Accepted per CPA         Gastroesophageal reflux disease    Current Medication: Pantoprazole Sodium (PROTONIX PO)   Rationale: Dose too high - Dosage too high - Safety   Recommendation: Decrease Dose - lower dose to once daily   Status: Accepted per CPA         Insomnia, unspecified type    Current Medication: CLONAZEPAM PO   Rationale: Unsafe medication for the patient - Adverse medication event - Safety    Recommendation: Change Medication - mirtazapine 7.5 MG tablet - increase mirtazapine and decrease clonazepam   Status: Accepted per CPA         Takes dietary supplements    Rationale: Patient prefers not to take - Adherence - Adherence   Recommendation: Discontinue Medication - calcium carbonate 600 MG Tabs - okay to stop   Status: Accepted - no CPA Needed

## 2022-07-13 ENCOUNTER — VIRTUAL VISIT (OUTPATIENT)
Dept: PHARMACY | Facility: PHYSICIAN GROUP | Age: 87
End: 2022-07-13
Payer: COMMERCIAL

## 2022-07-13 DIAGNOSIS — G47.00 INSOMNIA, UNSPECIFIED TYPE: ICD-10-CM

## 2022-07-13 DIAGNOSIS — I48.0 PAROXYSMAL ATRIAL FIBRILLATION (H): ICD-10-CM

## 2022-07-13 DIAGNOSIS — I10 BENIGN ESSENTIAL HYPERTENSION: ICD-10-CM

## 2022-07-13 DIAGNOSIS — K21.9 GASTROESOPHAGEAL REFLUX DISEASE, UNSPECIFIED WHETHER ESOPHAGITIS PRESENT: ICD-10-CM

## 2022-07-13 DIAGNOSIS — I25.10 CORONARY ARTERY DISEASE INVOLVING NATIVE CORONARY ARTERY OF NATIVE HEART WITHOUT ANGINA PECTORIS: Primary | ICD-10-CM

## 2022-07-13 DIAGNOSIS — J30.1 ALLERGIC RHINITIS DUE TO POLLEN, UNSPECIFIED SEASONALITY: ICD-10-CM

## 2022-07-13 PROCEDURE — 99606 MTMS BY PHARM EST 15 MIN: CPT | Performed by: PHARMACIST

## 2022-07-13 PROCEDURE — 99607 MTMS BY PHARM ADDL 15 MIN: CPT | Performed by: PHARMACIST

## 2022-07-13 NOTE — PROGRESS NOTES
Medication Therapy Management (MTM) Encounter    ASSESSMENT:                            Medication Adherence/Access: No issues identified    CAD/Hypertension/Afib: stable, continue same regimen for now.     Insomnia: ongoing discussion of his high risk regimen and goal to reduce overall fall risk and side effect profiles. Given a few harder nights this week will not taper clonazepam down further today, but do want to cut out extra PRN doses to avoid excess sedation, increased confusion and increased fall risk.     GERD: Stable.     Allergic Rhinitis: reminder to  fexofenadine instead as this is less anticholinergic.     PLAN:                            1. Only use 1/2 tablet clonazepam at night, no extra as we are trying to decrease the need for this medication.   2. Mirtazapine 7.5mg daily continued.   3. Allegra to replace Zyrtec (cetirizine).     Follow-up: Return in 2 weeks (on 7/27/2022) for Phone call with MTM.    SUBJECTIVE/OBJECTIVE:                          Chava Valentine is a 88 year old male called for a follow-up visit.  Today's visit is a follow-up MTM visit from 6/29     Reason for visit: patient, patient's wife and home care nurse Zaina all present on speaker phone for today's visit.     Allergies/ADRs: Reviewed in chart  Past Medical History: Reviewed in chart  Tobacco: He reports that he quit smoking about 23 years ago. His smoking use included cigarettes. He started smoking about 43 years ago. He has a 2.00 pack-year smoking history. He has never used smokeless tobacco.  Alcohol: not currently using    Medication Adherence/Access: Patient has assistance with medication administration: home care. Patient has trouble remembering details, so much of subjective information is offered up by her observations.     CAD/Hypertension/Afib: Flecainide 50mg twice daily. Coronary artery Bypass x4 in 1995 on lisinopril 5mg daily. Not on beta blocker due to flecainide use and beta blocker was not  tolerated.   Paroxysmal afib taking flecainide for over 11 years and not on any anticoagulation due to frequent falls and risk/benefit discuss that was had around bleed vs clot risks.    BP checks have been 120s/70s, HR 60-80 - home care checking.   BP Readings from Last 3 Encounters:   04/27/22 130/73   11/07/21 (!) 149/74   08/26/21 118/69   Scr 1.51, GFR 44 on 6/14/22.     Insomnia: last visit attempted to increase mirtazapine to 7.5mg daily, decrease Clonazepam to 0.25mg tablet (1/2 tablet) at night and left zolpidem at 5mg daily.   Patient denied issues sleeping, however wife reports last few nights he has been up more. Has given him extra clonazepam those nights.   Patient at high risk of falls.     GERD: Current medications include: Protonix (pantoprazole) 40mg once daily. Patient reports no current symptoms.  Patient feels that current regimen is effective. Previously had varied dosing between daily and twice daily, has not had issues with heartburn on reduced dose.     Allergic Rhinitis: Current medications include cetirizine as needed managed by wife, not in pill box. Last visit recommended switch cetirizine 10mg once daily to Fexofenadine, but this has not been purchased yet. Unclear frequency and consistency of use. Primary symptoms are nasal congestion. Patient feels that current therapy is effective.     ----------------    I spent 20 minutes with this patient today. All changes were made via collaborative practice agreement with Lucrecia Pacheco MD. A copy of the visit note was provided to the patient's provider(s).    The patient was sent via [employer] a summary of these recommendations.     Mary Ellen Amaya, Pharm.D, [facility]  Medication Therapy Management Pharmacist  [phone]      Telemedicine Visit Details  Type of service:  Telephone visit  Start Time: 1:56 PM  End Time: 2:16pm  Originating Location (patient location): Home  Distant Location (provider location):  [facility]  MTM     Medication Therapy Recommendations  Insomnia, unspecified type    Current Medication: CLONAZEPAM PO   Rationale: Does not understand instructions - Adherence - Adherence   Recommendation: Provide Education - want to avoid extra doses of clonazepam to lower overall risks.   Status: Patient Agreed - Adherence/Education

## 2022-07-13 NOTE — PATIENT INSTRUCTIONS
"Recommendations from today's MTM visit:                                                         1. Only use 1/2 tablet clonazepam at night, no extra as we are trying to decrease the need for this medication.   2. Mirtazapine 7.5mg daily continued.   3. Allegra to replace Zyrtec (cetirizine).      Follow-up: No follow-ups on file.    It was great speaking with you today.  I value your experience and would be very thankful for your time in providing feedback in our clinic survey. In the next few days, you may receive an email or text message from [employer] with a link to a survey related to your  clinical pharmacist.\"     To schedule another MTM appointment, please call the clinic directly or you may call the MTM scheduling line at [phone] or toll-free at [phone].     My Clinical Pharmacist's contact information:                                                      Please feel free to contact me with any questions or concerns you have.      Mary Ellen Amaya, Pharm.D, [facility]  Medication Therapy Management Pharmacist  [phone]     "

## 2022-07-27 ENCOUNTER — VIRTUAL VISIT (OUTPATIENT)
Dept: PHARMACY | Facility: PHYSICIAN GROUP | Age: 87
End: 2022-07-27
Payer: COMMERCIAL

## 2022-07-27 DIAGNOSIS — I25.10 CORONARY ARTERY DISEASE INVOLVING NATIVE CORONARY ARTERY OF NATIVE HEART WITHOUT ANGINA PECTORIS: Primary | ICD-10-CM

## 2022-07-27 DIAGNOSIS — I48.0 PAROXYSMAL ATRIAL FIBRILLATION (H): ICD-10-CM

## 2022-07-27 DIAGNOSIS — J30.1 ALLERGIC RHINITIS DUE TO POLLEN, UNSPECIFIED SEASONALITY: ICD-10-CM

## 2022-07-27 DIAGNOSIS — G47.00 INSOMNIA, UNSPECIFIED TYPE: ICD-10-CM

## 2022-07-27 DIAGNOSIS — K21.9 GASTROESOPHAGEAL REFLUX DISEASE, UNSPECIFIED WHETHER ESOPHAGITIS PRESENT: ICD-10-CM

## 2022-07-27 DIAGNOSIS — E55.9 VITAMIN D DEFICIENCY: ICD-10-CM

## 2022-07-27 DIAGNOSIS — Z78.9 TAKES DIETARY SUPPLEMENTS: ICD-10-CM

## 2022-07-27 DIAGNOSIS — I10 BENIGN ESSENTIAL HYPERTENSION: ICD-10-CM

## 2022-07-27 PROCEDURE — 99606 MTMS BY PHARM EST 15 MIN: CPT | Performed by: PHARMACIST

## 2022-07-27 NOTE — PATIENT INSTRUCTIONS
"Recommendations from today's MTM visit:                                                       1. Change clonazepam to only as needed at night to avoid excess medication/sedation/risks.     2. Change lisinopril to 5mg once daily.     Follow-up: Return in about 3 months (around 10/27/2022) for Phone call with MTM.    It was great speaking with you today.  I value your experience and would be very thankful for your time in providing feedback in our clinic survey. In the next few days, you may receive an email or text message from [employer] with a link to a survey related to your  clinical pharmacist.\"       My Clinical Pharmacist's contact information:                                                      Please feel free to contact me with any questions or concerns you have.      Mary Ellen Amaya, Pharm.D, [facility]  Medication Therapy Management Pharmacist  [phone]     "

## 2022-07-27 NOTE — PROGRESS NOTES
Medication Therapy Management (MTM) Encounter    ASSESSMENT:                            Medication Adherence/Access: No issues identified    CAD/Hypertension/Afib: Given the lower range of blood pressure and simplfying doses, decrease lisinopril to 5mg once daily.     Insomnia: Has not been getting clonazepam at night and tolerating this okay, so recommend to continue to limit the clonazepam as much as possible. Wife still concerned for stopping it completley as there are times he gets up in the middle of the night and would like to have this available if needed. Unable to reduce medication further at this time due to concerns/fears for worsening insomnia, so will continue on lowest possible combination for now.     GERD: Stable.    Allergic Rhinitis: Stable.     Vitamin D Deficiency/Supplement: Discussed okay to continue if they wish, but if looking to simplify would be okay to stop the calcium supplementation.     PLAN:                            1. Change clonazepam to only as needed at night to avoid excess medication/sedation/risks.     2. Change lisinopril to 5mg once daily.     Follow-up: Return in about 3 months (around 10/27/2022) for Phone call with MTM.    SUBJECTIVE/OBJECTIVE:                          Chava Valentine is a 88 year old male called for a follow-up visit.  Today's visit is a follow-up MTM visit from 7/13     Reason for visit: Med check in with patient, home care nurse Ginger and patient's wife.    Allergies/ADRs: Reviewed in chart  Past Medical History: Reviewed in chart  Tobacco: He reports that he quit smoking about 23 years ago. His smoking use included cigarettes. He started smoking about 43 years ago. He has a 2.00 pack-year smoking history. He has never used smokeless tobacco.  Alcohol: not currently using    Medication Adherence/Access: Patient has assistance with medication administration: home care. Patient has trouble remembering details, so much of subjective information is offered  up by her observations and patient's spouse.     CAD/Hypertension/Afib: Flecainide 50mg twice daily. Coronary artery Bypass x4 in 1995 on lisinopril 5mg in the AM and 2.5mg in the afternoon (prescription written for 1-2 tabs twice daily as needed, so has had varied amounts in pill box).   Has most recently been on lisinopril 5mg AM and 2.5mg PM with readings done by home care in the low 100s/60s.   Not on beta blocker due to flecainide use and beta blocker was not tolerated.   Paroxysmal afib taking flecainide for over 11 years and not on any anticoagulation due to frequent falls and risk/benefit discuss that was had around bleed vs clot risks.    Scr 1.51, GFR 44 on 6/14/22.   No recent falls.     Insomnia: last visit attempted to increase mirtazapine to 7.5mg daily and left zolpidem at 5mg daily. Last visit was going between stopping clonazepam and using just the single 1/2 tab at night, but wife states she hasn't been giving it to him at all and he has been sleeping well for the last 2 weeks. She doesn't want the medication to go away as there are hard nights where he will sometimes have more issues, so If he wakes up and gets wandering wants to be able to have the clonazepam as needed available for those occasional times.   Patient at high risk of falls.     GERD: Current medications include: Protonix (pantoprazole) 40mg once daily. Patient reports no current symptoms.  Patient feels that current regimen is effective. Previously had varied dosing between daily and twice daily, has not had issues with heartburn on reduced dose.     Allergic Rhinitis: Current medications include fexofenadine to replaced cetirizine as needed managed by wife.  Primary symptoms are nasal congestion, but feels this is working well. Patient feels that current therapy is effective.     Vitamin D Deficiency/Supplement: Currently taking weekly D3 02862 and Caltrate once daily. Previously had discussed cutting this out to simplify,however  wife is now wanting to continue.   Hx of falls, no Dexa. Tolerating medication.     ----------------      I spent 12 minutes with this patient today. All changes were made via collaborative practice agreement with Lucrecia Pacheco MD. A copy of the visit note was provided to the patient's provider(s).    The patient was sent via [employer]nik a summary of these recommendations.     Mary Ellen Amaya, Pharm.D, [facility]  Medication Therapy Management Pharmacist  [phone]      Telemedicine Visit Details  Type of service:  Telephone visit  Start Time: 11:09 AM  End Time: 11:21 AM  Originating Location (patient location): [place]  Distant Location (provider location):  [facility] MTM     Medication Therapy Recommendations  Benign essential hypertension    Current Medication: lisinopril (ZESTRIL) 5 MG tablet   Rationale: Dose too high - Dosage too high - Safety   Recommendation: Decrease Dose - lower to 5mg once daily   Status: Accepted per CPA         Insomnia, unspecified type    Current Medication: CLONAZEPAM PO   Rationale: Frequency inappropriate - Dosage too high - Safety   Recommendation: Decrease Frequency - change to ongoing PRN use only, not scheduled   Status: Accepted per Provider

## 2022-08-09 ENCOUNTER — VIRTUAL VISIT (OUTPATIENT)
Dept: PHARMACY | Facility: PHYSICIAN GROUP | Age: 87
End: 2022-08-09
Payer: COMMERCIAL

## 2022-08-09 DIAGNOSIS — G47.00 INSOMNIA, UNSPECIFIED TYPE: Primary | ICD-10-CM

## 2022-08-09 PROCEDURE — 99207 PR NO CHARGE LOS: CPT | Performed by: PHARMACIST

## 2022-08-12 NOTE — PROGRESS NOTES
Clinical Pharmacy Consult:                                                    Chava Valentine is a 88 year old male called for a clinical pharmacist consult.  He was referred to me from Dr. Pacheco.     Reason for Consult: Zolpidem question.    Discussion: Currently taking zolpidem 5mg nightly, however it ran out about 4 days ago. His wife is also on zolpidem and she's been using his after her bottle ran out about 1.5 months ago, which she believes someone else took hers. They have help from [facility] 10am-8pm 7 days/week. Now she's keeping meds under lock and key. Someone got into one drawer because they found the key. So now using the other drawer and keeping the key on her person at all times. Patient is also prescribed clonazepam by his neurologist, although [facility] has been working on titrating down to as needed use only. His wife is also on clonazepam 0.5mg and admits to taking 1 tablet at bedtime since ran out of zolpidem.     Dr. Pacheco and I discussed above situation and reviewed patient's  history.  Zolpidem 5mg filled qty #30 for 30 day supply on 7/14/22 and 7/15/22.  Clonazepam 0.5mg filled qty #60 for 30 day supply on 4/6/22 and 6/15/22.    Plan:  1. Dr. Pacheco refilled zolpidem 5mg at bedtime as needed, qty #30 for 30 day supply. I called [facility] and discontinued all other zolpidem orders.  2. Patient must follow-up with Dr. Pacheco prior to further refills. Scheduled appointment on 8/26.  3. I told patient this is a one time exception, cannot use zolpidem or clonazepam more than prescribed, cannot take wife's medications.   4. Patient must contact his neurologist regarding clonazepam refills.    Denise Sanders, PharmD, [facility]  Medication Therapy Management Pharmacist  Pager: [phone]

## 2022-10-11 ENCOUNTER — TRANSCRIBE ORDERS (OUTPATIENT)
Dept: OTHER | Age: 87
End: 2022-10-11

## 2022-10-11 DIAGNOSIS — R13.12 OROPHARYNGEAL DYSPHAGIA: Primary | ICD-10-CM

## 2022-10-22 ENCOUNTER — HEALTH MAINTENANCE LETTER (OUTPATIENT)
Age: 87
End: 2022-10-22

## 2022-11-03 DIAGNOSIS — I25.10 ATHEROSCLEROSIS OF NATIVE CORONARY ARTERY OF NATIVE HEART WITHOUT ANGINA PECTORIS: ICD-10-CM

## 2022-11-03 RX ORDER — NITROGLYCERIN 0.4 MG/1
TABLET SUBLINGUAL
Qty: 25 TABLET | Refills: 1 | Status: SHIPPED | OUTPATIENT
Start: 2022-11-03

## 2022-11-10 ENCOUNTER — HOSPITAL ENCOUNTER (OUTPATIENT)
Dept: SPEECH THERAPY | Facility: CLINIC | Age: 87
Discharge: HOME | End: 2022-11-10
Attending: INTERNAL MEDICINE
Payer: MEDICARE

## 2022-11-10 ENCOUNTER — HOSPITAL ENCOUNTER (OUTPATIENT)
Dept: GENERAL RADIOLOGY | Facility: CLINIC | Age: 87
Discharge: HOME | End: 2022-11-10
Attending: INTERNAL MEDICINE
Payer: MEDICARE

## 2022-11-10 DIAGNOSIS — R13.12 OROPHARYNGEAL DYSPHAGIA: ICD-10-CM

## 2022-11-10 PROCEDURE — 74230 X-RAY XM SWLNG FUNCJ C+: CPT

## 2022-11-10 PROCEDURE — 92610 EVALUATE SWALLOWING FUNCTION: CPT | Mod: GN | Performed by: SPEECH-LANGUAGE PATHOLOGIST

## 2022-11-10 PROCEDURE — 92611 MOTION FLUOROSCOPY/SWALLOW: CPT | Mod: GN | Performed by: SPEECH-LANGUAGE PATHOLOGIST

## 2022-11-10 RX ORDER — BARIUM SULFATE 400 MG/ML
60 SUSPENSION ORAL ONCE
Status: DISCONTINUED | OUTPATIENT
Start: 2022-11-10 | End: 2022-11-10 | Stop reason: CLARIF

## 2022-11-10 NOTE — PROGRESS NOTES
"   11/10/22 1355   General Information   Type Of Visit Initial   Start Of Care Date 11/10/22   Referring Physician Scarlet Montoya   Orders Evaluate And Treat   Orders Comment Video swallow study   Medical Diagnosis Unspecified dysphagia   Onset Of Illness/injury Or Date Of Surgery 09/28/22   Precautions/limitations Fall Precautions   Hearing [place] with aids   Pertinent History of Current Problem/OT: Additional Occupational Profile Info Patient is a very pleasant 89 year old male with a history of Parkinson's who was referred for a video swallow study by Scarlet Meza MD. He complains of difficulty swallowing. He states over the last 2 months when he is eating he feels like food \"gets stuck.\" This sensation is getting worse. He denies nausea, vomiting or any recent pneumomia.   Respiratory Status Room air   Prior Level Of Function Swallowing   Prior Level Of Function Comment Consumes a regular diet and thin liquids at home.   Patient Role/employment History Retired   Living Environment Apartment/condo   General Observations Pleasant, [place] with mild word finding deficits.   Abuse Screen (yes response referral indicated)   Feels Unsafe at Home or Work/School no   Feels Threatened by Someone no   Does Anyone Try to Keep You From Having Contact with Others or Doing Things Outside Your Home? no   Physical Signs of Abuse Present no   Patient needs abuse support services and resources No   Clinical Swallow Evaluation   Oral Musculature generally intact   Structural Abnormalities none present   Dentition present and adequate   Mucosal Quality good   Mandibular Strength and Mobility intact   Oral Labial Strength and Mobility impaired pursing   Lingual Strength and Mobility impaired anterior elevation   Velar Elevation intact   Buccal Strength and Mobility intact   Laryngeal Function Throat clear;Swallow;Voicing initiated  (Weak swallow response)   Oral Musculature Comments Minimal to mild impariment.   Additional " Documentation Yes   Swallow Eval   Feeding Assistance no assistance needed   Clinical Swallow Eval: Thin Liquid Texture Trial   Mode of Presentation, Thin Liquids cup   Volume of Liquid or Food Presented 3 oz of water   Oral Phase of Swallow Premature pharyngeal entry   Pharyngeal Phase of Swallow intact   Diagnostic Statement No immediate or overt Sx of aspiration.   VFSS Evaluation   VFSS Additional Documentation Yes   VFSS Eval: Radiology   Radiologist Dr. Maza   Views Taken left lateral   Physical Location of Procedure [facility]   VFSS Eval: Thin Liquid Texture Trial   Mode of Presentation, Thin Liquid cup;self-fed   Order of Presentation 1 3 5 6 8   Preparatory Phase WFL   Oral Phase, Thin Liquid Premature pharyngeal entry   Pharyngeal Phase, Thin Liquid Residue in valleculae;Residue in pyriform sinus   Rosenbek's Penetration Aspiration Scale: Thin Liquid Trial Results 3 - contrast remains above the vocal cords, visible residue remains (penetration)   Diagnostic Statement On the first trial patient consumed approximately 4 oz of thin barrium with a total of 7 consecutive swallows and on the final swallow there was trace amount of penetration on the underside of the epiglottis upon review. All other single swallows there was no penetration.   VFSS Eval: Puree Solid Texture Trial   Mode of Presentation, Puree spoon;self-fed   Order of Presentation 2   Preparatory Phase WFL   Oral Phase, Puree Delayed AP movement;Premature pharyngeal entry   Pharyngeal Phase, Puree Residue in valleculae   Rosenbek's Penetration Aspiration Scale: Puree Food Trial Results 1 - no aspiration, contrast does not enter airway   Diagnostic Statement Trace penetration was likely from the consecutive swallows of thins. Moderate amount of vallecular residue and mild pyriform sinus residue.   VFSS Eval: Soft & Bite Sized   Mode of Presentation spoon;self-fed   Order of Presentation 4   Preparatory Phase Poor bolus control    Oral Phase Delayed AP movement;Premature pharyngeal entry   Pharyngeal Phase Residue in valleculae;Residue in pyriform sinus   Rosenbek's Penetration Aspiration Scale 1 - no aspiration, contrast does not enter airway   Diagnostic Statement Mildly decreased bolus control and AP movement with premature entry to the valleculae with moderate vallecular residue that minimally clears with a liquid rinse.   VFSS Eval: Regular Texture Trial (Solid)   Mode of Presentation spoon;self-fed   Order of Presentation 7   Preparatory Phase Poor bolus control   Oral Phase Impaired mastication;Delayed AP movement;Residue in oral cavity;Premature pharyngeal entry   Pharyngeal Phase Residue in valleculae   Rosenbek's Penetration Aspiration Scale 1 - no aspiration, contrast does not enter airway   Diagnostic Statement Mildly prolonged mastication with reduced bolus control during AP movement, tilting his head back with premature entry to the valleculae and moderate vallecular residue that minimally clears with as additional swallow or liquid rinse.   Swallow Compensations   Swallow Compensations Effortful swallow;Multiple swallow   Educational Assessment   Barriers to Learning Cognitive   Swallow Eval: Clinical Impressions   Skilled Criteria for Therapy Intervention Skilled criteria met.  Treatment indicated.   Functional Assessment Scale (FAS) 4   Dysphagia Outcome Severity Scale (ADDI) Level 4 - ADDI   Treatment Diagnosis Mild to moderate oral and pharyngeal dysphagia   Diet texture recommendations Easy to Chew diet (level 7)   Recommended Feeding/Eating Techniques alternate between small bites and sips of food/liquid;check mouth frequently for oral residue/pocketing;hard swallow w/ each bite or sip;maintain upright posture during/after eating for 30 mins;no straws;small sips/bites   Demonstrates Need for Referral to Another Service   (Home health speech therapy)   Anticipated Discharge Disposition home w/ home health   Risks and  Benefits of Treatment have been explained. Yes   Patient, family and/or staff in agreement with Plan of Care Yes   Clinical Impression Comments Patient presents with mild to moderate oral and pharyngeal dysphagia on today's study secondary to Parkinson's. Deficits include; reduced bolus control during AP movement, premature entry, reduced BOT retraction, laryngeal elevation. These deficits resulted in premature entry of thin liquids to the level of the pyriform sinuses with only one instance of mild penetration of thin liquids on the first swallow. He comsumed 4 oz of thin barrium with 7 consecutive swallows and on the last swallow there was penetration on the underside of the epiglottis. No penetration/aspiration on single swallows. Mildly prolonged mastication of semi-solids and solids with reduced AP movement, BOT retraction with mild oral residue and moderate amount of vallecular residue, that only minimally reduced with an additional swallow or liquid rinse. Patient is at risk for penetration/aspiration on residue material remaining in the valleculae.     Recommend: 1. IDDSI level 7 regular easy to chew foods and thin liquids. 2. Upright and remain upright 30 minutes after meals, smaller more frequent meals/snack or breaks. No straws, hard swallow x2 and alternate liquids/solids. 3. Patient would benefit from a trial of home health speech therapy for jj-pharyngeal strengthening.     Total Session Time   SLP Eval: oral/pharyngeal swallow function, clinical minutes (49584) 12   SLP Eval: VideoFluoroscopic Swallow function Minutes (44562) 18   Total Evaluation Time 30

## 2022-11-11 ENCOUNTER — TELEPHONE (OUTPATIENT)
Dept: PHARMACY | Facility: PHYSICIAN GROUP | Age: 87
End: 2022-11-11

## 2022-11-11 NOTE — TELEPHONE ENCOUNTER
Spoke to patient's home care nurse to check on when she will be back out with patient and follow up = she is still going out every other week on Wednesdays and will be back there on 11/16. MT no openings around time she will be at the house, requesting to check in on Zolpidem use, clonazepam use and home blood pressures.     Zaina will call MT back next week to update on these pieces of medication use.     Mary Ellen Amaya, Pharm.D, [facility]  Medication Therapy Management Pharmacist  [phone]

## 2022-11-16 ENCOUNTER — LAB (OUTPATIENT)
Dept: LAB | Facility: CLINIC | Age: 87
End: 2022-11-16
Payer: MEDICARE

## 2022-11-16 ENCOUNTER — OFFICE VISIT (OUTPATIENT)
Dept: CARDIOLOGY | Facility: CLINIC | Age: 87
End: 2022-11-16
Attending: INTERNAL MEDICINE
Payer: MEDICARE

## 2022-11-16 VITALS
OXYGEN SATURATION: 97 % | HEIGHT: 68 IN | BODY MASS INDEX: 23.79 KG/M2 | HEART RATE: 53 BPM | SYSTOLIC BLOOD PRESSURE: 121 MMHG | DIASTOLIC BLOOD PRESSURE: 76 MMHG | WEIGHT: 157 LBS

## 2022-11-16 DIAGNOSIS — I25.10 CORONARY ARTERY DISEASE INVOLVING NATIVE CORONARY ARTERY OF NATIVE HEART WITHOUT ANGINA PECTORIS: ICD-10-CM

## 2022-11-16 DIAGNOSIS — E78.2 MIXED HYPERLIPIDEMIA: ICD-10-CM

## 2022-11-16 DIAGNOSIS — R42 DIZZINESS: ICD-10-CM

## 2022-11-16 DIAGNOSIS — I48.0 PAROXYSMAL ATRIAL FIBRILLATION (H): Primary | ICD-10-CM

## 2022-11-16 DIAGNOSIS — I48.0 PAROXYSMAL ATRIAL FIBRILLATION (H): ICD-10-CM

## 2022-11-16 DIAGNOSIS — I10 BENIGN ESSENTIAL HYPERTENSION: ICD-10-CM

## 2022-11-16 DIAGNOSIS — N18.31 CHRONIC RENAL FAILURE, STAGE 3A (H): ICD-10-CM

## 2022-11-16 LAB
ALT SERPL W P-5'-P-CCNC: 17 U/L (ref 0–70)
ANION GAP SERPL CALCULATED.3IONS-SCNC: 8 MMOL/L (ref 3–14)
BUN SERPL-MCNC: 26 MG/DL (ref 7–30)
CALCIUM SERPL-MCNC: 9 MG/DL (ref 8.5–10.1)
CHLORIDE BLD-SCNC: 108 MMOL/L (ref 94–109)
CHOLEST SERPL-MCNC: 128 MG/DL
CO2 SERPL-SCNC: 23 MMOL/L (ref 20–32)
CREAT SERPL-MCNC: 1.34 MG/DL (ref 0.66–1.25)
FASTING STATUS PATIENT QL REPORTED: YES
GFR SERPL CREATININE-BSD FRML MDRD: 51 ML/MIN/1.73M2
GLUCOSE BLD-MCNC: 101 MG/DL (ref 70–99)
HDLC SERPL-MCNC: 45 MG/DL
LDLC SERPL CALC-MCNC: 64 MG/DL
NONHDLC SERPL-MCNC: 83 MG/DL
POTASSIUM BLD-SCNC: 4.4 MMOL/L (ref 3.4–5.3)
SODIUM SERPL-SCNC: 139 MMOL/L (ref 133–144)
T4 FREE SERPL-MCNC: 0.98 NG/DL (ref 0.76–1.46)
TRIGL SERPL-MCNC: 95 MG/DL
TSH SERPL DL<=0.005 MIU/L-ACNC: 4.09 MU/L (ref 0.4–4)

## 2022-11-16 PROCEDURE — 80061 LIPID PANEL: CPT | Performed by: INTERNAL MEDICINE

## 2022-11-16 PROCEDURE — 99214 OFFICE O/P EST MOD 30 MIN: CPT | Performed by: NURSE PRACTITIONER

## 2022-11-16 PROCEDURE — 84460 ALANINE AMINO (ALT) (SGPT): CPT | Performed by: INTERNAL MEDICINE

## 2022-11-16 PROCEDURE — 93000 ELECTROCARDIOGRAM COMPLETE: CPT | Performed by: NURSE PRACTITIONER

## 2022-11-16 PROCEDURE — 84443 ASSAY THYROID STIM HORMONE: CPT | Performed by: INTERNAL MEDICINE

## 2022-11-16 PROCEDURE — 84439 ASSAY OF FREE THYROXINE: CPT | Performed by: INTERNAL MEDICINE

## 2022-11-16 PROCEDURE — 80048 BASIC METABOLIC PNL TOTAL CA: CPT | Performed by: INTERNAL MEDICINE

## 2022-11-16 PROCEDURE — 36415 COLL VENOUS BLD VENIPUNCTURE: CPT | Performed by: INTERNAL MEDICINE

## 2022-11-16 NOTE — PATIENT INSTRUCTIONS
"Thanks for participating in a office visit with the [facility] today.    Episodes of \" room spinning\" x 3 days.  Follow-up with PCP for further evaluation.  Recommend stopping lisinopril. Patient would like to taper down to 2.5 mg daily vs stopping completely.   EKG shows regular rhythm with bradycardia (stable)  Continue on flecainide.     Follow up in 6 months with JUANY     Please call my nurse at  [phone] with any questions or concerns.    Scheduling phone number: [phone]  Reminder: Please bring in all current medications, over the counter supplements and vitamin bottles to your next appointment.       "

## 2022-11-16 NOTE — PROGRESS NOTES
"Cardiology Clinic Progress Note  Chava Valentine MRN# [id number]   YOB: 1933 Age: 89 year old     Reason For Visit:  6-month follow-up   Primary Cardiologist:   Dr. Ni          History of Presenting Illness:      Chava Valentine is a pleasant 89 year old patient who carries a past medical history significant for coronary artery disease status post remote CABG in 1995 with LIMA to the LAD, SVG to diagonal, SVG to OM, and SVG to RCA, paroxysmal atrial fibrillation managed on chronic flecainide, chronic renal insufficiency, Parkinson's disease, hypertension, hyperlipidemia and cognitive impairment.     He returns to the office today accompanied by his wife for a 6-month follow-up.  His primary complaint is lightheadedness/dizziness with \" room spinning\" over the last 3 days.  He does not endorse any chest pain, shortness of breath, palpitations, PND, orthopnea, or edema.  Upon exam, lungs are clear bilaterally, heart rate bradycardic at 53 with regular rhythm, no evidence of fluid overload.     EKG shows sinus bradycardia (51 ) with first-degree AV block, .  He is currently on 50 mg twice daily for a flecainide and no anticoagulation due to falls risk.     Echocardiogram in 2021 showed a normal ejection fraction estimated at 55 to 60%, normal RV size and function, and no significant valvular disease    Last nuclear stress test in 2018 showed a trivial area of apical inferior ischemia, EF 77% at rest and 52% poststress.    Blood pressure is stable at 121/76  Labs today showed a sodium of 139, potassium 4.4, BUN 26, creatinine 1.34 (baseline 1.6), GFR 51  Lipid panel showed a total cholesterol 128, HDL 45, LDL 64, and triglycerides 95  Weight 157 pounds     Activity is primarily sedentary  Remains compliant with all medications.                   Assessment and Plan:     1.  Coronary artery disease -status post remote CABG in 1995 using a LIMA to the LAD, SVG to diagonal, SVG to OM, and SVG " to RCA.  He does not endorse any ischemic symptoms.  Continue on current medical therapy.    2.  Paroxysmal atrial fibrillation -EKG today showed sinus bradycardia with rate of 51, first-degree AV block, .  Continue low-dose flecainide.  No anticoagulation due to falls risk.    3.  Hypertension -well controlled  4.  Lightheadedness/dizziness -complaints of room spinning x3 days, concerning for vertigo.  Recommend follow-up with primary care physician for further evaluation.  Recommend stopping lisinopril.  Patient wishes to taper to 2.5 mg daily versus stopping altogether.  Continue to monitor symptoms.  If symptoms worsen or become severe seek ER evaluation immediately.  5.  CKD -creatinine stable at 1.34  6.  Hyperlipidemia -LDL at goal, continue low-dose rosuvastatin             Thank you for allowing me to participate in this delightful patient's care. I have recommended he follow up in 6 months with JUANY or sooner if needed.       Karlie Robin, APRN CNP         Review of Systems:     Review of Systems:  Skin:  not assessed     Eyes:  not assessed    ENT:  not assessed    Respiratory:  Negative    Cardiovascular:  Negative    Gastroenterology: not assessed    Genitourinary:  not assessed    Musculoskeletal:  not assessed    Neurologic - lightheadedness, dizziness  Psychiatric:  not assessed    Heme/Lymph/Imm:  not assessed    Endocrine:  not assessed                Physical Exam:     GEN:  In general, this is a well nourished elderly male in no acute distress.  HEENT:  Pupils equal, round. Sclerae nonicteric. Clear oropharynx. Mucous membranes moist.  NECK:   No JVD   C/V:  Regular rate and rhythm, no murmur, rub or gallop. No S3 or RV heave.   RESP: Respirations are unlabored. No use of accessory muscles. Clear to auscultation bilaterally without wheezing, rales, or rhonchi.  GI: Abdomen soft, nontender, nondistended. No HSM appreciated.   EXTREM: No LE edema. No cyanosis or clubbing.  NEURO: Alert and  oriented, cooperative.  Gait unsteady due to dizziness   PSYCH: Normal affect.  SKIN: Warm and dry. No rashes or petechiae appreciated.          Past Medical History:     Past Medical History:   Diagnosis Date     Allergic rhinitis      Anxiety      Cognitive impairment      Contact dermatitis      Coronary artery disease     CABG with 4 grafts-LIMA to LAD, SVG to Diagonal,OM and RCA     Cough      Elevated prolactin level      Erythrocytosis      Gastro-oesophageal reflux disease      GI bleed 10/2010     Hyperlipidemia      Hypertension      Hypogonadism     with low testosterone     Insomnia      Parkinson disease (H)      Paroxysmal atrial fibrillation (H)     flecainide therapy     Post-nasal drip      Prediabetes      Renal disease     CKD     Shoulder arthritis      SVT (supraventricular tachycardia) (H)      Tremor               Past Surgical History:     Past Surgical History:   Procedure Laterality Date     BYPASS GRAFT ARTERY CORONARY       CARDIAC SURGERY       COLONOSCOPY  12/2010    tubular adenoma     LASER SELECTIVE TRABECULOPLASTY BILATERAL Bilateral 6/11/2018    Procedure: LASER SELECTIVE TRABECULOPLASTY BILATERAL;  LASER SELECTIVE TRABECULOPLASTY BILATERAL ;  Surgeon: Parmjit Camacho MD;  Location: [facility]     ORTHOPEDIC SURGERY      rotator cuff right     PHACOEMULSIFICATION CLEAR CORNEA WITH STANDARD IOL, VITRECTOMY PARSPLANA 23 GAGUE, COMBINED  2/20/2013    Procedure: COMBINED PHACOEMULSIFICATION CLEAR CORNEA WITH STANDARD INTRAOCULAR LENS IMPLANT, VITRECTOMY PARSPLANA 23 GAUGE;;  Surgeon: Guero Lockett MD;  Location: [facility]     PHACOEMULSIFICATION CLEAR CORNEA WITH STANDARD IOL, VITRECTOMY PARSPLANA 23 GAGUE, COMBINED  7/31/2013    Procedure: COMBINED PHACOEMULSIFICATION CLEAR CORNEA WITH STANDARD INTRAOCULAR LENS IMPLANT, VITRECTOMY PARSPLANA 23 GAUGE;;  Surgeon: Guero Lockett MD;  Location: [facility]     REVERSE ARTHROPLASTY SHOULDER Right 7/24/2017    Procedure: REVERSE  ARTHROPLASTY SHOULDER;  RIGHT REVERSE TOTAL SHOULDER ARTHROPLASTY ;  Surgeon: Rodolfo Calhoun MD;  Location:  [facility]     VASCULAR SURGERY      cab     VITRECTOMY PARSPLANA, PHACOEMULSIFICATION CLEAR CORNEA W STANDARD INTRAOCULAR LENS IMPLANT, COMBINED  2/20/2013    Procedure: COMBINED VITRECTOMY PARSPLANA, PHACOEMULSIFICATION CLEAR CORNEA WITH STANDARD INTRAOCULAR LENS IMPLANT;  LEFT PHACOEMULSIFICATION CLEAR CORNEA WITH SAURABH TORIC INTRAOCULAR LENS IMPLANT,  LEFT EYE 23 GAUGE PARSPLANA VITRECTOMY;  Surgeon: Jorge Regan MD;  Location: [facility]     VITRECTOMY PARSPLANA, PHACOEMULSIFICATION CLEAR CORNEA W STANDARD INTRAOCULAR LENS IMPLANT, COMBINED  7/31/2013    Procedure: COMBINED VITRECTOMY PARSPLANA, PHACOEMULSIFICATION CLEAR CORNEA WITH STANDARD INTRAOCULAR LENS IMPLANT;  RIGHT PHACOEMULSIFICATION CLEAR CORNEA WITH TORIC INTRAOCULAR LENS IMPLANT, RIGHT 23 GAUGE PARSPLANA VITRECTOMY ;  Surgeon: Jorge Regan MD;  Location: [facility]              Allergies:   Carbidopa w/levodopa       Data:   All laboratory data reviewed:    LAST CHOLESTEROL:  Lab Results   Component Value Date    CHOL 128 11/16/2022    CHOL 109 04/08/2017     Lab Results   Component Value Date    HDL 45 11/16/2022    HDL 40 04/08/2017     Lab Results   Component Value Date    LDL 64 11/16/2022    LDL 54 04/08/2017     Lab Results   Component Value Date    TRIG 95 11/16/2022    TRIG 77 04/08/2017     Lab Results   Component Value Date    CHOLHDLRATIO 2.6 01/22/2015       LAST BMP:  Lab Results   Component Value Date     11/16/2022     10/29/2020      Lab Results   Component Value Date    POTASSIUM 4.4 11/16/2022    POTASSIUM 4.2 10/29/2020     Lab Results   Component Value Date    CHLORIDE 108 11/16/2022    CHLORIDE 104 08/23/2021    CHLORIDE 108 10/29/2020     Lab Results   Component Value Date    FIDEL 9.0 11/16/2022    FIDEL 8.8 10/29/2020     Lab Results   Component Value Date    CO2 23 11/16/2022    CO2 26 10/29/2020     Lab  Results   Component Value Date    BUN 26 11/16/2022    BUN 25 10/29/2020     Lab Results   Component Value Date    CR 1.34 11/16/2022    CR 1.67 10/29/2020     Lab Results   Component Value Date     11/16/2022     10/29/2020       LAST CBC:  Lab Results   Component Value Date    WBC 6.4 08/18/2021    WBC 7.9 10/29/2020     Lab Results   Component Value Date    RBC 4.17 08/18/2021    RBC 4.40 10/29/2020     Lab Results   Component Value Date    HGB 12.9 08/18/2021    HGB 14.2 10/29/2020     Lab Results   Component Value Date    HCT 38.9 08/18/2021    HCT 41.5 10/29/2020     Lab Results   Component Value Date    MCV 93 08/18/2021    MCV 94 10/29/2020     Lab Results   Component Value Date    MCH 30.9 08/18/2021    MCH 32.3 10/29/2020     Lab Results   Component Value Date    MCHC 33.2 08/18/2021    MCHC 34.2 10/29/2020     Lab Results   Component Value Date    RDW 11.9 08/18/2021    RDW 11.9 10/29/2020     Lab Results   Component Value Date     08/18/2021     10/29/2020

## 2022-11-16 NOTE — LETTER
"11/16/2022    Lucrecia Pacheco MD  [address]  [facility] [address]    RE: Chava Valentine       Dear Colleague,     I had the pleasure of seeing Chava Valentine in the [facility].  Cardiology Clinic Progress Note  Chava Valentine MRN# [id number]   YOB: 1933 Age: 89 year old     Reason For Visit:  6-month follow-up   Primary Cardiologist:   Dr. Ni          History of Presenting Illness:      Chava Valentine is a pleasant 89 year old patient who carries a past medical history significant for coronary artery disease status post remote CABG in 1995 with LIMA to the LAD, SVG to diagonal, SVG to OM, and SVG to RCA, paroxysmal atrial fibrillation managed on chronic flecainide, chronic renal insufficiency, Parkinson's disease, hypertension, hyperlipidemia and cognitive impairment.     He returns to the office today accompanied by his wife for a 6-month follow-up.  His primary complaint is lightheadedness/dizziness with \" room spinning\" over the last 3 days.  He does not endorse any chest pain, shortness of breath, palpitations, PND, orthopnea, or edema.  Upon exam, lungs are clear bilaterally, heart rate bradycardic at 53 with regular rhythm, no evidence of fluid overload.     EKG shows sinus bradycardia (51 ) with first-degree AV block, .  He is currently on 50 mg twice daily for a flecainide and no anticoagulation due to falls risk.     Echocardiogram in 2021 showed a normal ejection fraction estimated at 55 to 60%, normal RV size and function, and no significant valvular disease    Last nuclear stress test in 2018 showed a trivial area of apical inferior ischemia, EF 77% at rest and 52% poststress.    Blood pressure is stable at 121/76  Labs today showed a sodium of 139, potassium 4.4, BUN 26, creatinine 1.34 (baseline 1.6), GFR 51  Lipid panel showed a total cholesterol 128, HDL 45, LDL 64, and triglycerides 95  Weight 157 pounds     Activity is primarily " sedentary  Remains compliant with all medications.                   Assessment and Plan:     1.  Coronary artery disease -status post remote CABG in 1995 using a LIMA to the LAD, SVG to diagonal, SVG to OM, and SVG to RCA.  He does not endorse any ischemic symptoms.  Continue on current medical therapy.    2.  Paroxysmal atrial fibrillation -EKG today showed sinus bradycardia with rate of 51, first-degree AV block, .  Continue low-dose flecainide.  No anticoagulation due to falls risk.    3.  Hypertension -well controlled  4.  Lightheadedness/dizziness -complaints of room spinning x3 days, concerning for vertigo.  Recommend follow-up with primary care physician for further evaluation.  Recommend stopping lisinopril.  Patient wishes to taper to 2.5 mg daily versus stopping altogether.  Continue to monitor symptoms.  If symptoms worsen or become severe seek ER evaluation immediately.  5.  CKD -creatinine stable at 1.34  6.  Hyperlipidemia -LDL at goal, continue low-dose rosuvastatin             Thank you for allowing me to participate in this delightful patient's care. I have recommended he follow up in 6 months with JUANY or sooner if needed.       GAMAL Pepe CNP         Review of Systems:     Review of Systems:  Skin:  not assessed     Eyes:  not assessed    ENT:  not assessed    Respiratory:  Negative    Cardiovascular:  Negative    Gastroenterology: not assessed    Genitourinary:  not assessed    Musculoskeletal:  not assessed    Neurologic - lightheadedness, dizziness  Psychiatric:  not assessed    Heme/Lymph/Imm:  not assessed    Endocrine:  not assessed                Physical Exam:     GEN:  In general, this is a well nourished elderly male in no acute distress.  HEENT:  Pupils equal, round. Sclerae nonicteric. Clear oropharynx. Mucous membranes moist.  NECK:   No JVD   C/V:  Regular rate and rhythm, no murmur, rub or gallop. No S3 or RV heave.   RESP: Respirations are unlabored. No use of  accessory muscles. Clear to auscultation bilaterally without wheezing, rales, or rhonchi.  GI: Abdomen soft, nontender, nondistended. No HSM appreciated.   EXTREM: No LE edema. No cyanosis or clubbing.  NEURO: Alert and oriented, cooperative.  Gait unsteady due to dizziness   PSYCH: Normal affect.  SKIN: Warm and dry. No rashes or petechiae appreciated.          Past Medical History:     Past Medical History:   Diagnosis Date     Allergic rhinitis      Anxiety      Cognitive impairment      Contact dermatitis      Coronary artery disease     CABG with 4 grafts-LIMA to LAD, SVG to Diagonal,OM and RCA     Cough      Elevated prolactin level      Erythrocytosis      Gastro-oesophageal reflux disease      GI bleed 10/2010     Hyperlipidemia      Hypertension      Hypogonadism     with low testosterone     Insomnia      Parkinson disease (H)      Paroxysmal atrial fibrillation (H)     flecainide therapy     Post-nasal drip      Prediabetes      Renal disease     CKD     Shoulder arthritis      SVT (supraventricular tachycardia) (H)      Tremor               Past Surgical History:     Past Surgical History:   Procedure Laterality Date     BYPASS GRAFT ARTERY CORONARY       CARDIAC SURGERY       COLONOSCOPY  12/2010    tubular adenoma     LASER SELECTIVE TRABECULOPLASTY BILATERAL Bilateral 6/11/2018    Procedure: LASER SELECTIVE TRABECULOPLASTY BILATERAL;  LASER SELECTIVE TRABECULOPLASTY BILATERAL ;  Surgeon: Parmjit Camacho MD;  Location: [facility]     ORTHOPEDIC SURGERY      rotator cuff right     PHACOEMULSIFICATION CLEAR CORNEA WITH STANDARD IOL, VITRECTOMY PARSPLANA 23 GAGUE, COMBINED  2/20/2013    Procedure: COMBINED PHACOEMULSIFICATION CLEAR CORNEA WITH STANDARD INTRAOCULAR LENS IMPLANT, VITRECTOMY PARSPLANA 23 GAUGE;;  Surgeon: Guero Lockett MD;  Location: [facility]     PHACOEMULSIFICATION CLEAR CORNEA WITH STANDARD IOL, VITRECTOMY PARSPLANA 23 GAGUE, COMBINED  7/31/2013    Procedure: COMBINED PHACOEMULSIFICATION  CLEAR CORNEA WITH STANDARD INTRAOCULAR LENS IMPLANT, VITRECTOMY PARSPLANA 23 GAUGE;;  Surgeon: Guero Lockett MD;  Location: [facility]     REVERSE ARTHROPLASTY SHOULDER Right 7/24/2017    Procedure: REVERSE ARTHROPLASTY SHOULDER;  RIGHT REVERSE TOTAL SHOULDER ARTHROPLASTY ;  Surgeon: Rodolfo Calhoun MD;  Location:  OR     VASCULAR SURGERY      cab     VITRECTOMY PARSPLANA, PHACOEMULSIFICATION CLEAR CORNEA W STANDARD INTRAOCULAR LENS IMPLANT, COMBINED  2/20/2013    Procedure: COMBINED VITRECTOMY PARSPLANA, PHACOEMULSIFICATION CLEAR CORNEA WITH STANDARD INTRAOCULAR LENS IMPLANT;  LEFT PHACOEMULSIFICATION CLEAR CORNEA WITH SAURABH TORIC INTRAOCULAR LENS IMPLANT,  LEFT EYE 23 GAUGE PARSPLANA VITRECTOMY;  Surgeon: Jorge Regan MD;  Location: [facility]     VITRECTOMY PARSPLANA, PHACOEMULSIFICATION CLEAR CORNEA W STANDARD INTRAOCULAR LENS IMPLANT, COMBINED  7/31/2013    Procedure: COMBINED VITRECTOMY PARSPLANA, PHACOEMULSIFICATION CLEAR CORNEA WITH STANDARD INTRAOCULAR LENS IMPLANT;  RIGHT PHACOEMULSIFICATION CLEAR CORNEA WITH TORIC INTRAOCULAR LENS IMPLANT, RIGHT 23 GAUGE PARSPLANA VITRECTOMY ;  Surgeon: Jorge Regan MD;  Location: [facility]              Allergies:   Carbidopa w/levodopa       Data:   All laboratory data reviewed:    LAST CHOLESTEROL:  Lab Results   Component Value Date    CHOL 128 11/16/2022    CHOL 109 04/08/2017     Lab Results   Component Value Date    HDL 45 11/16/2022    HDL 40 04/08/2017     Lab Results   Component Value Date    LDL 64 11/16/2022    LDL 54 04/08/2017     Lab Results   Component Value Date    TRIG 95 11/16/2022    TRIG 77 04/08/2017     Lab Results   Component Value Date    CHOLHDLRATIO 2.6 01/22/2015       LAST BMP:  Lab Results   Component Value Date     11/16/2022     10/29/2020      Lab Results   Component Value Date    POTASSIUM 4.4 11/16/2022    POTASSIUM 4.2 10/29/2020     Lab Results   Component Value Date    CHLORIDE 108 11/16/2022    CHLORIDE 104  08/23/2021    CHLORIDE 108 10/29/2020     Lab Results   Component Value Date    FIDEL 9.0 11/16/2022    FIDEL 8.8 10/29/2020     Lab Results   Component Value Date    CO2 23 11/16/2022    CO2 26 10/29/2020     Lab Results   Component Value Date    BUN 26 11/16/2022    BUN 25 10/29/2020     Lab Results   Component Value Date    CR 1.34 11/16/2022    CR 1.67 10/29/2020     Lab Results   Component Value Date     11/16/2022     10/29/2020       LAST CBC:  Lab Results   Component Value Date    WBC 6.4 08/18/2021    WBC 7.9 10/29/2020     Lab Results   Component Value Date    RBC 4.17 08/18/2021    RBC 4.40 10/29/2020     Lab Results   Component Value Date    HGB 12.9 08/18/2021    HGB 14.2 10/29/2020     Lab Results   Component Value Date    HCT 38.9 08/18/2021    HCT 41.5 10/29/2020     Lab Results   Component Value Date    MCV 93 08/18/2021    MCV 94 10/29/2020     Lab Results   Component Value Date    MCH 30.9 08/18/2021    MCH 32.3 10/29/2020     Lab Results   Component Value Date    MCHC 33.2 08/18/2021    MCHC 34.2 10/29/2020     Lab Results   Component Value Date    RDW 11.9 08/18/2021    RDW 11.9 10/29/2020     Lab Results   Component Value Date     08/18/2021     10/29/2020   Thank you for allowing me to participate in the care of your patient.      Sincerely,     GAAML Pepe [facility]  cc:   Ricardo Ni MD  [address]  [place] [id number]

## 2023-02-23 DIAGNOSIS — E78.2 MIXED HYPERLIPIDEMIA: ICD-10-CM

## 2023-02-23 DIAGNOSIS — I25.810 CORONARY ARTERY DISEASE INVOLVING CORONARY BYPASS GRAFT OF NATIVE HEART WITHOUT ANGINA PECTORIS: ICD-10-CM

## 2023-02-23 DIAGNOSIS — I48.0 PAROXYSMAL ATRIAL FIBRILLATION (H): ICD-10-CM

## 2023-02-23 RX ORDER — FLECAINIDE ACETATE 100 MG/1
TABLET ORAL
Qty: 180 TABLET | Refills: 2 | Status: SHIPPED | OUTPATIENT
Start: 2023-02-23 | End: 2023-05-17

## 2023-02-23 RX ORDER — EZETIMIBE 10 MG/1
10 TABLET ORAL DAILY
Qty: 90 TABLET | Refills: 2 | Status: SHIPPED | OUTPATIENT
Start: 2023-02-23

## 2023-02-23 NOTE — TELEPHONE ENCOUNTER
Received refill request for:  Flecainide, Zetia    [facility] Refill Guideline reviewed.  Medication meets criteria for refill.    Winsome Drummond RN, BSN  02/23/23 at 12:38 PM

## 2023-03-13 DIAGNOSIS — E78.2 MIXED HYPERLIPIDEMIA: ICD-10-CM

## 2023-03-13 DIAGNOSIS — I25.810 CORONARY ARTERY DISEASE INVOLVING CORONARY BYPASS GRAFT OF NATIVE HEART WITHOUT ANGINA PECTORIS: ICD-10-CM

## 2023-03-13 RX ORDER — ROSUVASTATIN CALCIUM 10 MG/1
10 TABLET, COATED ORAL DAILY
Qty: 90 TABLET | Refills: 3 | Status: SHIPPED | OUTPATIENT
Start: 2023-03-13

## 2023-05-04 ENCOUNTER — TRANSFERRED RECORDS (OUTPATIENT)
Dept: HEALTH INFORMATION MANAGEMENT | Facility: CLINIC | Age: 88
End: 2023-05-04
Payer: MEDICARE

## 2023-05-08 DIAGNOSIS — E78.2 MIXED HYPERLIPIDEMIA: ICD-10-CM

## 2023-05-08 DIAGNOSIS — I25.810 CORONARY ARTERY DISEASE INVOLVING CORONARY BYPASS GRAFT OF NATIVE HEART WITHOUT ANGINA PECTORIS: Primary | ICD-10-CM

## 2023-05-12 ENCOUNTER — LAB (OUTPATIENT)
Dept: LAB | Facility: CLINIC | Age: 88
End: 2023-05-12
Payer: MEDICARE

## 2023-05-12 DIAGNOSIS — I25.810 CORONARY ARTERY DISEASE INVOLVING CORONARY BYPASS GRAFT OF NATIVE HEART WITHOUT ANGINA PECTORIS: ICD-10-CM

## 2023-05-12 DIAGNOSIS — E78.2 MIXED HYPERLIPIDEMIA: ICD-10-CM

## 2023-05-12 LAB
ALT SERPL W P-5'-P-CCNC: 16 U/L (ref 10–50)
CHOLEST SERPL-MCNC: 147 MG/DL
HDLC SERPL-MCNC: 49 MG/DL
LDLC SERPL CALC-MCNC: 69 MG/DL
NONHDLC SERPL-MCNC: 98 MG/DL
TRIGL SERPL-MCNC: 147 MG/DL

## 2023-05-12 PROCEDURE — 36415 COLL VENOUS BLD VENIPUNCTURE: CPT | Performed by: INTERNAL MEDICINE

## 2023-05-12 PROCEDURE — 80061 LIPID PANEL: CPT | Performed by: INTERNAL MEDICINE

## 2023-05-12 PROCEDURE — 84460 ALANINE AMINO (ALT) (SGPT): CPT | Performed by: INTERNAL MEDICINE

## 2023-05-17 ENCOUNTER — OFFICE VISIT (OUTPATIENT)
Dept: CARDIOLOGY | Facility: CLINIC | Age: 88
End: 2023-05-17
Payer: MEDICARE

## 2023-05-17 ENCOUNTER — TELEPHONE (OUTPATIENT)
Dept: CARDIOLOGY | Facility: CLINIC | Age: 88
End: 2023-05-17

## 2023-05-17 VITALS
OXYGEN SATURATION: 96 % | BODY MASS INDEX: 23.95 KG/M2 | DIASTOLIC BLOOD PRESSURE: 74 MMHG | SYSTOLIC BLOOD PRESSURE: 132 MMHG | WEIGHT: 158 LBS | HEART RATE: 62 BPM | HEIGHT: 68 IN

## 2023-05-17 DIAGNOSIS — I10 BENIGN ESSENTIAL HYPERTENSION: ICD-10-CM

## 2023-05-17 DIAGNOSIS — I48.0 PAROXYSMAL ATRIAL FIBRILLATION (H): ICD-10-CM

## 2023-05-17 DIAGNOSIS — R29.6 FALLS FREQUENTLY: ICD-10-CM

## 2023-05-17 DIAGNOSIS — I25.10 CORONARY ARTERY DISEASE INVOLVING NATIVE CORONARY ARTERY OF NATIVE HEART WITHOUT ANGINA PECTORIS: Primary | ICD-10-CM

## 2023-05-17 DIAGNOSIS — E78.2 MIXED HYPERLIPIDEMIA: ICD-10-CM

## 2023-05-17 DIAGNOSIS — R54 FRAILTY SYNDROME IN GERIATRIC PATIENT: ICD-10-CM

## 2023-05-17 DIAGNOSIS — N18.31 CHRONIC RENAL FAILURE, STAGE 3A (H): ICD-10-CM

## 2023-05-17 DIAGNOSIS — G20.A1 PARKINSON DISEASE (H): ICD-10-CM

## 2023-05-17 PROCEDURE — 99214 OFFICE O/P EST MOD 30 MIN: CPT | Performed by: INTERNAL MEDICINE

## 2023-05-17 RX ORDER — FLECAINIDE ACETATE 50 MG/1
50 TABLET ORAL 2 TIMES DAILY
Qty: 180 TABLET | Refills: 4 | Status: SHIPPED | OUTPATIENT
Start: 2023-05-17

## 2023-05-17 RX ORDER — FLECAINIDE ACETATE 50 MG/1
TABLET ORAL
Qty: 180 TABLET | Refills: 4 | Status: SHIPPED | OUTPATIENT
Start: 2023-05-17 | End: 2023-05-17

## 2023-05-17 NOTE — PROGRESS NOTES
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 Bernard, Dr. Vegas, Dr. Cartagena, Dr. Guzmán and is now seeking me out.      Meme's coronary history dates back to 1995 when he had coronary artery bypass grafting x4.  He has paroxysmal atrial fibrillation for which he has been on flecainide for 11 years, chronic renal insufficiency grade 3A, hyperlipidemia, severe Parkinson disease, hypertension, frequent falls and now appears to be developing some dementia..     Review of the chart shows his coronary bypass was a LIMA to left anterior descending artery and what appears to be separate saphenous vein grafts to the diagonal/obtuse marginal and right coronary artery.  A Lexiscan in 2018 demonstrated trivial apical ischemia.  An echocardiogram in 06/2021 demonstrated ejection fraction of 55%-60% without focal wall motion abnormality and no significant valvular pathology.  Most recent EKG in November 2022 demonstrated sinus bradycardia at 51 beats per minute, left anterior fascicular block and possible old anterior infarct.     He has been on flecainide since 2011.  Dr. Guzmán (electrophysiology) saw him in 2020 after he had a flecainide level from the ER that came back high at 1.9.  Attempts were made to switch him from flecainide to metoprolol.  He did not tolerate this very well.  The dose was adjusted multiple times.  Ultimately, after some discussion, it was decided to go back on flecainide despite his history of underlying coronary artery disease.  The patient was on dabigatran, but after a series of falls, it was decided that anticoagulation was too high a risk and he has not been on anticoagulation.     Dr. Valentine returns to clinic stating he thinks he is doing fairly well.  He has no chest, arm, neck, jaw or shoulder discomfort.  No dyspnea on exertion, orthopnea or PND.  He only has trace peripheral edema.  He notes no side effects  or problems with his current medical regimen.  His nocturnal palpitations have not been an issue.  His wife is in agreement with all of this.  He states he tries to walk several times a day every day.  He thinks he gets about 15-20 minutes in, and he walks in the halls of his apartment.  He uses his walker to try to avoid falls.,  They report that he has only had 1 fall in the last year.  Although previously had 1 very significant fall for which she struck his head fortunately, not on anticoagulation.     ASSESSMENT AND PLAN:  Dr. Valentine appears to be doing well from a cardiac standpoint without clinical evidence of ischemia, heart failure or significant arrhythmia.     EKG does show that he is significantly bradycardic, but as he does not appear to be having any symptoms, I will leave his medical regimen as is, but I have told him I would consider decreasing his flecainide to 25 b.i.d. if he should have any problems.  He is currently splitting 100 mg pills taken twice a day.  I will send in a new prescription for 50 mg twice a day for convenience.     I congratulated him on his exercise.  I encouraged him to continue to do so.     He reports he is moving to [place] next week.  I have told him we can send copies of his records with him.     Thank you for allow me to participate in this patient's care.  Sincerely,                               Ricardo Ni MD [facility]                 Today's clinic visit entailed:  Review of the result(s) of each unique test - Lab work, EKG  The following tests were independently interpreted by me as noted in my documentation: EKG  Ordering of each unique test  Prescription drug management  32 minutes spent by me on the date of the encounter doing chart review, history and exam, documentation and further activities per the note  Provider  Link to [facility] Help Grid     The level of medical decision making during this visit was of moderate complexity.      No orders of the defined types  were placed in this encounter.      Orders Placed This Encounter   Medications     DISCONTD: flecainide (TAMBOCOR) 50 MG tablet     Sig: Take 50mg (1/2 tablet) po bid     Dispense:  180 tablet     Refill:  4     flecainide (TAMBOCOR) 50 MG tablet     Sig: Take 1 tablet (50 mg) by mouth 2 times daily     Dispense:  180 tablet     Refill:  4       Medications Discontinued During This Encounter   Medication Reason     flecainide (TAMBOCOR) 100 MG tablet      flecainide (TAMBOCOR) 50 MG tablet      CLONAZEPAM PO          Encounter Diagnoses   Name Primary?     Coronary artery disease involving native coronary artery of native heart without angina pectoris Yes     Paroxysmal atrial fibrillation (H)      Benign essential hypertension      Mixed hyperlipidemia      Falls frequently      Chronic renal failure, stage 3a (H)      Frailty syndrome in geriatric patient      Parkinson disease (H)        CURRENT MEDICATIONS:  Current Outpatient Medications   Medication Sig Dispense Refill     Acetaminophen (TYLENOL) 325 MG CAPS Take 325-650 mg by mouth every 4 hours as needed       amoxicillin (AMOXIL) 500 MG tablet Take 2,000 mg by mouth Before dental appointments       calcium carbonate 600 mg-vitamin D 400 units (CALTRATE) 600-400 MG-UNIT per tablet Take 1 tablet by mouth daily       cholecalciferol (VITAMIN D3) 1250 mcg (50420 units) capsule TAKE 1 CAPSULE BY MOUTH WEEKLY FOR 12 WEEKS       cyanocobalamin (CYANOCOBALAMIN) 1000 MCG/ML injection Inject 1 mL into the muscle every 30 days       dorzolamide-timolol (COSOPT) 2-0.5 % ophthalmic solution [id number]  dorzolamide-timolol ophthalmic 2.23%-0.68% ophthalmic solution; 1 drop(s) in each eye 2 times a day 2.23%-0.68% solution Apply 1 drop(s) in each eye 2 times a day; ADMINSITER 1 DROP INTO BOTH EYES TWICE DAILY       ezetimibe (ZETIA) 10 MG tablet Take 1 tablet (10 mg) by mouth daily 90 tablet 2     fexofenadine (ALLEGRA) 180 MG tablet Take 180 mg by mouth daily        flecainide (TAMBOCOR) 50 MG tablet Take 1 tablet (50 mg) by mouth 2 times daily 180 tablet 4     latanoprostene bunod (VYZULTA) 0.024 % SOLN ophthalmic solution 1 drop At Bedtime       lisinopril (ZESTRIL) 5 MG tablet Take 2.5 mg by mouth daily       mirtazapine (REMERON) 7.5 MG tablet Take 7.5 mg by mouth       Multiple Vitamins-Minerals (PRESERVISION AREDS 2 PO) Take by mouth daily        nitroGLYcerin (NITROSTAT) 0.4 MG sublingual tablet For chest pain place 1 tablet under the tongue every 5 minutes for 3 doses. If symptoms persist 5 minutes after 1st dose call 911. 25 tablet 1     Pantoprazole Sodium (PROTONIX PO) Take 40 mg by mouth 2 times daily       rivastigmine (EXELON) 4.6 MG/24HR 24 hr patch Place 1 patch onto the skin daily       rosuvastatin (CRESTOR) 10 MG tablet Take 1 tablet (10 mg) by mouth daily 90 tablet 3     zolpidem (AMBIEN) 5 MG tablet Take 5 mg by mouth nightly as needed         ALLERGIES     Allergies   Allergen Reactions     Carbidopa W-Levodopa Dizziness     Nausea       PAST MEDICAL HISTORY:  Past Medical History:   Diagnosis Date     Allergic rhinitis      Anxiety      Cognitive impairment      Contact dermatitis      Coronary artery disease     CABG with 4 grafts-LIMA to LAD, SVG to Diagonal,OM and RCA     Cough      Elevated prolactin level      Erythrocytosis      Gastro-oesophageal reflux disease      GI bleed 10/2010     Hyperlipidemia      Hypertension      Hypogonadism     with low testosterone     Insomnia      Parkinson disease (H)      Paroxysmal atrial fibrillation (H)     flecainide therapy     Post-nasal drip      Prediabetes      Renal disease     CKD     Shoulder arthritis      SVT (supraventricular tachycardia) (H)      Tremor        PAST SURGICAL HISTORY:  Past Surgical History:   Procedure Laterality Date     BYPASS GRAFT ARTERY CORONARY       CARDIAC SURGERY       COLONOSCOPY  12/2010    tubular adenoma     LASER SELECTIVE TRABECULOPLASTY BILATERAL Bilateral 6/11/2018     Procedure: LASER SELECTIVE TRABECULOPLASTY BILATERAL;  LASER SELECTIVE TRABECULOPLASTY BILATERAL ;  Surgeon: Parmjit Camacho MD;  Location: [facility]     ORTHOPEDIC SURGERY      rotator cuff right     PHACOEMULSIFICATION CLEAR CORNEA WITH STANDARD IOL, VITRECTOMY PARSPLANA 23 GAGUE, COMBINED  2/20/2013    Procedure: COMBINED PHACOEMULSIFICATION CLEAR CORNEA WITH STANDARD INTRAOCULAR LENS IMPLANT, VITRECTOMY PARSPLANA 23 GAUGE;;  Surgeon: Guero Lockett MD;  Location: [facility]     PHACOEMULSIFICATION CLEAR CORNEA WITH STANDARD IOL, VITRECTOMY PARSPLANA 23 GAGUE, COMBINED  7/31/2013    Procedure: COMBINED PHACOEMULSIFICATION CLEAR CORNEA WITH STANDARD INTRAOCULAR LENS IMPLANT, VITRECTOMY PARSPLANA 23 GAUGE;;  Surgeon: Guero Lockett MD;  Location: [facility]     REVERSE ARTHROPLASTY SHOULDER Right 7/24/2017    Procedure: REVERSE ARTHROPLASTY SHOULDER;  RIGHT REVERSE TOTAL SHOULDER ARTHROPLASTY ;  Surgeon: Rodolfo Calhoun MD;  Location: [facility]     VASCULAR SURGERY      cab     VITRECTOMY PARSPLANA, PHACOEMULSIFICATION CLEAR CORNEA W STANDARD INTRAOCULAR LENS IMPLANT, COMBINED  2/20/2013    Procedure: COMBINED VITRECTOMY PARSPLANA, PHACOEMULSIFICATION CLEAR CORNEA WITH STANDARD INTRAOCULAR LENS IMPLANT;  LEFT PHACOEMULSIFICATION CLEAR CORNEA WITH SAURABH TORIC INTRAOCULAR LENS IMPLANT,  LEFT EYE 23 GAUGE PARSPLANA VITRECTOMY;  Surgeon: Jorge Regan MD;  Location: [facility]     VITRECTOMY PARSPLANA, PHACOEMULSIFICATION CLEAR CORNEA W STANDARD INTRAOCULAR LENS IMPLANT, COMBINED  7/31/2013    Procedure: COMBINED VITRECTOMY PARSPLANA, PHACOEMULSIFICATION CLEAR CORNEA WITH STANDARD INTRAOCULAR LENS IMPLANT;  RIGHT PHACOEMULSIFICATION CLEAR CORNEA WITH TORIC INTRAOCULAR LENS IMPLANT, RIGHT 23 GAUGE PARSPLANA VITRECTOMY ;  Surgeon: Jorge Regan MD;  Location: [facility]       FAMILY HISTORY:  Family History   Problem Relation Age of Onset     Other - See Comments Mother         old age     Other - See Comments Father  "97     Cerebrovascular Disease Father        SOCIAL HISTORY:  Social History     Socioeconomic History     Marital status:      Spouse name: None     Number of children: None     Years of education: None     Highest education level: None   Tobacco Use     Smoking status: Former     Packs/day: 0.10     Years: 20.00     Pack years: 2.00     Types: Cigarettes     Start date:      Quit date: 1999     Years since quittin.3     Smokeless tobacco: Never     Tobacco comments:     recreational only   Substance and Sexual Activity     Alcohol use: Yes     Comment: couple drinks a month     Drug use: No   Other Topics Concern     Parent/sibling w/ CABG, MI or angioplasty before 65F 55M? No     Sleep Concern Yes     Special Diet Yes     Comment: less meat      Exercise Yes     Comment: 3 x weekly     Seat Belt Yes       Review of Systems:  Skin:  Negative       Eyes:  Positive for visual blurring;glasses    ENT:  Positive for hearing loss    Respiratory:  Negative       Cardiovascular:  Negative for;chest pain;edema;fatigue Positive for;palpitations;lightheadedness rare palpitations, lightheadedness when standing too fast  Gastroenterology: Positive for constipation Miralax has been improving constipation  Genitourinary:  Negative      Musculoskeletal:  Negative      Neurologic:  Positive for headaches occasional headaches  Psychiatric:  Negative      Heme/Lymph/Imm:  Negative      Endocrine:  Negative        Physical Exam:  Vitals: /74   Pulse 62   Ht 1.727 m (5' 8\")   Wt 71.7 kg (158 lb)   SpO2 96%   BMI 24.02 kg/m      Constitutional:  cooperative, alert and oriented, well developed, well nourished, in no acute distress   Parkinsonian features.  In wheelchair    Skin:  warm and dry to the touch, no apparent skin lesions or masses noted          Head:  normocephalic, no masses or lesions        Eyes:  pupils equal and round, conjunctivae and lids unremarkable, sclera white, no xanthalasma, EOMS " intact, no nystagmus        Lymph:      ENT:  no pallor or cyanosis        Neck:  carotid pulses are full and equal bilaterally;no carotid bruit        Respiratory:  clear to auscultation;normal respiratory excursion;healed median sternotomy scar         Cardiac: regular rhythm bradycardic     late systolic murmur;LLSB;apical;grade 2                                                 GI:           Extremities and Muscular Skeletal:  no spinal abnormalities noted   bilateral LE edema;trace          Neurological:  no gross motor deficits        Psych:  oriented to time, person and place        CC  Karlie Robin, APRN CNP  [address] [place] [address]

## 2023-05-17 NOTE — TELEPHONE ENCOUNTER
Message from Dr. Ni:  Ricardo Ni MD  [facility] Team 2  Please call pharmacy as I sent in 2 prescriptions for flecainide.  The one I want filled is 50 mg twice a day 180 pills 4 refills.  The first 1 had some residual typed and message that I did not take out talking about a half a pill.  Previously he took 100 mg a half a pill twice a day.  I am just changing to 50 for convenience so he does not have to split pills anymore.       Called Shamir @ [phone] to clarify script order for flecainide 50mg BID.

## 2023-05-17 NOTE — LETTER
5/17/2023    Lucrecia Pacheco MD  [address]  [facility] [address]    RE: Chava Valentine       Dear Colleague,     I had the pleasure of seeing Chava Valentine in the [facility].  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 Bernard, Dr. Vegas, Dr. Cartagena, Dr. Guzmán and is now seeking me out.     Dr. Valentine's coronary history dates back to 1995 when he had coronary artery bypass grafting x4.  He has paroxysmal atrial fibrillation for which he has been on flecainide for 11 years, chronic renal insufficiency grade 3A, hyperlipidemia, severe Parkinson disease, hypertension, frequent falls and now appears to be developing some dementia..     Review of the chart shows his coronary bypass was a LIMA to left anterior descending artery and what appears to be separate saphenous vein grafts to the diagonal/obtuse marginal and right coronary artery.  A Lexiscan in 2018 demonstrated trivial apical ischemia.  An echocardiogram in 06/2021 demonstrated ejection fraction of 55%-60% without focal wall motion abnormality and no significant valvular pathology.  Most recent EKG in November 2022 demonstrated sinus bradycardia at 51 beats per minute, left anterior fascicular block and possible old anterior infarct.     He has been on flecainide since 2011.  Dr. Guzmán (electrophysiology) saw him in 2020 after he had a flecainide level from the ER that came back high at 1.9.  Attempts were made to switch him from flecainide to metoprolol.  He did not tolerate this very well.  The dose was adjusted multiple times.  Ultimately, after some discussion, it was decided to go back on flecainide despite his history of underlying coronary artery disease.  The patient was on dabigatran, but after a series of falls, it was decided that anticoagulation was too high a risk and he has not been on anticoagulation.     Dr. Valentine returns to  clinic stating he thinks he is doing fairly well.  He has no chest, arm, neck, jaw or shoulder discomfort.  No dyspnea on exertion, orthopnea or PND.  He only has trace peripheral edema.  He notes no side effects or problems with his current medical regimen.  His nocturnal palpitations have not been an issue.  His wife is in agreement with all of this.  He states he tries to walk several times a day every day.  He thinks he gets about 15-20 minutes in, and he walks in the halls of his apartment.  He uses his walker to try to avoid falls.,  They report that he has only had 1 fall in the last year.  Although previously had 1 very significant fall for which she struck his head fortunately, not on anticoagulation.     ASSESSMENT AND PLAN:  Dr. Valentine appears to be doing well from a cardiac standpoint without clinical evidence of ischemia, heart failure or significant arrhythmia.     EKG does show that he is significantly bradycardic, but as he does not appear to be having any symptoms, I will leave his medical regimen as is, but I have told him I would consider decreasing his flecainide to 25 b.i.d. if he should have any problems.  He is currently splitting 100 mg pills taken twice a day.  I will send in a new prescription for 50 mg twice a day for convenience.     I congratulated him on his exercise.  I encouraged him to continue to do so.     He reports he is moving to [place] next week.  I have told him we can send copies of his records with him.     Thank you for allow me to participate in this patient's care.  Sincerely,                               Ricardo Ni MD [facility]                 Today's clinic visit entailed:  Review of the result(s) of each unique test - Lab work, EKG  The following tests were independently interpreted by me as noted in my documentation: EKG  Ordering of each unique test  Prescription drug management  32 minutes spent by me on the date of the encounter doing chart review, history and  exam, documentation and further activities per the note  Provider  Link to MDM Help Grid     The level of medical decision making during this visit was of moderate complexity.      No orders of the defined types were placed in this encounter.      Orders Placed This Encounter   Medications    DISCONTD: flecainide (TAMBOCOR) 50 MG tablet     Sig: Take 50mg (1/2 tablet) po bid     Dispense:  180 tablet     Refill:  4    flecainide (TAMBOCOR) 50 MG tablet     Sig: Take 1 tablet (50 mg) by mouth 2 times daily     Dispense:  180 tablet     Refill:  4       Medications Discontinued During This Encounter   Medication Reason    flecainide (TAMBOCOR) 100 MG tablet     flecainide (TAMBOCOR) 50 MG tablet     CLONAZEPAM PO          Encounter Diagnoses   Name Primary?    Coronary artery disease involving native coronary artery of native heart without angina pectoris Yes    Paroxysmal atrial fibrillation (H)     Benign essential hypertension     Mixed hyperlipidemia     Falls frequently     Chronic renal failure, stage 3a (H)     Frailty syndrome in geriatric patient     Parkinson disease (H)        CURRENT MEDICATIONS:  Current Outpatient Medications   Medication Sig Dispense Refill    Acetaminophen (TYLENOL) 325 MG CAPS Take 325-650 mg by mouth every 4 hours as needed      amoxicillin (AMOXIL) 500 MG tablet Take 2,000 mg by mouth Before dental appointments      calcium carbonate 600 mg-vitamin D 400 units (CALTRATE) 600-400 MG-UNIT per tablet Take 1 tablet by mouth daily      cholecalciferol (VITAMIN D3) 1250 mcg (20797 units) capsule TAKE 1 CAPSULE BY MOUTH WEEKLY FOR 12 WEEKS      cyanocobalamin (CYANOCOBALAMIN) 1000 MCG/ML injection Inject 1 mL into the muscle every 30 days      dorzolamide-timolol (COSOPT) 2-0.5 % ophthalmic solution [id number]  dorzolamide-timolol ophthalmic 2.23%-0.68% ophthalmic solution; 1 drop(s) in each eye 2 times a day 2.23%-0.68% solution Apply 1 drop(s) in each eye 2 times a day; ADMINSITER 1 DROP  INTO BOTH EYES TWICE DAILY      ezetimibe (ZETIA) 10 MG tablet Take 1 tablet (10 mg) by mouth daily 90 tablet 2    fexofenadine (ALLEGRA) 180 MG tablet Take 180 mg by mouth daily      flecainide (TAMBOCOR) 50 MG tablet Take 1 tablet (50 mg) by mouth 2 times daily 180 tablet 4    latanoprostene bunod (VYZULTA) 0.024 % SOLN ophthalmic solution 1 drop At Bedtime      lisinopril (ZESTRIL) 5 MG tablet Take 2.5 mg by mouth daily      mirtazapine (REMERON) 7.5 MG tablet Take 7.5 mg by mouth      Multiple Vitamins-Minerals (PRESERVISION AREDS 2 PO) Take by mouth daily       nitroGLYcerin (NITROSTAT) 0.4 MG sublingual tablet For chest pain place 1 tablet under the tongue every 5 minutes for 3 doses. If symptoms persist 5 minutes after 1st dose call 911. 25 tablet 1    Pantoprazole Sodium (PROTONIX PO) Take 40 mg by mouth 2 times daily      rivastigmine (EXELON) 4.6 MG/24HR 24 hr patch Place 1 patch onto the skin daily      rosuvastatin (CRESTOR) 10 MG tablet Take 1 tablet (10 mg) by mouth daily 90 tablet 3    zolpidem (AMBIEN) 5 MG tablet Take 5 mg by mouth nightly as needed         ALLERGIES     Allergies   Allergen Reactions    Carbidopa W-Levodopa Dizziness     Nausea       PAST MEDICAL HISTORY:  Past Medical History:   Diagnosis Date    Allergic rhinitis     Anxiety     Cognitive impairment     Contact dermatitis     Coronary artery disease     CABG with 4 grafts-LIMA to LAD, SVG to Diagonal,OM and RCA    Cough     Elevated prolactin level     Erythrocytosis     Gastro-oesophageal reflux disease     GI bleed 10/2010    Hyperlipidemia     Hypertension     Hypogonadism     with low testosterone    Insomnia     Parkinson disease (H)     Paroxysmal atrial fibrillation (H)     flecainide therapy    Post-nasal drip     Prediabetes     Renal disease     CKD    Shoulder arthritis     SVT (supraventricular tachycardia) (H)     Tremor        PAST SURGICAL HISTORY:  Past Surgical History:   Procedure Laterality Date    BYPASS GRAFT  ARTERY CORONARY      CARDIAC SURGERY      COLONOSCOPY  12/2010    tubular adenoma    LASER SELECTIVE TRABECULOPLASTY BILATERAL Bilateral 6/11/2018    Procedure: LASER SELECTIVE TRABECULOPLASTY BILATERAL;  LASER SELECTIVE TRABECULOPLASTY BILATERAL ;  Surgeon: Parmjit Camacho MD;  Location: [facility]    ORTHOPEDIC SURGERY      rotator cuff right    PHACOEMULSIFICATION CLEAR CORNEA WITH STANDARD IOL, VITRECTOMY PARSPLANA 23 GAGUE, COMBINED  2/20/2013    Procedure: COMBINED PHACOEMULSIFICATION CLEAR CORNEA WITH STANDARD INTRAOCULAR LENS IMPLANT, VITRECTOMY PARSPLANA 23 GAUGE;;  Surgeon: Guero Lockett MD;  Location: [facility]    PHACOEMULSIFICATION CLEAR CORNEA WITH STANDARD IOL, VITRECTOMY PARSPLANA 23 GAGUE, COMBINED  7/31/2013    Procedure: COMBINED PHACOEMULSIFICATION CLEAR CORNEA WITH STANDARD INTRAOCULAR LENS IMPLANT, VITRECTOMY PARSPLANA 23 GAUGE;;  Surgeon: Guero Lockett MD;  Location: [facility]    REVERSE ARTHROPLASTY SHOULDER Right 7/24/2017    Procedure: REVERSE ARTHROPLASTY SHOULDER;  RIGHT REVERSE TOTAL SHOULDER ARTHROPLASTY ;  Surgeon: Rodolfo Calhoun MD;  Location:  OR    VASCULAR SURGERY      cab    VITRECTOMY PARSPLANA, PHACOEMULSIFICATION CLEAR CORNEA W STANDARD INTRAOCULAR LENS IMPLANT, COMBINED  2/20/2013    Procedure: COMBINED VITRECTOMY PARSPLANA, PHACOEMULSIFICATION CLEAR CORNEA WITH STANDARD INTRAOCULAR LENS IMPLANT;  LEFT PHACOEMULSIFICATION CLEAR CORNEA WITH SAURABH TORIC INTRAOCULAR LENS IMPLANT,  LEFT EYE 23 GAUGE PARSPLANA VITRECTOMY;  Surgeon: Jorge Regan MD;  Location: [facility]    VITRECTOMY PARSPLANA, PHACOEMULSIFICATION CLEAR CORNEA W STANDARD INTRAOCULAR LENS IMPLANT, COMBINED  7/31/2013    Procedure: COMBINED VITRECTOMY PARSPLANA, PHACOEMULSIFICATION CLEAR CORNEA WITH STANDARD INTRAOCULAR LENS IMPLANT;  RIGHT PHACOEMULSIFICATION CLEAR CORNEA WITH TORIC INTRAOCULAR LENS IMPLANT, RIGHT 23 GAUGE PARSPLANA VITRECTOMY ;  Surgeon: Jorge Regan MD;  Location: [facility]  "      FAMILY HISTORY:  Family History   Problem Relation Age of Onset    Other - See Comments Mother         old age    Other - See Comments Father 97    Cerebrovascular Disease Father        SOCIAL HISTORY:  Social History     Socioeconomic History    Marital status:      Spouse name: None    Number of children: None    Years of education: None    Highest education level: None   Tobacco Use    Smoking status: Former     Packs/day: 0.10     Years: 20.00     Pack years: 2.00     Types: Cigarettes     Start date:      Quit date: 1999     Years since quittin.3    Smokeless tobacco: Never    Tobacco comments:     recreational only   Substance and Sexual Activity    Alcohol use: Yes     Comment: couple drinks a month    Drug use: No   Other Topics Concern    Parent/sibling w/ CABG, MI or angioplasty before 65F 55M? No    Sleep Concern Yes    Special Diet Yes     Comment: less meat     Exercise Yes     Comment: 3 x weekly    Seat Belt Yes       Review of Systems:  Skin:  Negative       Eyes:  Positive for visual blurring;glasses    ENT:  Positive for hearing loss    Respiratory:  Negative       Cardiovascular:  Negative for;chest pain;edema;fatigue Positive for;palpitations;lightheadedness rare palpitations, lightheadedness when standing too fast  Gastroenterology: Positive for constipation Miralax has been improving constipation  Genitourinary:  Negative      Musculoskeletal:  Negative      Neurologic:  Positive for headaches occasional headaches  Psychiatric:  Negative      Heme/Lymph/Imm:  Negative      Endocrine:  Negative        Physical Exam:  Vitals: /74   Pulse 62   Ht 1.727 m (5' 8\")   Wt 71.7 kg (158 lb)   SpO2 96%   BMI 24.02 kg/m      Constitutional:  cooperative, alert and oriented, well developed, well nourished, in no acute distress   Parkinsonian features.  In wheelchair    Skin:  warm and dry to the touch, no apparent skin lesions or masses noted          Head:  " normocephalic, no masses or lesions        Eyes:  pupils equal and round, conjunctivae and lids unremarkable, sclera white, no xanthalasma, EOMS intact, no nystagmus        Lymph:      ENT:  no pallor or cyanosis        Neck:  carotid pulses are full and equal bilaterally;no carotid bruit        Respiratory:  clear to auscultation;normal respiratory excursion;healed median sternotomy scar         Cardiac: regular rhythm bradycardic     late systolic murmur;LLSB;apical;grade 2                                                 GI:           Extremities and Muscular Skeletal:  no spinal abnormalities noted   bilateral LE edema;trace          Neurological:  no gross motor deficits        Psych:  oriented to time, person and place        CC  Karlie Robin, APRN CNP  [address] [place] [address]                  Thank you for allowing me to participate in the care of your patient.      Sincerely,     Ricardo Ni MD     [facility]

## 2023-11-05 ENCOUNTER — HEALTH MAINTENANCE LETTER (OUTPATIENT)
Age: 88
End: 2023-11-05

## 2024-07-09 NOTE — PROGRESS NOTES
Labs done after OV today. Will forward to Dr. Bernard to review and advise.     Component      Latest Ref Rng & Units 9/1/2018 10/26/2018   Sodium      133 - 144 mmol/L 141 141   Potassium      3.4 - 5.3 mmol/L 4.2 4.6   Chloride      94 - 109 mmol/L 110 (H) 108   Carbon Dioxide      20 - 32 mmol/L 26 25   Anion Gap      3 - 14 mmol/L 5 8   Glucose      70 - 99 mg/dL 94 108 (H)   Urea Nitrogen      7 - 30 mg/dL 23 23   Creatinine      0.66 - 1.25 mg/dL 1.78 (H) 1.87 (H)   GFR Estimate      >60 mL/min/1.7m2 37 (L) 34 (L)   GFR Estimate If Black      >60 mL/min/1.7m2 44 (L) 42 (L)   Calcium      8.5 - 10.1 mg/dL 8.2 (L) 8.9   Bilirubin Total      0.2 - 1.3 mg/dL  0.7   Albumin      3.4 - 5.0 g/dL  3.5   Protein Total      6.8 - 8.8 g/dL  6.6 (L)   Alkaline Phosphatase      40 - 150 U/L  78   ALT      0 - 70 U/L  18   AST      0 - 45 U/L  12      None

## 2024-12-22 ENCOUNTER — HEALTH MAINTENANCE LETTER (OUTPATIENT)
Age: 89
End: 2024-12-22

## (undated) DEVICE — DRSG KERLIX FLUFFS X5

## (undated) DEVICE — ESU GROUND PAD UNIVERSAL W/O CORD

## (undated) DEVICE — PREP CHLORAPREP 26ML TINTED ORANGE  260815

## (undated) DEVICE — GLOVE PROTEXIS W/NEU-THERA 8.5  2D73TE85

## (undated) DEVICE — DRAPE SHEET REV FOLD 3/4 9349

## (undated) DEVICE — SU VICRYL 2-0 CP-1 27" J466H

## (undated) DEVICE — SYR 03ML LL W/O NDL 309657

## (undated) DEVICE — DRAPE CONVERTORS U-DRAPE 60X72" 8476

## (undated) DEVICE — PREP SKIN SCRUB TRAY 4461A

## (undated) DEVICE — SU VICRYL 0 CTX 36" J370H

## (undated) DEVICE — LINEN TOWEL PACK X5 5464

## (undated) DEVICE — TUBING SUCTION 12"X1/4" N612

## (undated) DEVICE — BONE CLEANING TIP INTERPULSE  0210-010-000

## (undated) DEVICE — Device

## (undated) DEVICE — PACK OPEN SHOULDER SOP15OCFSC

## (undated) DEVICE — GLOVE PROTEXIS POWDER FREE 8.0 ORTHOPEDIC 2D73ET80

## (undated) DEVICE — STPL SKIN 35W APPOSE 8886803712

## (undated) DEVICE — DECANTER BAG 2002S

## (undated) DEVICE — BONE CLEANING TIP INTERPULSE FEMORAL CANAL 0210-008-000

## (undated) DEVICE — DRILL BIT TORNIER 3MM LONG DWD060

## (undated) DEVICE — PACK SET-UP STD 9102

## (undated) DEVICE — HOOD FLYTE W/PEELAWAY 408-800-100

## (undated) DEVICE — BONE CEMENT MIXEVAC HI VAC W/CARTRIDGE 0306-563-000

## (undated) DEVICE — IMM SLING SHOULDER LG BUCKLE 79-84247

## (undated) DEVICE — WIPES FOLEY CARE SURESTEP PROVON DFC100

## (undated) DEVICE — DRSG XEROFORM 5X9" 8884431605

## (undated) DEVICE — NDL 22GA 1.5"

## (undated) DEVICE — SU FIBERWIRE 2 38"  AR-7200

## (undated) DEVICE — DRSG ABDOMINAL 07 1/2X8" 7197D

## (undated) DEVICE — SUCTION IRR SYSTEM W/O TIP INTERPULSE HANDPIECE 0210-100-000

## (undated) DEVICE — BLADE SAW SAGITTAL STRK 25X79.5X1.24MM 4/2000 2108-318-000

## (undated) DEVICE — DRSG GAUZE 4X4" 3033

## (undated) DEVICE — GLOVE PROTEXIS W/NEU-THERA 8.0  2D73TE80

## (undated) DEVICE — WRAP SHOULDER THERAPUTIC B-COOL 0814-3321

## (undated) DEVICE — GLOVE PROTEXIS POWDER FREE 8.5 ORTHOPEDIC 2D73ET85

## (undated) DEVICE — MANIFOLD NEPTUNE 4 PORT 700-20

## (undated) DEVICE — ESU ELEC BLADE 4" COATED

## (undated) DEVICE — SU VICRYL 3-0 PS-1 18" UND J683

## (undated) DEVICE — BLADE KNIFE SURG 10 371110

## (undated) DEVICE — CATH TRAY 16FR COUDE RED RUBBER LATEX 770715

## (undated) DEVICE — SU ETHIBOND 1 CT-1 30" X425H

## (undated) DEVICE — GOWN IMPERVIOUS SPECIALTY XL/XLONG 39049

## (undated) DEVICE — LIGHT HANDLE X2

## (undated) RX ORDER — FENTANYL CITRATE 50 UG/ML
INJECTION, SOLUTION INTRAMUSCULAR; INTRAVENOUS
Status: DISPENSED
Start: 2017-07-24

## (undated) RX ORDER — ONDANSETRON 2 MG/ML
INJECTION INTRAMUSCULAR; INTRAVENOUS
Status: DISPENSED
Start: 2017-07-24

## (undated) RX ORDER — REGADENOSON 0.08 MG/ML
INJECTION, SOLUTION INTRAVENOUS
Status: DISPENSED
Start: 2018-05-16

## (undated) RX ORDER — LIDOCAINE HYDROCHLORIDE 20 MG/ML
INJECTION, SOLUTION EPIDURAL; INFILTRATION; INTRACAUDAL; PERINEURAL
Status: DISPENSED
Start: 2017-07-24

## (undated) RX ORDER — PROPOFOL 10 MG/ML
INJECTION, EMULSION INTRAVENOUS
Status: DISPENSED
Start: 2017-07-24

## (undated) RX ORDER — CEFAZOLIN SODIUM 2 G/100ML
INJECTION, SOLUTION INTRAVENOUS
Status: DISPENSED
Start: 2017-07-24

## (undated) RX ORDER — GLYCOPYRROLATE 0.2 MG/ML
INJECTION, SOLUTION INTRAMUSCULAR; INTRAVENOUS
Status: DISPENSED
Start: 2017-07-24